# Patient Record
Sex: FEMALE | Race: WHITE | Employment: OTHER | URBAN - METROPOLITAN AREA
[De-identification: names, ages, dates, MRNs, and addresses within clinical notes are randomized per-mention and may not be internally consistent; named-entity substitution may affect disease eponyms.]

---

## 2017-01-05 ENCOUNTER — TELEPHONE (OUTPATIENT)
Dept: PAIN MANAGEMENT | Age: 67
End: 2017-01-05

## 2017-01-05 NOTE — TELEPHONE ENCOUNTER
Fentanyl 50mcg was denied by FEP - pt uses with 12 mcg to = 62mcg- this is available in a single dose patch, therefore, FEP will not cover 2 different strength patches.

## 2017-01-06 NOTE — TELEPHONE ENCOUNTER
Will need script for fentanyl 62 mcg for pt due to insurance. She has both fentanyl 50 and 12 on currently to be changed 1/8/17. Daughter will bring scripts to exchange. She was able to fill 12 mcg but 50 did not go through. They were unaware of situation when filling.

## 2017-01-16 ENCOUNTER — OFFICE VISIT (OUTPATIENT)
Dept: CARDIOLOGY CLINIC | Age: 67
End: 2017-01-16

## 2017-01-16 VITALS
DIASTOLIC BLOOD PRESSURE: 64 MMHG | SYSTOLIC BLOOD PRESSURE: 137 MMHG | WEIGHT: 128 LBS | HEART RATE: 75 BPM | HEIGHT: 64 IN | BODY MASS INDEX: 21.85 KG/M2

## 2017-01-16 DIAGNOSIS — Z95.0 PACEMAKER: ICD-10-CM

## 2017-01-16 DIAGNOSIS — F17.200 TOBACCO USE DISORDER: ICD-10-CM

## 2017-01-16 DIAGNOSIS — E78.00 PURE HYPERCHOLESTEROLEMIA: ICD-10-CM

## 2017-01-16 DIAGNOSIS — Z79.899 ENCOUNTER FOR LONG-TERM (CURRENT) USE OF HIGH-RISK MEDICATION: ICD-10-CM

## 2017-01-16 DIAGNOSIS — I48.19 PERSISTENT ATRIAL FIBRILLATION (HCC): Primary | ICD-10-CM

## 2017-01-16 DIAGNOSIS — I10 ESSENTIAL HYPERTENSION, BENIGN: ICD-10-CM

## 2017-01-16 DIAGNOSIS — J44.9 CHRONIC OBSTRUCTIVE PULMONARY DISEASE, UNSPECIFIED COPD TYPE (HCC): ICD-10-CM

## 2017-01-16 RX ORDER — ATORVASTATIN CALCIUM 20 MG/1
TABLET, FILM COATED ORAL DAILY
COMMUNITY
End: 2021-06-14 | Stop reason: SDUPTHER

## 2017-01-16 NOTE — PROGRESS NOTES
1. Have you been to the ER, urgent care clinic since your last visit? Hospitalized since your last visit? No    2. Have you seen or consulted any other health care providers outside of the [facility] since your last visit? Include any pap smears or colon screening. No     3. Since your last visit, have you had any of the following symptoms? SOB/Swelling in legs/ heart palpitations/ dizzines     4. Have you had any blood work, X-rays or cardiac testing? Per daughter confirmed patient had blood work completed at PCP     5. Where do you normally have your labs drawn? [facility]      6. Do you need any refills today?   None     Medications confirmed by patient and patient's daughter

## 2017-01-16 NOTE — PROGRESS NOTES
Last Office Visit date : 7/6/16  Next Office visit date : 1/16/17  Last remote check date : 10/28/16  scanned in our chart : yes

## 2017-01-16 NOTE — PATIENT INSTRUCTIONS
please get remote checks for pacer or ICD every 3 months and Office check in 1 yr  Please call our office after every transmission to confirm success of transmission  Medications Discontinued During This Encounter   Medication Reason    fentaNYL (DURAGESIC) 12 mcg/hr patch Duplicate Order    fentaNYL (DURAGESIC) 50 mcg/hr PATCH Duplicate Order    fentaNYL (DURAGESIC) 50 mcg/hr PATCH Duplicate Order    OTHER Duplicate Order    oxyCODONE IR (OXY-IR) 30 mg immediate release tablet Duplicate Order       Orders Placed This Encounter    CBC W/O DIFF     Standing Status:   Future     Standing Expiration Date:   4/18/2017    LIPID PANEL     Standing Status:   Future     Standing Expiration Date:   4/18/2017    HEPATIC FUNCTION PANEL     Standing Status:   Future     Standing Expiration Date:   4/18/2017    PROGRAM EVAL (IN PERSON) IMPLANT DEVICE,PACEMAKER,MULT LEAD     Order Specific Question:   Reason for Exam:     Answer:   f/u pacer          Stopping Smoking: Care Instructions  Your Care Instructions  Cigarette smokers crave the nicotine in cigarettes. Giving it up is much harder than simply changing a habit. Your body has to stop craving the nicotine. It is hard to quit, but you can do it. There are many tools that people use to quit smoking. You may find that combining tools works best for you. There are several steps to quitting. First you get ready to quit. Then you get support to help you. After that, you learn new skills and behaviors to become a nonsmoker. For many people, a necessary step is getting and using medicine. Your doctor will help you set up the plan that best meets your needs. You may want to attend a smoking cessation program to help you quit smoking. When you choose a program, look for one that has proven success. Ask your doctor for ideas.  You will greatly increase your chances of success if you take medicine as well as get counseling or join a cessation program.  Some of the changes you feel when you first quit tobacco are uncomfortable. Your body will miss the nicotine at first, and you may feel short-tempered and grumpy. You may have trouble sleeping or concentrating. Medicine can help you deal with these symptoms. You may struggle with changing your smoking habits and rituals. The last step is the tricky one: Be prepared for the smoking urge to continue for a time. This is a lot to deal with, but keep at it. You will feel better. Follow-up care is a key part of your treatment and safety. Be sure to make and go to all appointments, and call your doctor if you are having problems. Its also a good idea to know your test results and keep a list of the medicines you take. How can you care for yourself at home? · Ask your family, friends, and coworkers for support. You have a better chance of quitting if you have help and support. · Join a support group, such as Nicotine Anonymous, for people who are trying to quit smoking. · Consider signing up for a smoking cessation program, such as the American Lung Association's Freedom from Smoking program.  · Set a quit date. Pick your date carefully so that it is not right in the middle of a big deadline or stressful time. Once you quit, do not even take a puff. Get rid of all ashtrays and lighters after your last cigarette. Clean your house and your clothes so that they do not smell of smoke. · Learn how to be a nonsmoker. Think about ways you can avoid those things that make you reach for a cigarette. ¨ Avoid situations that put you at greatest risk for smoking. For some people, it is hard to have a drink with friends without smoking. For others, they might skip a coffee break with coworkers who smoke. ¨ Change your daily routine. Take a different route to work or eat a meal in a different place. · Cut down on stress. Calm yourself or release tension by doing an activity you enjoy, such as reading a book, taking a hot bath, or gardening.   · Talk to your doctor or pharmacist about nicotine replacement therapy, which replaces the nicotine in your body. You still get nicotine but you do not use tobacco. Nicotine replacement products help you slowly reduce the amount of nicotine you need. These products come in several forms, many of them available over-the-counter:  ¨ Nicotine patches  ¨ Nicotine gum and lozenges  ¨ Nicotine inhaler  · Ask your doctor about bupropion (Wellbutrin) or varenicline (Chantix), which are prescription medicines. They do not contain nicotine. They help you by reducing withdrawal symptoms, such as stress and anxiety. · Some people find hypnosis, acupuncture, and massage helpful for ending the smoking habit. · Eat a healthy diet and get regular exercise. Having healthy habits will help your body move past its craving for nicotine. · Be prepared to keep trying. Most people are not successful the first few times they try to quit. Do not get mad at yourself if you smoke again. Make a list of things you learned and think about when you want to try again, such as next week, next month, or next year. Where can you learn more? Go to http://jose alejandro-lizzy.info/. Enter H788 in the search box to learn more about \"Stopping Smoking: Care Instructions. \"  Current as of: May 26, 2016  Content Version: 11.1  © 6147-8188 [employer]. Care instructions adapted under license by [employer] (which disclaims liability or warranty for this information). If you have questions about a medical condition or this instruction, always ask your healthcare professional. [place] [address] any warranty or liability for your use of this information.

## 2017-01-16 NOTE — PROGRESS NOTES
HISTORY OF PRESENT ILLNESS  Juancarlos Ware is a 78 y.o. female. HPI Comments: F/u AFib, HTN, CHF, MR, HLD, s/p DDD  1/17 has a cold for 6 wks    Palpitations    The history is provided by the medical records. This is a chronic problem. The current episode started more than 1 week ago. The problem has not changed since onset. The problem occurs daily (off and on). Associated symptoms include irregular heartbeat, dizziness and shortness of breath. Pertinent negatives include no diaphoresis, no fever, no chest pain, no claudication, no orthopnea, no PND, no syncope, no abdominal pain, no nausea, no vomiting, no headaches, no weakness, no cough, no hemoptysis and no sputum production. Risk factors include diabetes mellitus, hypertension, smoking/tobacco exposure and dyslipidemia. Her past medical history is significant for DM, hypertension and atrial fibrillation. Hypertension   The history is provided by the medical records. This is a chronic problem. Associated symptoms include shortness of breath. Pertinent negatives include no chest pain, no abdominal pain and no headaches. Cholesterol Problem   The history is provided by the medical records. This is a chronic problem. Associated symptoms include shortness of breath. Pertinent negatives include no chest pain, no abdominal pain and no headaches. Pacemaker Check   The history is provided by the patient and medical records. Associated symptoms include shortness of breath. Pertinent negatives include no chest pain, no abdominal pain and no headaches. Dizziness   The history is provided by the patient. This is a new problem. The current episode started more than 1 week ago. The problem occurs every several days (3/wk; 5-10 mts). The problem has not changed since onset. Associated symptoms include shortness of breath. Pertinent negatives include no chest pain, no abdominal pain and no headaches. The symptoms are aggravated by twisting (looking/reaching up).  The symptoms are relieved by rest and laying down. Shortness of Breath   The history is provided by the patient. This is a chronic problem. The problem occurs intermittently. The current episode started more than 1 week ago. The problem has not changed since onset. Pertinent negatives include no fever, no headaches, no ear pain, no neck pain, no cough, no sputum production, no hemoptysis, no wheezing, no PND, no orthopnea, no chest pain, no syncope, no vomiting, no abdominal pain, no rash, no leg swelling and no claudication. The problem's precipitants include exercise (2 blocks). Review of Systems   Constitutional: Negative for chills, diaphoresis, fever and weight loss. HENT: Negative for ear pain and hearing loss. Eyes: Negative for blurred vision. Respiratory: Positive for shortness of breath. Negative for cough, hemoptysis, sputum production, wheezing and stridor. Cardiovascular: Positive for palpitations. Negative for chest pain, orthopnea, claudication, leg swelling, syncope and PND. Gastrointestinal: Negative for abdominal pain, heartburn, nausea and vomiting. Musculoskeletal: Negative for myalgias and neck pain. Skin: Negative for rash. Neurological: Positive for dizziness. Negative for tingling, tremors, focal weakness, loss of consciousness, weakness and headaches. Psychiatric/Behavioral: Negative for depression and suicidal ideas. No Known Allergies    Past Medical History   Diagnosis Date    Abnormal myocardial perfusion study 05/07/2010     Anterior ischemia c/w at least 1-vessel CAD. No WMA. EF 51%. Positive EKG at 78% pred max HR. Ex time 7 min 30 sec.  Allergic rhinitis 1/30/2010    Atrial fibrillation ([facility] [address]     Broken ankle 08/22/2013     left    Carotid stenosis 1/30/2010    COPD     Essential hypertension     Heart failure ([facility] [address]     History of amiodarone therapy 5/11/2015    History of cardiac cath 05/25/2010     Patent coronary arteries. LVEDP 13 mmHg. EF 65%.  History of cervical cancer 11/05    History of echocardiogram 08/21/2013     EF 50-55%. No RWMA. Gr 2 DDfx. Mild LAE. Mild MR.      Hypertension     Migraines 1/30/2010    Mitral regurgitation     Orthostatic dizziness 3/30/2016     ? autonomic dysfunction Vs amio side effect     Pacemaker 11/25/13     DDD    Pure hypercholesterolemia 1/30/2010    Rosacea 1/30/2010    Type II or unspecified type diabetes mellitus without mention of complication, not stated as uncontrolled 1/30/2010    Unspecified hypothyroidism 1/30/2010       Family History   Problem Relation Age of Onset    Hypertension Mother     Cancer Maternal Aunt      breast CA 68yo    Breast Cancer Maternal Aunt 79    Diabetes Maternal Grandmother     Cancer Maternal Aunt      uterine CA    Diabetes Daughter     Other Daughter      lupus (SLE) and coagulopathy    Breast Cancer Paternal Aunt 61    Heart Attack Sister     Heart Disease Neg Hx        Social History   Substance Use Topics    Smoking status: Current Some Day Smoker     Packs/day: 0.50     Years: 42.00     Last attempt to quit: 5/7/2013    Smokeless tobacco: Never Used      Comment: per patient some days none and some days a few     Alcohol use No        Current Outpatient Prescriptions   Medication Sig    atorvastatin (LIPITOR) 20 mg tablet Take  by mouth daily.  XARELTO 20 mg tab tablet TAKE ONE TABLET BY MOUTH ONCE DAILY WITH DINNER    [START ON 1/31/2017] fentaNYL (DURAGESIC) 12 mcg/hr patch 1 Patch by TransDERmal route every seventy-two (72) hours for 30 days. For chronic pain  Use with 50 ug patch = 62 ug total dose  Indications: CHRONIC PAIN WITH OPIOID TOLERANCE, SEVERE PAIN WITH OPIOID TOLERANCE    [START ON 1/31/2017] oxyCODONE IR (OXY-IR) 30 mg immediate release tablet Take 1 Tab by mouth four (4) times daily as needed for Pain for up to 30 days. Max Daily Amount: 120 mg.  Indications: NEUROPATHIC PAIN, PAIN    diltiazem XR (DILACOR XR) 180 mg XR capsule TAKE ONE CAPSULE BY MOUTH ONCE DAILY    digoxin (LANOXIN) 0.25 mg tablet Take 1 Tab by mouth daily. (Patient taking differently: Take 0.125 mg by mouth daily.)    naloxegol (MOVANTIK) 25 mg tab tablet Take 25 mg by mouth daily.  insulin lispro (HUMALOG) 100 unit/mL injection by SubCUTAneous route.  insulin glargine (LANTUS) 100 unit/mL injection by SubCUTAneous route nightly.  ipratropium-albuterol (COMBIVENT RESPIMAT)  mcg/actuation inhaler Take 1 Puff by inhalation every six (6) hours as needed for Wheezing or Shortness of Breath.  pyridoxine (VITAMIN B-6) 100 mg tablet Take 100 mg by mouth daily.  fish oil-dha-epa 1,200-144-216 mg cap Take 1 Cap by mouth daily.  levothyroxine (SYNTHROID) 125 mcg tablet Take 125 mcg by mouth Daily (before breakfast).  tiotropium (SPIRIVA WITH HANDIHALER) 18 mcg inhalation capsule Take 1 Cap by inhalation daily.  cholecalciferol, vitamin d3, (VITAMIN D) 1,000 unit tablet Take 5,000 Units by mouth daily. No current facility-administered medications for this visit. Past Surgical History   Procedure Laterality Date    Hx carotid endarterectomy  11/06     right Dr. Glenny Bradley Hx hysterectomy  5/06    Hx afib ablation  2/8/13     Dr Katherine Ramirez Hx svt ablation  2/18/2013    Hx pacemaker  11/25/13     DDD    Hx orthopaedic Right 07/11/2016     knee replacement       Diagnostic Studies: Old records reviewed and show as follows  EKG tracings reviewed by me today.     CARDIOLOGY STUDIES 8/2/2013 1/31/2013   EKG Result - 11/12 Atrial Flutter, rapid heart rate, normal axis, diffuse nonspecific ST-T changes   Myocardial Perfusion Scan Result - 12/05 nl scan, EF 51%; 4/08 nl scan, EF 66%; 5/10 abn scan, partially reversible anterior ischemia, EF 51%;   Echocardiogram - Complete Result 55%EF, mild MR, trivial PE 4/07 EF 60-65% mild DD, mild MR; 4/08 EF 60%,mild DD,trace/mild MR; 2/10 EF 45-50%, PAP 32; 5/10; 3/11 EF 55%, mild MR; 11/12 55%EF, mild LVH,mod MR,   Coronary Angiogram Result - CATH 6/06; 5/10 nl ca;   PFT's with DLCO Result - 9/07; 2/10; 7/10;   10/15 NUC STRESS  CONCLUSION  1. No evidence of ischemia based on this study. 2. Normal left ventricular size and function. Ejection fraction of 49%. 3. Low risk scan. Visit Vitals    /64    Pulse 75    Ht 5' 4\" (1.626 m)    Wt 58.1 kg (128 lb)    BMI 21.97 kg/m2       Ms. Yoo has a reminder for a \"due or due soon\" health maintenance. I have asked that she contact her primary care provider for follow-up on this health maintenance. Physical Exam   Constitutional: She is oriented to person, place, and time. She appears well-developed and well-nourished. No distress. HENT:   Head: Atraumatic. Mouth/Throat: No oropharyngeal exudate. Eyes: Conjunctivae are normal. No scleral icterus. Neck: Neck supple. No JVD present. Cardiovascular: An irregularly irregular rhythm present. Tachycardia present. Exam reveals no gallop. No murmur heard. Pulmonary/Chest: Effort normal and breath sounds normal. No stridor. She has no wheezes. She has no rales. Abdominal: Soft. There is no tenderness. Musculoskeletal: Normal range of motion. She exhibits no edema. Neurological: She is alert and oriented to person, place, and time. She exhibits normal muscle tone. Skin: Skin is warm. She is not diaphoretic. Psychiatric: She has a normal mood and affect. Her behavior is normal.     ekg atrial fibrillation with ventricular paced  ASSESSMENT and  Mace was seen today for irregular heart beat, hypertension, cholesterol problem and pacemaker check. Diagnoses and all orders for this visit:    Persistent atrial fibrillation [facility])    Essential hypertension, benign  Comments:  1/17 controlled    Pure hypercholesterolemia  Comments:  9/15 LDL46; lipitor incr recently  recheck in 1 months  Orders:  -     LIPID PANEL;  Future  -     HEPATIC FUNCTION PANEL; Future    Chronic obstructive pulmonary disease, unspecified COPD type ([facility] [address]    Tobacco use disorder  Comments:  1/17 Patient advised to stop smoking to reduce future cardiovascular events. Encounter for long-term (current) use of high-risk medication  Comments:  anticoags    Orders:  -     CBC W/O DIFF; Future    Pacemaker  Comments:  1/17 nl function, perm AFib  Orders:  -     PROGRAM EVAL (IN PERSON) IMPLANT DEVICE,PACEMAKER,MULT LEAD        Pertinent laboratory and test data reviewed and discussed with patient.   See patient instructions also for other medical advice given    Medications Discontinued During This Encounter   Medication Reason    fentaNYL (DURAGESIC) 12 mcg/hr patch Duplicate Order    fentaNYL (DURAGESIC) 50 mcg/hr PATCH Duplicate Order    fentaNYL (DURAGESIC) 50 mcg/hr PATCH Duplicate Order    OTHER Duplicate Order    oxyCODONE IR (OXY-IR) 30 mg immediate release tablet Duplicate Order       Follow-up Disposition:  Return in about 6 months (around 7/16/2017), or if symptoms worsen or fail to improve, for with pacer/ICD check, post test.

## 2017-01-16 NOTE — MR AVS SNAPSHOT
Visit Information Date & Time Provider Department Dept. Phone Encounter #  
 1/16/2017 10:15 AM Marleni Fernandez MD [facility] [id number] Follow-up Instructions Return in about 6 months (around 7/16/2017), or if symptoms worsen or fail to improve, for with pacer/ICD check, post test.  
  
Your Appointments 1/18/2017  9:00 AM  
PROCEDURE with [facility] IMAGING 2  
[facility] ([facility] SCHEDULING) Appt Note: asim iliack puga [address] [address]  
[phone] [address] [address]  
  
    
 1/18/2017 10:00 AM  
PROCEDURE with [facility] NONIMAGING  
[facility] ([facility] SCHEDULING) Appt Note: agnes puga [address] [address]  
[phone] [address] [address]  
  
    
 1/25/2017  9:20 AM  
Follow Up with Chandan Mejía MD  
[facility] [address] [facility]) Appt Note: return in 2 months 30 [facility] [address]  
[phone] [address] [facility]  
  
    
 2/1/2017 11:45 AM  
Follow Up with Justo Springer MD  
[facility] ([facility] SCHEDULING) [address] [facility] [address] [place] [address]  
[phone]  
  
   
 [address] [address] 7/17/2017 10:00 AM  
ESTABLISHED PATIENT with Marleni Fernandez MD  
[facility]) Appt Note: 6 months with medtr[employer] [address] [facility], [address] [facility] [address]  
[address], [address] [facility] 4300 [facility] Upcoming Health Maintenance Date Due Hepatitis C Screening 1950 FOOT EXAM Q1 5/1/1959 EYE EXAM RETINAL OR DILATED Q1 5/1/1959 FOBT Q 1 YEAR AGE 50-75 5/1/1999 DTaP/Tdap/Td series (1 - Tdap) 10/2/2005 ZOSTER VACCINE AGE 60> 5/1/2009 GLAUCOMA SCREENING Q2Y 5/1/2014 OSTEOPOROSIS SCREENING (DEXA) 5/1/2014 Pneumococcal 65+ Low/Medium Risk (2 of 2 - PPSV23) 5/1/2014 MEDICARE YEARLY EXAM 5/1/2014 BREAST CANCER SCRN MAMMOGRAM 7/25/2015 MICROALBUMIN Q1 1/8/2016 HEMOGLOBIN A1C Q6M 7/10/2016 INFLUENZA AGE 9 TO ADULT 8/1/2016 LIPID PANEL Q1 9/29/2016 Allergies as of 1/16/2017  Review Complete On: 1/16/2017 By: Ta Alberto MD  
 No Known Allergies Current Immunizations  Reviewed on 2/9/2011 Name Date Pneumococcal Vaccine (Unspecified Type) 12/14/2005 TD Vaccine 10/1/2005 Not reviewed this visit You Were Diagnosed With   
  
 Codes Comments Persistent atrial fibrillation (HCC)    -  Primary ICD-10-CM: I48.1 ICD-9-CM: 427.31 Essential hypertension, benign     ICD-10-CM: I10 
ICD-9-CM: 401.1 1/17 controlled Pure hypercholesterolemia     ICD-10-CM: E78.00 ICD-9-CM: 272.0 9/15 LDL46; lipitor incr recently 
recheck in 1 months Chronic obstructive pulmonary disease, unspecified [facility] ([facility]     ICD-10-CM: J44.9 ICD-9-CM: 070 Tobacco use disorder     ICD-10-CM: F17.200 ICD-9-CM: 305.1 1/17 Patient advised to stop smoking to reduce future cardiovascular events. Encounter for long-term (current) use of high-risk medication     ICD-10-CM: Z79.899 ICD-9-CM: V58.69 anticoags Pacemaker     ICD-10-CM: Z95.0 ICD-9-CM: V45.01 1/17 nl function, perm AFib Vitals BP Pulse Height(growth percentile) Weight(growth percentile) BMI OB Status 137/64 75 5' 4\" (1.626 m) 128 lb (58.1 kg) 21.97 kg/m2 Hysterectomy Smoking Status Current Some Day Smoker Vitals History BMI and BSA Data Body Mass Index Body Surface Area  
 21.97 kg/m 2 1.62 m 2 Preferred Pharmacy Pharmacy Name Phone [phone] [address] [phone] Your Updated Medication List  
  
 This list is accurate as of: 1/16/17 11:01 AM.  Always use your most recent med list.  
  
  
  
  
 digoxin 0.25 mg tablet Commonly known as:  LANOXIN Take 1 Tab by mouth daily. dilTIAZem  mg XR capsule Commonly known as:  DILACOR XR  
TAKE ONE CAPSULE BY MOUTH ONCE DAILY  
  
 fentaNYL 12 mcg/hr patch Commonly known as:  DURAGESIC  
1 Patch by TransDERmal route every seventy-two (72) hours for 30 days. For chronic pain Use with 50 ug patch = 62 ug total dose  Indications: CHRONIC PAIN WITH OPIOID TOLERANCE, SEVERE PAIN WITH OPIOID TOLERANCE Start taking on:  1/31/2017  
  
 fish oil-dha-epa 1,200-144-216 mg Cap Take 1 Cap by mouth daily. HumaLOG 100 unit/mL injection Generic drug:  insulin lispro  
by SubCUTAneous route. ipratropium-albuterol  mcg/actuation inhaler Commonly known as:  Jerral Ruck Take 1 Puff by inhalation every six (6) hours as needed for Wheezing or Shortness of Breath. LANTUS 100 unit/mL injection Generic drug:  insulin glargine  
by SubCUTAneous route nightly. LIPITOR 20 mg tablet Generic drug:  atorvastatin Take  by mouth daily. naloxegol 25 mg Tab tablet Commonly known as:  Kelsie Linear Take 25 mg by mouth daily. oxyCODONE IR 30 mg immediate release tablet Commonly known as:  OXY-IR Take 1 Tab by mouth four (4) times daily as needed for Pain for up to 30 days. Max Daily Amount: 120 mg. Indications: NEUROPATHIC PAIN, PAIN Start taking on:  1/31/2017 SYNTHROID 125 mcg tablet Generic drug:  levothyroxine Take 125 mcg by mouth Daily (before breakfast). tiotropium 18 mcg inhalation capsule Commonly known as:  [address] Take 1 Cap by inhalation daily. VITAMIN B-6 100 mg tablet Generic drug:  pyridoxine (vitamin B6) Take 100 mg by mouth daily. VITAMIN D3 1,000 unit tablet Generic drug:  cholecalciferol Take 5,000 Units by mouth daily. XARELTO 20 mg Tab tablet Generic drug:  rivaroxaban TAKE ONE TABLET BY MOUTH ONCE DAILY WITH DINNER We Performed the Following PROGRAM EVAL (IN PERSON) IMPLANT DEVICE,PACEMAKER,MULT LEAD [id number] CPT(R)] Follow-up Instructions Return in about 6 months (around 7/16/2017), or if symptoms worsen or fail to improve, for with pacer/ICD check, post test.  
  
To-Do List   
 Around 02/10/2017 Lab:  CBC W/O DIFF Around 02/10/2017 Lab:  HEPATIC FUNCTION PANEL Around 02/10/2017 Lab:  LIPID PANEL Patient Instructions   
please get remote checks for pacer or ICD every 3 months and Office check in 1 yr Please call our office after every transmission to confirm success of transmission Medications Discontinued During This Encounter Medication Reason  fentaNYL (DURAGESIC) 12 mcg/hr patch Duplicate Order  fentaNYL (DURAGESIC) 50 mcg/hr PATCH Duplicate Order  fentaNYL (DURAGESIC) 50 mcg/hr PATCH Duplicate Order  OTHER Duplicate Order  oxyCODONE IR (OXY-IR) 30 mg immediate release tablet Duplicate Order Orders Placed This Encounter  CBC W/O DIFF Standing Status:   Future Standing Expiration Date:   4/18/2017  LIPID PANEL Standing Status:   Future Standing Expiration Date:   4/18/2017  
 HEPATIC FUNCTION PANEL Standing Status:   Future Standing Expiration Date:   4/18/2017  
 PROGRAM EVAL (IN PERSON) IMPLANT DEVICE,PACEMAKER,MULT LEAD Order Specific Question:   Reason for Exam: Answer:   f/u pacer Stopping Smoking: Care Instructions Your Care Instructions Cigarette smokers crave the nicotine in cigarettes. Giving it up is much harder than simply changing a habit. Your body has to stop craving the nicotine. It is hard to quit, but you can do it. There are many tools that people use to quit smoking. You may find that combining tools works best for you. There are several steps to quitting. First you get ready to quit. Then you get support to help you. After that, you learn new skills and behaviors to become a nonsmoker. For many people, a necessary step is getting and using medicine. Your doctor will help you set up the plan that best meets your needs. You may want to attend a smoking cessation program to help you quit smoking. When you choose a program, look for one that has proven success. Ask your doctor for ideas. You will greatly increase your chances of success if you take medicine as well as get counseling or join a cessation program. 
Some of the changes you feel when you first quit tobacco are uncomfortable. Your body will miss the nicotine at first, and you may feel short-tempered and grumpy. You may have trouble sleeping or concentrating. Medicine can help you deal with these symptoms. You may struggle with changing your smoking habits and rituals. The last step is the tricky one: Be prepared for the smoking urge to continue for a time. This is a lot to deal with, but keep at it. You will feel better. Follow-up care is a key part of your treatment and safety. Be sure to make and go to all appointments, and call your doctor if you are having problems. Its also a good idea to know your test results and keep a list of the medicines you take. How can you care for yourself at home? · Ask your family, friends, and coworkers for support. You have a better chance of quitting if you have help and support. · Join a support group, such as Nicotine Anonymous, for people who are trying to quit smoking. · Consider signing up for a smoking cessation program, such as the American Lung Association's Freedom from Smoking program. 
· Set a quit date. Pick your date carefully so that it is not right in the middle of a big deadline or stressful time. Once you quit, do not even take a puff.  Get rid of all ashtrays and lighters after your last cigarette. Clean your house and your clothes so that they do not smell of smoke. · Learn how to be a nonsmoker. Think about ways you can avoid those things that make you reach for a cigarette. ¨ Avoid situations that put you at greatest risk for smoking. For some people, it is hard to have a drink with friends without smoking. For others, they might skip a coffee break with coworkers who smoke. ¨ Change your daily routine. Take a different route to work or eat a meal in a different place. · Cut down on stress. Calm yourself or release tension by doing an activity you enjoy, such as reading a book, taking a hot bath, or gardening. · Talk to your doctor or pharmacist about nicotine replacement therapy, which replaces the nicotine in your body. You still get nicotine but you do not use tobacco. Nicotine replacement products help you slowly reduce the amount of nicotine you need. These products come in several forms, many of them available over-the-counter: ¨ Nicotine patches ¨ Nicotine gum and lozenges ¨ Nicotine inhaler · Ask your doctor about bupropion (Wellbutrin) or varenicline (Chantix), which are prescription medicines. They do not contain nicotine. They help you by reducing withdrawal symptoms, such as stress and anxiety. · Some people find hypnosis, acupuncture, and massage helpful for ending the smoking habit. · Eat a healthy diet and get regular exercise. Having healthy habits will help your body move past its craving for nicotine. · Be prepared to keep trying. Most people are not successful the first few times they try to quit. Do not get mad at yourself if you smoke again. Make a list of things you learned and think about when you want to try again, such as next week, next month, or next year. Where can you learn more? Go to http://jose alejandro-lizzy.info/. Enter P221 in the search box to learn more about \"Stopping Smoking: Care Instructions. \" Current as of: May 26, 2016 Content Version: 11.1 © 7647-3435 [employer]. Care instructions adapted under license by [employer] (which disclaims liability or warranty for this information). If you have questions about a medical condition or this instruction, always ask your healthcare professional. [address] any warranty or liability for your use of this information. Introducing [address]! Tyler Farrell introduces Helveta patient portal. Now you can access parts of your medical record, email your doctor's office, and request medication refills online. 1. In your internet browser, go to [email or web address] 2. Click on the First Time User? Click Here link in the Sign In box. You will see the New Member Sign Up page. 3. Enter your Helveta Access Code exactly as it appears below. You will not need to use this code after youve completed the sign-up process. If you do not sign up before the expiration date, you must request a new code. · Helveta Access Code: [email or web address] Expires: 4/16/2017 10:14 AM 
 
4. Enter the last four digits of your Social Security Number (xxxx) and Date of Birth (mm/dd/yyyy) as indicated and click Submit. You will be taken to the next sign-up page. 5. Create a Helveta ID. This will be your Helveta login ID and cannot be changed, so think of one that is secure and easy to remember. 6. Create a Helveta password. You can change your password at any time. 7. Enter your Password Reset Question and Answer. This can be used at a later time if you forget your password. 8. Enter your e-mail address. You will receive e-mail notification when new information is available in [address] 9. Click Sign Up. You can now view and download portions of your medical record. 10. Click the Download Summary menu link to download a portable copy of your medical information. If you have questions, please visit the Frequently Asked Questions section of the [employer] website. Remember, [employer] is NOT to be used for urgent needs. For medical emergencies, dial 911. Now available from your iPhone and Android! Please provide this summary of care documentation to your next provider. Your primary care clinician is listed as Winsome Scherer. If you have any questions after today's visit, please call [phone].

## 2017-01-16 NOTE — LETTER
[address] 1950 1/16/2017 Dear Ewa Washington MD 
 
I had the pleasure of evaluating  Ms. Yoo in office today. Below are the relevant portions of my assessment and plan of care. ICD-10-CM ICD-9-CM 1. Persistent atrial fibrillation (HCC) I48.1 427.31   
2. Essential hypertension, benign I10 401.1 1/17 controlled 3. Pure hypercholesterolemia E78.00 272.0 LIPID PANEL  
   HEPATIC FUNCTION PANEL  
 9/15 LDL46; lipitor incr recently 
recheck in 1 months 4. Chronic obstructive pulmonary disease, unspecified COPD type ([facility] J44.9 496   
5. Tobacco use disorder F17.200 305.1 1/17 Patient advised to stop smoking to reduce future cardiovascular events. 6. Encounter for long-term (current) use of high-risk medication Z79.899 V58.69 CBC W/O DIFF  
 anticoags 7. Pacemaker Z95.0 V45.01 PROGRAM EVAL (IN PERSON) IMPLANT DEVICE,PACEMAKER,MULT LEAD  
 1/17 nl function, perm AFib Current Outpatient Prescriptions Medication Sig Dispense Refill  atorvastatin (LIPITOR) 20 mg tablet Take  by mouth daily.  XARELTO 20 mg tab tablet TAKE ONE TABLET BY MOUTH ONCE DAILY WITH DINNER 30 Tab 6  [START ON 1/31/2017] fentaNYL (DURAGESIC) 12 mcg/hr patch 1 Patch by TransDERmal route every seventy-two (72) hours for 30 days. For chronic pain Use with 50 ug patch = 62 ug total dose  Indications: CHRONIC PAIN WITH OPIOID TOLERANCE, SEVERE PAIN WITH OPIOID TOLERANCE 10 Patch 0  
 [START ON 1/31/2017] oxyCODONE IR (OXY-IR) 30 mg immediate release tablet Take 1 Tab by mouth four (4) times daily as needed for Pain for up to 30 days. Max Daily Amount: 120 mg. Indications: NEUROPATHIC PAIN, PAIN 120 Tab 0  
 diltiazem XR (DILACOR XR) 180 mg XR capsule TAKE ONE CAPSULE BY MOUTH ONCE DAILY 90 Cap 1  digoxin (LANOXIN) 0.25 mg tablet Take 1 Tab by mouth daily. (Patient taking differently: Take 0.125 mg by mouth daily.) 30 Tab 6  naloxegol (MOVANTIK) 25 mg tab tablet Take 25 mg by mouth daily.  insulin lispro (HUMALOG) 100 unit/mL injection by SubCUTAneous route.  insulin glargine (LANTUS) 100 unit/mL injection by SubCUTAneous route nightly.  ipratropium-albuterol (COMBIVENT RESPIMAT)  mcg/actuation inhaler Take 1 Puff by inhalation every six (6) hours as needed for Wheezing or Shortness of Breath. 1 Inhaler 1  pyridoxine (VITAMIN B-6) 100 mg tablet Take 100 mg by mouth daily.  fish oil-dha-epa 1,200-144-216 mg cap Take 1 Cap by mouth daily.  levothyroxine (SYNTHROID) 125 mcg tablet Take 125 mcg by mouth Daily (before breakfast).  tiotropium (SPIRIVA WITH HANDIHALER) 18 mcg inhalation capsule Take 1 Cap by inhalation daily. 30 Cap 11  
 cholecalciferol, vitamin d3, (VITAMIN D) 1,000 unit tablet Take 5,000 Units by mouth daily. Orders Placed This Encounter  CBC W/O DIFF Standing Status:   Future Standing Expiration Date:   4/18/2017  LIPID PANEL Standing Status:   Future Standing Expiration Date:   4/18/2017  
 HEPATIC FUNCTION PANEL Standing Status:   Future Standing Expiration Date:   4/18/2017  
 PROGRAM EVAL (IN PERSON) IMPLANT DEVICE,PACEMAKER,MULT LEAD Order Specific Question:   Reason for Exam: Answer:   f/u pacer  atorvastatin (LIPITOR) 20 mg tablet Sig: Take  by mouth daily. If you have questions, please do not hesitate to call me. I look forward to following Ms. oYo along with you. Sincerely, Elisabeth Parker MD

## 2017-01-18 ENCOUNTER — OFFICE VISIT (OUTPATIENT)
Dept: VASCULAR SURGERY | Age: 67
End: 2017-01-18

## 2017-01-18 DIAGNOSIS — I10 ESSENTIAL HYPERTENSION, BENIGN: ICD-10-CM

## 2017-01-18 DIAGNOSIS — I74.09 AORTOILIAC OCCLUSIVE DISEASE (HCC): ICD-10-CM

## 2017-01-18 DIAGNOSIS — I65.23 CAROTID STENOSIS, BILATERAL: ICD-10-CM

## 2017-01-18 NOTE — PROCEDURES
Kelsie Tomlinson   *** FINAL REPORT ***    Name: Jessi Yepez  MRN: [id number]       Outpatient  : 01 May 1950  HIS Order #: [id number]  [address] [facility] Visit #: [id number]  Date: 2017    TYPE OF TEST: Aorto-Iliac Duplex    REASON FOR TEST  Iliac stent    B-Mode:-                 (cm)   1     2     3  Aortic diameter:         AP:                 1.3                           TV:                 1.6  Common iliac diameter:   Right:                           Left:    Duplex:-                           PSV  Stenosis                           ----- --------------------  Aorta: (1)                     Normal         (2)         (3)               115.0    Right common iliac:      122.0 Normal  Right external iliac:    142.0 Normal    Left common iliac:       158.0 Normal  Left external iliac:     142.0 Normal    INTERPRETATION/FINDINGS  Duplex images were obtained using 2-D gray scale, color flow and  spectral doppler. 1. Patent  common iliac artery stents bilaterally  with no evidence of   significant stenosis. 2. Patent external iliac arteries bilaterally with no evidence of  significant stenosis. 3. Patent internal iliac arteries bilaterally. 4. Multiphasic flow noted throughout the iliac vessels. 5. Compared to the previous study on 16 there is no significant  change. ADDITIONAL COMMENTS    I have personally reviewed the data relevant to the interpretation of  this  study. TECHNOLOGIST: Jazmin Jones RVT, WENCESLAO  Signed: 2017 09:52 AM    PHYSICIAN: Mine Guevara.  Calin Paniagua MD  Signed: 2017 04:41 PM

## 2017-01-18 NOTE — PROCEDURES
[facility]   *** FINAL REPORT ***    Name: Quinton Leos  MRN: [id number]       Outpatient  : 01 May 1950  HIS Order #: [id number]  [address] [facility] Visit #: [id number]  Date: 2017    TYPE OF TEST: Cerebrovascular Duplex    REASON FOR TEST  Asymptomatic-post op follow up, Carotid stenosis    Right Carotid:-             Proximal               Mid                 Distal  cm/s  Systolic  Diastolic  Systolic  Diastolic  Systolic  Diastolic  CCA:     69.1      12.0       52.0      14.0       52.0      17.0  Bulb:    42.0      14.0  ECA:     48.0       5.0  ICA:     53.0      13.0       76.0      24.0       85.0      18.0  ICA/CCA:  1.4       1.5    ICA Stenosis: <50%    Right Vertebral:-  Finding: Antegrade  Sys:       52.0  Prema:       11.0    Right Subclavian:    Left Carotid:-            Proximal                Mid                 Distal  cm/s  Systolic  Diastolic  Systolic  Diastolic  Systolic  Diastolic  CCA:     44.3      18.0       60.0      15.0       43.0      11.0  Bulb:    58.0      18.0  ECA:     52.0       5.0  ICA:    155.0      38.0      119.0      29.0      115.0      38.0  ICA/CCA:  1.8       2.1    ICA Stenosis: 50-69%    Left Vertebral:-  Finding: Antegrade  Sys:     4813.0  Prema:    Left Subclavian:    INTERPRETATION/FINDINGS  Duplex images were obtained using 2-D gray scale, color flow and  spectral doppler analysis. 1. Patent right carotid endarterectomy with mild plaquing in the less  than 50% range. 2. Moderate  50-69% stenosis in the left internal carotid artery. 3. Heterogeneous plaque in the left common carotid artery without  significant stenosis. 4. No significant stenosis in the external carotid arteries  bilaterally. 5. Antegrade flow in both vertebral arteries. Plaque Morphology:  1. Heterogeneous plaque in the bulb and right ICA. 2. Irregular varying density plaque in the bulb and left ICA. No prior study in this lab for comparison.     ADDITIONAL COMMENTS    I have personally reviewed the data relevant to the interpretation of  this  study. TECHNOLOGIST: Varinder Swanson RVT, WENCESLAO  Signed: 01/18/2017 09:46 AM    PHYSICIAN: Omaira Cartwright.  Randi Rowland MD  Signed: 01/18/2017 04:41 PM

## 2017-01-25 ENCOUNTER — OFFICE VISIT (OUTPATIENT)
Dept: PAIN MANAGEMENT | Age: 67
End: 2017-01-25

## 2017-01-25 VITALS
BODY MASS INDEX: 21.97 KG/M2 | WEIGHT: 128 LBS | SYSTOLIC BLOOD PRESSURE: 122 MMHG | DIASTOLIC BLOOD PRESSURE: 54 MMHG | HEART RATE: 95 BPM

## 2017-01-25 DIAGNOSIS — Z86.59 HISTORY OF DEPRESSION: ICD-10-CM

## 2017-01-25 DIAGNOSIS — M79.2 NEURALGIA AND NEURITIS: ICD-10-CM

## 2017-01-25 DIAGNOSIS — G89.4 CHRONIC PAIN SYNDROME: ICD-10-CM

## 2017-01-25 DIAGNOSIS — Z79.899 ENCOUNTER FOR LONG-TERM (CURRENT) USE OF HIGH-RISK MEDICATION: ICD-10-CM

## 2017-01-25 DIAGNOSIS — M15.9 GENERALIZED OA: ICD-10-CM

## 2017-01-25 DIAGNOSIS — E11.40 DIABETIC NEUROPATHY, PAINFUL (HCC): Primary | ICD-10-CM

## 2017-01-25 RX ORDER — FENTANYL 50 UG/1
1 PATCH TRANSDERMAL
Qty: 10 PATCH | Refills: 0 | Status: SHIPPED | OUTPATIENT
Start: 2017-04-03 | End: 2017-01-25 | Stop reason: RX

## 2017-01-25 RX ORDER — FENTANYL 12.5 UG/1
1 PATCH TRANSDERMAL
Qty: 10 PATCH | Refills: 0 | Status: SHIPPED | OUTPATIENT
Start: 2017-03-05 | End: 2017-01-25 | Stop reason: RX

## 2017-01-25 RX ORDER — FENTANYL 50 UG/1
1 PATCH TRANSDERMAL
Qty: 10 PATCH | Refills: 0 | Status: SHIPPED | OUTPATIENT
Start: 2017-03-05 | End: 2017-01-25 | Stop reason: RX

## 2017-01-25 RX ORDER — OXYCODONE HYDROCHLORIDE 30 MG/1
30 TABLET ORAL
Qty: 120 TAB | Refills: 0 | Status: SHIPPED | OUTPATIENT
Start: 2017-03-30 | End: 2017-04-21 | Stop reason: SDUPTHER

## 2017-01-25 RX ORDER — FENTANYL 50 UG/1
1 PATCH TRANSDERMAL
Qty: 10 PATCH | Refills: 0 | Status: SHIPPED | OUTPATIENT
Start: 2017-02-04 | End: 2017-01-25 | Stop reason: RX

## 2017-01-25 RX ORDER — FENTANYL 12.5 UG/1
1 PATCH TRANSDERMAL
Qty: 10 PATCH | Refills: 0 | Status: SHIPPED | OUTPATIENT
Start: 2017-03-03 | End: 2017-01-25 | Stop reason: RX

## 2017-01-25 RX ORDER — OXYCODONE HYDROCHLORIDE 30 MG/1
30 TABLET ORAL
Qty: 120 TAB | Refills: 0 | Status: SHIPPED | OUTPATIENT
Start: 2017-03-01 | End: 2017-04-21 | Stop reason: SDUPTHER

## 2017-01-25 RX ORDER — FENTANYL 12.5 UG/1
1 PATCH TRANSDERMAL
Qty: 10 PATCH | Refills: 0 | Status: SHIPPED | OUTPATIENT
Start: 2017-02-04 | End: 2017-01-25 | Stop reason: RX

## 2017-01-25 RX ORDER — OXYCODONE HYDROCHLORIDE 30 MG/1
30 TABLET ORAL
Qty: 120 TAB | Refills: 0 | Status: SHIPPED | OUTPATIENT
Start: 2017-01-31 | End: 2017-04-21 | Stop reason: SDUPTHER

## 2017-01-25 NOTE — PROGRESS NOTES
Nursing Notes    Patient presents to the office today in follow-up. Patient rates her pain at 7/10 on the numerical pain scale. Reviewed medications with counts as follows:    Rx Date filled Qty Dispensed Pill Count Last Dose Short   Fentanyl 62.5 1/6/17 10 5+1 on Placed 1/24/17 no   torres 30 1/2/17 120 26 1/25/17 no         Comments:     POC UDS was not performed in office today    Any new labs or imaging since last appointment? YES, blood work for c/u    Have you been to an emergency room (ER) or urgent care clinic since your last visit? NO            Have you been hospitalized since your last visit? NO     If yes, where, when, and reason for visit? Have you seen or consulted any other health care providers outside of the [address] [place] [address]  since your last visit? NO     If yes, where, when, and reason for visit? Ms. Yoo has a reminder for a \"due or due soon\" health maintenance. I have asked that she contact her primary care provider for follow-up on this health maintenance.

## 2017-01-25 NOTE — MR AVS SNAPSHOT
Visit Information Date & Time Provider Department Dept. Phone Encounter #  
 1/25/2017  9:20 AM Cb Teresa MD [address] [place] [address] for Pain Management [phone] Follow-up Instructions Return in about 3 months (around 4/25/2017). Your Appointments 2/1/2017 11:45 AM  
Follow Up with Cristiano Morton MD  
[facility] ([facility] SCHEDULING) [address] [facility] [address] [address]  
[phone]  
  
   
 [address] [facility] [address] [facility] [address] 7/17/2017 10:00 AM  
ESTABLISHED PATIENT with Daisy Rocha MD  
[facility] Appt Note: 6 months with [employer] [address] [place] [address], [facility] [address] [address]  
[address] [facility] Robert, [address] [place] [address] Upcoming Health Maintenance Date Due Hepatitis C Screening 1950 FOOT EXAM Q1 5/1/1959 EYE EXAM RETINAL OR DILATED Q1 5/1/1959 FOBT Q 1 YEAR AGE 50-75 5/1/1999 DTaP/Tdap/Td series (1 - Tdap) 10/2/2005 ZOSTER VACCINE AGE 60> 5/1/2009 GLAUCOMA SCREENING Q2Y 5/1/2014 OSTEOPOROSIS SCREENING (DEXA) 5/1/2014 Pneumococcal 65+ Low/Medium Risk (2 of 2 - PPSV23) 5/1/2014 MEDICARE YEARLY EXAM 5/1/2014 BREAST CANCER SCRN MAMMOGRAM 7/25/2015 MICROALBUMIN Q1 1/8/2016 HEMOGLOBIN A1C Q6M 7/10/2016 INFLUENZA AGE 9 TO ADULT 8/1/2016 LIPID PANEL Q1 9/29/2016 Allergies as of 1/25/2017  Review Complete On: 1/25/2017 By: Pedro Haddad RN No Known Allergies Current Immunizations  Reviewed on 2/9/2011 Name Date Pneumococcal Vaccine (Unspecified Type) 12/14/2005 TD Vaccine 10/1/2005 Not reviewed this visit Vitals BP Pulse Weight(growth percentile) BMI OB Status Smoking Status 122/54 95 128 lb (58.1 kg) 21.97 kg/m2 Hysterectomy Current Some Day Smoker BMI and BSA Data Body Mass Index Body Surface Area 21.97 kg/m 2 1.62 m 2 Preferred Pharmacy Pharmacy Name Phone [facility] [address] [phone] Your Updated Medication List  
  
   
This list is accurate as of: 1/25/17 10:30 AM.  Always use your most recent med list.  
  
  
  
  
 digoxin 0.25 mg tablet Commonly known as:  LANOXIN Take 1 Tab by mouth daily. dilTIAZem  mg XR capsule Commonly known as:  DILACOR XR  
TAKE ONE CAPSULE BY MOUTH ONCE DAILY * fentaNYL 12 mcg/hr patch Commonly known as:  DURAGESIC  
1 Patch by TransDERmal route every seventy-two (72) hours for 30 days. For chronic pain Use with 50 ug patch = 62 ug total dose  Indications: CHRONIC PAIN WITH OPIOID TOLERANCE, SEVERE PAIN WITH OPIOID TOLERANCE Start taking on:  1/31/2017 * fentaNYL 50 mcg/hr PATCH Commonly known as:  DURAGESIC  
1 Patch by TransDERmal route every seventy-two (72) hours for 30 days. Indications: CHRONIC PAIN WITH OPIOID TOLERANCE, SEVERE PAIN WITH OPIOID TOLERANCE Start taking on:  2/4/2017 * fentaNYL 12 mcg/hr patch Commonly known as:  DURAGESIC  
1 Patch by TransDERmal route every seventy-two (72) hours for 30 days. For chronic pain Use with 50 ug patch = 62 ug total dose  Indications: CHRONIC PAIN WITH OPIOID TOLERANCE, SEVERE PAIN WITH OPIOID TOLERANCE Start taking on:  2/4/2017 * fentaNYL 12 mcg/hr patch Commonly known as:  DURAGESIC  
1 Patch by TransDERmal route every seventy-two (72) hours for 30 days. For chronic pain Use with 50 ug patch = 62 ug total dose  Indications: CHRONIC PAIN WITH OPIOID TOLERANCE, SEVERE PAIN WITH OPIOID TOLERANCE Start taking on:  3/3/2017 * fentaNYL 12 mcg/hr patch Commonly known as:  DURAGESIC  
1 Patch by TransDERmal route every seventy-two (72) hours for 30 days.  For chronic pain Use with 50 ug patch = 62 ug total dose  Indications: CHRONIC PAIN WITH OPIOID TOLERANCE, SEVERE PAIN WITH OPIOID TOLERANCE  
 Start taking on:  3/5/2017 * fentaNYL 50 mcg/hr PATCH Commonly known as:  DURAGESIC  
1 Patch by TransDERmal route every seventy-two (72) hours for 30 days. Indications: CHRONIC PAIN WITH OPIOID TOLERANCE, SEVERE PAIN WITH OPIOID TOLERANCE Start taking on:  3/5/2017 * fentaNYL 50 mcg/hr PATCH Commonly known as:  DURAGESIC  
1 Patch by TransDERmal route every seventy-two (72) hours for 30 days. Indications: CHRONIC PAIN WITH OPIOID TOLERANCE, SEVERE PAIN WITH OPIOID TOLERANCE Start taking on:  4/3/2017  
  
 fish oil-dha-epa 1,200-144-216 mg Cap Take 1 Cap by mouth daily. HumaLOG 100 unit/mL injection Generic drug:  insulin lispro  
by SubCUTAneous route. ipratropium-albuterol  mcg/actuation inhaler Commonly known as:  Devonda Pillow Take 1 Puff by inhalation every six (6) hours as needed for Wheezing or Shortness of Breath. LANTUS 100 unit/mL injection Generic drug:  insulin glargine  
by SubCUTAneous route nightly. LIPITOR 20 mg tablet Generic drug:  atorvastatin Take  by mouth daily. * naloxegol 25 mg Tab tablet Commonly known as:  Randol Boast Take 25 mg by mouth daily. * naloxegol 25 mg Tab tablet Commonly known as:  Randol Boast Take 1 Tab by mouth daily for 30 days. Indications: OPIOID-INDUCED CONSTIPATION  
  
 * oxyCODONE IR 30 mg immediate release tablet Commonly known as:  OXY-IR Take 1 Tab by mouth four (4) times daily as needed for Pain for up to 30 days. Max Daily Amount: 120 mg. Indications: NEUROPATHIC PAIN, PAIN Start taking on:  1/31/2017 * oxyCODONE IR 30 mg immediate release tablet Commonly known as:  OXY-IR Take 1 Tab by mouth four (4) times daily as needed for Pain for up to 30 days. Max Daily Amount: 120 mg. Indications: NEUROPATHIC PAIN, PAIN Start taking on:  1/31/2017 * oxyCODONE IR 30 mg immediate release tablet Commonly known as:  OXY-IR  
 Take 1 Tab by mouth four (4) times daily as needed for Pain for up to 30 days. Max Daily Amount: 120 mg. Indications: NEUROPATHIC PAIN, PAIN Start taking on:  3/1/2017 * oxyCODONE IR 30 mg immediate release tablet Commonly known as:  OXY-IR Take 1 Tab by mouth four (4) times daily as needed for Pain for up to 30 days. Max Daily Amount: 120 mg. Indications: NEUROPATHIC PAIN, PAIN Start taking on:  3/30/2017 SYNTHROID 125 mcg tablet Generic drug:  levothyroxine Take 125 mcg by mouth Daily (before breakfast). tiotropium 18 mcg inhalation capsule Commonly known as:  [address] Take 1 Cap by inhalation daily. VITAMIN B-6 100 mg tablet Generic drug:  pyridoxine (vitamin B6) Take 100 mg by mouth daily. VITAMIN D3 1,000 unit tablet Generic drug:  cholecalciferol Take 5,000 Units by mouth daily. XARELTO 20 mg Tab tablet Generic drug:  rivaroxaban TAKE ONE TABLET BY MOUTH ONCE DAILY WITH DINNER  
  
 * Notice: This list has 13 medication(s) that are the same as other medications prescribed for you. Read the directions carefully, and ask your doctor or other care provider to review them with you. Prescriptions Printed Refills  
 oxyCODONE IR (OXY-IR) 30 mg immediate release tablet 0 Starting on: 3/30/2017 Sig: Take 1 Tab by mouth four (4) times daily as needed for Pain for up to 30 days. Max Daily Amount: 120 mg. Indications: NEUROPATHIC PAIN, PAIN Class: Print Route: Oral  
 oxyCODONE IR (OXY-IR) 30 mg immediate release tablet 0 Starting on: 3/1/2017 Sig: Take 1 Tab by mouth four (4) times daily as needed for Pain for up to 30 days. Max Daily Amount: 120 mg. Indications: NEUROPATHIC PAIN, PAIN Class: Print Route: Oral  
 oxyCODONE IR (OXY-IR) 30 mg immediate release tablet 0 Starting on: 1/31/2017  Sig: Take 1 Tab by mouth four (4) times daily as needed for Pain for up to 30 days. Max Daily Amount: 120 mg. Indications: NEUROPATHIC PAIN, PAIN Class: Print Route: Oral  
 fentaNYL (DURAGESIC) 12 mcg/hr patch 0 Starting on: 3/3/2017 Si Patch by TransDERmal route every seventy-two (72) hours for 30 days. For chronic pain Use with 50 ug patch = 62 ug total dose  Indications: CHRONIC PAIN WITH OPIOID TOLERANCE, SEVERE PAIN WITH OPIOID TOLERANCE Class: Print Route: TransDERmal  
 fentaNYL (DURAGESIC) 50 mcg/hr PATCH 0 Starting on: 4/3/2017 Si Patch by TransDERmal route every seventy-two (72) hours for 30 days. Indications: CHRONIC PAIN WITH OPIOID TOLERANCE, SEVERE PAIN WITH OPIOID TOLERANCE Class: Print Route: TransDERmal  
 fentaNYL (DURAGESIC) 12 mcg/hr patch 0 Starting on: 3/5/2017 Si Patch by TransDERmal route every seventy-two (72) hours for 30 days. For chronic pain Use with 50 ug patch = 62 ug total dose  Indications: CHRONIC PAIN WITH OPIOID TOLERANCE, SEVERE PAIN WITH OPIOID TOLERANCE Class: Print Route: TransDERmal  
 fentaNYL (DURAGESIC) 50 mcg/hr PATCH 0 Starting on: 3/5/2017 Si Patch by TransDERmal route every seventy-two (72) hours for 30 days. Indications: CHRONIC PAIN WITH OPIOID TOLERANCE, SEVERE PAIN WITH OPIOID TOLERANCE Class: Print Route: TransDERmal  
 fentaNYL (DURAGESIC) 50 mcg/hr PATCH 0 Starting on: 2017 Si Patch by TransDERmal route every seventy-two (72) hours for 30 days. Indications: CHRONIC PAIN WITH OPIOID TOLERANCE, SEVERE PAIN WITH OPIOID TOLERANCE Class: Print Route: TransDERmal  
 fentaNYL (DURAGESIC) 12 mcg/hr patch 0 Starting on: 2017 Si Patch by TransDERmal route every seventy-two (72) hours for 30 days. For chronic pain Use with 50 ug patch = 62 ug total dose  Indications: CHRONIC PAIN WITH OPIOID TOLERANCE, SEVERE PAIN WITH OPIOID TOLERANCE Class: Print Route: TransDERmal  
  
Prescriptions Sent to Pharmacy Refills naloxegol (MOVANTIK) 25 mg tab tablet 5 Sig: Take 1 Tab by mouth daily for 30 days. Indications: OPIOID-INDUCED CONSTIPATION Class: Normal  
 Pharmacy: [address]  #: [phone] Route: Oral  
  
Follow-up Instructions Return in about 3 months (around 4/25/2017). Patient Instructions Current health maintenance issues were reviewed and the patient was advised to followup with his/her PCP for completion of these items. Introducing [facility]! Josie Sarmiento introduces [employer] patient portal. Now you can access parts of your medical record, email your doctor's office, and request medication refills online. 1. In your internet browser, go to [email or web address] 2. Click on the First Time User? Click Here link in the Sign In box. You will see the New Member Sign Up page. 3. Enter your [employer] Access Code exactly as it appears below. You will not need to use this code after youve completed the sign-up process. If you do not sign up before the expiration date, you must request a new code. · [employer] Access Code: [email or web address] Expires: 4/16/2017 10:14 AM 
 
4. Enter the last four digits of your Social Security Number (xxxx) and Date of Birth (mm/dd/yyyy) as indicated and click Submit. You will be taken to the next sign-up page. 5. Create a [employer] ID. This will be your [employer] login ID and cannot be changed, so think of one that is secure and easy to remember. 6. Create a [employer] password. You can change your password at any time. 7. Enter your Password Reset Question and Answer. This can be used at a later time if you forget your password. 8. Enter your e-mail address. You will receive e-mail notification when new information is available in [address] 9. Click Sign Up. You can now view and download portions of your medical record. 10. Click the Download Summary menu link to download a portable copy of your medical information. If you have questions, please visit the Frequently Asked Questions section of the [employer] website. Remember, [employer] is NOT to be used for urgent needs. For medical emergencies, dial 911. Now available from your iPhone and Android! Please provide this summary of care documentation to your next provider. Your primary care clinician is listed as Rip Webb. If you have any questions after today's visit, please call [phone].

## 2017-01-30 NOTE — PROGRESS NOTES
HISTORY OF PRESENT ILLNESS  Irineo Chahal is a 78 y.o. female. HPI she returns for follow-up of chronic, severe pain which is widespread and polyarticular and related to generalized osteoarthritis. He also suffers from painful diabetic polyneuropathy. Pain continues under satisfactory control, averaging 7 out of 10. Pain level today 7 out of 10, outcome 12/28,(The lower the upper number, the better the outcome)  Physical activity and mobility as well as sleep are fair, mood is good. No reported side effects. Review of Systems   Constitutional: Positive for chills. HENT: Positive for hearing loss and sore throat. Eyes: Negative for blurred vision and double vision. Respiratory: Positive for cough and shortness of breath. Cardiovascular: Positive for chest pain and leg swelling. Gastrointestinal: Positive for nausea. Negative for heartburn. Genitourinary: Negative for dysuria and urgency. Musculoskeletal: Positive for falls and joint pain. Skin: Negative for itching and rash. Neurological: Positive for loss of consciousness and weakness. Negative for dizziness. Endo/Heme/Allergies: Positive for environmental allergies. Does not bruise/bleed easily. Psychiatric/Behavioral: Positive for depression. Negative for suicidal ideas. The patient is nervous/anxious and has insomnia. All other systems reviewed and are negative. Physical Exam   Constitutional: She is oriented to person, place, and time. She appears well-developed and well-nourished. No distress. Daughter accompanies patient to appointment   HENT:   Head: Normocephalic. Eyes: Conjunctivae and EOM are normal. Pupils are equal, round, and reactive to light. Neck: No thyromegaly present. Pulmonary/Chest: Effort normal.   Musculoskeletal: She exhibits no edema or tenderness (throughout  legs to light palpation). Right knee: She exhibits decreased range of motion.         Left knee: She exhibits decreased range of motion. Neurological: She is alert and oriented to person, place, and time. No cranial nerve deficit (grossly). Gait (antalgic) abnormal.   CN 2-12 grossly intact   Psychiatric: She has a normal mood and affect. Her behavior is normal. Judgment and thought content normal.   Nursing note and vitals reviewed. ASSESSMENT and PLAN  Encounter Diagnoses   Name Primary?  Diabetic neuropathy, painful ([facility] [address] Yes    Neuralgia and neuritis     Encounter for long-term (current) use of high-risk medication     Chronic pain syndrome     Generalized OA     History of depression      She will continue on her current analgesic regimen as this is providing excellent pain control with improve functionality and minimal side effects. 3 month reassess her    No concerns are raised for misuse, abuse, or diversion. 1. Pain medications are prescribed with the objective of pain relief and improved physical and psychosocial function in this patient. 2. Counseled patient on proper use of prescribed medications and reviewed opioid contract. 3. Counseled patient about chronic medical conditions and their relationship to anxiety and depression and recommended mental health support as needed. 4. Reviewed with patient self-help tools, home exercise, and lifestyle changes to assist the patient in self-management of symptoms. 5. Advised patient to have a primary care provider to continue care for health maintenance and general medical conditions and support for referral to specialty care as needed. 6. Reviewed with patient the treatment plan, goals of treatment plan, and limitations of treatment plan, to include the potential for side effects from medications and procedures. If side effects occur, it is the responsibility of the patient to inform the clinic so that a change in the treatment plan can be made in a safe manner.  The patient is advised that stopping prescribed medication may cause an increase in symptoms and possible medication withdrawal symptoms. The patient is informed an emergency room evaluation may be necessary if this occurs. DISPOSITION: The patients condition and plan were discussed at length and all questions were answered. The patient agrees with the plan.     Counseling occupied > 50% of visit:  Total time: 40 minutes

## 2017-02-06 ENCOUNTER — TELEPHONE (OUTPATIENT)
Dept: PAIN MANAGEMENT | Age: 67
End: 2017-02-06

## 2017-02-06 NOTE — TELEPHONE ENCOUNTER
Received call from pharmacy regarding pt's fentanyl scripts. Dosage is 62.5 mcg and not 62. They will change verbally.

## 2017-03-15 ENCOUNTER — OFFICE VISIT (OUTPATIENT)
Dept: VASCULAR SURGERY | Age: 67
End: 2017-03-15

## 2017-03-15 VITALS
RESPIRATION RATE: 13 BRPM | HEART RATE: 78 BPM | DIASTOLIC BLOOD PRESSURE: 70 MMHG | WEIGHT: 128 LBS | HEIGHT: 64 IN | BODY MASS INDEX: 21.85 KG/M2 | SYSTOLIC BLOOD PRESSURE: 118 MMHG

## 2017-03-15 DIAGNOSIS — I73.9 PAD (PERIPHERAL ARTERY DISEASE) (HCC): ICD-10-CM

## 2017-03-15 DIAGNOSIS — I70.213 ATHEROSCLEROSIS OF NATIVE ARTERY OF BOTH LOWER EXTREMITIES WITH INTERMITTENT CLAUDICATION (HCC): ICD-10-CM

## 2017-03-15 DIAGNOSIS — I74.09 AORTOILIAC OCCLUSIVE DISEASE (HCC): ICD-10-CM

## 2017-03-15 DIAGNOSIS — I65.23 BILATERAL CAROTID ARTERY STENOSIS: Primary | ICD-10-CM

## 2017-03-15 NOTE — PROGRESS NOTES
[facility] [address]    Chief Complaint   Patient presents with    Carotid Artery Stenosis       History and Physical    [address] [place] [address] Drive is a 78 y.o. female with bilateral common iliac artery kissing stents. She also has a previous right carotid endarterectomy and known moderate disease within her left internal carotid artery. She remains asymptomatic without any TIA or strokes. She has no claudication. She did recently have a total knee replacement of the right knee. No rest pain no fevers or chills. Past Medical History:   Diagnosis Date    Abnormal myocardial perfusion study 05/07/2010    Anterior ischemia c/w at least 1-vessel CAD. No WMA. EF 51%. Positive EKG at 78% pred max HR. Ex time 7 min 30 sec.  Allergic rhinitis 1/30/2010    Atrial fibrillation ([facility]     Broken ankle 08/22/2013    left    Carotid stenosis 1/30/2010    COPD     Essential hypertension     Heart failure ([facility]     History of amiodarone therapy 5/11/2015    History of cardiac cath 05/25/2010    Patent coronary arteries. LVEDP 13 mmHg. EF 65%.  History of cervical cancer 11/05    History of echocardiogram 08/21/2013    EF 50-55%. No RWMA. Gr 2 DDfx. Mild LAE. Mild MR.      Hypertension     Migraines 1/30/2010    Mitral regurgitation     Orthostatic dizziness 3/30/2016    ? autonomic dysfunction Vs amio side effect     Pacemaker 11/25/13    DDD    Pure hypercholesterolemia 1/30/2010    Rosacea 1/30/2010    Type II or unspecified type diabetes mellitus without mention of complication, not stated as uncontrolled 1/30/2010    Unspecified hypothyroidism 1/30/2010     Past Surgical History:   Procedure Laterality Date    HX AFIB ABLATION  2/8/13    Dr Sage Collier HX CAROTID ENDARTERECTOMY  11/06    right Dr. Alexi Eaton HX HYSTERECTOMY  5/06    HX ORTHOPAEDIC Right 07/11/2016    knee replacement    HX PACEMAKER  11/25/13    DDD    HX SVT ABLATION  2/18/2013     Patient Active Problem List Diagnosis Code    Type II or unspecified type diabetes mellitus without mention of complication, not stated as uncontrolled E11.9    Essential hypertension, benign I10    Pure hypercholesterolemia E78.00    Unspecified hypothyroidism E03.9    Allergic rhinitis J30.9    Carotid stenosis I65.29    Rosacea L71.9    Migraines G43.909    COPD (chronic obstructive pulmonary disease) ([facility]) J44.9    Edema R60.9    Leg pain M79.606    Encounter for long-term (current) use of high-risk medication Z79.899    DM (diabetes mellitus) ([facility]) E11.9    Chronic pain syndrome G89.4    Peripheral neuropathy ([facility]) G62.9    Neuralgia and neuritis M79.2    History of depression Z86.59    Generalized OA M15.9    S/P ablation operation for arrhythmia Z98.890, Z86.79    Tobacco use disorder F17.200    Lesion of ulnar nerve G56.20    Diabetic neuropathy, painful ([facility]) E11.40    Pre-operative cardiovascular examination Z01.810    Syncope and collapse R55    Fracture of fibula with tibia, left, closed S82.202A, S82.402A    Palpitations R00.2    Irregular heart beat I49.9    Elevated INR R79.1    Pacemaker Z95.0    H/O amiodarone therapy Z92.29    Enthesopathy of hip region M76.899    Aortoiliac occlusive disease ([facility]) I74.09    PAD (peripheral artery disease) ([facility]) I73.9    Atherosclerosis of native artery of both lower extremities with intermittent claudication ([facility]) I70.213    History of amiodarone therapy Z92.29    Pneumonia J18.9    Atrial fibrillation with RVR ([facility]) I48.91    Atrial fibrillation ([facility]) I48.91    Orthostatic dizziness R42     Current Outpatient Prescriptions   Medication Sig Dispense Refill    dilTIAZem XR (DILACOR XR) 180 mg XR capsule TAKE ONE CAPSULE BY MOUTH ONCE DAILY 90 Cap 3    [START ON 3/30/2017] oxyCODONE IR (OXY-IR) 30 mg immediate release tablet Take 1 Tab by mouth four (4) times daily as needed for Pain for up to 30 days. Max Daily Amount: 120 mg.  Indications: NEUROPATHIC PAIN, PAIN 120 Tab 0    oxyCODONE IR (OXY-IR) 30 mg immediate release tablet Take 1 Tab by mouth four (4) times daily as needed for Pain for up to 30 days. Max Daily Amount: 120 mg. Indications: NEUROPATHIC PAIN, PAIN 120 Tab 0    OTHER Fentanyl 62 mcg patches, apply one patch every 72 hours. 10 Patch 0    OTHER Fentanyl 62 mcg patch. Apply one patch every 72 hours 10 Patch 0    [START ON 4/1/2017] OTHER Fentanyl 62 mcg patch. Apply one patch every 72 hours. 10 Patch 0    atorvastatin (LIPITOR) 20 mg tablet Take  by mouth daily.  XARELTO 20 mg tab tablet TAKE ONE TABLET BY MOUTH ONCE DAILY WITH DINNER 30 Tab 6    digoxin (LANOXIN) 0.25 mg tablet Take 1 Tab by mouth daily. (Patient taking differently: Take 0.125 mg by mouth daily.) 30 Tab 6    naloxegol (MOVANTIK) 25 mg tab tablet Take 25 mg by mouth daily.  insulin lispro (HUMALOG) 100 unit/mL injection by SubCUTAneous route.  insulin glargine (LANTUS) 100 unit/mL injection by SubCUTAneous route nightly.  ipratropium-albuterol (COMBIVENT RESPIMAT)  mcg/actuation inhaler Take 1 Puff by inhalation every six (6) hours as needed for Wheezing or Shortness of Breath. 1 Inhaler 1    pyridoxine (VITAMIN B-6) 100 mg tablet Take 100 mg by mouth daily.  fish oil-dha-epa 1,200-144-216 mg cap Take 1 Cap by mouth daily.  levothyroxine (SYNTHROID) 125 mcg tablet Take 125 mcg by mouth Daily (before breakfast).  tiotropium (SPIRIVA WITH HANDIHALER) 18 mcg inhalation capsule Take 1 Cap by inhalation daily. 30 Cap 11    cholecalciferol, vitamin d3, (VITAMIN D) 1,000 unit tablet Take 5,000 Units by mouth daily. No Known Allergies  Social History     Social History    Marital status:      Spouse name: N/A    Number of children: N/A    Years of education: N/A     Occupational History    Not on file.      Social History Main Topics    Smoking status: Current Some Day Smoker     Packs/day: 0.50     Years: 42.00 Last attempt to quit: 5/7/2013    Smokeless tobacco: Never Used      Comment: per patient some days none and some days a few     Alcohol use No    Drug use: No    Sexual activity: Not on file     Other Topics Concern    Not on file     Social History Narrative      Family History   Problem Relation Age of Onset    Hypertension Mother     Cancer Maternal Aunt      breast CA 66yo    Breast Cancer Maternal Aunt 79    Diabetes Maternal Grandmother     Cancer Maternal Aunt      uterine CA    Diabetes Daughter     Other Daughter      lupus (SLE) and coagulopathy    Breast Cancer Paternal Aunt 61    Heart Attack Sister     Heart Disease Neg Hx        Physical Exam:    Visit Vitals    /70 (BP 1 Location: Right arm, BP Patient Position: Sitting)    Pulse 78    Resp 13    Ht 5' 4\" (1.626 m)    Wt 128 lb (58.1 kg)    BMI 21.97 kg/m2      General: Well-appearing female no acute distress  HEENT: EOMI no scleral icterus is noted  Pulmonary: No increased work of breathing is noted  Extremities: Warm and well-perfused bilaterally  Neuro: Cranial nerves II through XII grossly intact strength is 5 out of 5 in upper and lower extremities bilaterally    Impression and Plan:  Lizbeth Magana is a 78 y.o. female with known asymptomatic carotid artery stenosis of the left internal carotid artery with the right one previously repaired she also has known peripheral arterial disease with aortoiliac occlusive disease repaired with bilateral common iliac artery stenting. She is currently asymptomatic from her normal arterial disease without any claudication. We will continue to watch all of her arterial disease on a yearly basis with ultrasounds. We reviewed the plan with the patient and the patient understands. We also gave the patient appropriate instructions on their disease process and when to call back. Follow-up Disposition:  Return in about 1 year (around 3/15/2018).     Zuhair Hewitt MD    PLEASE NOTE:  This document has been produced using voice recognition software. Unrecognized errors in transcription may be present.

## 2017-03-15 NOTE — MR AVS SNAPSHOT
Visit Information Date & Time Provider Department Dept. Phone Encounter #  
 3/15/2017  1:30 PM Casimiro García, [address] [facility] [phone] [id number] Follow-up Instructions Return in about 1 year (around 3/15/2018). Follow-up and Disposition History Your Appointments 4/21/2017  9:20 AM  
Follow Up with Gena Nowak MD  
[facility] Appt Note: return in 3 months [address] [facility] [address]  
[phone] Beverly Barron [address] [address] 7/17/2017 10:00 AM  
ESTABLISHED PATIENT with Caryl Johnson MD  
[facility] Replaced by [facility]) Appt Note: 6 months with [employer] [address] [facility], [address] [facility] [address] [address], [address] [place] [address] Upcoming Health Maintenance Date Due Hepatitis C Screening 1950 FOOT EXAM Q1 5/1/1959 EYE EXAM RETINAL OR DILATED Q1 5/1/1959 FOBT Q 1 YEAR AGE 50-75 5/1/1999 DTaP/Tdap/Td series (1 - Tdap) 10/2/2005 ZOSTER VACCINE AGE 60> 5/1/2009 GLAUCOMA SCREENING Q2Y 5/1/2014 OSTEOPOROSIS SCREENING (DEXA) 5/1/2014 Pneumococcal 65+ Low/Medium Risk (2 of 2 - PPSV23) 5/1/2014 MEDICARE YEARLY EXAM 5/1/2014 BREAST CANCER SCRN MAMMOGRAM 7/25/2015 MICROALBUMIN Q1 1/8/2016 HEMOGLOBIN A1C Q6M 7/10/2016 INFLUENZA AGE 9 TO ADULT 8/1/2016 LIPID PANEL Q1 9/29/2016 Allergies as of 3/15/2017  Review Complete On: 3/15/2017 By: Casimiro García MD  
 No Known Allergies Current Immunizations  Reviewed on 2/9/2011 Name Date Pneumococcal Vaccine (Unspecified Type) 12/14/2005 TD Vaccine 10/1/2005 Not reviewed this visit You Were Diagnosed With   
  
 Codes Comments Bilateral carotid artery stenosis    -  Primary ICD-10-CM: X59.23 ICD-9-CM: 433.10, 433.30 Atherosclerosis of native artery of both lower extremities with intermittent claudication ([facility]     ICD-10-CM: P95.125 ICD-9-CM: 440.21 Aortoiliac occlusive disease (HCC)     ICD-10-CM: I74.09 
ICD-9-CM: 444.09   
 PAD (peripheral artery disease) ([facility]     ICD-10-CM: I73.9 ICD-9-CM: 443. 9 Vitals BP Pulse Resp Height(growth percentile) Weight(growth percentile) BMI  
 118/70 (BP 1 Location: Right arm, BP Patient Position: Sitting) 78 13 5' 4\" (1.626 m) 128 lb (58.1 kg) 21.97 kg/m2 OB Status Smoking Status Hysterectomy Current Some Day Smoker Vitals History BMI and BSA Data Body Mass Index Body Surface Area  
 21.97 kg/m 2 1.62 m 2 Preferred Pharmacy Pharmacy Name Phone [facility] [address] [phone] Your Updated Medication List  
  
   
This list is accurate as of: 3/15/17  2:17 PM.  Always use your most recent med list.  
  
  
  
  
 digoxin 0.25 mg tablet Commonly known as:  LANOXIN Take 1 Tab by mouth daily. dilTIAZem  mg XR capsule Commonly known as:  DILACOR XR  
TAKE ONE CAPSULE BY MOUTH ONCE DAILY  
  
 fish oil-dha-epa 1,200-144-216 mg Cap Take 1 Cap by mouth daily. HumaLOG 100 unit/mL injection Generic drug:  insulin lispro  
by SubCUTAneous route. ipratropium-albuterol  mcg/actuation inhaler Commonly known as:  Jackqulyn Cristian Take 1 Puff by inhalation every six (6) hours as needed for Wheezing or Shortness of Breath. LANTUS 100 unit/mL injection Generic drug:  insulin glargine  
by SubCUTAneous route nightly. LIPITOR 20 mg tablet Generic drug:  atorvastatin Take  by mouth daily. naloxegol 25 mg Tab tablet Commonly known as:  Montez Son Take 25 mg by mouth daily. * OTHER Fentanyl 62 mcg patches, apply one patch every 72 hours. * OTHER Fentanyl 62 mcg patch. Apply one patch every 72 hours * OTHER Fentanyl 62 mcg patch. Apply one patch every 72 hours. Start taking on:  4/1/2017 * oxyCODONE IR 30 mg immediate release tablet Commonly known as:  OXY-IR Take 1 Tab by mouth four (4) times daily as needed for Pain for up to 30 days. Max Daily Amount: 120 mg. Indications: NEUROPATHIC PAIN, PAIN  
  
 * oxyCODONE IR 30 mg immediate release tablet Commonly known as:  OXY-IR Take 1 Tab by mouth four (4) times daily as needed for Pain for up to 30 days. Max Daily Amount: 120 mg. Indications: NEUROPATHIC PAIN, PAIN Start taking on:  3/30/2017 SYNTHROID 125 mcg tablet Generic drug:  levothyroxine Take 125 mcg by mouth Daily (before breakfast). tiotropium 18 mcg inhalation capsule Commonly known as:  [address] Take 1 Cap by inhalation daily. VITAMIN B-6 100 mg tablet Generic drug:  pyridoxine (vitamin B6) Take 100 mg by mouth daily. VITAMIN D3 1,000 unit tablet Generic drug:  cholecalciferol Take 5,000 Units by mouth daily. XARELTO 20 mg Tab tablet Generic drug:  rivaroxaban TAKE ONE TABLET BY MOUTH ONCE DAILY WITH DINNER  
  
 * Notice: This list has 5 medication(s) that are the same as other medications prescribed for you. Read the directions carefully, and ask your doctor or other care provider to review them with you. Follow-up Instructions Return in about 1 year (around 3/15/2018). To-Do List   
 03/15/2018 Imaging:  DUPLEX AORTA ILIAC GRAFT COMPLETE AMB   
  
 03/15/2018 Imaging:  DUPLEX CAROTID BILATERAL AMB   
  
 03/15/2018 Imaging:  LOWER EXT ART PVR MULT LEVEL SEG PRESSURES [facility]! Tyler Farrell introduces [employer] patient portal. Now you can access parts of your medical record, email your doctor's office, and request medication refills online. 1. In your internet browser, go to [email or web address] 2. Click on the First Time User? Click Here link in the Sign In box. You will see the New Member Sign Up page. 3. Enter your [employer] Access Code exactly as it appears below. You will not need to use this code after youve completed the sign-up process. If you do not sign up before the expiration date, you must request a new code. · [employer] Access Code: [email or web address] Expires: 4/16/2017 11:14 AM 
 
4. Enter the last four digits of your Social Security Number (xxxx) and Date of Birth (mm/dd/yyyy) as indicated and click Submit. You will be taken to the next sign-up page. 5. Create a [employer] ID. This will be your [employer] login ID and cannot be changed, so think of one that is secure and easy to remember. 6. Create a [employer] password. You can change your password at any time. 7. Enter your Password Reset Question and Answer. This can be used at a later time if you forget your password. 8. Enter your e-mail address. You will receive e-mail notification when new information is available in [address] 9. Click Sign Up. You can now view and download portions of your medical record. 10. Click the Download Summary menu link to download a portable copy of your medical information. If you have questions, please visit the Frequently Asked Questions section of the [employer] website. Remember, [employer] is NOT to be used for urgent needs. For medical emergencies, dial 911. Now available from your iPhone and Android! Please provide this summary of care documentation to your next provider. Your primary care clinician is listed as Atiya Gtz. If you have any questions after today's visit, please call [phone].

## 2017-04-21 ENCOUNTER — OFFICE VISIT (OUTPATIENT)
Dept: PAIN MANAGEMENT | Age: 67
End: 2017-04-21

## 2017-04-21 VITALS
BODY MASS INDEX: 21.97 KG/M2 | WEIGHT: 128 LBS | HEART RATE: 92 BPM | SYSTOLIC BLOOD PRESSURE: 131 MMHG | DIASTOLIC BLOOD PRESSURE: 56 MMHG

## 2017-04-21 DIAGNOSIS — I70.213 ATHEROSCLEROSIS OF NATIVE ARTERY OF BOTH LOWER EXTREMITIES WITH INTERMITTENT CLAUDICATION (HCC): ICD-10-CM

## 2017-04-21 DIAGNOSIS — G56.23 LESIONS OF BOTH ULNAR NERVES: ICD-10-CM

## 2017-04-21 DIAGNOSIS — G43.709 CHRONIC MIGRAINE WITHOUT AURA WITHOUT STATUS MIGRAINOSUS, NOT INTRACTABLE: ICD-10-CM

## 2017-04-21 DIAGNOSIS — Z86.59 HISTORY OF DEPRESSION: ICD-10-CM

## 2017-04-21 DIAGNOSIS — G89.4 CHRONIC PAIN SYNDROME: ICD-10-CM

## 2017-04-21 DIAGNOSIS — M15.9 GENERALIZED OA: Primary | ICD-10-CM

## 2017-04-21 DIAGNOSIS — E11.40 DIABETIC NEUROPATHY, PAINFUL (HCC): ICD-10-CM

## 2017-04-21 DIAGNOSIS — Z79.899 ENCOUNTER FOR LONG-TERM (CURRENT) USE OF HIGH-RISK MEDICATION: ICD-10-CM

## 2017-04-21 LAB
ALCOHOL UR POC: NORMAL
AMPHETAMINES UR POC: NEGATIVE
BARBITURATES UR POC: NEGATIVE
BENZODIAZEPINES UR POC: NEGATIVE
BUPRENORPHINE UR POC: NORMAL
CANNABINOIDS UR POC: NEGATIVE
CARISOPRODOL UR POC: NORMAL
COCAINE UR POC: NEGATIVE
FENTANYL UR POC: NORMAL
MDMA/ECSTASY UR POC: NEGATIVE
METHADONE UR POC: NEGATIVE
METHAMPHETAMINE UR POC: NEGATIVE
METHYLPHENIDATE UR POC: NEGATIVE
OPIATES UR POC: NORMAL
OXYCODONE UR POC: NORMAL
PHENCYCLIDINE UR POC: NEGATIVE
PROPOXYPHENE UR POC: NORMAL
TRAMADOL UR POC: NORMAL
TRICYCLICS UR POC: NEGATIVE

## 2017-04-21 RX ORDER — FENTANYL 62.5 UG/H
62.5 PATCH, EXTENDED RELEASE TRANSDERMAL
Qty: 10 PATCH | Refills: 0 | Status: SHIPPED | OUTPATIENT
Start: 2017-04-30 | End: 2017-07-21 | Stop reason: SDUPTHER

## 2017-04-21 RX ORDER — OXYCODONE HYDROCHLORIDE 30 MG/1
30 TABLET ORAL
Qty: 120 TAB | Refills: 0 | Status: SHIPPED | OUTPATIENT
Start: 2017-05-26 | End: 2017-07-21 | Stop reason: SDUPTHER

## 2017-04-21 RX ORDER — FENTANYL 62.5 UG/H
62.5 PATCH, EXTENDED RELEASE TRANSDERMAL
Qty: 10 PATCH | Refills: 0 | Status: SHIPPED | OUTPATIENT
Start: 2017-06-26 | End: 2017-07-21 | Stop reason: SDUPTHER

## 2017-04-21 RX ORDER — FENTANYL 62.5 UG/H
62.5 PATCH, EXTENDED RELEASE TRANSDERMAL
Qty: 10 PATCH | Refills: 0 | Status: SHIPPED | OUTPATIENT
Start: 2017-05-28 | End: 2017-07-21 | Stop reason: SDUPTHER

## 2017-04-21 RX ORDER — OXYCODONE HYDROCHLORIDE 30 MG/1
30 TABLET ORAL
Qty: 120 TAB | Refills: 0 | Status: SHIPPED | OUTPATIENT
Start: 2017-04-28 | End: 2017-07-21 | Stop reason: SDUPTHER

## 2017-04-21 RX ORDER — OXYCODONE HYDROCHLORIDE 30 MG/1
30 TABLET ORAL
Qty: 120 TAB | Refills: 0 | Status: SHIPPED | OUTPATIENT
Start: 2017-06-24 | End: 2017-07-21 | Stop reason: SDUPTHER

## 2017-04-21 NOTE — PROGRESS NOTES
Nursing Notes    Patient presents to the office today in follow-up. Patient rates her pain at 8/10 on the numerical pain scale. Reviewed medications with counts as follows:    Rx Date filled Qty Dispensed Pill Count Last Dose Short   Fentanyl 62.5 4/1/18 10 4+1 on Placed 5/20/17 no   torres 30 3/30/17 120 36 4/21/18 no       Comments: pt has been having trouble keeping new patches on, advised by pharmacist that other pt's were having the same issues with the new patch    POC UDS was performed in office today    Any new labs or imaging since last appointment? NO    Have you been to an emergency room (ER) or urgent care clinic since your last visit? NO            Have you been hospitalized since your last visit? NO     If yes, where, when, and reason for visit? Have you seen or consulted any other health care providers outside of the [address] [place] [address]  since your last visit? NO     If yes, where, when, and reason for visit? Ms. Yoo has a reminder for a \"due or due soon\" health maintenance. I have asked that she contact her primary care provider for follow-up on this health maintenance.

## 2017-05-09 NOTE — PROGRESS NOTES
HISTORY OF PRESENT ILLNESS  Mai Henderson is a 75 y.o. female. HPI she returns for follow-up of chronic, severe pain which is widespread and polyarticular and related to generalized osteoarthritis. She also suffers from painful diabetic polyneuropathy. Pain continues under good control with 50-60% overall relief. Pain continues to be adversely affected by the changing weather patterns. Pain level today 8 out of 10, outcome 15/28, (The lower the upper number, the better the outcome)  Physical activity and mobility as well as sleep are fair, mood is poor. No reported side effects    A current review of the  does not identify any inconsistency. UDS obtained and reviewed; formal confirmation from laboratory is pending. Review of Systems   Constitutional: Positive for chills. HENT: Positive for hearing loss and sore throat. Eyes: Negative for blurred vision and double vision. Respiratory: Positive for cough and shortness of breath. Cardiovascular: Positive for chest pain and leg swelling. Gastrointestinal: Positive for nausea. Negative for heartburn. Genitourinary: Negative for dysuria and urgency. Musculoskeletal: Positive for falls and joint pain. Skin: Negative for itching and rash. Neurological: Positive for loss of consciousness and weakness. Negative for dizziness. Endo/Heme/Allergies: Positive for environmental allergies. Does not bruise/bleed easily. Psychiatric/Behavioral: Positive for depression. Negative for suicidal ideas. The patient is nervous/anxious and has insomnia. All other systems reviewed and are negative. Physical Exam   Constitutional: She is oriented to person, place, and time. She appears well-developed and well-nourished. No distress. Daughter accompanies patient to appointment   HENT:   Head: Normocephalic. Eyes: Conjunctivae and EOM are normal. Pupils are equal, round, and reactive to light. Neck: No thyromegaly present. Pulmonary/Chest: Effort normal.   Musculoskeletal: She exhibits no edema or tenderness (throughout  legs to light palpation). Right knee: She exhibits decreased range of motion. Left knee: She exhibits decreased range of motion. Neurological: She is alert and oriented to person, place, and time. No cranial nerve deficit (grossly). Gait (antalgic) abnormal.   CN 2-12 grossly intact   Psychiatric: She has a normal mood and affect. Her behavior is normal. Judgment and thought content normal.   Nursing note and vitals reviewed. ASSESSMENT and PLAN  Encounter Diagnoses   Name Primary?  Generalized OA Yes    Encounter for long-term (current) use of high-risk medication     Atherosclerosis of native artery of both lower extremities with intermittent claudication (HCC)     Chronic pain syndrome     History of depression     Diabetic neuropathy, painful (HCC)     Lesions of both ulnar nerves     Chronic migraine without aura without status migrainosus, not intractable      She will continue on her current analgesic regimen as this is providing good pain control with improve functionality and minimal side effects. 3 month reassess her    No concerns are raised for misuse, abuse, or diversion. 1. Pain medications are prescribed with the objective of pain relief and improved physical and psychosocial function in this patient. 2. Counseled patient on proper use of prescribed medications and reviewed opioid contract. 3. Counseled patient about chronic medical conditions and their relationship to anxiety and depression and recommended mental health support as needed. 4. Reviewed with patient self-help tools, home exercise, and lifestyle changes to assist the patient in self-management of symptoms. 5. Advised patient to have a primary care provider to continue care for health maintenance and general medical conditions and support for referral to specialty care as needed.   6. Reviewed with patient the treatment plan, goals of treatment plan, and limitations of treatment plan, to include the potential for side effects from medications and procedures. If side effects occur, it is the responsibility of the patient to inform the clinic so that a change in the treatment plan can be made in a safe manner. The patient is advised that stopping prescribed medication may cause an increase in symptoms and possible medication withdrawal symptoms. The patient is informed an emergency room evaluation may be necessary if this occurs. DISPOSITION: The patients condition and plan were discussed at length and all questions were answered. The patient agrees with the plan.     Counseling occupied > 50% of visit:  Total time: 40 minutes

## 2017-06-07 RX ORDER — DIGOXIN 250 MCG
0.12 TABLET ORAL DAILY
Qty: 30 TAB | Refills: 6 | Status: SHIPPED | OUTPATIENT
Start: 2017-06-07 | End: 2018-02-16 | Stop reason: ALTCHOICE

## 2017-06-26 ENCOUNTER — TELEPHONE (OUTPATIENT)
Dept: CARDIOLOGY CLINIC | Age: 67
End: 2017-06-26

## 2017-06-26 ENCOUNTER — OFFICE VISIT (OUTPATIENT)
Dept: ORTHOPEDIC SURGERY | Age: 67
End: 2017-06-26

## 2017-06-26 VITALS
SYSTOLIC BLOOD PRESSURE: 113 MMHG | DIASTOLIC BLOOD PRESSURE: 63 MMHG | WEIGHT: 125 LBS | TEMPERATURE: 97.8 F | HEART RATE: 92 BPM | HEIGHT: 64 IN | BODY MASS INDEX: 21.34 KG/M2 | RESPIRATION RATE: 16 BRPM

## 2017-06-26 DIAGNOSIS — M46.1 SACROILIITIS (HCC): Primary | ICD-10-CM

## 2017-06-26 DIAGNOSIS — M79.18 MYOFASCIAL PAIN: ICD-10-CM

## 2017-06-26 DIAGNOSIS — M70.61 TROCHANTERIC BURSITIS OF BOTH HIPS: ICD-10-CM

## 2017-06-26 DIAGNOSIS — M70.62 TROCHANTERIC BURSITIS OF BOTH HIPS: ICD-10-CM

## 2017-06-26 RX ORDER — LIDOCAINE HYDROCHLORIDE 20 MG/ML
4 INJECTION, SOLUTION EPIDURAL; INFILTRATION; INTRACAUDAL; PERINEURAL ONCE
Qty: 4 ML | Refills: 0
Start: 2017-06-26 | End: 2017-06-26

## 2017-06-26 RX ORDER — BETAMETHASONE SODIUM PHOSPHATE AND BETAMETHASONE ACETATE 3; 3 MG/ML; MG/ML
12 INJECTION, SUSPENSION INTRA-ARTICULAR; INTRALESIONAL; INTRAMUSCULAR; SOFT TISSUE ONCE
Qty: 2 ML | Refills: 0
Start: 2017-06-26 | End: 2017-06-26

## 2017-06-26 RX ORDER — BUPIVACAINE HYDROCHLORIDE 2.5 MG/ML
4 INJECTION, SOLUTION INFILTRATION; PERINEURAL ONCE
Qty: 4 ML | Refills: 0
Start: 2017-06-26 | End: 2017-06-26

## 2017-06-26 NOTE — PATIENT INSTRUCTIONS
[facility] Activation    Thank you for requesting access to [employer]. Please follow the instructions below to securely access and download your online medical record. [facility] allows you to send messages to your doctor, view your test results, renew your prescriptions, schedule appointments, and more. How Do I Sign Up? 1. In your internet browser, go to [email or web address]  2. Click on the First Time User? Click Here link in the Sign In box. You will be redirect to the New Member Sign Up page. 3. Enter your [facility] Access Code exactly as it appears below. You will not need to use this code after youve completed the sign-up process. If you do not sign up before the expiration date, you must request a new code. [facility] Access Code: [email or web address]  Expires: 2017 10:01 AM (This is the date your [employer] access code will )    4. Enter the last four digits of your Social Security Number (xxxx) and Date of Birth (mm/dd/yyyy) as indicated and click Submit. You will be taken to the next sign-up page. 5. Create a [facility] ID. This will be your [facility] login ID and cannot be changed, so think of one that is secure and easy to remember. 6. Create a [facility] password. You can change your password at any time. 7. Enter your Password Reset Question and Answer. This can be used at a later time if you forget your password. 8. Enter your e-mail address. You will receive e-mail notification when new information is available in [address]. 9. Click Sign Up. You can now view and download portions of your medical record. 10. Click the Download Summary menu link to download a portable copy of your medical information. Additional Information    If you have questions, please visit the Frequently Asked Questions section of the [employer] website at [email or web address] Remember, [facility] is NOT to be used for urgent needs. For medical emergencies, dial 911.          Low Back Pain: Exercises  Your Care Instructions  Here are some examples of typical rehabilitation exercises for your condition. Start each exercise slowly. Ease off the exercise if you start to have pain. Your doctor or physical therapist will tell you when you can start these exercises and which ones will work best for you. How to do the exercises  Press-up    1. Lie on your stomach, supporting your body with your forearms. 2. Press your elbows down into the floor to raise your upper back. As you do this, relax your stomach muscles and allow your back to arch without using your back muscles. As your press up, do not let your hips or pelvis come off the floor. 3. Hold for 15 to 30 seconds, then relax. 4. Repeat 2 to 4 times. Alternate arm and leg (bird dog) exercise    Note: Do this exercise slowly. Try to keep your body straight at all times, and do not let one hip drop lower than the other. 1. Start on the floor, on your hands and knees. 2. Tighten your belly muscles. 3. Raise one leg off the floor, and hold it straight out behind you. Be careful not to let your hip drop down, because that will twist your trunk. 4. Hold for about 6 seconds, then lower your leg and switch to the other leg. 5. Repeat 8 to 12 times on each leg. 6. Over time, work up to holding for 10 to 30 seconds each time. 7. If you feel stable and secure with your leg raised, try raising the opposite arm straight out in front of you at the same time. Knee-to-chest exercise    1. Lie on your back with your knees bent and your feet flat on the floor. 2. Bring one knee to your chest, keeping the other foot flat on the floor (or keeping the other leg straight, whichever feels better on your lower back). 3. Keep your lower back pressed to the floor. Hold for at least 15 to 30 seconds. 4. Relax, and lower the knee to the starting position. 5. Repeat with the other leg. Repeat 2 to 4 times with each leg.   6. To get more stretch, put your other leg flat on the floor while pulling your knee to your chest.  Curl-ups    1. Lie on the floor on your back with your knees bent at a 90-degree angle. Your feet should be flat on the floor, about 12 inches from your buttocks. 2. Cross your arms over your chest. If this bothers your neck, try putting your hands behind your neck (not your head), with your elbows spread apart. 3. Slowly tighten your belly muscles and raise your shoulder blades off the floor. 4. Keep your head in line with your body, and do not press your chin to your chest.  5. Hold this position for 1 or 2 seconds, then slowly lower yourself back down to the floor. 6. Repeat 8 to 12 times. Pelvic tilt exercise    1. Lie on your back with your knees bent. 2. \"Brace\" your stomach. This means to tighten your muscles by pulling in and imagining your belly button moving toward your spine. You should feel like your back is pressing to the floor and your hips and pelvis are rocking back. 3. Hold for about 6 seconds while you breathe smoothly. 4. Repeat 8 to 12 times. Heel dig bridging    1. Lie on your back with both knees bent and your ankles bent so that only your heels are digging into the floor. Your knees should be bent about 90 degrees. 2. Then push your heels into the floor, squeeze your buttocks, and lift your hips off the floor until your shoulders, hips, and knees are all in a straight line. 3. Hold for about 6 seconds as you continue to breathe normally, and then slowly lower your hips back down to the floor and rest for up to 10 seconds. 4. Do 8 to 12 repetitions. Hamstring stretch in doorway    1. Lie on your back in a doorway, with one leg through the open door. 2. Slide your leg up the wall to straighten your knee. You should feel a gentle stretch down the back of your leg. 3. Hold the stretch for at least 15 to 30 seconds. Do not arch your back, point your toes, or bend either knee.  Keep one heel touching the floor and the other heel touching the wall.  4. Repeat with your other leg. 5. Do 2 to 4 times for each leg. Hip flexor stretch    1. Kneel on the floor with one knee bent and one leg behind you. Place your forward knee over your foot. Keep your other knee touching the floor. 2. Slowly push your hips forward until you feel a stretch in the upper thigh of your rear leg. 3. Hold the stretch for at least 15 to 30 seconds. Repeat with your other leg. 4. Do 2 to 4 times on each side. Wall sit    1. Stand with your back 10 to 12 inches away from a wall. 2. Lean into the wall until your back is flat against it. 3. Slowly slide down until your knees are slightly bent, pressing your lower back into the wall. 4. Hold for about 6 seconds, then slide back up the wall. 5. Repeat 8 to 12 times. Follow-up care is a key part of your treatment and safety. Be sure to make and go to all appointments, and call your doctor if you are having problems. It's also a good idea to know your test results and keep a list of the medicines you take. Where can you learn more? Go to http://jose alejandro-lizzy.info/. Enter I583 in the search box to learn more about \"Low Back Pain: Exercises. \"  Current as of: March 21, 2017  Content Version: 11.3  © 5177-9656 [employer]. Care instructions adapted under license by [employer] (which disclaims liability or warranty for this information). If you have questions about a medical condition or this instruction, always ask your healthcare professional. [place] [address] any warranty or liability for your use of this information.

## 2017-06-26 NOTE — TELEPHONE ENCOUNTER
Need to know if patient can stop the Xarelto to have a block done and how many days prior can she stop ?

## 2017-06-26 NOTE — MR AVS SNAPSHOT
Visit Information Date & Time Provider Department Dept. Phone Encounter #  
 6/26/2017 10:00 AM Anu Martinez MD [facility] [phone] [id number] Follow-up Instructions Return in about 3 weeks (around 7/17/2017), or if symptoms worsen or fail to improve. Your Appointments 7/17/2017 10:00 AM  
ESTABLISHED PATIENT with Rubia Wooten MD  
[facility]) Appt Note: 6 months with [employer] [address], [address] [facility] [address]  
[phone]  
  
   
 [address], [address]  
  
    
 7/21/2017  9:20 AM  
Follow Up with Raymundo Whyte MD  
[facility]) Appt Note: 2 MONTH F/U  
 [address] [facility] [address]  
[phone] [address] [id number]  
  
    
 7/31/2017  9:30 AM  
Follow Up with Anu Martinez MD  
[facility] ONE [facility]) Appt Note: 3 WK BLOCK FU; [employer] 7/12/17  
 [address] [facility] [address]  
[phone]  
  
   
 [address] [facility] [address] 3/16/2018  8:00 AM  
PROCEDURE with [facility] IMAGING 1 [facility] ([facility]) Appt Note: LISA ILIAC STENTS 1YR FAGAN; MAILED [address]; .  
 [address] [facility] [address]  
[phone] [address] [place], [place] [id number]  
  
    
 3/16/2018 10:00 AM  
PROCEDURE with [facility] NONIMAGING [facility] ([facility]) Appt Note: LEG ART 1YR FAGAN; MAILED CARDS AND PREP INFORMATION; .  
 [phone] [facility] [address]  
[phone]  [address] [place] [address]  
  
    
 3/28/2018  1:45 PM  
Follow Up with Mary Cano MD  
 [facility] App[facility] Note: 1 year fu after studies at our lab on 3/16/2018 with prep; MAILED [address]; CALLED PATIENT TO MOVE APPT TIME DUE TO DR Navy Feliz Hinton PT IS AWARE; 1 year fu after studies at our lab on 3/16/2018 with prep MAILED CARDS AND PREP INFORMATION CALLED PATIENT TO MOVE APPT TIME DUE TO DR De Anda [address]  
 [place] [address], [place] [address] [address] [facility] [address]  
[phone] [facility] 200 [facility] Upcoming Health Maintenance Date Due Hepatitis C Screening 1950 FOOT EXAM Q1 5/1/1959 EYE EXAM RETINAL OR DILATED Q1 5/1/1959 FOBT Q 1 YEAR AGE 50-75 5/1/1999 DTaP/Tdap/Td series (1 - Tdap) 10/2/2005 ZOSTER VACCINE AGE 60> 5/1/2009 GLAUCOMA SCREENING Q2Y 5/1/2014 OSTEOPOROSIS SCREENING (DEXA) 5/1/2014 Pneumococcal 65+ Low/Medium Risk (2 of 2 - PPSV23) 5/1/2014 MEDICARE YEARLY EXAM 5/1/2014 BREAST CANCER SCRN MAMMOGRAM 7/25/2015 MICROALBUMIN Q1 1/8/2016 HEMOGLOBIN A1C Q6M 7/10/2016 LIPID PANEL Q1 9/29/2016 INFLUENZA AGE 9 TO ADULT 8/1/2017 Allergies as of 6/26/2017  Review Complete On: 6/26/2017 By: Tyron Barrett LPN No Known Allergies Current Immunizations  Reviewed on 2/9/2011 Name Date Pneumococcal Vaccine (Unspecified Type) 12/14/2005 TD Vaccine 10/1/2005 Not reviewed this visit You Were Diagnosed With   
  
 Codes Comments Sacroiliitis ([facility] [address]    -  Primary ICD-10-CM: M46.1 ICD-9-CM: 720.2 Myofascial pain     ICD-10-CM: M79.1 ICD-9-CM: 729.1 Trochanteric bursitis of both hips     ICD-10-CM: M70.61, M70.62 ICD-9-CM: 726.5 Vitals BP Pulse Temp Resp Height(growth percentile) Weight(growth percentile) 113/63 92 97.8 °F (36.6 °C) (Oral) 16 5' 4\" (1.626 m) 125 lb (56.7 kg) BMI OB Status Smoking Status 21.46 kg/m2 Hysterectomy Current Some Day Smoker BMI and BSA Data Body Mass Index Body Surface Area  
 21.46 kg/m 2 1.6 m 2 Preferred Pharmacy Pharmacy Name Phone [facility] [address] [phone] Your Updated Medication List  
  
   
This list is accurate as of: 6/26/17 11:36 AM.  Always use your most recent med list.  
  
  
  
  
 betamethasone 6 mg/mL injection Commonly known as:  CELESTONE SOLUSPAN  
2 mL by IntraMUSCular route once for 1 dose. bupivacaine 0.25 % (2.5 mg/mL) Soln injection Commonly known as:  MARCAINE  
4 mL by IntraMUSCular route once for 1 dose. digoxin 0.25 mg tablet Commonly known as:  LANOXIN Take 0.5 Tabs by mouth daily. dilTIAZem  mg XR capsule Commonly known as:  DILACOR XR  
TAKE ONE CAPSULE BY MOUTH ONCE DAILY * fentaNYL 62.5 mcg/hour Pt72  
62.5 mcg by TransDERmal route every seventy-two (72) hours for 30 days. Max Daily Amount: 62.5 mcg. Indications: Chronic Pain with Opioid Tolerance, SEVERE PAIN WITH OPIOID TOLERANCE  
  
 * fentaNYL 62.5 mcg/hour Pt72  
62.5 mcg by TransDERmal route every seventy-two (72) hours for 30 days. Max Daily Amount: 62.5 mcg. Indications: Chronic Pain with Opioid Tolerance, SEVERE PAIN WITH OPIOID TOLERANCE  
  
 fish oil-dha-epa 1,200-144-216 mg Cap Take 1 Cap by mouth daily. HumaLOG 100 unit/mL injection Generic drug:  insulin lispro  
by SubCUTAneous route. ipratropium-albuterol  mcg/actuation inhaler Commonly known as:  Peggye Fury Take 1 Puff by inhalation every six (6) hours as needed for Wheezing or Shortness of Breath. LANTUS 100 unit/mL injection Generic drug:  insulin glargine  
by SubCUTAneous route nightly. lidocaine (PF) 20 mg/mL (2 %) injection Commonly known as:  XYLOCAINE  
4 mL by Other route once for 1 dose. LIPITOR 20 mg tablet Generic drug:  atorvastatin Take  by mouth daily. naloxegol 25 mg Tab tablet Commonly known as:  Marina Richter Take 25 mg by mouth daily. * OTHER Fentanyl 62 mcg patches, apply one patch every 72 hours. * OTHER Fentanyl 62 mcg patch. Apply one patch every 72 hours * OTHER Fentanyl 62 mcg patch. Apply one patch every 72 hours. oxyCODONE IR 30 mg immediate release tablet Commonly known as:  OXY-IR Take 1 Tab by mouth four (4) times daily as needed for Pain for up to 30 days. Max Daily Amount: 120 mg. Indications: NEUROPATHIC PAIN, Pain SYNTHROID 125 mcg tablet Generic drug:  levothyroxine Take 125 mcg by mouth Daily (before breakfast). tiotropium 18 mcg inhalation capsule Commonly known as:  [address] Take 1 Cap by inhalation daily. VITAMIN B-6 100 mg tablet Generic drug:  pyridoxine (vitamin B6) Take 100 mg by mouth daily. VITAMIN D3 1,000 unit tablet Generic drug:  cholecalciferol Take 5,000 Units by mouth daily. XARELTO 20 mg Tab tablet Generic drug:  rivaroxaban TAKE ONE TABLET BY MOUTH ONCE DAILY WITH DINNER  
  
 * Notice: This list has 5 medication(s) that are the same as other medications prescribed for you. Read the directions carefully, and ask your doctor or other care provider to review them with you. We Performed the Following BETAMETHASONE ACETATE & SODIUM PHOSPHATE INJECTION 3 MG EA. [ [facility]S] INJECTION, BUPIVICAINE HYDRO [ [facility]S] LIDOCAINE INJECTION [ [facility]] TN DRAIN/INJECT LARGE JOINT/BURSA [id number] CPT(R)] REFERRAL TO PHYSICAL THERAPY [UTX43 Custom] Comments:  
 eval and treat, dry needling if indicated, Pilates equipment based exercises; Eric therapy. Patient has lumbar myofascial pain. Follow-up Instructions Return in about 3 weeks (around 7/17/2017), or if symptoms worsen or fail to improve. Referral Information Referral ID Referred By Referred To  
  
 [id number] UYEN Mccoy Not Available Visits Status Start Date End Date 1 New Request 17 If your referral has a status of pending review or denied, additional information will be sent to support the outcome of this decision. Patient Instructions [employer] Activation Thank you for requesting access to [employer]. Please follow the instructions below to securely access and download your online medical record. [employer] allows you to send messages to your doctor, view your test results, renew your prescriptions, schedule appointments, and more. How Do I Sign Up? 1. In your internet browser, go to [email or web address] 
2. Click on the First Time User? Click Here link in the Sign In box. You will be redirect to the New Member Sign Up page. 3. Enter your [employer] Access Code exactly as it appears below. You will not need to use this code after youve completed the sign-up process. If you do not sign up before the expiration date, you must request a new code. [employer] Access Code: [email or web address] Expires: 2017 10:01 AM (This is the date your [employer] access code will ) 4. Enter the last four digits of your Social Security Number (xxxx) and Date of Birth (mm/dd/yyyy) as indicated and click Submit. You will be taken to the next sign-up page. 5. Create a [employer] ID. This will be your [employer] login ID and cannot be changed, so think of one that is secure and easy to remember. 6. Create a [employer] password. You can change your password at any time. 7. Enter your Password Reset Question and Answer. This can be used at a later time if you forget your password. 8. Enter your e-mail address. You will receive e-mail notification when new information is available in [address] 9. Click Sign Up. You can now view and download portions of your medical record.  
10. Click the Download Summary menu link to download a portable copy of your medical information. Additional Information If you have questions, please visit the Frequently Asked Questions section of the [employer] website at [email or web address] Remember, [employer] is NOT to be used for urgent needs. For medical emergencies, dial 911. Low Back Pain: Exercises Your Care Instructions Here are some examples of typical rehabilitation exercises for your condition. Start each exercise slowly. Ease off the exercise if you start to have pain. Your doctor or physical therapist will tell you when you can start these exercises and which ones will work best for you. How to do the exercises Press-up 1. Lie on your stomach, supporting your body with your forearms. 2. Press your elbows down into the floor to raise your upper back. As you do this, relax your stomach muscles and allow your back to arch without using your back muscles. As your press up, do not let your hips or pelvis come off the floor. 3. Hold for 15 to 30 seconds, then relax. 4. Repeat 2 to 4 times. Alternate arm and leg (bird dog) exercise Note: Do this exercise slowly. Try to keep your body straight at all times, and do not let one hip drop lower than the other. 1. Start on the floor, on your hands and knees. 2. Tighten your belly muscles. 3. Raise one leg off the floor, and hold it straight out behind you. Be careful not to let your hip drop down, because that will twist your trunk. 4. Hold for about 6 seconds, then lower your leg and switch to the other leg. 5. Repeat 8 to 12 times on each leg. 6. Over time, work up to holding for 10 to 30 seconds each time. 7. If you feel stable and secure with your leg raised, try raising the opposite arm straight out in front of you at the same time. Knee-to-chest exercise 1. Lie on your back with your knees bent and your feet flat on the floor.  
2. Bring one knee to your chest, keeping the other foot flat on the floor (or keeping the other leg straight, whichever feels better on your lower back). 3. Keep your lower back pressed to the floor. Hold for at least 15 to 30 seconds. 4. Relax, and lower the knee to the starting position. 5. Repeat with the other leg. Repeat 2 to 4 times with each leg. 6. To get more stretch, put your other leg flat on the floor while pulling your knee to your chest. 
Curl-ups 1. Lie on the floor on your back with your knees bent at a 90-degree angle. Your feet should be flat on the floor, about 12 inches from your buttocks. 2. Cross your arms over your chest. If this bothers your neck, try putting your hands behind your neck (not your head), with your elbows spread apart. 3. Slowly tighten your belly muscles and raise your shoulder blades off the floor. 4. Keep your head in line with your body, and do not press your chin to your chest. 
5. Hold this position for 1 or 2 seconds, then slowly lower yourself back down to the floor. 6. Repeat 8 to 12 times. Pelvic tilt exercise 1. Lie on your back with your knees bent. 2. \"Brace\" your stomach. This means to tighten your muscles by pulling in and imagining your belly button moving toward your spine. You should feel like your back is pressing to the floor and your hips and pelvis are rocking back. 3. Hold for about 6 seconds while you breathe smoothly. 4. Repeat 8 to 12 times. Heel dig bridging 1. Lie on your back with both knees bent and your ankles bent so that only your heels are digging into the floor. Your knees should be bent about 90 degrees. 2. Then push your heels into the floor, squeeze your buttocks, and lift your hips off the floor until your shoulders, hips, and knees are all in a straight line. 3. Hold for about 6 seconds as you continue to breathe normally, and then slowly lower your hips back down to the floor and rest for up to 10 seconds. 4. Do 8 to 12 repetitions. Hamstring stretch in doorway 1. Lie on your back in a doorway, with one leg through the open door. 2. Slide your leg up the wall to straighten your knee. You should feel a gentle stretch down the back of your leg. 3. Hold the stretch for at least 15 to 30 seconds. Do not arch your back, point your toes, or bend either knee. Keep one heel touching the floor and the other heel touching the wall. 4. Repeat with your other leg. 5. Do 2 to 4 times for each leg. Hip flexor stretch 1. Kneel on the floor with one knee bent and one leg behind you. Place your forward knee over your foot. Keep your other knee touching the floor. 2. Slowly push your hips forward until you feel a stretch in the upper thigh of your rear leg. 3. Hold the stretch for at least 15 to 30 seconds. Repeat with your other leg. 4. Do 2 to 4 times on each side. Wall sit 1. Stand with your back 10 to 12 inches away from a wall. 2. Lean into the wall until your back is flat against it. 3. Slowly slide down until your knees are slightly bent, pressing your lower back into the wall. 4. Hold for about 6 seconds, then slide back up the wall. 5. Repeat 8 to 12 times. Follow-up care is a key part of your treatment and safety. Be sure to make and go to all appointments, and call your doctor if you are having problems. It's also a good idea to know your test results and keep a list of the medicines you take. Where can you learn more? Go to http://jose alejandro-lizzy.info/. Enter W390 in the search box to learn more about \"Low Back Pain: Exercises. \" Current as of: March 21, 2017 Content Version: 11.3 © 8202-1779 Healthwise, Incorporated. Care instructions adapted under license by [employer] (which disclaims liability or warranty for this information).  If you have questions about a medical condition or this instruction, always ask your healthcare professional. [place] [address] any warranty or liability for your use of this information. Introducing [facility]! [facility] introduces [employer] patient portal. Now you can access parts of your medical record, email your doctor's office, and request medication refills online. 1. In your internet browser, go to [email or web address] 2. Click on the First Time User? Click Here link in the Sign In box. You will see the New Member Sign Up page. 3. Enter your [employer] Access Code exactly as it appears below. You will not need to use this code after youve completed the sign-up process. If you do not sign up before the expiration date, you must request a new code. · [employer] Access Code: [email or web address] Expires: 7/20/2017 10:01 AM 
 
4. Enter the last four digits of your Social Security Number (xxxx) and Date of Birth (mm/dd/yyyy) as indicated and click Submit. You will be taken to the next sign-up page. 5. Create a [employer] ID. This will be your [employer] login ID and cannot be changed, so think of one that is secure and easy to remember. 6. Create a [employer] password. You can change your password at any time. 7. Enter your Password Reset Question and Answer. This can be used at a later time if you forget your password. 8. Enter your e-mail address. You will receive e-mail notification when new information is available in [address] 9. Click Sign Up. You can now view and download portions of your medical record. 10. Click the Download Summary menu link to download a portable copy of your medical information. If you have questions, please visit the Frequently Asked Questions section of the [employer] website. Remember, [employer] is NOT to be used for urgent needs. For medical emergencies, dial 911. Now available from your iPhone and Android! Please provide this summary of care documentation to your next provider. Your primary care clinician is listed as [facility].  If you have any questions after today's visit, please call [phone].

## 2017-06-26 NOTE — PROGRESS NOTES
Nae [address]man [facility].  [address], [address]  [address]  Phone: [phone]  Fax: [phone]        Mikayla Tidwell  : 1950  PCP: Onesimo Mccormack MD  2017    NEW PATIENT      ASSESSMENT AND PLAN     Cory Patino comes in to the office today c/o pain in the lumbar region that is described at pins and needles for the last 2 months. The pain radiates down the posterior aspect of her bilateral lower extremities. Based upon the physical examination findings of a bilateral positive DEON, right P4, distraction and Gaenslen's test I am lead to believe her pain today is caused by Sacroiliitis. She is also tender in the trochanter bursa region. I did a bilateral injection which provided great relief. She was referred to physical therapy for sacroiliitis and myofascial pain. She was also referred to get bilateral SI injection for the sacroiliitis. Pt will f/u in 3 weeks or sooner if needed. Maria Del Rosario Krause was seen today for back pain. Diagnoses and all orders for this visit:    Sacroiliitis ([address]  -     SCHEDULE SURGERY    Myofascial pain  -     REFERRAL TO PHYSICAL THERAPY    Trochanteric bursitis of both hips  -     bupivacaine (MARCAINE) 0.25 % (2.5 mg/mL) soln injection; 4 mL by IntraMUSCular route once for 1 dose. -     INJECTION, BUPIVICAINE HYDRO  -     LIDOCAINE INJECTION  -     lidocaine, PF, (XYLOCAINE) 20 mg/mL (2 %) injection; 4 mL by Other route once for 1 dose. -     betamethasone (CELESTONE SOLUSPAN) 6 mg/mL injection; 2 mL by IntraMUSCular route once for 1 dose.  -     BETAMETHASONE ACETATE & SODIUM PHOSPHATE INJECTION 6 MG   - DRAIN/INJECT LARGE JOINT/BURSA       Follow-up Disposition:  Return in about 6 weeks (around 2017), or if symptoms worsen or fail to improve.     CHIEF COMPLAINT  Cory Patino is seen today in consultation at the request of Onesimo Mccormack MD for complaints of pain in the lumbar region. HISTORY OF PRESENT ILLNESS  Diana Jaramillo is a 75 y.o. female c/o pain in the lumbar region that is described at pins and needles for the last 2 months. The pain radiates down the posterior aspect of her bilateral lower extremities and goes into the anterior aspect of the feet. Her pain is currently radiated at a 8/10. She does have issues with her balance while walking and pain with bending forward to tie her shoes. Sitting, walking, lifting and bending forward increases her pain. Standing, lying, and changing positions help relieve her pain. Pt denies any fevers, chills, nausea, vomiting. Pt denies any chest pain and shortness of breath. Pt denies any ear, nose, and throat problems. Pt denies any fecal or urinary incontinence. She is currently not working  She is a tobacco smoker. PAST MEDICAL HISTORY   Past Medical History:   Diagnosis Date    Abnormal myocardial perfusion study 05/07/2010    Anterior ischemia c/w at least 1-vessel CAD. No WMA. EF 51%. Positive EKG at 78% pred max HR. Ex time 7 min 30 sec.  Allergic rhinitis 1/30/2010    Atrial fibrillation ([address]     Broken ankle 08/22/2013    left    Carotid stenosis 1/30/2010    COPD     Essential hypertension     Heart failure ([address]     History of amiodarone therapy 5/11/2015    History of cardiac cath 05/25/2010    Patent coronary arteries. LVEDP 13 mmHg. EF 65%.  History of cervical cancer 11/05    History of echocardiogram 08/21/2013    EF 50-55%. No RWMA. Gr 2 DDfx. Mild LAE. Mild MR.      Hypertension     Migraines 1/30/2010    Mitral regurgitation     Orthostatic dizziness 3/30/2016    ? autonomic dysfunction Vs amio side effect     Pacemaker 11/25/13    DDD    Pure hypercholesterolemia 1/30/2010    Rosacea 1/30/2010    Type II or unspecified type diabetes mellitus without mention of complication, not stated as uncontrolled 1/30/2010    Unspecified hypothyroidism 1/30/2010 Past Surgical History:   Procedure Laterality Date    HX AFIB ABLATION  2/8/13    Dr Jovana Gary HX CAROTID ENDARTERECTOMY  11/06    right Dr. Niraj Moyer HX HYSTERECTOMY  5/06    HX ORTHOPAEDIC Right 07/11/2016    knee replacement    HX PACEMAKER  11/25/13    DDD    HX SVT ABLATION  2/18/2013       MEDICATIONS      Current Outpatient Prescriptions   Medication Sig Dispense Refill    digoxin (LANOXIN) 0.25 mg tablet Take 0.5 Tabs by mouth daily. 30 Tab 6    oxyCODONE IR (OXY-IR) 30 mg immediate release tablet Take 1 Tab by mouth four (4) times daily as needed for Pain for up to 30 days. Max Daily Amount: 120 mg. Indications: NEUROPATHIC PAIN, Pain 120 Tab 0    fentaNYL 62.5 mcg/hour pt72 62.5 mcg by TransDERmal route every seventy-two (72) hours for 30 days. Max Daily Amount: 62.5 mcg. Indications: Chronic Pain with Opioid Tolerance, SEVERE PAIN WITH OPIOID TOLERANCE 10 Patch 0    fentaNYL 62.5 mcg/hour pt72 62.5 mcg by TransDERmal route every seventy-two (72) hours for 30 days. Max Daily Amount: 62.5 mcg. Indications: Chronic Pain with Opioid Tolerance, SEVERE PAIN WITH OPIOID TOLERANCE 10 Patch 0    dilTIAZem XR (DILACOR XR) 180 mg XR capsule TAKE ONE CAPSULE BY MOUTH ONCE DAILY 90 Cap 3    OTHER Fentanyl 62 mcg patches, apply one patch every 72 hours. 10 Patch 0    OTHER Fentanyl 62 mcg patch. Apply one patch every 72 hours 10 Patch 0    OTHER Fentanyl 62 mcg patch. Apply one patch every 72 hours. 10 Patch 0    atorvastatin (LIPITOR) 20 mg tablet Take  by mouth daily.  XARELTO 20 mg tab tablet TAKE ONE TABLET BY MOUTH ONCE DAILY WITH DINNER 30 Tab 6    naloxegol (MOVANTIK) 25 mg tab tablet Take 25 mg by mouth daily.  insulin lispro (HUMALOG) 100 unit/mL injection by SubCUTAneous route.  insulin glargine (LANTUS) 100 unit/mL injection by SubCUTAneous route nightly.       ipratropium-albuterol (COMBIVENT RESPIMAT)  mcg/actuation inhaler Take 1 Puff by inhalation every six (6) hours as needed for Wheezing or Shortness of Breath. 1 Inhaler 1    pyridoxine (VITAMIN B-6) 100 mg tablet Take 100 mg by mouth daily.  fish oil-dha-epa 1,200-144-216 mg cap Take 1 Cap by mouth daily.  levothyroxine (SYNTHROID) 125 mcg tablet Take 125 mcg by mouth Daily (before breakfast).  tiotropium (SPIRIVA WITH HANDIHALER) 18 mcg inhalation capsule Take 1 Cap by inhalation daily. 30 Cap 11    cholecalciferol, vitamin d3, (VITAMIN D) 1,000 unit tablet Take 5,000 Units by mouth daily. ALLERGIES  No Known Allergies       SOCIAL HISTORY    Social History     Social History    Marital status:      Spouse name: N/A    Number of children: N/A    Years of education: N/A     Social History Main Topics    Smoking status: Current Some Day Smoker     Packs/day: 0.50     Years: 42.00     Last attempt to quit: 5/7/2013    Smokeless tobacco: Never Used      Comment: per patient some days none and some days a few     Alcohol use No    Drug use: No    Sexual activity: Not Asked     Other Topics Concern    None     Social History Narrative       FAMILY HISTORY    Family History   Problem Relation Age of Onset    Hypertension Mother     Cancer Maternal Aunt      breast CA 66yo    Breast Cancer Maternal Aunt 79    Diabetes Maternal Grandmother     Cancer Maternal Aunt      uterine CA    Diabetes Daughter     Other Daughter      lupus (SLE) and coagulopathy    Breast Cancer Paternal Aunt 61    Heart Attack Sister     Heart Disease Neg Hx          REVIEW OF SYSTEMS  Review of Systems   Constitutional: Negative for chills, diaphoresis, fever, malaise/fatigue and weight loss. Respiratory: Negative for shortness of breath. Cardiovascular: Negative for chest pain and leg swelling. Gastrointestinal: Negative for constipation, nausea and vomiting. Neurological: Negative for dizziness, tingling, seizures, loss of consciousness, weakness and headaches.    Psychiatric/Behavioral: The patient does not have insomnia. PHYSICAL EXAMINATION  Visit Vitals    /63    Pulse 92    Temp 97.8 °F (36.6 °C) (Oral)    Resp 16    Ht 5' 4\" (1.626 m)    Wt 125 lb (56.7 kg)    BMI 21.46 kg/m2         Pain Assessment  6/26/2017   Location of Pain Back   Severity of Pain 7   Quality of Pain Aching   Quality of Pain Comment tingling   Frequency of Pain Constant   Result of Injury No         Constitutional:  Well developed, well nourished, in no acute distress. Psychiatric: Affect and mood are appropriate. HEENT: Normocephalic, atraumatic. Extraocular movements intact. Integumentary: No rashes or abrasions noted on exposed areas. Cardiovascular: Regular rate and rhythm. Pulmonary: Clear to auscultation bilaterally. SPINE/MUSCULOSKELETAL EXAM    Cervical spine:  Neck is midline. Normal muscle tone. No focal atrophy is noted. ROM pain free. Shoulder ROM intact. No tenderness to palpation. Negative Spurling's sign. Negative Tinel's sign. Negative Lala's sign. Sensation in the bilateral arms grossly intact to light touch. Lumbar spine:  No rash, ecchymosis, or gross obliquity. No fasciculations. No focal atrophy is noted. No pain with hip ROM. Full range of motion. Tenderness to palpation. No tenderness to palpation at the sciatic notch. SI joints tender. Trochanters tender. Positive bilateral DEON test   Positive right  P4 test   Negatvie Compression test  Positive Distraction test   Positive right Gaenslen's test       Right L5 and S1 decreased sensation      MOTOR:      Biceps  Triceps Deltoids Wrist Ext Wrist Flex Hand Intrin   Right 5/5 5/5 5/5 5/5 5/5 5/5   Left 5/5 5/5 5/5 5/5 5/5 5/5             Hip Flex  Quads Hamstrings Ankle DF EHL Ankle PF   Right 5/5 5/5 5/5 5/5 5/5 5/5   Left 5/5 5/5 5/5 5/5 5/5 5/5     DTRs are 2+ biceps, triceps, brachioradialis, patella, and Achilles. Negative Straight Leg raise.    Squat not tested. No difficulty with tandem gait. Ambulation without assistive device. FWB. RADIOGRAPHS  Lumbar XR images taken on 05/22/2017 personally reviewed with patient:  Findings: 3 views of the lumbar spine are provided.  There are 5 non-rib bearing lumbar vertebral elements.  There is no evidence of subluxation.  Mild chronic wedge deformities of L2 and L3 again noted.  Significant loss of disc height is noted at L2/3 and L5/S1, with moderate loss of disc height at L3/4, and mild loss of disc height at L1/2 and L4/5 consistent with multilevel degenerative disc disease.  Bilateral degenerative facet joint change is noted at all lumbar levels but is most severe from L3-S1.  Degenerative changes noted involving the sacroiliac joints bilaterally. Vascular calcification is noted.  Stents are present at the aortic bifurcation.  reviewed    Ms. Yoo has a reminder for a \"due or due soon\" health maintenance. I have asked that she contact her primary care provider for follow-up on this health maintenance. This plan was reviewed with the patient and patient agrees. All questions were answered. More than half of this visit today was spent on counseling. Written by Bruce Méndez, as dictated by Dr. Florencio Fernandez. I, Dr. Florencio Fernandez, confirm that all documentation is accurate.

## 2017-06-28 NOTE — TELEPHONE ENCOUNTER
Hold xarelto x 2 days  Start asa 81 mg/day if ok with surgeon- may need to hold this also for spinal. Check with surgeon. Please inform the patient of slightly increased risk of stroke during this time(<1%). Restart xarelto post procedure as soon as possible and when ok with surgeon.

## 2017-06-29 ENCOUNTER — HOSPITAL ENCOUNTER (OUTPATIENT)
Dept: PHYSICAL THERAPY | Age: 67
Discharge: HOME | End: 2017-06-29
Payer: MEDICARE

## 2017-06-29 PROCEDURE — G8978 MOBILITY CURRENT STATUS: HCPCS

## 2017-06-29 PROCEDURE — 97162 PT EVAL MOD COMPLEX 30 MIN: CPT

## 2017-06-29 PROCEDURE — 97110 THERAPEUTIC EXERCISES: CPT

## 2017-06-29 PROCEDURE — G8979 MOBILITY GOAL STATUS: HCPCS

## 2017-06-29 NOTE — PROGRESS NOTES
[facility]  [address]  [place], [address]  [phone] fax    Plan of Care/ Statement of Necessity for Physical Therapy Services    Patient name: Sharmin Grimaldo Start of Care: 2017   Referral source: Adryan Friday, MD : 1950    Medical Diagnosis: Sacroiliitis, not elsewhere classified [M46.1]  Trochanteric bursitis, right hip [M70.61]  Trochanteric bursitis, left hip [M70.62]  Myalgia [M79.1]   Onset Date:2 months ago    Treatment Diagnosis: B SI and hip pain   Prior Hospitalization: see medical history Provider#: [id number]   Medications: Verified on Patient summary List    Comorbidities: heart disease; pacemaker; R TKR; OA; thyroid; D<; HTN; hearing impaired; L ankle B malleolar fx   Prior Level of Function: Limited mobility due to co-morbidities       The Plan of Care and following information is based on the information from the initial evaluation. Assessment/ key information: 75y.o. year old female presents with CC of chronic B SI pain and B radicular pain L > R. Reports 30 year history of pain with recent exacerbation of pain 2 months ago. Deficits include: poor B glute recruitment L > R; poor B hip strength; poor TA recruitment and strength; poor core stability. Patient will benefit from physical therapy to address deficits, and ultimately to return patient to prior level of function. Evaluation Complexity History HIGH Complexity :3+ comorbidities / personal factors will impact the outcome/ POC ; Examination MEDIUM Complexity : 3 Standardized tests and measures addressing body structure, function, activity limitation and / or participation in recreation  ;Presentation MEDIUM Complexity : Evolving with changing characteristics  ; Clinical Decision Making MEDIUM Complexity : FOTO score of 26-74  Overall Complexity Rating: MEDIUM  Problem List: pain affecting function, decrease strength, decrease ADL/ functional abilitiies, decrease activity tolerance and decrease flexibility/ joint mobility   Treatment Plan may include any combination of the following: Therapeutic exercise, Neuromuscular re-education, Physical agent/modality, Manual therapy and Patient education  Patient / Family readiness to learn indicated by: asking questions, trying to perform skills and interest  Persons(s) to be included in education: patient (P)  Barriers to Learning/Limitations: None  Patient Goal (s): to decrease pain  Patient Self Reported Health Status: fair  Rehabilitation Potential: good    Short Term Goals: To be accomplished in 1-2 weeks:   1. I and compliant with HEP for self management of symptoms. Long Term Goals: To be accomplished in 4 weeks:   1. Improve FOTO to 48 to indicate improved function with daily activities. 2. Increase B hip strength to grossly 4/5 to improve tolerate for prolonged standing for ADLs. 3. Perform core exercises without rectus bulge to indicate improved stability for daily activities. Frequency / Duration: Patient to be seen 2 times per week for 4 weeks. Patient/ Caregiver education and instruction: Diagnosis, prognosis, exercises   [x]  Plan of care has been reviewed with PTA    G-Codes (GP)  Mobility   Current  CL= 60-79%   Goal  CK= 40-59%    The severity rating is based on clinical judgment and the FOTO score. Certification Period: 6/29/17-8/29/17  Bill Welsh, PT 6/29/2017 2:45 PM    ________________________________________________________________________    I certify that the above Therapy Services are being furnished while the patient is under my care. I agree with the treatment plan and certify that this therapy is necessary.     [de-identified] Signature:____________________  Date:____________Time: _________    Please sign and return to [facility] [address] [place], [place] [address], [facility] [address]  [phone] fax

## 2017-06-29 NOTE — PROGRESS NOTES
PT DAILY TREATMENT NOTE - [facility]     Patient Name: Helena Yoo  Date:2017  : 1950  [x]  Patient  Verified  [place]: [place] / Plan: [place] MED[place]ARE PART A & B / Product Type: Medicare /    In time:202  Out time:246  Total Treatment Time (min): 44  Total Timed Codes (min): 24  1:1 Treatment Time ( only): 44   Visit #: 1 of 8    Treatment Area: Sacroiliitis, not elsewhere classified [M46.1]  Trochanteric bursitis, right hip [M70.61]  Trochanteric bursitis, left hip [M70.62]  Myalgia [M79.1]    SUBJECTIVE  Pain Level (0-10 scale): 8  Any medication changes, allergies to medications, adverse drug reactions, diagnosis change, or new procedure performed?: [x] No    [] Yes (see summary sheet for update)  Subjective functional status/changes:   [] No changes reported  See eval    OBJECTIVE      20 min [x]Eval                  []Re-Eval       24 min Therapeutic Exercise:  [] See flow sheet :   Rationale: increase ROM and increase strength to improve the patients ability to perform daily activities       With   [] TE   [] TA   [] neuro   [] other: Patient Education: [x] Review HEP    [] Progressed/Changed HEP based on:   [] positioning   [] body mechanics   [] transfers   [] heat/ice application    [] other:      Other Objective/Functional Measures:      Pain Level (0-10 scale) post treatment: 8    ASSESSMENT/Changes in Function: see POC    Patient will continue to benefit from skilled PT services to modify and progress therapeutic interventions, address functional mobility deficits, address ROM deficits, address strength deficits, analyze and address soft tissue restrictions, analyze and cue movement patterns and assess and modify postural abnormalities to attain remaining goals. [x]  See Plan of Care  []  See progress note/recertification  []  See Discharge Summary         Progress towards goals / Updated goals:  Short Term Goals:  To be accomplished in 1-2 weeks:                        1. I and compliant with HEP for self management of symptoms. Long Term Goals: To be accomplished in 4 weeks:                        1. Improve FOTO to 48 to indicate improved function with daily activities. 2. Increase B hip strength to grossly 4/5 to improve tolerate for prolonged standing for ADLs. 3. Perform core exercises without rectus bulge to indicate improved stability for daily activities.      PLAN  []  Upgrade activities as tolerated     [x]  Continue plan of care  []  Update interventions per flow sheet       []  Discharge due to:_  []  Other:_      Whit Hendrix, PT 6/29/2017  3:52 PM    Future Appointments  Date Time Provider Lucille Singleton   7/6/2017 2:30 PM Whit Hendrix, PT [facility]   7/7/2017 10:00 AM Ton Everett, PT [facility]   7/11/2017 10:00 AM Zenon Lazcano, PTA [facility]   7/13/2017 12:00 PM Zenon Lazcano, PTA [facility]   7/17/2017 10:00 AM Stanislav Pelaez MD [facility] SCHED   7/18/2017 11:00 AM Gabbie Santana, PTA [facility]   7/20/2017 11:00 AM Zenon Lazcano, PTA [facility]   7/21/2017 9:20 AM Katelyn Hauser MD [facility] SCHED   7/24/2017 11:00 AM Zenon Lazcano, PTA [facility]   7/31/2017 9:30 AM Tressa Sanchez  [address]    3/16/2018 8:00 AM [facility]    3/16/2018 10:00 AM [facility]AGING [facility] SCHED   3/28/2018 1:45 PM MD [address]

## 2017-07-06 ENCOUNTER — APPOINTMENT (OUTPATIENT)
Dept: PHYSICAL THERAPY | Age: 67
End: 2017-07-06

## 2017-07-07 ENCOUNTER — APPOINTMENT (OUTPATIENT)
Dept: PHYSICAL THERAPY | Age: 67
End: 2017-07-07

## 2017-07-12 ENCOUNTER — HOSPITAL ENCOUNTER (OUTPATIENT)
Age: 67
Setting detail: OUTPATIENT SURGERY
Discharge: HOME | End: 2017-07-12
Attending: PHYSICAL MEDICINE & REHABILITATION | Admitting: PHYSICAL MEDICINE & REHABILITATION
Payer: MEDICARE

## 2017-07-12 ENCOUNTER — APPOINTMENT (OUTPATIENT)
Dept: GENERAL RADIOLOGY | Age: 67
End: 2017-07-12
Attending: PHYSICAL MEDICINE & REHABILITATION
Payer: MEDICARE

## 2017-07-12 VITALS
OXYGEN SATURATION: 99 % | BODY MASS INDEX: 21.46 KG/M2 | RESPIRATION RATE: 18 BRPM | WEIGHT: 125 LBS | DIASTOLIC BLOOD PRESSURE: 72 MMHG | TEMPERATURE: 98 F | HEART RATE: 71 BPM | SYSTOLIC BLOOD PRESSURE: 129 MMHG

## 2017-07-12 LAB — GLUCOSE BLD STRIP.AUTO-MCNC: 93 MG/DL (ref 70–110)

## 2017-07-12 PROCEDURE — 76010000009 HC PAIN MGT 0 TO 30 MIN PROC: Performed by: PHYSICAL MEDICINE & REHABILITATION

## 2017-07-12 PROCEDURE — 74011250637 HC RX REV CODE- 250/637: Performed by: PHYSICAL MEDICINE & REHABILITATION

## 2017-07-12 PROCEDURE — 74011250636 HC RX REV CODE- 250/636

## 2017-07-12 PROCEDURE — 74011636320 HC RX REV CODE- 636/320

## 2017-07-12 PROCEDURE — 82962 GLUCOSE BLOOD TEST: CPT

## 2017-07-12 PROCEDURE — 74011000250 HC RX REV CODE- 250

## 2017-07-12 RX ORDER — LIDOCAINE HYDROCHLORIDE 10 MG/ML
INJECTION, SOLUTION EPIDURAL; INFILTRATION; INTRACAUDAL; PERINEURAL AS NEEDED
Status: DISCONTINUED | OUTPATIENT
Start: 2017-07-12 | End: 2017-07-12 | Stop reason: HOSPADM

## 2017-07-12 RX ORDER — DEXAMETHASONE SODIUM PHOSPHATE 100 MG/10ML
INJECTION INTRAMUSCULAR; INTRAVENOUS AS NEEDED
Status: DISCONTINUED | OUTPATIENT
Start: 2017-07-12 | End: 2017-07-12 | Stop reason: HOSPADM

## 2017-07-12 RX ORDER — DIAZEPAM 5 MG/1
5-20 TABLET ORAL ONCE
Status: COMPLETED | OUTPATIENT
Start: 2017-07-12 | End: 2017-07-12

## 2017-07-12 RX ADMIN — DIAZEPAM 10 MG: 5 TABLET ORAL at 13:49

## 2017-07-12 NOTE — H&P
Date of Surgery Update:  Ngoc Parnell was seen and examined. History and physical has been reviewed. The patient has been examined. There have been no significant clinical changes since the completion of the last office visit.       Signed By: Leopoldo Bolt, MD     July 12, 2017 1:37 PM

## 2017-07-12 NOTE — DISCHARGE INSTRUCTIONS
[facility]   (MALATHI)  Dr. Gill Bustamante, Dr. Dayne Jones Dr. Sunday Glancanaly not drive a car, operate heavy machinery or dangerous equipment for 24 hours. * Activity as tolerated; rest for the remainder of the day. * Resume pre-block medications including those for your family doctor. * Do not drink alcoholic beverages for 24 hours. Alcohol and the medications you have received may interact and cause an adverse reaction. * You may feel better this evening and worse tomorrow, as the numbing medications wears off and the steroid has yet to begin to work. After 48 hrs the steroid should begin to release bringing you relief. * You may shower this evening and remove any bandages. * Avoid hot tubs and heating pads for 24 hours. You may use cold packs on the procedure site as tolerated for the first 24 hours. * If a headache develops, drink plenty of fluids and rest.  Take over the counter medications for headache if needed. If the headache continues longer than 24 hours, call MD at the [address] [place] [address]. [phone]    * Continue taking pain medications as needed. * You may resume your regular diet if tolerated. Otherwise, start with sips of water and advance slowly. * If Diabetic: check your blood sugar three times a day for the next 3 days. If your sugar is greater than 300 call your family doctor. If your sugar is greater than 400, have someone transport you to the nearest Emergency Room. * If you experience any of the following problems, Please Call the [address] [place] [address] at [phone].         * Shortness of Breath    * Fever of 101 or higher    * Nausea / Vomiting    * Severe Headache    * Weakness or numbness in arms or legs that is not      resolving    * Prolonged increase in pain greater than 4 days      DISCHARGE SUMMARY from Nurse      PATIENT INSTRUCTIONS:    After oral sedation, for 24 hours or while taking prescription Narcotics:  · Limit your activities  · Do not drive and operate hazardous machinery  · Do not make important personal or business decisions  · Do  not drink alcoholic beverages  · If you have not urinated within 8 hours after discharge, please contact your surgeon on call. Report the following to your surgeon:  · Excessive pain, swelling, redness or odor of or around the surgical area  · Temperature over 101  · Nausea and vomiting lasting longer than 4 hours or if unable to take medications  · Any signs of decreased circulation or nerve impairment to extremity: change in color, persistent  numbness, tingling, coldness or increase pain  · Any questions            What to do at Home:  Recommended activity: Activity as tolerated, NO DRIVING FOR 12 Hours post injection          *  Please give a list of your current medications to your Primary Care Provider. *  Please update this list whenever your medications are discontinued, doses are      changed, or new medications (including over-the-counter products) are added. *  Please carry medication information at all times in case of emergency situations. These are general instructions for a healthy lifestyle:    No smoking/ No tobacco products/ Avoid exposure to second hand smoke    Surgeon General's Warning:  Quitting smoking now greatly reduces serious risk to your health. Obesity, smoking, and sedentary lifestyle greatly increases your risk for illness    A healthy diet, regular physical exercise & weight monitoring are important for maintaining a healthy lifestyle    You may be retaining fluid if you have a history of heart failure or if you experience any of the following symptoms:  Weight gain of 3 pounds or more overnight or 5 pounds in a week, increased swelling in our hands or feet or shortness of breath while lying flat in bed.   Please call your doctor as soon as you notice any of these symptoms; do not wait until your next office visit. Recognize signs and symptoms of STROKE:    F-face looks uneven    A-arms unable to move or move unevenly    S-speech slurred or non-existent    T-time-call 911 as soon as signs and symptoms begin-DO NOT go       Back to bed or wait to see if you get better-TIME IS BRAIN.

## 2017-07-12 NOTE — PROCEDURES
Bi Procedure Note    Patient Name: Jada Szymanski    Date of Procedure: July 12, 2017    Preoperative Diagnosis: Bilateral Sacroiliac Joint Pain and Dysfunction    Post Operative Diagnosis: same    Procedure: SI Joint Injection bilateral      Consent: Informed consent was obtained prior to the procedure. The patient was given the opportunity to ask questions regarding the procedure and its associated risks. In addition to the potential risks associated with the procedure itself, the patient was informed both verbally and in writing of potential side effects of the use of glucocorticoids. The patient appeared to comprehend the informed consent and desired to have the procedure performed. Procedure: The patient was placed in the prone position on the flouroscopy table and the back was prepped and draped in the usual sterile manner. A #22 gauge spinal needle was then advanced to lie within the SI joint after local Lidocaine 1% injection. The procedure was repeated for the contralateral SI joint. A total of 30 mg of preservative free dexamethasone  and 5 ml of Lidocaine was introduced into SI joints. The injection area was cleaned and bandaids applied. No excessive bleeding was noted. Patient dressed and was discharged to home with instructions. Discussion:  (x) The patient tolerated the procedure well.     ( )       Eleanor Davis MD  July 12, 2017

## 2017-07-13 ENCOUNTER — APPOINTMENT (OUTPATIENT)
Dept: PHYSICAL THERAPY | Age: 67
End: 2017-07-13

## 2017-07-17 ENCOUNTER — OFFICE VISIT (OUTPATIENT)
Dept: CARDIOLOGY CLINIC | Age: 67
End: 2017-07-17

## 2017-07-17 VITALS
HEART RATE: 74 BPM | SYSTOLIC BLOOD PRESSURE: 91 MMHG | HEIGHT: 64 IN | DIASTOLIC BLOOD PRESSURE: 49 MMHG | BODY MASS INDEX: 22.02 KG/M2 | WEIGHT: 129 LBS

## 2017-07-17 DIAGNOSIS — F17.200 TOBACCO USE DISORDER: ICD-10-CM

## 2017-07-17 DIAGNOSIS — E78.00 PURE HYPERCHOLESTEROLEMIA: ICD-10-CM

## 2017-07-17 DIAGNOSIS — I10 ESSENTIAL HYPERTENSION, BENIGN: ICD-10-CM

## 2017-07-17 DIAGNOSIS — J44.9 CHRONIC OBSTRUCTIVE PULMONARY DISEASE, UNSPECIFIED COPD TYPE (HCC): ICD-10-CM

## 2017-07-17 DIAGNOSIS — R55 SYNCOPE, UNSPECIFIED SYNCOPE TYPE: Primary | ICD-10-CM

## 2017-07-17 DIAGNOSIS — Z95.0 PACEMAKER: ICD-10-CM

## 2017-07-17 DIAGNOSIS — I48.21 PERMANENT ATRIAL FIBRILLATION (HCC): ICD-10-CM

## 2017-07-17 NOTE — PATIENT INSTRUCTIONS
Medications Discontinued During This Encounter   Medication Reason    OTHER Duplicate Order    OTHER Duplicate Order    OTHER Duplicate Order       Orders Placed This Encounter    DIGOXIN     Standing Status:   Future     Standing Expiration Date:   0/12/5659    METABOLIC PANEL, BASIC     Standing Status:   Future     Standing Expiration Date:   7/18/2018    CBC W/O DIFF     Standing Status:   Future     Standing Expiration Date:   7/18/2018    MAGNESIUM     Standing Status:   Future     Standing Expiration Date:   7/18/2018    REFERRAL TO CARDIAC ELECTROPHYSIOLOGY     Standing Status:   Future     Standing Expiration Date:   10/15/2017     Referral Priority:   Routine     Referral Type:   Consultation     Referral Reason:   Specialty Services Required     Referred to Provider:   Edel Martinez MD     Requested Specialty:   Cardiology     Number of Visits Requested:   1    PROGRAM EVAL (IN PERSON) IMPLANT DEVICE,PACEMAKER,MULT LEAD     Order Specific Question:   Reason for Exam:     Answer:   f/u pacer    AMB POC EKG ROUTINE W/ 12 LEADS, INTER & REP     Order Specific Question:   Reason for Exam:     Answer:   palpitations     Please drink plenty of fluids and eat plenty of salt         Learning About Atrial Fibrillation  What is atrial fibrillation? Atrial fibrillation (say \"AY-tree-yennifer jfk-tdyx-TZM-shun\") is the most common type of irregular heartbeat (arrhythmia). Normally, the heart beats in a strong, steady rhythm. In atrial fibrillation, a problem with the heart's electrical system causes the two upper parts of the heart (the atria) to quiver, or fibrillate. Your heart rate also may be faster than normal.  Atrial fibrillation can be dangerous because if the heartbeat isn't strong and steady, blood can collect, or pool, in the atria. And pooled blood is more likely to form clots. Clots can travel to the brain, block blood flow, and cause a stroke.  Atrial fibrillation can also lead to heart failure. Treatment for atrial fibrillation helps prevent stroke and heart failure. It also helps relieve symptoms. Atrial fibrillation is often caused by another heart problem. It may happen after heart surgery. It may also be caused by other problems, such as an overactive thyroid gland or lung disease. Many people with atrial fibrillation are able to live full and active lives. What are the symptoms? Some people feel symptoms when they have episodes of atrial fibrillation. But other people don't notice any symptoms. If you have symptoms, you may feel:  · A fluttering, racing, or pounding feeling in your chest called palpitations. · Weak or tired. · Dizzy or lightheaded. · Short of breath. · Chest pain. · Confused. You may notice signs of atrial fibrillation when you check your pulse. Your pulse may seem uneven or fast.  What can you expect when you have atrial fibrillation? At first, spells of atrial fibrillation may come on suddenly and last a short time. It may go away on its own or it goes away after treatment. This is called paroxysmal atrial fibrillation. Over time, the spells may last longer and occur more often. They often don't go away on their own. How is it treated? Treatments can help you feel better and prevent future problems, especially stroke and heart failure. The main types of treatment slow the heart rate, control the heart rhythm, and help prevent stroke. Your treatment will depend on the cause of your atrial fibrillation, your symptoms, and your risk for stroke. · Heart rate treatment. Medicine may be used to slow your heart rate. Your heartbeat may still be irregular. But these medicines keep your heart from beating too fast. They may also help relieve your symptoms. · Heart rhythm treatment. Different treatments may be used to try to stop atrial fibrillation and keep it from returning. They can also relieve symptoms.  These treatments include medicine, electrical cardioversion to shock the heart back to a normal rhythm, a procedure called catheter ablation, and heart surgery. · Stroke prevention. You and your doctor can decide how to lower your risk. You may decide to take a blood-thinning medicine, such as aspirin or an anticoagulant. How can you live well with it? You can live well and help manage atrial fibrillation by having a heart-healthy lifestyle. To have a heart-healthy lifestyle:  · Don't smoke. · Eat heart-healthy foods. · Be active. Talk to your doctor about what type and level of exercise is safe for you. · Stay at a healthy weight. Lose weight if you need to. · Manage stress. · Avoid alcohol if it triggers symptoms. · Manage other health problems such as high blood pressure, high cholesterol, and diabetes. · Avoid getting sick from the flu. Get a flu shot every year. When should you call for help? Call 911 anytime you think you may need emergency care. For example, call if:  · You have symptoms of a stroke. These may include:  ¨ Sudden numbness, tingling, weakness, or loss of movement in your face, arm, or leg, especially on only one side of your body. ¨ Sudden vision changes. ¨ Sudden trouble speaking. ¨ Sudden confusion or trouble understanding simple statements. ¨ Sudden problems with walking or balance. ¨ A sudden, severe headache that is different from past headaches. Call your doctor now or seek immediate medical care if:  · You have new or increased shortness of breath. · You feel dizzy or lightheaded, or you feel like you may faint. Watch closely for changes in your health, and be sure to contact your doctor if you have any problems. Follow-up care is a key part of your treatment and safety. Be sure to make and go to all appointments, and call your doctor if you are having problems. It's also a good idea to know your test results and keep a list of the medicines you take. Where can you learn more?   Go to http://jose alejandro-lizzy.info/. Enter [phone] in the search box to learn more about \"Learning About Atrial Fibrillation. \"  Current as of: March 28, 2016  Content Version: 11.3  © 6008-5012 [employer] Care instructions adapted under license by [employer] (which disclaims liability or warranty for this information). If you have questions about a medical condition or this instruction, always ask your healthcare professional. [place] [address] any warranty or liability for your use of this information.

## 2017-07-17 NOTE — PROGRESS NOTES
Last Office Visit date : 1/16/2017  Next Office visit date :  7/17/2017  Last remote check date : 4/13/2017  scanned in our chart : yes  1. Have you been to the ER, urgent care clinic since your last visit? Hospitalized since your last visit?     no    2. Have you seen or consulted any other health care providers outside of the [address] [place] [address] since your last visit? Include any pap smears or colon screening. Yes,pcp    3. Since your last visit, have you had any of the following symptoms? Sob, dizziness, chest pains, palpitations    4. Have you had any blood work, X-rays or cardiac testing? Yes, pcp            5.  Where do you normally have your labs drawn?   pcp    6. Do you need any refills today?    no

## 2017-07-17 NOTE — LETTER
[address] 1950 7/17/2017 Dear Mariah Cochran MD 
 
I had the pleasure of evaluating  Ms. Yoo in office today. Below are the relevant portions of my assessment and plan of care. ICD-10-CM ICD-9-CM 1. Syncope, unspecified syncope type R55 780.2 AMB POC EKG ROUTINE W/ 12 LEADS, INTER & REP  
   REFERRAL TO CARDIAC ELECTROPHYSIOLOGY 7/17 continues with dizziness, rare LOC/fall- last in 6/17 
likely due to RVR intermittently with perm afib; will refer for AFib ablation 2. Essential hypertension, benign I10 401.1 7/17 low normal- adv to incr salt/fluids; 3. Pure hypercholesterolemia E78.00 272.0   
 7/17 on lipitor; 9/15 LDL46 
get labs from PCP 4. Chronic obstructive pulmonary disease, unspecified COPD [facility] ([facility] J44.9 496   
5. Tobacco use disorder F17.200 305.1 7/17; 1/17 Patient advised to stop smoking to reduce future cardiovascular events. Restarted 6/16 6. Permanent atrial fibrillation (HCC) I48.2 427.31 REFERRAL TO CARDIAC ELECTROPHYSIOLOGY  
   DIGOXIN  
   METABOLIC PANEL, BASIC  
   CBC W/O DIFF MAGNESIUM Current Outpatient Prescriptions Medication Sig Dispense Refill  digoxin (LANOXIN) 0.25 mg tablet Take 0.5 Tabs by mouth daily. 30 Tab 6  
 oxyCODONE IR (OXY-IR) 30 mg immediate release tablet Take 1 Tab by mouth four (4) times daily as needed for Pain for up to 30 days. Max Daily Amount: 120 mg. Indications: NEUROPATHIC PAIN, Pain 120 Tab 0  
 fentaNYL 62.5 mcg/hour pt72 62.5 mcg by TransDERmal route every seventy-two (72) hours for 30 days. Max Daily Amount: 62.5 mcg. Indications: Chronic Pain with Opioid Tolerance, SEVERE PAIN WITH OPIOID TOLERANCE 10 Patch 0  
 dilTIAZem XR (DILACOR XR) 180 mg XR capsule TAKE ONE CAPSULE BY MOUTH ONCE DAILY 90 Cap 3  
 atorvastatin (LIPITOR) 20 mg tablet Take  by mouth daily.  XARELTO 20 mg tab tablet TAKE ONE TABLET BY MOUTH ONCE DAILY WITH DINNER 30 Tab 6  naloxegol (MOVANTIK) 25 mg tab tablet Take 25 mg by mouth daily.  insulin lispro (HUMALOG) 100 unit/mL injection by SubCUTAneous route.  insulin glargine (LANTUS) 100 unit/mL injection by SubCUTAneous route nightly.  ipratropium-albuterol (COMBIVENT RESPIMAT)  mcg/actuation inhaler Take 1 Puff by inhalation every six (6) hours as needed for Wheezing or Shortness of Breath. 1 Inhaler 1  pyridoxine (VITAMIN B-6) 100 mg tablet Take 100 mg by mouth daily.  fish oil-dha-epa 1,200-144-216 mg cap Take 1 Cap by mouth daily.  levothyroxine (SYNTHROID) 125 mcg tablet Take 125 mcg by mouth Daily (before breakfast).  tiotropium (SPIRIVA WITH HANDIHALER) 18 mcg inhalation capsule Take 1 Cap by inhalation daily. 30 Cap 11  
 cholecalciferol, vitamin d3, (VITAMIN D) 1,000 unit tablet Take 5,000 Units by mouth daily. Orders Placed This Encounter  DIGOXIN Standing Status:   Future Standing Expiration Date:   1/17/2018  METABOLIC PANEL, BASIC Standing Status:   Future Standing Expiration Date:   7/18/2018  CBC W/O DIFF Standing Status:   Future Standing Expiration Date:   7/18/2018  MAGNESIUM Standing Status:   Future Standing Expiration Date:   7/18/2018  REFERRAL TO CARDIAC ELECTROPHYSIOLOGY Standing Status:   Future Standing Expiration Date:   10/15/2017 Referral Priority:   Routine Referral Type:   Consultation Referral Reason:   Specialty Services Required Referred to Provider:   Tom Jesus MD  
  Requested Specialty:   Cardiology Number of Visits Requested:   1  AMB POC EKG ROUTINE W/ 12 LEADS, INTER & REP Order Specific Question:   Reason for Exam: Answer:   palpitations If you have questions, please do not hesitate to call me. I look forward to following Ms. Yoo along with you. Sincerely, Charles Huynh MD

## 2017-07-17 NOTE — PROGRESS NOTES
HISTORY OF PRESENT ILLNESS  Gwen Chang is a 75 y.o. female. HPI Comments: F/u AFib, HTN, CHF, MR, HLD, s/p DDD    7/17 continues with dizziness, rare LOC/fall- last in 6/17 1/17 has a cold for 6 wks    Palpitations    The history is provided by the medical records. This is a chronic problem. The current episode started more than 1 week ago. The problem has not changed since onset. The problem occurs daily (off and on). On average, each episode lasts 30 minutes (permanent AF on pacer check). Associated symptoms include irregular heartbeat, dizziness and shortness of breath. Pertinent negatives include no diaphoresis, no fever, no chest pain, no claudication, no orthopnea, no PND, no syncope, no abdominal pain, no nausea, no vomiting, no headaches, no weakness, no cough, no hemoptysis and no sputum production. Risk factors include diabetes mellitus, hypertension, smoking/tobacco exposure and dyslipidemia. Her past medical history is significant for DM, hypertension and atrial fibrillation. Hypertension   The history is provided by the medical records. This is a chronic problem. Associated symptoms include shortness of breath. Pertinent negatives include no chest pain, no abdominal pain and no headaches. Pacemaker Check   The history is provided by the patient and medical records. Associated symptoms include shortness of breath. Pertinent negatives include no chest pain, no abdominal pain and no headaches. Cholesterol Problem   The history is provided by the medical records. This is a chronic problem. Associated symptoms include shortness of breath. Pertinent negatives include no chest pain, no abdominal pain and no headaches. Dizziness   The history is provided by the patient. This is a new problem. The current episode started more than 1 week ago. The problem occurs every several days (3/wk; 5-10 mts; 6/17 LOC for few seconds). The problem has not changed since onset. Associated symptoms include shortness of breath. Pertinent negatives include no chest pain, no abdominal pain and no headaches. The symptoms are aggravated by twisting (looking/reaching up). The symptoms are relieved by rest and laying down. Shortness of Breath   The history is provided by the patient. This is a chronic problem. The problem occurs intermittently. The current episode started more than 1 week ago. The problem has not changed since onset. Pertinent negatives include no fever, no headaches, no ear pain, no neck pain, no cough, no sputum production, no hemoptysis, no wheezing, no PND, no orthopnea, no chest pain, no syncope, no vomiting, no abdominal pain, no rash, no leg swelling and no claudication. The problem's precipitants include exercise (2 blocks). Review of Systems   Constitutional: Negative for chills, diaphoresis, fever and weight loss. HENT: Negative for ear pain and hearing loss. Eyes: Negative for blurred vision. Respiratory: Positive for shortness of breath. Negative for cough, hemoptysis, sputum production, wheezing and stridor. Cardiovascular: Positive for palpitations. Negative for chest pain, orthopnea, claudication, leg swelling, syncope and PND. Gastrointestinal: Negative for abdominal pain, heartburn, nausea and vomiting. Musculoskeletal: Negative for myalgias and neck pain. Skin: Negative for rash. Neurological: Positive for dizziness. Negative for tingling, tremors, focal weakness, loss of consciousness, weakness and headaches. Psychiatric/Behavioral: Negative for depression and suicidal ideas. No Known Allergies    Past Medical History:   Diagnosis Date    Abnormal myocardial perfusion study 05/07/2010    Anterior ischemia c/w at least 1-vessel CAD. No WMA. EF 51%. Positive EKG at 78% pred max HR. Ex time 7 min 30 sec.     Allergic rhinitis 1/30/2010    Atrial fibrillation ([address]     Broken ankle 08/22/2013    left    Carotid stenosis 1/30/2010    COPD     Essential hypertension     Heart failure ([facility]     History of amiodarone therapy 5/11/2015    History of cardiac cath 05/25/2010    Patent coronary arteries. LVEDP 13 mmHg. EF 65%.  History of cervical cancer 11/05    History of echocardiogram 08/21/2013    EF 50-55%. No RWMA. Gr 2 DDfx. Mild LAE. Mild MR.      Hypertension     Migraines 1/30/2010    Mitral regurgitation     Orthostatic dizziness 3/30/2016    ? autonomic dysfunction Vs amio side effect     Pacemaker 11/25/13    DDD    Pure hypercholesterolemia 1/30/2010    Rosacea 1/30/2010    Type II or unspecified type diabetes mellitus without mention of complication, not stated as uncontrolled 1/30/2010    Unspecified hypothyroidism 1/30/2010       Family History   Problem Relation Age of Onset    Hypertension Mother     Cancer Maternal Aunt      breast CA 66yo    Breast Cancer Maternal Aunt 79    Diabetes Maternal Grandmother     Cancer Maternal Aunt      uterine CA    Diabetes Daughter     Other Daughter      lupus (SLE) and coagulopathy    Breast Cancer Paternal Aunt 61    Heart Attack Sister     Heart Disease Neg Hx        Social History   Substance Use Topics    Smoking status: Current Some Day Smoker     Packs/day: 0.50     Years: 42.00     Last attempt to quit: 5/7/2013    Smokeless tobacco: Never Used      Comment: per patient some days none and some days a few     Alcohol use No        Current Outpatient Prescriptions   Medication Sig    digoxin (LANOXIN) 0.25 mg tablet Take 0.5 Tabs by mouth daily.  oxyCODONE IR (OXY-IR) 30 mg immediate release tablet Take 1 Tab by mouth four (4) times daily as needed for Pain for up to 30 days. Max Daily Amount: 120 mg. Indications: NEUROPATHIC PAIN, Pain    fentaNYL 62.5 mcg/hour pt72 62.5 mcg by TransDERmal route every seventy-two (72) hours for 30 days. Max Daily Amount: 62.5 mcg.  Indications: Chronic Pain with Opioid Tolerance, SEVERE PAIN WITH OPIOID TOLERANCE    dilTIAZem XR (DILACOR XR) 180 mg XR capsule TAKE ONE CAPSULE BY MOUTH ONCE DAILY    atorvastatin (LIPITOR) 20 mg tablet Take  by mouth daily.  XARELTO 20 mg tab tablet TAKE ONE TABLET BY MOUTH ONCE DAILY WITH DINNER    naloxegol (MOVANTIK) 25 mg tab tablet Take 25 mg by mouth daily.  insulin lispro (HUMALOG) 100 unit/mL injection by SubCUTAneous route.  insulin glargine (LANTUS) 100 unit/mL injection by SubCUTAneous route nightly.  ipratropium-albuterol (COMBIVENT RESPIMAT)  mcg/actuation inhaler Take 1 Puff by inhalation every six (6) hours as needed for Wheezing or Shortness of Breath.  pyridoxine (VITAMIN B-6) 100 mg tablet Take 100 mg by mouth daily.  fish oil-dha-epa 1,200-144-216 mg cap Take 1 Cap by mouth daily.  levothyroxine (SYNTHROID) 125 mcg tablet Take 125 mcg by mouth Daily (before breakfast).  tiotropium (SPIRIVA WITH HANDIHALER) 18 mcg inhalation capsule Take 1 Cap by inhalation daily.  cholecalciferol, vitamin d3, (VITAMIN D) 1,000 unit tablet Take 5,000 Units by mouth daily. No current facility-administered medications for this visit. Past Surgical History:   Procedure Laterality Date    HX AFIB ABLATION  2/8/13    Dr Anthony Milligan HX CAROTID ENDARTERECTOMY  11/06    right Dr. Andrew Gracia HX HYSTERECTOMY  5/06    HX ORTHOPAEDIC Right 07/11/2016    knee replacement    HX PACEMAKER  11/25/13    DDD    HX SVT ABLATION  2/18/2013       Diagnostic Studies: Old records reviewed and show as follows  EKG tracings reviewed by me today.     CARDIOLOGY STUDIES 8/2/2013 1/31/2013   EKG Result - 11/12 Atrial Flutter, rapid heart rate, normal axis, diffuse nonspecific ST-T changes   Myocardial Perfusion Scan Result - 12/05 nl scan, EF 51%; 4/08 nl scan, EF 66%; 5/10 abn scan, partially reversible anterior ischemia, EF 51%;   Echocardiogram - Complete Result 55%EF, mild MR, trivial PE 4/07 EF 60-65% mild DD, mild MR; 4/08 EF 60%,mild DD,trace/mild MR; 2/10 EF 45-50%, PAP 32; 5/10; 3/11 EF 55%, mild MR; 11/12 55%EF, mild LVH,mod MR,   Coronary Angiogram Result - CATH 6/06; 5/10 nl ca;   PFT's with DLCO Result - 9/07; 2/10; 7/10; Some recent data might be hidden   10/15 NUC STRESS  CONCLUSION  1. No evidence of ischemia based on this study. 2. Normal left ventricular size and function. Ejection fraction of 49%. 3. Low risk scan. Visit Vitals    BP 91/49    Pulse 74    Ht 5' 4\" (1.626 m)    Wt 58.5 kg (129 lb)    BMI 22.14 kg/m2       Ms. Yoo has a reminder for a \"due or due soon\" health maintenance. I have asked that she contact her primary care provider for follow-up on this health maintenance. Physical Exam   Constitutional: She is oriented to person, place, and time. She appears well-developed and well-nourished. No distress. HENT:   Head: Atraumatic. Mouth/Throat: No oropharyngeal exudate. Eyes: Conjunctivae are normal. No scleral icterus. Neck: Neck supple. No JVD present. Cardiovascular: An irregularly irregular rhythm present. Tachycardia present. Exam reveals no gallop. No murmur heard. Pulmonary/Chest: Effort normal and breath sounds normal. No stridor. She has no wheezes. She has no rales. Abdominal: Soft. There is no tenderness. Musculoskeletal: Normal range of motion. She exhibits no edema. Neurological: She is alert and oriented to person, place, and time. She exhibits normal muscle tone. Skin: Skin is warm. She is not diaphoretic. Psychiatric: She has a normal mood and affect. Her behavior is normal.     ekg atrial fibrillation with ventricular paced  ASSESSMENT and Olvierio Sandhu was seen today for irregular heart beat, hypertension and pacemaker check.     Diagnoses and all orders for this visit:    Syncope, unspecified syncope type  Comments:  7/17 continues with dizziness, rare LOC/fall- last in 6/17  likely due to RVR intermittently with perm afib; will refer for AFib ablation  Orders:  -     AMB POC EKG ROUTINE W/ 12 LEADS, INTER & REP  -     REFERRAL TO CARDIAC ELECTROPHYSIOLOGY; Future    Essential hypertension, benign  Comments:  7/17 low normal- adv to incr salt/fluids;     Pure hypercholesterolemia  Comments:  7/17 on lipitor; 9/15 LDL46  get labs from PCP    Chronic obstructive pulmonary disease, unspecified COPD type ([facility]    Tobacco use disorder  Comments:  7/17; 1/17 Patient advised to stop smoking to reduce future cardiovascular events. Restarted 6/16    Permanent atrial fibrillation (HCC)  -     REFERRAL TO CARDIAC ELECTROPHYSIOLOGY; Future  -     DIGOXIN; Future  -     METABOLIC PANEL, BASIC; Future  -     CBC W/O DIFF; Future  -     MAGNESIUM; Future    Pacemaker  Comments:   7/17 nl function, perm AFib  Orders:  -     PROGRAM EVAL (IN PERSON) IMPLANT DEVICE,PACEMAKER,MULT LEAD        Pertinent laboratory and test data reviewed and discussed with patient. See patient instructions also for other medical advice given    Medications Discontinued During This Encounter   Medication Reason    OTHER Duplicate Order    OTHER Duplicate Order    OTHER Duplicate Order       Follow-up Disposition:  Return in about 3 months (around 10/17/2017), or if symptoms worsen or fail to improve, for with pacer/ICD check.

## 2017-07-17 NOTE — MR AVS SNAPSHOT
Visit Information Date & Time Provider Department Dept. Phone Encounter #  
 7/17/2017 10:00 AM Adam Jalloh MD [facility] [id number] Follow-up Instructions Return in about 3 months (around 10/17/2017), or if symptoms worsen or fail to improve, for with pacer/ICD check. Your Appointments 7/21/2017  9:20 AM  
Follow Up with Marcelina Brooks MD  
[facility]) Appt Note: 2 MONTH F/U  
 [address] [place] [address]  
[phone] [id number]  
  
    
 7/27/2017  9:40 AM  
Follow Up with Judith Valenzuela MD  
[facility] ([facility]) Appt Note: ref by Dr. Sunshine Potter ofc   dx AV node ablation    notes in cc per [place] [phone]  
[phone] [address] [facility] [address] [address]  
  
    
 7/31/2017  9:30 AM  
Follow Up with Laurie Murray MD  
[address] [facility], [address] and [facility]) Appt Note: 3 WK BLOCK FU; [employer] 7/12/17  
 [address] [facility] [address]  
[phone]  
  
   
 [address] [facility] [address]  
  
    
 10/23/2017 10:45 AM  
Office Visit with Adam Jalloh MD  
[facility]) Appt Note: 3 m w/ medtronic ck [address] [facility], [address] [facility] [address]  
[address], [address] [place] [id number] [facility] 3/16/2018  8:00 AM  
PROCEDURE with [facility] IMAGING 1 [facility] ([facility]) Appt Note: LISA ILIAC STENTS 1YR FAGAN; MAILED [address]; .  
 27 [place], [place] [address] [address] [facility]  
[phone]  [address] [place] [address],Suite [facility]  
  
    
 3/16/2018 10:00 AM  
 PROCEDURE with [facility] NONIMAGING [facility] ([address] [facility]) Appt Note: LEG ART 1YR FAGAN; MAILED CARDS AND PREP INFORMATION; .  
 [phone] [facility] [address]  
[phone] [address] [facility],[address]  
  
    
 3/28/2018  1:45 PM  
Follow Up with Farzaneh Magallanes MD  
600 [facility] [address] [place] [address]) Appt Note: 1 year fu after studies at our lab on 3/16/2018 with prep; MAILED [address]; CALLED PATIENT TO MOVE APPT TIME DUE TO DR Navy Feliz Hinton PT IS AWARE; 1 year fu after studies at our lab on 3/16/2018 with prep MAILED CARDS AND PREP INFORMATION CALLED PATIENT TO MOVE APPT TIME DUE TO DR De Anda 207  
 [address] [address] [address] [facility] [address]  
[phone] [address] [facility] [address] [facility] [address] Upcoming Health Maintenance Date Due Hepatitis C Screening 1950 FOOT EXAM Q1 5/1/1959 EYE EXAM RETINAL OR DILATED Q1 5/1/1959 FOBT Q 1 YEAR AGE 50-75 5/1/1999 DTaP/Tdap/Td series (1 - Tdap) 10/2/2005 ZOSTER VACCINE AGE 60> 5/1/2009 GLAUCOMA SCREENING Q2Y 5/1/2014 OSTEOPOROSIS SCREENING (DEXA) 5/1/2014 Pneumococcal 65+ Low/Medium Risk (2 of 2 - PPSV23) 5/1/2014 MEDICARE YEARLY EXAM 5/1/2014 BREAST CANCER SCRN MAMMOGRAM 7/25/2015 MICROALBUMIN Q1 1/8/2016 HEMOGLOBIN A1C Q6M 7/10/2016 LIPID PANEL Q1 9/29/2016 INFLUENZA AGE 9 TO ADULT 8/1/2017 Allergies as of 7/17/2017  Review Complete On: 7/17/2017 By: Andrew Szymanski MD  
 No Known Allergies Current Immunizations  Reviewed on 2/9/2011 Name Date Pneumococcal Vaccine (Unspecified Type) 12/14/2005 TD Vaccine 10/1/2005 Not reviewed this visit You Were Diagnosed With   
  
 Codes Comments  Syncope, unspecified syncope type    -  Primary ICD-10-CM: R55 
 ICD-9-CM: 780.2 7/17 continues with dizziness, rare LOC/fall- last in 6/17 
likely due to RVR intermittently with perm afib; will refer for AFib ablation Essential hypertension, benign     ICD-10-CM: I10 
ICD-9-CM: 401.1 7/17 low normal- adv to incr salt/fluids;   
 Pure hypercholesterolemia     ICD-10-CM: E78.00 ICD-9-CM: 272.0 7/17 on lipitor; 9/15 LDL46 
get labs from PCP Chronic obstructive pulmonary disease, unspecified COPD type ([facility]     ICD-10-CM: J44.9 ICD-9-CM: 726 Tobacco use disorder     ICD-10-CM: F17.200 ICD-9-CM: 305.1 7/17; 1/17 Patient advised to stop smoking to reduce future cardiovascular events. Restarted 6/16 Permanent atrial fibrillation (HCC)     ICD-10-CM: R62.5 ICD-9-CM: 427.31 Pacemaker     ICD-10-CM: Z95.0 ICD-9-CM: V45.01  7/17 nl function, perm AFib Vitals BP Pulse Height(growth percentile) Weight(growth percentile) BMI OB Status 91/49 74 5' 4\" (1.626 m) 129 lb (58.5 kg) 22.14 kg/m2 Hysterectomy Smoking Status Current Some Day Smoker Vitals History BMI and BSA Data Body Mass Index Body Surface Area  
 22.14 kg/m 2 1.63 m 2 Preferred Pharmacy Pharmacy Name Phone [facility] [address] [phone] Your Updated Medication List  
  
   
This list is accurate as of: 7/17/17 11:08 AM.  Always use your most recent med list.  
  
  
  
  
 digoxin 0.25 mg tablet Commonly known as:  LANOXIN Take 0.5 Tabs by mouth daily. dilTIAZem  mg XR capsule Commonly known as:  DILACOR XR  
TAKE ONE CAPSULE BY MOUTH ONCE DAILY  
  
 fentaNYL 62.5 mcg/hour Pt72  
62.5 mcg by TransDERmal route every seventy-two (72) hours for 30 days. Max Daily Amount: 62.5 mcg. Indications: Chronic Pain with Opioid Tolerance, SEVERE PAIN WITH OPIOID TOLERANCE  
  
 fish oil-dha-epa 1,200-144-216 mg Cap Take 1 Cap by mouth daily. HumaLOG 100 unit/mL injection Generic drug:  insulin lispro  
by SubCUTAneous route. ipratropium-albuterol  mcg/actuation inhaler Commonly known as:  Klaudia Bal Take 1 Puff by inhalation every six (6) hours as needed for Wheezing or Shortness of Breath. LANTUS 100 unit/mL injection Generic drug:  insulin glargine  
by SubCUTAneous route nightly. LIPITOR 20 mg tablet Generic drug:  atorvastatin Take  by mouth daily. naloxegol 25 mg Tab tablet Commonly known as:  Jesus Ziegler Take 25 mg by mouth daily. oxyCODONE IR 30 mg immediate release tablet Commonly known as:  OXY-IR Take 1 Tab by mouth four (4) times daily as needed for Pain for up to 30 days. Max Daily Amount: 120 mg. Indications: NEUROPATHIC PAIN, Pain SYNTHROID 125 mcg tablet Generic drug:  levothyroxine Take 125 mcg by mouth Daily (before breakfast). tiotropium 18 mcg inhalation capsule Commonly known as:  [address] Take 1 Cap by inhalation daily. VITAMIN B-6 100 mg tablet Generic drug:  pyridoxine (vitamin B6) Take 100 mg by mouth daily. VITAMIN D3 1,000 unit tablet Generic drug:  cholecalciferol Take 5,000 Units by mouth daily. XARELTO 20 mg Tab tablet Generic drug:  rivaroxaban TAKE ONE TABLET BY MOUTH ONCE DAILY WITH DINNER We Performed the Following AMB POC EKG ROUTINE W/ 12 LEADS, INTER & REP [69187 CPT(R)] PROGRAM EVAL (IN PERSON) IMPLANT DEVICE,PACEMAKER,MULT LEAD [id number] CPT(R)] Follow-up Instructions Return in about 3 months (around 10/17/2017), or if symptoms worsen or fail to improve, for with pacer/ICD check. To-Do List   
 Around 07/17/2017 Lab:  CBC W/O DIFF   
  
 07/17/2017 Lab:  MAGNESIUM   
  
 07/17/2017 Lab:  METABOLIC PANEL, BASIC Around 07/18/2017 Lab:  DIGOXIN   
  
 07/18/2017 11:00 AM  
  Appointment with Ramana Trinidad PTA at [facility]  [facility] ([phone]) 07/20/2017 11:00 AM  
  Appointment with Zaid Ramirez PTA at [facility]  [address] ([phone]) Around 07/24/2017 Outpatient Referral:  REFERRAL TO CARDIAC ELECTROPHYSIOLOGY   
  
 07/24/2017 11:00 AM  
  Appointment with Zaid Ramirez PTA at [facility]  [address] ([phone]) Referral Information Referral ID Referred By Referred To  
  
 [id number] Wei HUMPHREY MD   
   [address] [facility] [address] [facility] [place], [address] Phone: [phone] Fax: [phone] Visits Status Start Date End Date 1 New Request 7/17/17 7/17/18 If your referral has a status of pending review or denied, additional information will be sent to support the outcome of this decision. Patient Instructions Medications Discontinued During This Encounter Medication Reason  OTHER Duplicate Order  OTHER Duplicate Order  OTHER Duplicate Order Orders Placed This Encounter  DIGOXIN Standing Status:   Future Standing Expiration Date:   1/17/2018  METABOLIC PANEL, BASIC Standing Status:   Future Standing Expiration Date:   7/18/2018  CBC W/O DIFF Standing Status:   Future Standing Expiration Date:   7/18/2018  MAGNESIUM Standing Status:   Future Standing Expiration Date:   7/18/2018  REFERRAL TO CARDIAC ELECTROPHYSIOLOGY Standing Status:   Future Standing Expiration Date:   10/15/2017 Referral Priority:   Routine Referral Type:   Consultation Referral Reason:   Specialty Services Required Referred to Provider:   Arvind Newell MD  
  Requested Specialty:   Cardiology Number of Visits Requested:   1  AMB POC EKG ROUTINE W/ 12 LEADS, INTER & REP Order Specific Question:   Reason for Exam: Answer:   palpitations Please drink plenty of fluids and eat plenty of salt Learning About Atrial Fibrillation What is atrial fibrillation? Atrial fibrillation (say \"AY-tree-yennifer mkz-puid-EMQ-shun\") is the most common type of irregular heartbeat (arrhythmia). Normally, the heart beats in a strong, steady rhythm. In atrial fibrillation, a problem with the heart's electrical system causes the two upper parts of the heart (the atria) to quiver, or fibrillate. Your heart rate also may be faster than normal. 
Atrial fibrillation can be dangerous because if the heartbeat isn't strong and steady, blood can collect, or pool, in the atria. And pooled blood is more likely to form clots. Clots can travel to the brain, block blood flow, and cause a stroke. Atrial fibrillation can also lead to heart failure. Treatment for atrial fibrillation helps prevent stroke and heart failure. It also helps relieve symptoms. Atrial fibrillation is often caused by another heart problem. It may happen after heart surgery. It may also be caused by other problems, such as an overactive thyroid gland or lung disease. Many people with atrial fibrillation are able to live full and active lives. What are the symptoms? Some people feel symptoms when they have episodes of atrial fibrillation. But other people don't notice any symptoms. If you have symptoms, you may feel: · A fluttering, racing, or pounding feeling in your chest called palpitations. · Weak or tired. · Dizzy or lightheaded. · Short of breath. · Chest pain. · Confused. You may notice signs of atrial fibrillation when you check your pulse. Your pulse may seem uneven or fast. 
What can you expect when you have atrial fibrillation? At first, spells of atrial fibrillation may come on suddenly and last a short time. It may go away on its own or it goes away after treatment. This is called paroxysmal atrial fibrillation. Over time, the spells may last longer and occur more often. They often don't go away on their own. How is it treated? Treatments can help you feel better and prevent future problems, especially stroke and heart failure. The main types of treatment slow the heart rate, control the heart rhythm, and help prevent stroke. Your treatment will depend on the cause of your atrial fibrillation, your symptoms, and your risk for stroke. · Heart rate treatment. Medicine may be used to slow your heart rate. Your heartbeat may still be irregular. But these medicines keep your heart from beating too fast. They may also help relieve your symptoms. · Heart rhythm treatment. Different treatments may be used to try to stop atrial fibrillation and keep it from returning. They can also relieve symptoms. These treatments include medicine, electrical cardioversion to shock the heart back to a normal rhythm, a procedure called catheter ablation, and heart surgery. · Stroke prevention. You and your doctor can decide how to lower your risk. You may decide to take a blood-thinning medicine, such as aspirin or an anticoagulant. How can you live well with it? You can live well and help manage atrial fibrillation by having a heart-healthy lifestyle. To have a heart-healthy lifestyle: · Don't smoke. · Eat heart-healthy foods. · Be active. Talk to your doctor about what type and level of exercise is safe for you. · Stay at a healthy weight. Lose weight if you need to. · Manage stress. · Avoid alcohol if it triggers symptoms. · Manage other health problems such as high blood pressure, high cholesterol, and diabetes. · Avoid getting sick from the flu. Get a flu shot every year. When should you call for help? Call 911 anytime you think you may need emergency care. For example, call if: 
· You have symptoms of a stroke. These may include: 
¨ Sudden numbness, tingling, weakness, or loss of movement in your face, arm, or leg, especially on only one side of your body. ¨ Sudden vision changes. ¨ Sudden trouble speaking. ¨ Sudden confusion or trouble understanding simple statements. ¨ Sudden problems with walking or balance. ¨ A sudden, severe headache that is different from past headaches. Call your doctor now or seek immediate medical care if: 
· You have new or increased shortness of breath. · You feel dizzy or lightheaded, or you feel like you may faint. Watch closely for changes in your health, and be sure to contact your doctor if you have any problems. Follow-up care is a key part of your treatment and safety. Be sure to make and go to all appointments, and call your doctor if you are having problems. It's also a good idea to know your test results and keep a list of the medicines you take. Where can you learn more? Go to http://jose alejandro-lizzy.info/. Enter [phone] in the search box to learn more about \"Learning About Atrial Fibrillation. \" Current as of: March 28, 2016 Content Version: 11.3 © 1673-5736 [employer]. Care instructions adapted under license by [employer] (which disclaims liability or warranty for this information). If you have questions about a medical condition or this instruction, always ask your healthcare professional. [place] [address] any warranty or liability for your use of this information. Introducing [facility] & HEALTH SERVICES! [employer] introduces [employer] patient portal. Now you can access parts of your medical record, email your doctor's office, and request medication refills online. 1. In your internet browser, go to [email or web address] 2. Click on the First Time User? Click Here link in the Sign In box. You will see the New Member Sign Up page. 3. Enter your C[employer]ium Access Code exactly as it appears below. You will not need to use this code after youve completed the sign-up process. If you do not sign up before the expiration date, you must request a new code.  
 
· [employer] Access Code: [email or web address] 
 Expires: 7/20/2017 10:01 AM 
 
4. Enter the last four digits of your Social Security Number (xxxx) and Date of Birth (mm/dd/yyyy) as indicated and click Submit. You will be taken to the next sign-up page. 5. Create a Sgrouples ID. This will be your Sgr[employer]ples login ID and cannot be changed, so think of one that is secure and easy to remember. 6. Create a S[employer]ples password. You can change your password at any time. 7. Enter your Password Reset Question and Answer. This can be used at a later time if you forget your password. 8. Enter your e-mail address. You will receive e-mail notification when new information is available in [address] 9. Click Sign Up. You can now view and download portions of your medical record. 10. Click the Download Summary menu link to download a portable copy of your medical information. If you have questions, please visit the Frequently Asked Questions section of the S[employer] website. Remember, Sgrouples is NOT to be used for urgent needs. For medical emergencies, dial 911. Now available from your iPhone and Android! Please provide this summary of care documentation to your next provider. Your primary care clinician is listed as Layla Navarro. If you have any questions after today's visit, please call [phone].

## 2017-07-21 ENCOUNTER — OFFICE VISIT (OUTPATIENT)
Dept: PAIN MANAGEMENT | Age: 67
End: 2017-07-21

## 2017-07-21 VITALS
HEART RATE: 92 BPM | TEMPERATURE: 96.4 F | WEIGHT: 131 LBS | SYSTOLIC BLOOD PRESSURE: 111 MMHG | BODY MASS INDEX: 22.49 KG/M2 | RESPIRATION RATE: 16 BRPM | DIASTOLIC BLOOD PRESSURE: 55 MMHG

## 2017-07-21 DIAGNOSIS — G89.4 CHRONIC PAIN SYNDROME: ICD-10-CM

## 2017-07-21 DIAGNOSIS — Z79.899 ENCOUNTER FOR LONG-TERM (CURRENT) USE OF HIGH-RISK MEDICATION: ICD-10-CM

## 2017-07-21 DIAGNOSIS — E11.40 DIABETIC NEUROPATHY, PAINFUL (HCC): Primary | ICD-10-CM

## 2017-07-21 DIAGNOSIS — I70.213 ATHEROSCLEROSIS OF NATIVE ARTERY OF BOTH LOWER EXTREMITIES WITH INTERMITTENT CLAUDICATION (HCC): ICD-10-CM

## 2017-07-21 DIAGNOSIS — M15.9 GENERALIZED OA: ICD-10-CM

## 2017-07-21 RX ORDER — OXYCODONE HYDROCHLORIDE 30 MG/1
30 TABLET ORAL
Qty: 120 TAB | Refills: 0 | Status: SHIPPED | OUTPATIENT
Start: 2017-08-20 | End: 2017-09-19

## 2017-07-21 RX ORDER — FENTANYL 62.5 UG/H
62.5 PATCH, EXTENDED RELEASE TRANSDERMAL
Qty: 10 PATCH | Refills: 0 | Status: SHIPPED | OUTPATIENT
Start: 2017-09-20 | End: 2017-10-10 | Stop reason: SDUPTHER

## 2017-07-21 RX ORDER — FENTANYL 62.5 UG/H
62.5 PATCH, EXTENDED RELEASE TRANSDERMAL
Qty: 10 PATCH | Refills: 0 | Status: SHIPPED | OUTPATIENT
Start: 2017-08-22 | End: 2017-08-21

## 2017-07-21 RX ORDER — FENTANYL 62.5 UG/H
62.5 PATCH, EXTENDED RELEASE TRANSDERMAL
Qty: 10 PATCH | Refills: 0 | Status: SHIPPED | OUTPATIENT
Start: 2017-07-24 | End: 2017-11-16 | Stop reason: SDUPTHER

## 2017-07-21 RX ORDER — OXYCODONE HYDROCHLORIDE 30 MG/1
30 TABLET ORAL
Qty: 120 TAB | Refills: 0 | Status: SHIPPED | OUTPATIENT
Start: 2017-09-18 | End: 2017-10-10 | Stop reason: SDUPTHER

## 2017-07-21 RX ORDER — OXYCODONE HYDROCHLORIDE 30 MG/1
30 TABLET ORAL
Qty: 120 TAB | Refills: 0 | Status: SHIPPED | OUTPATIENT
Start: 2017-07-22 | End: 2017-08-21

## 2017-07-21 RX ORDER — NALOXONE HYDROCHLORIDE 4 MG/.1ML
4 SPRAY NASAL AS NEEDED
Qty: 1 BOX | Refills: 1 | Status: SHIPPED | OUTPATIENT
Start: 2017-07-21

## 2017-07-21 NOTE — PROGRESS NOTES
Nursing Notes    Patient presents to the office today in follow-up. Patient rates her pain at 7/10 on the numerical pain scale. Reviewed medications with counts as follows:    Rx Date filled Qty Dispensed Pill Count Last Dose Short   Fentanyl 62.5 6/26/17 10 2+1 on Placed 7/21/17 no   torres 30 6/24/17 120 11 7/21/17 no         Comments:     POC UDS was not performed in office today    Any new labs or imaging since last appointment? NO    Have you been to an emergency room (ER) or urgent care clinic since your last visit? NO            Have you been hospitalized since your last visit? NO     If yes, where, when, and reason for visit? Have you seen or consulted any other health care providers outside of the [address] [place] [address]  since your last visit? NO     If yes, where, when, and reason for visit? Ms. Yoo has a reminder for a \"due or due soon\" health maintenance. I have asked that she contact her primary care provider for follow-up on this health maintenance.

## 2017-07-21 NOTE — MR AVS SNAPSHOT
Visit Information Date & Time Provider Department Dept. Phone Encounter #  
 7/21/2017  9:20 AM Xin Ferrer MD [address] [place] [address] for Pain Management  Follow-up Instructions Return in about 3 months (around 10/21/2017). Your Appointments 7/27/2017  9:40 AM  
Follow Up with Daniel Dunbar MD  
[facility] ([facility]) Appt Note: ref by Dr. Beck Weber ofc   dx AV node ablation    notes in cc per Bill Martínez  [id number]  
[phone] [address] [address]  
  
    
 7/31/2017  9:30 AM  
Follow Up with Griselda Odell MD  
[address] [facility], [address] and [facility]) Appt Note: 3 WK BLOCK FU; [employer] 7/12/17  
 [address] Merg[facility] with [facility] [address]  
[phone]  
  
   
 [address] [facility] [address]  
  
    
 10/23/2017 10:45 AM  
Office Visit with Miranda Coyle MD  
[facility]) Appt Note: 3 m w/ medtronic ck 178 [facility], [address] M[facility]ed with [facility] [address]  
[address], [address] 3/16/2018  8:00 AM  
PROCEDURE with BSV[facility] IMAGING 1 [facility] ([facility]) Appt Note: LISA ILIAC STENTS 1YR FAGAN; MAILED [address] [facility] [address]; .  
 Lucita [facility], [facility] 863 [address] [facility] [address]  
[phone] [address] [place], [place] [address]  
  
    
 3/16/2018 10:00 AM  
PROCEDURE with BSVVS NONIMAGING [facility] ([facility]) Appt Note: LEG ART 1YR FAGAN; MAILED CARDS AND PREP INFORMATION; .  
 [address], [facility] [address] 200 [facility] [address]  
[phone]  Charisse Juan [address] [place] [address]  
  
    
 3/28/2018  1:45 PM  
 Follow Up with Timo Martinez MD  
600 [facility] [address] [facility]) Appt Note: 1 year fu after studies at our lab on 3/16/2018 with prep; MAILED [address]; CALLED PATIENT TO MOVE APPT TIME DUE TO DR Navy Feliz Hinton PT IS AWARE; 1 year fu after studies at our lab on 3/16/2018 with prep MAILED CARDS AND PREP INFORMATION CALLED PATIENT TO MOVE APPT TIME DUE TO DR De Anda 207  
 [address] 99 706 [facility]  
[phone] 2307 [facility] 706 [facility] Upcoming Health Maintenance Date Due Hepatitis C Screening 1950 FOOT EXAM Q1 5/1/1959 EYE EXAM RETINAL OR DILATED Q1 5/1/1959 FOBT Q 1 YEAR AGE 50-75 5/1/1999 DTaP/Tdap/Td series (1 - Tdap) 10/2/2005 ZOSTER VACCINE AGE 60> 3/1/2009 GLAUCOMA SCREENING Q2Y 5/1/2014 OSTEOPOROSIS SCREENING (DEXA) 5/1/2014 Pneumococcal 65+ Low/Medium Risk (2 of 2 - PPSV23) 5/1/2014 MEDICARE YEARLY EXAM 5/1/2014 BREAST CANCER SCRN MAMMOGRAM 7/25/2015 MICROALBUMIN Q1 1/8/2016 HEMOGLOBIN A1C Q6M 7/10/2016 LIPID PANEL Q1 9/29/2016 INFLUENZA AGE 9 TO ADULT 8/1/2017 Allergies as of 7/21/2017  Review Complete On: 7/21/2017 By: Carlyn Watson MD  
 No Known Allergies Current Immunizations  Reviewed on 2/9/2011 Name Date Pneumococcal Vaccine (Unspecified Type) 12/14/2005 TD Vaccine 10/1/2005 Not reviewed this visit Vitals BP Pulse Temp Resp Weight(growth percentile) BMI  
 111/55 92 96.4 °F (35.8 °C) 16 131 lb (59.4 kg) 22.49 kg/m2 OB Status Smoking Status Hysterectomy Current Some Day Smoker BMI and BSA Data Body Mass Index Body Surface Area  
 22.49 kg/m 2 1.64 m 2 Preferred Pharmacy Pharmacy Name Phone [facility] [address] [phone] Your Updated Medication List  
  
   
 This list is accurate as of: 7/21/17 10:17 AM.  Always use your most recent med list.  
  
  
  
  
 digoxin 0.25 mg tablet Commonly known as:  LANOXIN Take 0.5 Tabs by mouth daily. dilTIAZem  mg XR capsule Commonly known as:  DILACOR XR  
TAKE ONE CAPSULE BY MOUTH ONCE DAILY * fentaNYL 62.5 mcg/hour Pt72  
62.5 mcg by TransDERmal route every seventy-two (72) hours for 30 days. Max Daily Amount: 62.5 mcg. Indications: Chronic Pain with Opioid Tolerance, SEVERE PAIN WITH OPIOID TOLERANCE Start taking on:  7/24/2017 * fentaNYL 62.5 mcg/hour Pt72  
62.5 mcg by TransDERmal route every seventy-two (72) hours for 30 days. Max Daily Amount: 62.5 mcg. Indications: Chronic Pain with Opioid Tolerance, SEVERE PAIN WITH OPIOID TOLERANCE Start taking on:  8/22/2017 * fentaNYL 62.5 mcg/hour Pt72  
62.5 mcg by TransDERmal route every seventy-two (72) hours for 30 days. Max Daily Amount: 62.5 mcg. Indications: Chronic Pain with Opioid Tolerance, SEVERE PAIN WITH OPIOID TOLERANCE Start taking on:  9/20/2017  
  
 fish oil-dha-epa 1,200-144-216 mg Cap Take 1 Cap by mouth daily. HumaLOG 100 unit/mL injection Generic drug:  insulin lispro  
by SubCUTAneous route. ipratropium-albuterol  mcg/actuation inhaler Commonly known as:  Cori Charlevoix Take 1 Puff by inhalation every six (6) hours as needed for Wheezing or Shortness of Breath. LANTUS 100 unit/mL injection Generic drug:  insulin glargine  
by SubCUTAneous route nightly. LIPITOR 20 mg tablet Generic drug:  atorvastatin Take  by mouth daily. naloxegol 25 mg Tab tablet Commonly known as:  Sigmund Meo Take 25 mg by mouth daily. naloxone 4 mg/actuation Spry 4 mg by Nasal route as needed for up to 2 doses. Indications: OPIOID TOXICITY  
  
 * oxyCODONE IR 30 mg immediate release tablet Commonly known as:  OXY-IR  
 Take 1 Tab by mouth four (4) times daily as needed for Pain for up to 30 days. Max Daily Amount: 120 mg. Indications: NEUROPATHIC PAIN, Pain Start taking on:  2017 * oxyCODONE IR 30 mg immediate release tablet Commonly known as:  OXY-IR Take 1 Tab by mouth four (4) times daily as needed for Pain for up to 30 days. Max Daily Amount: 120 mg. Indications: NEUROPATHIC PAIN, Pain Start taking on:  2017 * oxyCODONE IR 30 mg immediate release tablet Commonly known as:  OXY-IR Take 1 Tab by mouth four (4) times daily as needed for Pain for up to 30 days. Max Daily Amount: 120 mg. Indications: NEUROPATHIC PAIN, Pain Start taking on:  2017 SYNTHROID 125 mcg tablet Generic drug:  levothyroxine Take 125 mcg by mouth Daily (before breakfast). tiotropium 18 mcg inhalation capsule Commonly known as:  [address] Take 1 Cap by inhalation daily. VITAMIN B-6 100 mg tablet Generic drug:  pyridoxine (vitamin B6) Take 100 mg by mouth daily. VITAMIN D3 1,000 unit tablet Generic drug:  cholecalciferol Take 5,000 Units by mouth daily. XARELTO 20 mg Tab tablet Generic drug:  rivaroxaban TAKE ONE TABLET BY MOUTH ONCE DAILY WITH DINNER  
  
 * Notice: This list has 6 medication(s) that are the same as other medications prescribed for you. Read the directions carefully, and ask your doctor or other care provider to review them with you. Prescriptions Printed Refills  
 oxyCODONE IR (OXY-IR) 30 mg immediate release tablet 0 Starting on: 2017 Sig: Take 1 Tab by mouth four (4) times daily as needed for Pain for up to 30 days. Max Daily Amount: 120 mg. Indications: NEUROPATHIC PAIN, Pain Class: Print Route: Oral  
 fentaNYL 62.5 mcg/hour pt72 0 Starting on: 2017  Si.5 mcg by TransDERmal route every seventy-two (72) hours for 30 days. Max Daily Amount: 62.5 mcg. Indications: Chronic Pain with Opioid Tolerance, SEVERE PAIN WITH OPIOID TOLERANCE Class: Print Route: TransDERmal  
 oxyCODONE IR (OXY-IR) 30 mg immediate release tablet 0 Starting on: 2017 Sig: Take 1 Tab by mouth four (4) times daily as needed for Pain for up to 30 days. Max Daily Amount: 120 mg. Indications: NEUROPATHIC PAIN, Pain Class: Print Route: Oral  
 oxyCODONE IR (OXY-IR) 30 mg immediate release tablet 0 Starting on: 2017 Sig: Take 1 Tab by mouth four (4) times daily as needed for Pain for up to 30 days. Max Daily Amount: 120 mg. Indications: NEUROPATHIC PAIN, Pain Class: Print Route: Oral  
 fentaNYL 62.5 mcg/hour pt72 0 Starting on: 2017 Si.5 mcg by TransDERmal route every seventy-two (72) hours for 30 days. Max Daily Amount: 62.5 mcg. Indications: Chronic Pain with Opioid Tolerance, SEVERE PAIN WITH OPIOID TOLERANCE Class: Print Route: TransDERmal  
 fentaNYL 62.5 mcg/hour pt72 0 Starting on: 2017 Si.5 mcg by TransDERmal route every seventy-two (72) hours for 30 days. Max Daily Amount: 62.5 mcg. Indications: Chronic Pain with Opioid Tolerance, SEVERE PAIN WITH OPIOID TOLERANCE Class: Print Route: TransDERmal  
  
Prescriptions Sent to Pharmacy Refills  
 naloxone 4 mg/actuation spry 1 Si mg by Nasal route as needed for up to 2 doses. Indications: OPIOID TOXICITY Class: Normal  
 Pharmacy: [address] #: [phone] Route: Nasal  
  
Follow-up Instructions Return in about 3 months (around 10/21/2017). To-Do List   
 2017 11:00 AM  
  Appointment with Zaid Ramirez PTA at [facility]  [facility] ([phone]) Patient Instructions Current health maintenance issues were reviewed and the patient was advised to followup with his/her PCP for completion of these items. Introducing [facility] & HEALTH SERVICES! [employer] introduces 115 network disks patient portal. Now you can access parts of your medical record, email your doctor's office, and request medication refills online. 1. In your internet browser, go to [email or web address] 2. Click on the First Time User? Click Here link in the Sign In box. You will see the New Member Sign Up page. 3. Enter your 115 network disks Access Code exactly as it appears below. You will not need to use this code after youve completed the sign-up process. If you do not sign up before the expiration date, you must request a new code. · 115 network disks Access Code: [email or web address] Expires: 10/18/2017 10:17 AM 
 
4. Enter the last four digits of your Social Security Number (xxxx) and Date of Birth (mm/dd/yyyy) as indicated and click Submit. You will be taken to the next sign-up page. 5. Create a 115 network disks ID. This will be your 115 network disks login ID and cannot be changed, so think of one that is secure and easy to remember. 6. Create a 115 network disks password. You can change your password at any time. 7. Enter your Password Reset Question and Answer. This can be used at a later time if you forget your password. 8. Enter your e-mail address. You will receive e-mail notification when new information is available in [address] 9. Click Sign Up. You can now view and download portions of your medical record. 10. Click the Download Summary menu link to download a portable copy of your medical information. If you have questions, please visit the Frequently Asked Questions section of the 115 network disks website. Remember, 115 network disks is NOT to be used for urgent needs. For medical emergencies, dial 911. Now available from your iPhone and Android! Please provide this summary of care documentation to your next provider. Your primary care clinician is listed as Lesly Wakefield. If you have any questions after today's visit, please call [phone].

## 2017-07-26 NOTE — PROGRESS NOTES
[facility]  [address]  [place], [address]  [phone] fax    Physical Therapy Discharge Summary    Patient name: Dmitri Birmingham Start of Care: 17   Referral source: Tarik Marshall MD : 1950   Medical/Treatment Diagnosis: Trochanteric bursitis, right hip [M70.61]  Trochanteric bursitis, left hip [M70.62]  Myalgia [M79.1]  Bilateral hip pain [M25.551, M25.552] Onset Date:> 2 months ago     Prior Hospitalization: see medical history Provider#: [id number]   Medications: Verified on Patient Summary List    Comorbidities: heart disease; pacemaker; R TKR; OA; thyroid; D<; HTN; hearing impaired; L ankle B malleolar fx   Prior Level of Function: Limited mobility due to co-morbidities   Visits from Start of Care: 1    Missed Visits: 2  Reporting Period : 17 to 17    Summary of Care:  Patient only attended eval; no-showed and cancelled the rest of her appointments; unplanned D/C.    ASSESSMENT/RECOMMENDATIONS:  [x]Discontinue therapy: []Patient has reached or is progressing toward set goals      [x]Patient is non-compliant or has abdicated      []Due to lack of appreciable progress towards set Ul. Alexis Mendez, PT 2017 3:59 PM

## 2017-07-27 ENCOUNTER — OFFICE VISIT (OUTPATIENT)
Dept: CARDIOLOGY CLINIC | Age: 67
End: 2017-07-27

## 2017-07-27 ENCOUNTER — CLINICAL SUPPORT (OUTPATIENT)
Dept: CARDIOLOGY CLINIC | Age: 67
End: 2017-07-27

## 2017-07-27 VITALS
HEART RATE: 99 BPM | WEIGHT: 129 LBS | DIASTOLIC BLOOD PRESSURE: 74 MMHG | BODY MASS INDEX: 22.02 KG/M2 | SYSTOLIC BLOOD PRESSURE: 112 MMHG | OXYGEN SATURATION: 97 % | HEIGHT: 64 IN

## 2017-07-27 DIAGNOSIS — I10 ESSENTIAL HYPERTENSION, BENIGN: ICD-10-CM

## 2017-07-27 DIAGNOSIS — R60.9 EDEMA, UNSPECIFIED TYPE: ICD-10-CM

## 2017-07-27 DIAGNOSIS — E78.00 PURE HYPERCHOLESTEROLEMIA: ICD-10-CM

## 2017-07-27 DIAGNOSIS — Z95.0 PACEMAKER: ICD-10-CM

## 2017-07-27 DIAGNOSIS — I48.91 ATRIAL FIBRILLATION WITH RVR (HCC): Primary | ICD-10-CM

## 2017-07-27 DIAGNOSIS — I65.23 BILATERAL CAROTID ARTERY STENOSIS: ICD-10-CM

## 2017-07-27 DIAGNOSIS — I48.19 PERSISTENT ATRIAL FIBRILLATION (HCC): Primary | ICD-10-CM

## 2017-07-27 DIAGNOSIS — I70.213 ATHEROSCLEROSIS OF NATIVE ARTERY OF BOTH LOWER EXTREMITIES WITH INTERMITTENT CLAUDICATION (HCC): ICD-10-CM

## 2017-07-27 NOTE — MR AVS SNAPSHOT
Visit Information Date & Time Provider Department Dept. Phone Encounter #  
 7/27/2017  9:40 AM Jose M Fry MD [facility] [place] [phone] Follow-up Instructions Follow-up and Disposition History Your Appointments 7/31/2017  9:30 AM  
Follow Up with Faiza Guerrero MD  
[facility]) Appt Note: 3 WK BLOCK FU; [employer] 7/12/17  
 [address] [place] [address]  
[phone]  
  
   
 [address]  
  
    
 8/2/2017  8:30 AM  
PROCEDURE with CA NAYELI [facility] ([facility]) Appt Note: les eng yadi prior to ablation [address] [place] [address]  
[address], [address] Daniela [place] 8/14/2017 10:00 AM  
PRE PROCEDURE with Bp Hv Csi [facility] [place] ([facility]) Appt Note: AV Node Ablation on 8/21 [facility] [address] [address] [address] [phone] [place] [address]  
  
    
 10/18/2017  9:20 AM  
Follow Up with Royce Nails MD  
[facility] for Pain Management [facility]) Appt Note: return in 3 months 30 [facility] [address] [phone] [facility] 6292 [address]  
  
    
 10/23/2017 10:45 AM  
Office Visit with Ramila Sanderson MD  
[facility]) Appt Note: 3 m w/ medtronic ck [address], [address] [place] [address]  
[address], [address] Daniela [place] 3/16/2018  8:00 AM  
PROCEDURE with BSVVS IMAGING 1 [facility] ([facility]) Appt Note: LISA ILIAC STENTS 1YR FAGAN; MAILED CARDS AND PREP INFORMATION; . 2300 [facility] Roddie Necessary 112 200 [facility] [address]  
[phone] 2630 [facility],[address]  
  
    
 3/16/2018 10:00 AM  
PROCEDURE with BSVVS ALEKSANDRA [facility] ([address] [place] [address]) Appt Note: LEG ART 1YR FAGAN; MAILED CARDS AND PREP INFORMATION; .  
 27 Char[address], [place] [address] [facility] [address]  
[phone] [facility],[address]  
  
    
 3/28/2018  1:45 PM  
Follow Up with Althea Tucker MD  
600 [facility] [address] [place] [address]) Appt Note: 1 year fu after studies at our lab on 3/16/2018 with prep; MAILED [address]; CALLED PATIENT TO MOVE APPT TIME DUE TO DR Navy Feliz Hinton PT IS AWARE; 1 year fu after studies at our lab on 3/16/2018 with prep MAILED CARDS AND PREP INFORMATION CALLED PATIENT TO MOVE APPT TIME DUE TO DR De Anda 207  
 27 Char[address], [place] [address] 200 [facility] [address]  
[phone] 2300 [facility] 200 [facility] [address] Upcoming Health Maintenance Date Due Hepatitis C Screening 1950 FOOT EXAM Q1 5/1/1959 EYE EXAM RETINAL OR DILATED Q1 5/1/1959 FOBT Q 1 YEAR AGE 50-75 5/1/1999 DTaP/Tdap/Td series (1 - Tdap) 10/2/2005 ZOSTER VACCINE AGE 60> 3/1/2009 GLAUCOMA SCREENING Q2Y 5/1/2014 OSTEOPOROSIS SCREENING (DEXA) 5/1/2014 Pneumococcal 65+ Low/Medium Risk (2 of 2 - PPSV23) 5/1/2014 MEDICARE YEARLY EXAM 5/1/2014 BREAST CANCER SCRN MAMMOGRAM 7/25/2015 MICROALBUMIN Q1 1/8/2016 HEMOGLOBIN A1C Q6M 7/10/2016 LIPID PANEL Q1 9/29/2016 INFLUENZA AGE 9 TO ADULT 8/1/2017 Allergies as of 7/27/2017  Review Complete On: 7/27/2017 By: Tom Jesus MD  
 No Known Allergies Current Immunizations  Reviewed on 2/9/2011 Name Date  
 TD Vaccine 10/1/2005 ZZZ-RETIRED (DO NOT USE) Pneumococcal Vaccine (Unspecified Type) 12/14/2005 Not reviewed this visit You Were Diagnosed With   
  
 Codes Comments Persistent atrial fibrillation (HCC)    -  Primary ICD-10-CM: I48.1 ICD-9-CM: 427.31 Edema, unspecified type     ICD-10-CM: R60.9 ICD-9-CM: 782.3 Essential hypertension, benign     ICD-10-CM: I10 
ICD-9-CM: 401.1 Pure hypercholesterolemia     ICD-10-CM: E78.00 ICD-9-CM: 272.0 Bilateral carotid artery stenosis     ICD-10-CM: I65.23 ICD-9-CM: 433.10, 433.30 Atherosclerosis of native artery of both lower extremities with intermittent claudication ([facility] [address] [address]     ICD-10-CM: W48.433 ICD-9-CM: 440.21 Vitals BP Pulse Height(growth percentile) Weight(growth percentile) SpO2 BMI  
 112/74 99 5' 4\" (1.626 m) 129 lb (58.5 kg) 97% 22.14 kg/m2 OB Status Smoking Status Hysterectomy Current Some Day Smoker Vitals History BMI and BSA Data Body Mass Index Body Surface Area  
 22.14 kg/m 2 1.63 m 2 Preferred Pharmacy Pharmacy Name Phone [facility] [address] [phone] Your Updated Medication List  
  
   
This list is accurate as of: 7/27/17 11:08 AM.  Always use your most recent med list.  
  
  
  
  
 digoxin 0.25 mg tablet Commonly known as:  LANOXIN Take 0.5 Tabs by mouth daily. dilTIAZem  mg XR capsule Commonly known as:  DILACOR XR  
TAKE ONE CAPSULE BY MOUTH ONCE DAILY * fentaNYL 62.5 mcg/hour Pt72  
62.5 mcg by TransDERmal route every seventy-two (72) hours for 30 days. Max Daily Amount: 62.5 mcg. Indications: Chronic Pain with Opioid Tolerance, SEVERE PAIN WITH OPIOID TOLERANCE  
  
 * fentaNYL 62.5 mcg/hour Pt72  
62.5 mcg by TransDERmal route every seventy-two (72) hours for 30 days. Max Daily Amount: 62.5 mcg. Indications: Chronic Pain with Opioid Tolerance, SEVERE PAIN WITH OPIOID TOLERANCE Start taking on:  8/22/2017 * fentaNYL 62.5 mcg/hour Pt72 62.5 mcg by TransDERmal route every seventy-two (72) hours for 30 days. Max Daily Amount: 62.5 mcg. Indications: Chronic Pain with Opioid Tolerance, SEVERE PAIN WITH OPIOID TOLERANCE Start taking on:  9/20/2017  
  
 fish oil-dha-epa 1,200-144-216 mg Cap Take 1 Cap by mouth daily. HumaLOG 100 unit/mL injection Generic drug:  insulin lispro  
by SubCUTAneous route. ipratropium-albuterol  mcg/actuation inhaler Commonly known as:  Rosi Slim Take 1 Puff by inhalation every six (6) hours as needed for Wheezing or Shortness of Breath. LANTUS 100 unit/mL injection Generic drug:  insulin glargine  
by SubCUTAneous route nightly. LIPITOR 20 mg tablet Generic drug:  atorvastatin Take  by mouth daily. naloxegol 25 mg Tab tablet Commonly known as:  Shelby Mayfield Take 25 mg by mouth daily. naloxone 4 mg/actuation Spry 4 mg by Nasal route as needed for up to 2 doses. Indications: OPIOID TOXICITY  
  
 * oxyCODONE IR 30 mg immediate release tablet Commonly known as:  OXY-IR Take 1 Tab by mouth four (4) times daily as needed for Pain for up to 30 days. Max Daily Amount: 120 mg. Indications: NEUROPATHIC PAIN, Pain * oxyCODONE IR 30 mg immediate release tablet Commonly known as:  OXY-IR Take 1 Tab by mouth four (4) times daily as needed for Pain for up to 30 days. Max Daily Amount: 120 mg. Indications: NEUROPATHIC PAIN, Pain Start taking on:  8/20/2017 * oxyCODONE IR 30 mg immediate release tablet Commonly known as:  OXY-IR Take 1 Tab by mouth four (4) times daily as needed for Pain for up to 30 days. Max Daily Amount: 120 mg. Indications: NEUROPATHIC PAIN, Pain Start taking on:  9/18/2017 SYNTHROID 125 mcg tablet Generic drug:  levothyroxine Take 125 mcg by mouth Daily (before breakfast). tiotropium 18 mcg inhalation capsule Commonly known as:  [address] [facility] [address] Take 1 Cap by inhalation daily. VITAMIN B-6 100 mg tablet Generic drug:  pyridoxine (vitamin B6) Take 100 mg by mouth daily. VITAMIN D3 1,000 unit tablet Generic drug:  cholecalciferol Take 5,000 Units by mouth daily. XARELTO 20 mg Tab tablet Generic drug:  rivaroxaban TAKE ONE TABLET BY MOUTH ONCE DAILY WITH DINNER  
  
 * Notice: This list has 6 medication(s) that are the same as other medications prescribed for you. Read the directions carefully, and ask your doctor or other care provider to review them with you. We Performed the Following AMB POC EKG ROUTINE W/ 12 LEADS, INTER & REP [94458 CPT(R)] Introducing [facility] & HEALTH SERVICES! Guillermina Saravia introduces [employer] patient portal. Now you can access parts of your medical record, email your doctor's office, and request medication refills online. 1. In your internet browser, go to [email or web address] 2. Click on the First Time User? Click Here link in the Sign In box. You will see the New Member Sign Up page. 3. Enter your [employer] Link Access Code exactly as it appears below. You will not need to use this code after youve completed the sign-up process. If you do not sign up before the expiration date, you must request a new code. · Ui Link Access Code: [email or web address] Expires: 10/18/2017 10:17 AM 
 
4. Enter the last four digits of your Social Security Number (xxxx) and Date of Birth (mm/dd/yyyy) as indicated and click Submit. You will be taken to the next sign-up page. 5. Create a [employer])t ID. This will be your [employer]i Link login ID and cannot be changed, so think of one that is secure and easy to remember. 6. Create a Ui Link password. You can change your password at any time. 7. Enter your Password Reset Question and Answer. This can be used at a later time if you forget your password. 8. Enter your e-mail address. You will receive e-mail notification when new information is available in [address] 9. Click Sign Up. You can now view and download portions of your medical record. 10. Click the Download Summary menu link to download a portable copy of your medical information. If you have questions, please visit the Frequently Asked Questions section of the [employer] website. Remember, [employer] is NOT to be used for urgent needs. For medical emergencies, dial 911. Now available from your iPhone and Android! Please provide this summary of care documentation to your next provider. Your primary care clinician is listed as Meadville Backers. If you have any questions after today's visit, please call [phone].

## 2017-07-27 NOTE — PROGRESS NOTES
History of Present Illness:  A 75 y.o. woman referred by Dr. Valentino Kitchen for evaluation of atrial fibrillation. Christiano Lopez has had persistent and essentially permanent atrial fibrillation for a number of years.  In 2013, I performed an atrial flutter ablation and placed a pacemaker for pauses of up to 8-9 seconds.  She lost about 50-60 pounds in the past 6 months.  She has had 2 episodes of syncope, one while in the kitchen and the other while getting up from the bed and going to the bathroom.  She had significant palpitations just prior. Her device shows she does have atrial fibrillation rates that do increase up to about 200 beats per minute for short episodes, potentially exacerbating her condition.  She does have some occasional chest pain when her heart races, no PND, orthopnea, edema.  She has been intermittently smoking and now smoking about a pack that lasts for about a week and a half. The chest pain that she does have is sharp. Impression:   Essentially chronic atrial fibrillation with occasional episodes of increased ventricular rates. History of recurrent syncope associated with palpitations. Atypical chest pains when her heart races. History of atrial flutter ablation 2013. Dual chamber Medtronic pacemaker 2013 for pauses of up to 8-9 seconds. Chronic Digoxin and Diltiazem therapy limited due to hypotension. Chronic COPD on inhalers, currently stable. Echocardiogram 2015 with normal function. Chronic diastolic heart failure, currently compensated. Recent 50-60 pound weight loss in the past 6 months. History of hypotension. Chronic pain on narcotics. Diabetes on insulin. Thyroid disorder. Chronic Xarelto use. Dyslipidemia.      Given her co-morbidities and essentially permanent atrial fibrillation, the chance of atrial fibrillation ablation success is quite low and I discussed this with the patient. Em Marvin her episodes however and increased ventricular rates, I discussed AV node ablation.  I discussed this would be completely dependent upon the device.  I stated that she could develop a reduced ejection fraction as time goes on. Israel Rappa the goal for symptom management, however, she agreed to the AV node ablation.  I will go ahead and make arrangements. She does have some chest pains and given her risk factors, I am going to have her get a nuclear stress test at Dr. Beck Weber office. [facility] with AV node ablation we will be able to stop some of her Diltiazem and her blood pressure will improve though there will need to be continued evaluation for her weight loss if it continues. I am going to check a chest x-ray in preparation for the procedure as well. Of note she has failed amiodarone therapy as well. Past Medical History:   Diagnosis Date    Abnormal myocardial perfusion study 05/07/2010    Anterior ischemia c/w at least 1-vessel CAD. No WMA. EF 51%. Positive EKG at 78% pred max HR. Ex time 7 min 30 sec.  Allergic rhinitis 1/30/2010    Atrial fibrillation ([address]     Broken ankle 08/22/2013    left    Carotid stenosis 1/30/2010    COPD     Essential hypertension     Heart failure ([address]     History of amiodarone therapy 5/11/2015    History of cardiac cath 05/25/2010    Patent coronary arteries. LVEDP 13 mmHg. EF 65%.  History of cervical cancer 11/05    History of echocardiogram 08/21/2013    EF 50-55%. No RWMA. Gr 2 DDfx. Mild LAE. Mild MR.      Hypertension     Migraines 1/30/2010    Mitral regurgitation     Orthostatic dizziness 3/30/2016    ? autonomic dysfunction Vs amio side effect     Pacemaker 11/25/13    DDD    Pure hypercholesterolemia 1/30/2010    Rosacea 1/30/2010    Type II or unspecified type diabetes mellitus without mention of complication, not stated as uncontrolled 1/30/2010    Unspecified hypothyroidism 1/30/2010       Current Outpatient Prescriptions   Medication Sig Dispense Refill    [START ON 9/18/2017] oxyCODONE IR (OXY-IR) 30 mg immediate release tablet Take 1 Tab by mouth four (4) times daily as needed for Pain for up to 30 days. Max Daily Amount: 120 mg. Indications: NEUROPATHIC PAIN, Pain 120 Tab 0    [START ON 9/20/2017] fentaNYL 62.5 mcg/hour pt72 62.5 mcg by TransDERmal route every seventy-two (72) hours for 30 days. Max Daily Amount: 62.5 mcg. Indications: Chronic Pain with Opioid Tolerance, SEVERE PAIN WITH OPIOID TOLERANCE 10 Patch 0    [START ON 8/20/2017] oxyCODONE IR (OXY-IR) 30 mg immediate release tablet Take 1 Tab by mouth four (4) times daily as needed for Pain for up to 30 days. Max Daily Amount: 120 mg. Indications: NEUROPATHIC PAIN, Pain 120 Tab 0    oxyCODONE IR (OXY-IR) 30 mg immediate release tablet Take 1 Tab by mouth four (4) times daily as needed for Pain for up to 30 days. Max Daily Amount: 120 mg. Indications: NEUROPATHIC PAIN, Pain 120 Tab 0    [START ON 8/22/2017] fentaNYL 62.5 mcg/hour pt72 62.5 mcg by TransDERmal route every seventy-two (72) hours for 30 days. Max Daily Amount: 62.5 mcg. Indications: Chronic Pain with Opioid Tolerance, SEVERE PAIN WITH OPIOID TOLERANCE 10 Patch 0    fentaNYL 62.5 mcg/hour pt72 62.5 mcg by TransDERmal route every seventy-two (72) hours for 30 days. Max Daily Amount: 62.5 mcg. Indications: Chronic Pain with Opioid Tolerance, SEVERE PAIN WITH OPIOID TOLERANCE 10 Patch 0    naloxone 4 mg/actuation spry 4 mg by Nasal route as needed for up to 2 doses. Indications: OPIOID TOXICITY 1 Box 1    digoxin (LANOXIN) 0.25 mg tablet Take 0.5 Tabs by mouth daily. 30 Tab 6    dilTIAZem XR (DILACOR XR) 180 mg XR capsule TAKE ONE CAPSULE BY MOUTH ONCE DAILY 90 Cap 3    atorvastatin (LIPITOR) 20 mg tablet Take  by mouth daily.  XARELTO 20 mg tab tablet TAKE ONE TABLET BY MOUTH ONCE DAILY WITH DINNER 30 Tab 6    naloxegol (MOVANTIK) 25 mg tab tablet Take 25 mg by mouth daily.  insulin lispro (HUMALOG) 100 unit/mL injection by SubCUTAneous route.       insulin glargine (LANTUS) 100 unit/mL injection by SubCUTAneous route nightly.  ipratropium-albuterol (COMBIVENT RESPIMAT)  mcg/actuation inhaler Take 1 Puff by inhalation every six (6) hours as needed for Wheezing or Shortness of Breath. 1 Inhaler 1    pyridoxine (VITAMIN B-6) 100 mg tablet Take 100 mg by mouth daily.  fish oil-dha-epa 1,200-144-216 mg cap Take 1 Cap by mouth daily.  levothyroxine (SYNTHROID) 125 mcg tablet Take 125 mcg by mouth Daily (before breakfast).  tiotropium (SPIRIVA WITH HANDIHALER) 18 mcg inhalation capsule Take 1 Cap by inhalation daily. 30 Cap 11    cholecalciferol, vitamin d3, (VITAMIN D) 1,000 unit tablet Take 5,000 Units by mouth daily. Social History   reports that she has been smoking. She has a 21.00 pack-year smoking history. She has never used smokeless tobacco.   reports that she does not drink alcohol. Family History  family history includes Breast Cancer (age of onset: 61) in her paternal aunt; Breast Cancer (age of onset: 79) in her maternal aunt; Cancer in her maternal aunt and maternal aunt; Diabetes in her daughter and maternal grandmother; Heart Attack in her sister; Hypertension in her mother; Other in her daughter. There is no history of Heart Disease. Review of Systems  Except as stated above include:  Constitutional: Negative for fever, chills and malaise/fatigue. HEENT: No congestion or recent URI. Gastrointestinal: No nausea, vomiting, abdominal pain, bloody stools. Pulmonary:  Negative except as stated above. Cardiac:  Negative except as stated above. Musculoskeletal: Negative except as stated above. Neurological:  No localized symptoms. Skin:  Negative except as stated above. Psych:  No mood swings. Endocrine:  No heat/cold intolerance.     PHYSICAL EXAM  BP Readings from Last 3 Encounters:   07/27/17 112/74   07/21/17 111/55   07/17/17 91/49     Pulse Readings from Last 3 Encounters:   07/27/17 99   07/21/17 92 07/17/17 74     Wt Readings from Last 3 Encounters:   07/27/17 58.5 kg (129 lb)   07/21/17 59.4 kg (131 lb)   07/17/17 58.5 kg (129 lb)     General:   Well developed, well groomed. Head/Neck:   No jugular venous distention     No carotid bruits. No evidence of xanthelasma. Lungs:   No respiratory distress. Clear bilaterally. Heart:    Irreg irreg. Normal S1/S2. Palpation of heart with normal point of maximum impulse. No significant murmurs, rubs or gallops. Pacer pocket ok. Abdomen:   Soft and nontender. No palpable abdominal mass or bruits. Extremities:   Intact peripheral pulses. No significant edema. Neurological:   Alert and oriented to person, place, time. No focal neurological deficit visually.

## 2017-07-27 NOTE — PROGRESS NOTES
1. Have you been to the ER, urgent care clinic since your last visit? Hospitalized since your last visit? No     2. Have you seen or consulted any other health care providers outside of the [facility] since your last visit? Include any pap smears or colon screening.  No

## 2017-07-28 NOTE — PROGRESS NOTES
I have personally seen and evaluated the device findings. Interrogation reviewed and I agree with assessment.     Nati Rivera

## 2017-07-31 ENCOUNTER — OFFICE VISIT (OUTPATIENT)
Dept: ORTHOPEDIC SURGERY | Age: 67
End: 2017-07-31

## 2017-07-31 VITALS
HEIGHT: 64 IN | OXYGEN SATURATION: 97 % | TEMPERATURE: 98 F | BODY MASS INDEX: 22.2 KG/M2 | SYSTOLIC BLOOD PRESSURE: 97 MMHG | RESPIRATION RATE: 18 BRPM | WEIGHT: 130 LBS | DIASTOLIC BLOOD PRESSURE: 69 MMHG | HEART RATE: 80 BPM

## 2017-07-31 DIAGNOSIS — M79.18 MYOFASCIAL PAIN: Primary | ICD-10-CM

## 2017-07-31 RX ORDER — PREDNISONE 10 MG/1
10 TABLET ORAL SEE ADMIN INSTRUCTIONS
Qty: 21 TAB | Refills: 0 | Status: ON HOLD | OUTPATIENT
Start: 2017-07-31 | End: 2017-08-21 | Stop reason: CLARIF

## 2017-07-31 NOTE — PROGRESS NOTES
HISTORY OF PRESENT ILLNESS  Adrianna Coffey is a 75 y.o. female. HPI  she returns for follow-up of chronic, severe pain which is widespread and polyarticular and related to generalized osteoarthritis. She also suffers from painful diabetic polyneuropathy. Pain continues under good control, 70% overall relief being reported. Pain level today 7 out of 10, outcome 12/28,(The lower the upper number, the better the outcome)  Physical activity and mobility as well as sleep are fair, mood is good. No reported side effects. A current review of the  does not identify any inconsistency. Review of Systems   Constitutional: Positive for chills. HENT: Positive for hearing loss and sore throat. Eyes: Negative for blurred vision and double vision. Respiratory: Positive for cough and shortness of breath. Cardiovascular: Positive for chest pain and leg swelling. Gastrointestinal: Positive for nausea. Negative for heartburn. Genitourinary: Negative for dysuria and urgency. Musculoskeletal: Positive for falls and joint pain. Skin: Negative for itching and rash. Neurological: Positive for loss of consciousness and weakness. Negative for dizziness. Endo/Heme/Allergies: Positive for environmental allergies. Does not bruise/bleed easily. Psychiatric/Behavioral: Positive for depression. Negative for suicidal ideas. The patient is nervous/anxious and has insomnia. All other systems reviewed and are negative. Physical Exam   Constitutional: She is oriented to person, place, and time. She appears well-developed and well-nourished. No distress. Daughter accompanies patient to appointment   HENT:   Head: Normocephalic. Eyes: Conjunctivae and EOM are normal. Pupils are equal, round, and reactive to light. Neck: No thyromegaly present. Pulmonary/Chest: Effort normal.   Musculoskeletal: She exhibits no edema or tenderness (throughout  legs to light palpation).         Right knee: She exhibits decreased range of motion. Left knee: She exhibits decreased range of motion. Neurological: She is alert and oriented to person, place, and time. No cranial nerve deficit (grossly). Gait (antalgic) abnormal.   CN 2-12 grossly intact   Psychiatric: She has a normal mood and affect. Her behavior is normal. Judgment and thought content normal.   Nursing note and vitals reviewed. ASSESSMENT and PLAN  Encounter Diagnoses   Name Primary?  Diabetic neuropathy, painful ([address] Yes    Atherosclerosis of native artery of both lower extremities with intermittent claudication (HCC)     Generalized OA     Chronic pain syndrome     Encounter for long-term (current) use of high-risk medication      She will continue on her current analgesic regimen as this is providing excellent pain control with improved functionality and minimal side effects. 3 month reassess her    No concerns are raised for misuse, abuse, or diversion. 1. Pain medications are prescribed with the objective of pain relief and improved physical and psychosocial function in this patient. 2. Counseled patient on proper use of prescribed medications and reviewed opioid contract. 3. Counseled patient about chronic medical conditions and their relationship to anxiety and depression and recommended mental health support as needed. 4. Reviewed with patient self-help tools, home exercise, and lifestyle changes to assist the patient in self-management of symptoms. 5. Advised patient to have a primary care provider to continue care for health maintenance and general medical conditions and support for referral to specialty care as needed. 6. Reviewed with patient the treatment plan, goals of treatment plan, and limitations of treatment plan, to include the potential for side effects from medications and procedures.  If side effects occur, it is the responsibility of the patient to inform the clinic so that a change in the treatment plan can be made in a safe manner. The patient is advised that stopping prescribed medication may cause an increase in symptoms and possible medication withdrawal symptoms. The patient is informed an emergency room evaluation may be necessary if this occurs. DISPOSITION: The patients condition and plan were discussed at length and all questions were answered. The patient agrees with the plan.     Counseling occupied > 50% of visit:  Total time: 40 minutes

## 2017-07-31 NOTE — PATIENT INSTRUCTIONS
[employer] Activation    Thank you for requesting access to [employer]. Please follow the instructions below to securely access and download your online medical record. [employer] allows you to send messages to your doctor, view your test results, renew your prescriptions, schedule appointments, and more. How Do I Sign Up? 1. In your internet browser, go to [email or web address]  2. Click on the First Time User? Click Here link in the Sign In box. You will be redirect to the New Member Sign Up page. 3. Enter your [employer] Access Code exactly as it appears below. You will not need to use this code after youve completed the sign-up process. If you do not sign up before the expiration date, you must request a new code. [employer] Access Code: [email or web address]  Expires: 10/18/2017 10:17 AM (This is the date your [employer] access code will )    4. Enter the last four digits of your Social Security Number (xxxx) and Date of Birth (mm/dd/yyyy) as indicated and click Submit. You will be taken to the next sign-up page. 5. Create a [employer] ID. This will be your [employer] login ID and cannot be changed, so think of one that is secure and easy to remember. 6. Create a [employer] password. You can change your password at any time. 7. Enter your Password Reset Question and Answer. This can be used at a later time if you forget your password. 8. Enter your e-mail address. You will receive e-mail notification when new information is available in [id number]. 9. Click Sign Up. You can now view and download portions of your medical record. 10. Click the Download Summary menu link to download a portable copy of your medical information. Additional Information    If you have questions, please visit the Frequently Asked Questions section of the [employer] website at [email or web address] Remember, [employer] is NOT to be used for urgent needs. For medical emergencies, dial 911.

## 2017-07-31 NOTE — PROGRESS NOTES
[address]  [address], [address]  [place]  Phone: [phone]  Fax: [phone]        Leanna Wagoner  : 1950  PCP: Elie Mahmood MD  2017    PROGRESS NOTE      ASSESSMENT AND PLAN    Eylse Yoo comes in to the office today for a new onset of pain in the cervical region with limited range of motion. Upon the physical examination she had tenderness in the cervical region with radicular symptoms into the left upper extremity. I performed dry needling to her trapezius, cervical paraspinals, supraspinatus and infraspinatus. She had good relief with this treatment. She had significant twitch responses in the supraspinatus. At this time, I referred her to physical therapy and prescribed her a Prednisone 10 mg dose pack. Based on her progression, I may consider advanced imaging at her next visit. Pt will f/u in 6 weeks or sooner as needed. Diagnoses and all orders for this visit:    1. Myofascial pain  -     REFERRAL TO PHYSICAL THERAPY  -     predniSONE (STERAPRED DS) 10 mg dose pack; Take 1 Tab by mouth See Admin Instructions. See administration instruction per 10mg dose pack       Follow-up Disposition:  Return in about 6 weeks (around 2017), or if symptoms worsen or fail to improve. HISTORY OF PRESENT ILLNESS  Dmitri Birmingham is a 75 y.o. female. A&P / HPI from 2017:  Dmitri Birmingham comes in to the office today c/o pain in the lumbar region that is described at pins and needles for the last 2 months. The pain radiates down the posterior aspect of her bilateral lower extremities.     Based upon the physical examination findings of a bilateral positive DEON, right P4, distraction and Gaenslen's test I am lead to believe her pain today is caused by Sacroiliitis. She is also tender in the trochanter bursa region. I did a bilateral injection which provided great relief.  She was referred to physical therapy for sacroiliitis and myofascial pain. She was also referred to get bilateral SI injection for the sacroiliitis. Updates from 07/31/17:  Pt presents for improved pain in the bilateral SI joints. At the last visit she was referred to get bilateral SI joint injections and PT. This course of treatment has completely decreased her symptoms. She has not attended PT due to family illness. She presents today with a new onset of pain in the cervical region with limited ROM to the left. She has radicular symptoms into the left upper extremity. She notes a tingling sensation that radiates into the last 2 digits. She reports that getting out of her bed will exacerbate her pain. PAST MEDICAL HISTORY   Past Medical History:   Diagnosis Date    Abnormal myocardial perfusion study 05/07/2010    Anterior ischemia c/w at least 1-vessel CAD. No WMA. EF 51%. Positive EKG at 78% pred max HR. Ex time 7 min 30 sec.  Allergic rhinitis 1/30/2010    Atrial fibrillation ([address]     Broken ankle 08/22/2013    left    Carotid stenosis 1/30/2010    COPD     Essential hypertension     Heart failure ([address]     History of amiodarone therapy 5/11/2015    History of cardiac cath 05/25/2010    Patent coronary arteries. LVEDP 13 mmHg. EF 65%.  History of cervical cancer 11/05    History of echocardiogram 08/21/2013    EF 50-55%. No RWMA. Gr 2 DDfx. Mild LAE. Mild MR.      Hypertension     Migraines 1/30/2010    Mitral regurgitation     Orthostatic dizziness 3/30/2016    ? autonomic dysfunction Vs amio side effect     Pacemaker 11/25/13    DDD    Pure hypercholesterolemia 1/30/2010    Rosacea 1/30/2010    Type II or unspecified type diabetes mellitus without mention of complication, not stated as uncontrolled 1/30/2010    Unspecified hypothyroidism 1/30/2010       Past Surgical History:   Procedure Laterality Date    HX AFIB ABLATION  2/8/13    Dr Cheryl Wen ENDARTERECTOMY  11/06    right Dr. Yfn Yip  5/06    HX ORTHOPAEDIC Right 07/11/2016    knee replacement    HX PACEMAKER  11/25/13    DDD    HX SVT ABLATION  2/18/2013   . MEDICATIONS      Current Outpatient Prescriptions   Medication Sig Dispense Refill    [START ON 9/18/2017] oxyCODONE IR (OXY-IR) 30 mg immediate release tablet Take 1 Tab by mouth four (4) times daily as needed for Pain for up to 30 days. Max Daily Amount: 120 mg. Indications: NEUROPATHIC PAIN, Pain 120 Tab 0    [START ON 9/20/2017] fentaNYL 62.5 mcg/hour pt72 62.5 mcg by TransDERmal route every seventy-two (72) hours for 30 days. Max Daily Amount: 62.5 mcg. Indications: Chronic Pain with Opioid Tolerance, SEVERE PAIN WITH OPIOID TOLERANCE 10 Patch 0    [START ON 8/20/2017] oxyCODONE IR (OXY-IR) 30 mg immediate release tablet Take 1 Tab by mouth four (4) times daily as needed for Pain for up to 30 days. Max Daily Amount: 120 mg. Indications: NEUROPATHIC PAIN, Pain 120 Tab 0    oxyCODONE IR (OXY-IR) 30 mg immediate release tablet Take 1 Tab by mouth four (4) times daily as needed for Pain for up to 30 days. Max Daily Amount: 120 mg. Indications: NEUROPATHIC PAIN, Pain 120 Tab 0    [START ON 8/22/2017] fentaNYL 62.5 mcg/hour pt72 62.5 mcg by TransDERmal route every seventy-two (72) hours for 30 days. Max Daily Amount: 62.5 mcg. Indications: Chronic Pain with Opioid Tolerance, SEVERE PAIN WITH OPIOID TOLERANCE 10 Patch 0    fentaNYL 62.5 mcg/hour pt72 62.5 mcg by TransDERmal route every seventy-two (72) hours for 30 days. Max Daily Amount: 62.5 mcg. Indications: Chronic Pain with Opioid Tolerance, SEVERE PAIN WITH OPIOID TOLERANCE 10 Patch 0    naloxone 4 mg/actuation spry 4 mg by Nasal route as needed for up to 2 doses. Indications: OPIOID TOXICITY 1 Box 1    digoxin (LANOXIN) 0.25 mg tablet Take 0.5 Tabs by mouth daily.  30 Tab 6    dilTIAZem XR (DILACOR XR) 180 mg XR capsule TAKE ONE CAPSULE BY MOUTH ONCE DAILY 90 Cap 3    atorvastatin (LIPITOR) 20 mg tablet Take  by mouth daily.  XARELTO 20 mg tab tablet TAKE ONE TABLET BY MOUTH ONCE DAILY WITH DINNER 30 Tab 6    naloxegol (MOVANTIK) 25 mg tab tablet Take 25 mg by mouth daily.  insulin lispro (HUMALOG) 100 unit/mL injection by SubCUTAneous route.  insulin glargine (LANTUS) 100 unit/mL injection by SubCUTAneous route nightly.  ipratropium-albuterol (COMBIVENT RESPIMAT)  mcg/actuation inhaler Take 1 Puff by inhalation every six (6) hours as needed for Wheezing or Shortness of Breath. 1 Inhaler 1    pyridoxine (VITAMIN B-6) 100 mg tablet Take 100 mg by mouth daily.  fish oil-dha-epa 1,200-144-216 mg cap Take 1 Cap by mouth daily.  levothyroxine (SYNTHROID) 125 mcg tablet Take 125 mcg by mouth Daily (before breakfast).  tiotropium (SPIRIVA WITH HANDIHALER) 18 mcg inhalation capsule Take 1 Cap by inhalation daily. 30 Cap 11    cholecalciferol, vitamin d3, (VITAMIN D) 1,000 unit tablet Take 5,000 Units by mouth daily.           ALLERGIES  No Known Allergies       SOCIAL HISTORY    Social History     Social History    Marital status:      Spouse name: N/A    Number of children: N/A    Years of education: N/A     Social History Main Topics    Smoking status: Current Some Day Smoker     Packs/day: 0.50     Years: 42.00     Last attempt to quit: 5/7/2013    Smokeless tobacco: Never Used      Comment: per patient some days none and some days a few     Alcohol use No    Drug use: No    Sexual activity: Not Asked     Other Topics Concern    None     Social History Narrative       FAMILY HISTORY    Family History   Problem Relation Age of Onset    Hypertension Mother     Cancer Maternal Aunt      breast CA 66yo    Breast Cancer Maternal Aunt 79    Diabetes Maternal Grandmother     Cancer Maternal Aunt      uterine CA    Diabetes Daughter     Other Daughter      lupus (SLE) and coagulopathy    Breast Cancer Paternal Aunt 61    Heart Attack Sister     Heart Disease Neg Hx          REVIEW OF SYSTEMS  Review of Systems   Constitutional: Negative for chills, diaphoresis, fever, malaise/fatigue and weight loss. Respiratory: Negative for shortness of breath. Cardiovascular: Negative for chest pain and leg swelling. Gastrointestinal: Negative for constipation, nausea and vomiting. Neurological: Negative for dizziness, tingling, seizures, loss of consciousness, weakness and headaches. Psychiatric/Behavioral: The patient does not have insomnia. PHYSICAL EXAMINATION  Visit Vitals    BP 97/69    Pulse 80    Temp 98 °F (36.7 °C)    Resp 18    Ht 5' 4\" (1.626 m)    Wt 130 lb (59 kg)    SpO2 97%    BMI 22.31 kg/m2       Pain Assessment  7/31/2017   Location of Pain Back   Severity of Pain 0   Quality of Pain -   Quality of Pain Comment -   Frequency of Pain -   Result of Injury -           Constitutional:  Well developed, well nourished, in no acute distress. Psychiatric: Affect and mood are appropriate. Integumentary: No rashes or abrasions noted on exposed areas. SPINE/MUSCULOSKELETAL EXAM    Cervical spine:  Neck is midline. Normal muscle tone. No focal atrophy is noted. ROM pain free. Shoulder ROM intact. No tenderness to palpation. Negative Spurling's sign. Negative Tinel's sign. Negative Lala's sign.       Sensation in the bilateral arms grossly intact to light touch.      Lumbar spine:  No rash, ecchymosis, or gross obliquity. No fasciculations. No focal atrophy is noted. No pain with hip ROM. Full range of motion. Tenderness to palpation. No tenderness to palpation at the sciatic notch. SI joints tender. Trochanters tender.   Positive bilateral DEON test   Positive right  P4 test   Negatvie Compression test  Positive Distraction test   Positive right Gaenslen's test         Right L5 and S1 decreased sensation    Updates from 07/31/17:  Decreased ROM in the cervical region to the left     MOTOR:      Biceps  Triceps Deltoids Wrist Ext Wrist Flex Hand Intrin   Right 5/5 5/5 5/5 5/5 5/5 5/5   Left 5/5 5/5 5/5 5/5 5/5 5/5             Hip Flex  Quads Hamstrings Ankle DF EHL Ankle PF   Right 5/5 5/5 5/5 5/5 5/5 5/5   Left 5/5 5/5 5/5 5/5 5/5 5/5     DTRs are 2+ biceps, triceps, brachioradialis, patella, and Achilles.     Negative Straight Leg raise. Squat not tested. No difficulty with tandem gait.      Ambulation without assistive device. FWB.       RADIOGRAPHS  Lumbar XR images taken on 05/22/2017 personally reviewed with patient:  Findings: 3 views of the lumbar spine are provided.  There are 5 non-rib bearing lumbar vertebral elements.  There is no evidence of subluxation.  Mild chronic wedge deformities of L2 and L3 again noted.  Significant loss of disc height is noted at L2/3 and L5/S1, with moderate loss of disc height at L3/4, and mild loss of disc height at L1/2 and L4/5 consistent with multilevel degenerative disc disease.  Bilateral degenerative facet joint change is noted at all lumbar levels but is most severe from L3-S1.  Degenerative changes noted involving the sacroiliac joints bilaterally. Vascular calcification is noted.  Stents are present at the aortic bifurcation.      reviewed     Ms. Yoo has a reminder for a \"due or due soon\" health maintenance. I have asked that she contact her primary care provider for follow-up on this health maintenance.      This plan was reviewed with the patient and patient agrees. All questions were answered. More than half of this visit today was spent on counseling.     Written by Celena Shah, as dictated by Dr. Andrew Cristina, Dr. Viviana Valdes, confirm that all documentation is accurate.

## 2017-07-31 NOTE — MR AVS SNAPSHOT
Visit Information Date & Time Provider Department Dept. Phone Encounter #  
 7/31/2017  9:30 AM Eleazar Andersen MD [facility] [phone] [id number] Follow-up Instructions Return in about 6 weeks (around 9/11/2017), or if symptoms worsen or fail to improve. Your Appointments 8/2/2017  8:30 AM  
PROCEDURE with SANNA TYLER [facility] ([facility]) Appt Note: les eng yadi prior to ablation [address] [facility], [address] [facility] [address]  
[address], 9352 [facility] 4300 [facility] 8/14/2017 10:00 AM  
PRE PROCEDURE with [facility] ([address] [place] [address]) Appt Note: AV Node Ablation on 8/21 [address] [address] [phone] [place] [id number]  
  
    
 10/18/2017  9:20 AM  
Follow Up with Prerna Mccoy MD  
[facility] [address] [place] [address]) Appt Note: return in 3 months 30 [facility] [address]  
[phone] [facility] 1348 [address]  
  
    
 10/23/2017 10:45 AM  
Office Visit with Leslie Gregory MD  
[facility]) Appt Note: 3 m w/ medtronic ck [address] [facility], [address] [place] [address]  
[address], 9352 [facility] [address] 3/16/2018  8:00 AM  
PROCEDURE with [facility] IMAGING 1 [facility] ([address] [place], [place] [address]er [facility]) Appt Note: LISA ILIAC STENTS 1YR FAGAN; MAILED [address]; .  
 27 [place], [place] [address] [facility]  
[phone] [address] [place] [address]  
  
    
 3/16/2018 10:00 AM  
PROCEDURE with [facility] NONIMAGING [facility] ([address] [place] [address] Road) Appt Note: LEG ART 1YR FAGAN; MAILED CARDS AND PREP INFORMATION; .  
 27 [address]i [address]el 960 200 [facility] [address]  
[phone] 2630 [facility]  
  
    
 3/28/2018  1:45 PM  
Follow Up with Binu Reynolds MD  
600 [facility]) Appt Note: 1 year fu after studies at our lab on 3/16/2018 with prep; MAILED [address] [facility] [address]; CALLED PATIENT TO MOVE APPT TIME DUE TO DR Navy Feliz Hinton PT IS AWARE; 1 year fu after studies at our lab on 3/16/2018 with prep MAILED CARDS AND PREP INFORMATION CALLED PATIENT TO MOVE APPT TIME DUE TO DR De Anda 207  
 [address] [place] [address] 200 [facility] [address]  
[phone] 2300 [facility] 200 [facility] [address] Upcoming Health Maintenance Date Due Hepatitis C Screening 1950 FOOT EXAM Q1 5/1/1959 EYE EXAM RETINAL OR DILATED Q1 5/1/1959 FOBT Q 1 YEAR AGE 50-75 5/1/1999 DTaP/Tdap/Td series (1 - Tdap) 10/2/2005 ZOSTER VACCINE AGE 60> 3/1/2009 GLAUCOMA SCREENING Q2Y 5/1/2014 OSTEOPOROSIS SCREENING (DEXA) 5/1/2014 Pneumococcal 65+ Low/Medium Risk (2 of 2 - PPSV23) 5/1/2014 MEDICARE YEARLY EXAM 5/1/2014 BREAST CANCER SCRN MAMMOGRAM 7/25/2015 MICROALBUMIN Q1 1/8/2016 HEMOGLOBIN A1C Q6M 7/10/2016 LIPID PANEL Q1 9/29/2016 INFLUENZA AGE 9 TO ADULT 8/1/2017 Allergies as of 7/31/2017  Review Complete On: 7/31/2017 By: Rodney Briones LPN No Known Allergies Current Immunizations  Reviewed on 2/9/2011 Name Date  
 TD Vaccine 10/1/2005 ZZZ-RETIRED (DO NOT USE) Pneumococcal Vaccine (Unspecified Type) 12/14/2005 Not reviewed this visit You Were Diagnosed With   
  
 Codes Comments Myofascial pain    -  Primary ICD-10-CM: M79.1 ICD-9-CM: 729.1 Vitals BP Pulse Temp Resp Height(growth percentile) Weight(growth percentile) 97/69 80 98 °F (36.7 °C) 18 5' 4\" (1.626 m) 130 lb (59 kg) SpO2 BMI OB Status Smoking Status 97% 22.31 kg/m2 Hysterectomy Current Some Day Smoker Vitals History BMI and BSA Data Body Mass Index Body Surface Area  
 22.31 kg/m 2 1.63 m 2 Preferred Pharmacy Pharm[facility] [address]. [phone] Your Updated Medication List  
  
   
This list is accurate as of: 7/31/17 10:35 AM.  Always use your most recent med list.  
  
  
  
  
 digoxin 0.25 mg tablet Commonly known as:  LANOXIN Take 0.5 Tabs by mouth daily. dilTIAZem  mg XR capsule Commonly known as:  DILACOR XR  
TAKE ONE CAPSULE BY MOUTH ONCE DAILY * fentaNYL 62.5 mcg/hour Pt72  
62.5 mcg by TransDERmal route every seventy-two (72) hours for 30 days. Max Daily Amount: 62.5 mcg. Indications: Chronic Pain with Opioid Tolerance, SEVERE PAIN WITH OPIOID TOLERANCE  
  
 * fentaNYL 62.5 mcg/hour Pt72  
62.5 mcg by TransDERmal route every seventy-two (72) hours for 30 days. Max Daily Amount: 62.5 mcg. Indications: Chronic Pain with Opioid Tolerance, SEVERE PAIN WITH OPIOID TOLERANCE Start taking on:  8/22/2017 * fentaNYL 62.5 mcg/hour Pt72  
62.5 mcg by TransDERmal route every seventy-two (72) hours for 30 days. Max Daily Amount: 62.5 mcg. Indications: Chronic Pain with Opioid Tolerance, SEVERE PAIN WITH OPIOID TOLERANCE Start taking on:  9/20/2017  
  
 fish oil-dha-epa 1,200-144-216 mg Cap Take 1 Cap by mouth daily. HumaLOG 100 unit/mL injection Generic drug:  insulin lispro  
by SubCUTAneous route. ipratropium-albuterol  mcg/actuation inhaler Commonly known as:  Ebony Pesa Take 1 Puff by inhalation every six (6) hours as needed for Wheezing or Shortness of Breath. LANTUS 100 unit/mL injection Generic drug:  insulin glargine  
by SubCUTAneous route nightly. LIPITOR 20 mg tablet Generic drug:  atorvastatin Take  by mouth daily. naloxegol 25 mg Tab tablet Commonly known as:  Tamsen Randy Take 25 mg by mouth daily. naloxone 4 mg/actuation Spry 4 mg by Nasal route as needed for up to 2 doses. Indications: OPIOID TOXICITY  
  
 * oxyCODONE IR 30 mg immediate release tablet Commonly known as:  OXY-IR Take 1 Tab by mouth four (4) times daily as needed for Pain for up to 30 days. Max Daily Amount: 120 mg. Indications: NEUROPATHIC PAIN, Pain * oxyCODONE IR 30 mg immediate release tablet Commonly known as:  OXY-IR Take 1 Tab by mouth four (4) times daily as needed for Pain for up to 30 days. Max Daily Amount: 120 mg. Indications: NEUROPATHIC PAIN, Pain Start taking on:  8/20/2017 * oxyCODONE IR 30 mg immediate release tablet Commonly known as:  OXY-IR Take 1 Tab by mouth four (4) times daily as needed for Pain for up to 30 days. Max Daily Amount: 120 mg. Indications: NEUROPATHIC PAIN, Pain Start taking on:  9/18/2017  
  
 predniSONE 10 mg dose pack Commonly known as:  STERAPRED DS Take 1 Tab by mouth See Admin Instructions. See administration instruction per 10mg dose pack SYNTHROID 125 mcg tablet Generic drug:  levothyroxine Take 125 mcg by mouth Daily (before breakfast). tiotropium 18 mcg inhalation capsule Commonly known as:  [address] [facility] [address] Take 1 Cap by inhalation daily. VITAMIN B-6 100 mg tablet Generic drug:  pyridoxine (vitamin B6) Take 100 mg by mouth daily. VITAMIN D3 1,000 unit tablet Generic drug:  cholecalciferol Take 5,000 Units by mouth daily. XARELTO 20 mg Tab tablet Generic drug:  rivaroxaban TAKE ONE TABLET BY MOUTH ONCE DAILY WITH DINNER  
  
 * Notice: This list has 6 medication(s) that are the same as other medications prescribed for you. Read the directions carefully, and ask your doctor or other care provider to review them with you. Prescriptions Sent to Pharmacy Refills  
 predniSONE (STERAPRED DS) 10 mg dose pack 0 Sig: Take 1 Tab by mouth See Admin Instructions. See administration instruction per 10mg dose pack Class: Normal  
 Pharmacy: [facility] [address]  #: [phone] Route: Oral  
  
We Performed the Following REFERRAL TO PHYSICAL THERAPY [QXY94 Custom] Comments:  
 eval and treat; dry needling Pt has myofascial pain in the cervical region with possible cervical radiculopathy. Follow-up Instructions Return in about 6 weeks (around 9/11/2017), or if symptoms worsen or fail to improve. To-Do List   
 08/21/2017 9:15 AM  
  Appointment with [facility] ANESTHESIA 2; [facility] EP LAB; [facility] CATH HOLDING at [address] [place] [address] ([phone]) Referral Information Referral ID Referred By Referred To  
  
 [id number] [facility] Not Available Visits Status Start Date End Date 1 New Request 7/31/17 7/31/18 If your referral has a status of pending review or denied, additional information will be sent to support the outcome of this decision. Patient Instructions The Luxe Nomad Activation Thank you for requesting access to [employer] Luxe Nomad. Please follow the instructions below to securely access and download your online medical record. The Luxe Nomad allows you to send messages to your doctor, view your test results, renew your prescriptions, schedule appointments, and more. How Do I Sign Up? 1. In your internet browser, go to [email or web address]."[email or web address] 
2. Click on the First Time User? Click Here link in the Sign In box. You will be redirect to the New Member Sign Up page. 3. Enter your The Luxe Nomad Access Code exactly as it appears below. You will not need to use this code after youve completed the sign-up process. If you do not sign up before the expiration date, you must request a new code.  
 
The Luxe Nomad Access Code: [email or web address] 
 Expires: 10/18/2017 10:17 AM (This is the date your BovControl access code will ) 4. Enter the last four digits of your Social Security Number (xxxx) and Date of Birth (mm/dd/yyyy) as indicated and click Submit. You will be taken to the next sign-up page. 5. Create a Embarket ID. This will be your BovControl login ID and cannot be changed, so think of one that is secure and easy to remember. 6. Create a BovControl password. You can change your password at any time. 7. Enter your Password Reset Question and Answer. This can be used at a later time if you forget your password. 8. Enter your e-mail address. You will receive e-mail notification when new information is available in [address] 9. Click Sign Up. You can now view and download portions of your medical record. 10. Click the Download Summary menu link to download a portable copy of your medical information. Additional Information If you have questions, please visit the Frequently Asked Questions section of the BovControl website at [email or web address] Remember, BovControl is NOT to be used for urgent needs. For medical emergencies, dial 911. Introducing [facility]! Magui Arevalo introduces BovControl patient portal. Now you can access parts of your medical record, email your doctor's office, and request medication refills online. 1. In your internet browser, go to [email or web address] 2. Click on the First Time User? Click Here link in the Sign In box. You will see the New Member Sign Up page. 3. Enter your BovControl Access Code exactly as it appears below. You will not need to use this code after youve completed the sign-up process. If you do not sign up before the expiration date, you must request a new code. · BovControl Access Code: [email or web address] Expires: 10/18/2017 10:17 AM 
 
4.  Enter the last four digits of your Social Security Number (xxxx) and Date of Birth (mm/dd/yyyy) as indicated and click Submit. You will be taken to the next sign-up page. 5. Create a LemonQuest ID. This will be your LemonQuest login ID and cannot be changed, so think of one that is secure and easy to remember. 6. Create a LemonQuest password. You can change your password at any time. 7. Enter your Password Reset Question and Answer. This can be used at a later time if you forget your password. 8. Enter your e-mail address. You will receive e-mail notification when new information is available in [address] 9. Click Sign Up. You can now view and download portions of your medical record. 10. Click the Download Summary menu link to download a portable copy of your medical information. If you have questions, please visit the Frequently Asked Questions section of the LemonQuest website. Remember, LemonQuest is NOT to be used for urgent needs. For medical emergencies, dial 911. Now available from your iPhone and Android! Please provide this summary of care documentation to your next provider. Your primary care clinician is listed as Dimitris Hinds. If you have any questions after today's visit, please call [phone].

## 2017-08-02 ENCOUNTER — CLINICAL SUPPORT (OUTPATIENT)
Dept: CARDIOLOGY CLINIC | Age: 67
End: 2017-08-02

## 2017-08-02 DIAGNOSIS — I10 ESSENTIAL HYPERTENSION, BENIGN: ICD-10-CM

## 2017-08-02 DIAGNOSIS — I48.19 PERSISTENT ATRIAL FIBRILLATION (HCC): Primary | ICD-10-CM

## 2017-08-02 NOTE — PROGRESS NOTES
[facility]  [address], [facility], [address] [place] [address], [place], [address] [place] [address]  [phone] [facility]  [phone] [place]       Name: Edward Castillo         MRN#: [id number]        YOB: 1950   Gender: female Ht:5'4\" Wt:130 lbs       . Date of Rest/Stress Images: 8/2/2017   Referring Physician: Celena Shook MD  Ordering Physician: Dr. Tristin Esquivel  Technologist: Leesa Eaton. ANU Hudson, C.N.M.T  Diagnosis:No diagnosis found. Rest/Stress Myoview SPECT Myocardial Perfusion Imaging with  Lexiscan Stress and gated SPECT Imaging      PROCEDURE:      Myocardial perfusion imaging was performed at rest approximately 30 mins following the intravenous injection,(Right hand ) of 12.1 mCi of Tc99m Myoview for evaluation of myocardial function and perfusion at rest.    Baseline Data:  Baseline EKG reveals atrial fibrillation with ST-T changes involving the inferolateral leads. Baseline heart rate was 87 beats per minute. Baseline blood pressure was 140/82 mmHg. Procedure: Following this, the patient was administered 0.4 mg of IV Lexiscan over a 30 second period. Heart rate increased to a maximum of 92 beats per minute. Maximum blood pressure was 140/82 mmHg. The patient did not report chest pain or trouble breathing. No ventricular arrhythmias were noted. The patient did not report chest pain or trouble breathing. The patient had chronic ST-T changes with no worsening. The test was stopped due to completion of protocol. Diagnosis:  1. Nondiagnostic EKG portion due to resting abnormal ECG. 2. Nuclear imaging report to follow. Thank you for this referral.           Pharmacological:  Patient was injected with . 4 mg/mL with Lexiscan intravenously over a period 10 to 20 sec. After pharmacologic stress, the patient was injected intravenously with 37.7 mCi of Tc99m Myoview.  Gating post stress tomographic imaging performed approximately 45 minutes post tracer injection. The data was reconstructed in the short, horizontal long and vertical long axis views and tomographic slices were generated. NUCLEAR IMAGING:    Findings:   1. Post-stress imaging in short axis, horizontal and vertical long axis views reveals normal isotope uptake in all areas. 2. Resting images also show normal isotope uptake in all areas. 3. Gated images show normal left ventricular size, wall motion and systolic function. Ejection fraction is 69%. Diagnosis:   1. Normal scan. 2. No evidence of significant fixed or reversible defect suggesting ischemia or myocardial infarction noted from this nuclear study. 3. Low risk scan.     Thank you for the referral.    E-signed and Interpreting Physician:    Pippa Murphy MD, [facility]     Date of interpretation: 8/2/2017  Date of final report: 8/2/2017

## 2017-08-07 ENCOUNTER — APPOINTMENT (OUTPATIENT)
Dept: PHYSICAL THERAPY | Age: 67
End: 2017-08-07
Payer: MEDICARE

## 2017-08-10 ENCOUNTER — HOSPITAL ENCOUNTER (OUTPATIENT)
Dept: PHYSICAL THERAPY | Age: 67
Discharge: HOME | End: 2017-08-10
Payer: MEDICARE

## 2017-08-10 PROCEDURE — 97110 THERAPEUTIC EXERCISES: CPT

## 2017-08-10 PROCEDURE — G8985 CARRY GOAL STATUS: HCPCS

## 2017-08-10 PROCEDURE — 97162 PT EVAL MOD COMPLEX 30 MIN: CPT

## 2017-08-10 PROCEDURE — G8984 CARRY CURRENT STATUS: HCPCS

## 2017-08-10 NOTE — PROGRESS NOTES
[facility]  [address]  [place], [address]  [phone] fax    Plan of Care/ Statement of Necessity for Physical Therapy Services    Patient name: Edward Castillo Start of Care: 8/10/2017   Referral source: Marvin Wilson MD : 1950    Medical Diagnosis: Neck pain [M54.2]  Myalgia [M79.1]   Onset Date:2 weeks ago    Treatment Diagnosis: C/S pain, L shoulder pain   Prior Hospitalization: see medical history Provider#: [id number]   Medications: Verified on Patient summary List    Comorbidities: chronic A-fib, pacemaker, R TKR, B ankle fx, diabetes, OA, thyroid disease, HTN, hearing impaired   Prior Level of Function: functionally I with all activities      The Plan of Care and following information is based on the information from the initial evaluation. Assessment/ key information: 76 y/o female presents with 2 week h/o L sided neck and shoulder pain. She reports no mechanism of injury. Since onset she reports improvement in symptoms. Pt demonstrates limited c/s AROM in all planes of motion with c/o pain. AROM and PROM of the L shoulder also limited in all planes of motion. Pt was difficult to assess for capsular tightness due to being limited by pain and resisting PT with PROM. + trigger point and myofascial restrictions noted in the L UT. Pt reports she will be having surgery the week after next and will not be able to attend PT. Pt will benefit from PT for next week to address the aforementioned impairments. Evaluation Complexity History HIGH Complexity :3+ comorbidities / personal factors will impact the outcome/ POC ; Examination MEDIUM Complexity : 3 Standardized tests and measures addressing body structure, function, activity limitation and / or participation in recreation  ;Presentation MEDIUM Complexity : Evolving with changing characteristics  ; Clinical Decision Making MEDIUM Complexity : FOTO score of 26-74  Overall Complexity Rating: MEDIUM  Problem List: pain affecting function, decrease ROM, decrease strength, decrease ADL/ functional abilitiies, decrease activity tolerance and decrease flexibility/ joint mobility   Treatment Plan may include any combination of the following: Therapeutic exercise, Therapeutic activities, Neuromuscular re-education, Physical agent/modality, Manual therapy and Patient education  Patient / Family readiness to learn indicated by: asking questions and trying to perform skills  Persons(s) to be included in education: patient (P)  Barriers to Learning/Limitations: None  Patient Goal (s): To help learn some things to help the pain  Patient Self Reported Health Status: fair  Rehabilitation Potential: fair    Short Term Goals: To be accomplished in 1-2 weeks:   1. Pt will be I and compliant with HEP   2. Pt will improve FOTO score to 52 for improved ability for daily tasks      Frequency / Duration: Patient to be seen 1-2 times per week for 2 weeks. Patient/ Caregiver education and instruction: Diagnosis, prognosis, self care, activity modification and exercises   [x]  Plan of care has been reviewed with PTA    G-Codes (GP)  Carry   Current  CK= 40-59%    Goal  CK= 40-59%      The severity rating is based on clinical judgment and the FOTO score. Certification Period: 8-10-17 to 09-8-17  Ciro Chavarria, PT 8/10/2017 9:51 AM    ________________________________________________________________________    I certify that the above Therapy Services are being furnished while the patient is under my care. I agree with the treatment plan and certify that this therapy is necessary.     [de-identified] Signature:____________________  Date:____________Time: _________    Please sign and return to [facility] [address] [place] [address] [place] [address], [address]  [phone] fax

## 2017-08-10 NOTE — PROGRESS NOTES
PT DAILY TREATMENT NOTE - [facility]    Patient Name: Naman Laird  Date:8/10/2017  : 1950  [x]  Patient  Verified  [place]or[place] MED[place]ARE / Plan: [place] MEDICARE PART A & B / Product Type: Medicare /    In time:10:00  Out time:10:40  Total Treatment Time (min): 40  Total Timed Codes (min): 10  1:1 Treatment Time ( [address] only): 40   Visit #: 1 of 3    Treatment Area: Neck pain [M54.2]  Myalgia [M79.1]    SUBJECTIVE  Pain Level (0-10 scale): 6  Any medication changes, allergies to medications, adverse drug reactions, diagnosis change, or new procedure performed?: [x] No    [] Yes (see summary sheet for update)  Subjective functional status/changes:   [] No changes reported       OBJECTIVE    Modality rationale:    Min Type Additional Details    [] Estim:  []Unatt       []IFC  []Premod                        []Other:  []w/ice   []w/heat  Position:  Location:    [] Estim: []Att    []TENS instruct  []NMES                    []Other:  []w/US   []w/ice   []w/heat  Position:  Location:    []  Traction: [] Cervical       []Lumbar                       [] Prone          []Supine                       []Intermittent   []Continuous Lbs:  [] before manual  [] after manual    []  Ultrasound: []Continuous   [] Pulsed                           []1MHz   []3MHz Location:  W/cm2:    []  Iontophoresis with dexamethasone         Location: [] Take home patch   [] In clinic    []  Ice     []  heat  []  Ice massage  []  Laser   []  Anodyne Position:  Location:    []  Laser with stim  []  Other: Position:  Location:    []  Vasopneumatic Device Pressure:       [] lo [] med [] hi   Temperature: [] lo [] med [] hi   [] Skin assessment post-treatment:  []intact []redness- no adverse reaction    []redness  adverse reaction:     30 min [x]Eval                  []Re-Eval       10 min Therapeutic Exercise:  [] See flow sheet : HEP   Rationale: increase ROM and increase strength to improve the patients ability to perform ADL              With [] TE   [] TA   [] neuro   [] other: Patient Education: [x] Review HEP    [] Progressed/Changed HEP based on:   [] positioning   [] body mechanics   [] transfers   [] heat/ice application    [] other:      Other Objective/Functional Measures:       Pain Level (0-10 scale) post treatment: 6    ASSESSMENT/Changes in Function:      Patient will continue to benefit from skilled PT services to modify and progress therapeutic interventions, address functional mobility deficits, address ROM deficits, address strength deficits, analyze and address soft tissue restrictions and analyze and cue movement patterns to attain remaining goals.      [x]  See Plan of Care  []  See progress note/recertification  []  See Discharge Summary         Progress towards goals / Updated goals:  Per POC    PLAN  []  Upgrade activities as tolerated     [x]  Continue plan of care  []  Update interventions per flow sheet       []  Discharge due to:_  []  Other:_      Ladonna Ivory, PT 8/10/2017  9:49 AM

## 2017-08-14 ENCOUNTER — HOSPITAL ENCOUNTER (OUTPATIENT)
Dept: GENERAL RADIOLOGY | Age: 67
Discharge: HOME | End: 2017-08-14
Payer: MEDICARE

## 2017-08-14 ENCOUNTER — HOSPITAL ENCOUNTER (OUTPATIENT)
Dept: LAB | Age: 67
Discharge: HOME | End: 2017-08-14
Payer: MEDICARE

## 2017-08-14 ENCOUNTER — OFFICE VISIT (OUTPATIENT)
Dept: CARDIOLOGY CLINIC | Age: 67
End: 2017-08-14

## 2017-08-14 DIAGNOSIS — I48.19 PERSISTENT ATRIAL FIBRILLATION (HCC): ICD-10-CM

## 2017-08-14 DIAGNOSIS — Z01.812 PRE-PROCEDURE LAB EXAM: Primary | ICD-10-CM

## 2017-08-14 DIAGNOSIS — Z01.812 PRE-PROCEDURE LAB EXAM: ICD-10-CM

## 2017-08-14 LAB
ALBUMIN SERPL BCP-MCNC: 3.1 G/DL (ref 3.4–5)
ALBUMIN/GLOB SERPL: 1 {RATIO} (ref 0.8–1.7)
ALP SERPL-CCNC: 108 U/L (ref 45–117)
ALT SERPL-CCNC: 33 U/L (ref 13–56)
ANION GAP BLD CALC-SCNC: 4 MMOL/L (ref 3–18)
AST SERPL W P-5'-P-CCNC: 19 U/L (ref 15–37)
BASOPHILS # BLD AUTO: 0 K/UL (ref 0–0.06)
BASOPHILS # BLD: 0 % (ref 0–2)
BILIRUB SERPL-MCNC: 0.3 MG/DL (ref 0.2–1)
BUN SERPL-MCNC: 10 MG/DL (ref 7–18)
BUN/CREAT SERPL: 10 (ref 12–20)
CALCIUM SERPL-MCNC: 7.9 MG/DL (ref 8.5–10.1)
CHLORIDE SERPL-SCNC: 98 MMOL/L (ref 100–108)
CO2 SERPL-SCNC: 33 MMOL/L (ref 21–32)
CREAT SERPL-MCNC: 0.99 MG/DL (ref 0.6–1.3)
DIFFERENTIAL METHOD BLD: NORMAL
EOSINOPHIL # BLD: 0.1 K/UL (ref 0–0.4)
EOSINOPHIL NFR BLD: 1 % (ref 0–5)
ERYTHROCYTE [DISTWIDTH] IN BLOOD BY AUTOMATED COUNT: 13.2 % (ref 11.6–14.5)
GLOBULIN SER CALC-MCNC: 3.1 G/DL (ref 2–4)
GLUCOSE SERPL-MCNC: 149 MG/DL (ref 74–99)
HCT VFR BLD AUTO: 42.4 % (ref 35–45)
HGB BLD-MCNC: 14.7 G/DL (ref 12–16)
INR PPP: 0.9 (ref 0.8–1.2)
LYMPHOCYTES # BLD AUTO: 27 % (ref 21–52)
LYMPHOCYTES # BLD: 2 K/UL (ref 0.9–3.6)
MCH RBC QN AUTO: 31.5 PG (ref 24–34)
MCHC RBC AUTO-ENTMCNC: 34.7 G/DL (ref 31–37)
MCV RBC AUTO: 90.8 FL (ref 74–97)
MONOCYTES # BLD: 0.7 K/UL (ref 0.05–1.2)
MONOCYTES NFR BLD AUTO: 10 % (ref 3–10)
NEUTS SEG # BLD: 4.4 K/UL (ref 1.8–8)
NEUTS SEG NFR BLD AUTO: 62 % (ref 40–73)
PLATELET # BLD AUTO: 162 K/UL (ref 135–420)
PMV BLD AUTO: 9.6 FL (ref 9.2–11.8)
POTASSIUM SERPL-SCNC: 3.6 MMOL/L (ref 3.5–5.5)
PROT SERPL-MCNC: 6.2 G/DL (ref 6.4–8.2)
PROTHROMBIN TIME: 11.7 SEC (ref 11.5–15.2)
RBC # BLD AUTO: 4.67 M/UL (ref 4.2–5.3)
SODIUM SERPL-SCNC: 135 MMOL/L (ref 136–145)
WBC # BLD AUTO: 7.2 K/UL (ref 4.6–13.2)

## 2017-08-14 PROCEDURE — 85025 COMPLETE CBC W/AUTO DIFF WBC: CPT | Performed by: INTERNAL MEDICINE

## 2017-08-14 PROCEDURE — 85610 PROTHROMBIN TIME: CPT | Performed by: INTERNAL MEDICINE

## 2017-08-14 PROCEDURE — 36415 COLL VENOUS BLD VENIPUNCTURE: CPT | Performed by: INTERNAL MEDICINE

## 2017-08-14 PROCEDURE — 71020 XR CHEST PA LAT: CPT

## 2017-08-14 PROCEDURE — 80053 COMPREHEN METABOLIC PANEL: CPT | Performed by: INTERNAL MEDICINE

## 2017-08-14 NOTE — MR AVS SNAPSHOT
Visit Information Date & Time Provider Department Dept. Phone Encounter #  
 8/14/2017 10:00 AM [facility] [id number] Your Appointments 9/11/2017  9:45 AM  
Follow Up with Kee Stokes MD  
[address] [facility], [address] and [facility] ONE [facility]) Appt Note: 6 wk f/u  
 [address] [facility] [address]  
[phone]  
  
   
 [address] [facility],[address] [facility]  
  
    
 10/18/2017  9:20 AM  
Follow Up with Belen Neal MD  
[facility]) Appt Note: return in 3 months 30 [facility] [address]  
[phone]  [phone]  
  
    
 10/23/2017 10:45 AM  
Office Visit with Janneth Rothman MD  
[facility]) Appt Note: 3 m w/ medtronic ck [address] [facility], [address] [facility] [address]  
[address], 9352 [facility] [address] [place] [address] 3/16/2018  8:00 AM  
PROCEDURE with [facility] 1 [facility] ([facility]) Appt Note: LISA ILIAC STENTS 1YR FAGAN; MAILED [address] [facility] [address]; .  
 [address] [facility] [address]  
[phone] [address] [place] [address]  
  
    
 3/16/2018 10:00 AM  
PROCEDURE with [facility] NONIMAGING [facility] ([facility]) Appt Note: LEG ART 1YR FAGAN; MAILED CARDS AND PREP INFORMATION; .  
 [facility] [place] [facility] [address] [facility] [address]  
[phone] [address] [place] [address]  
  
    
 3/28/2018  1:45 PM  
Follow Up with Ling Burkett MD  
[address] [place] [address] and [facility]) Appt Note: 1 year fu after studies at our lab on 3/16/2018 with prep; MAILED CARDS AND PREP INFORMATION; CALLED PATIENT TO MOVE APPT TIME DUE TO  [facility] TIMES CHANGE PT IS AWARE; 1 year fu after studies at our lab on 3/16/2018 with prep MAILED CARDS AND PREP INFORMATION CALLED PATIENT TO MOVE APPT TIME DUE TO [address] PT IS AWARE  
 27 [address]a Patel [address] 200 [facility] [address]  
[phone] 12[address] [facility] [place] [address] [facility] [address] Upcoming Health Maintenance Date Due Hepatitis C Screening 1950 FOOT EXAM Q1 5/1/1959 EYE EXAM RETINAL OR DILATED Q1 5/1/1959 FOBT Q 1 YEAR AGE 50-75 5/1/1999 DTaP/Tdap/Td series (1 - Tdap) 10/2/2005 ZOSTER VACCINE AGE 60> 3/1/2009 GLAUCOMA SCREENING Q2Y 5/1/2014 OSTEOPOROSIS SCREENING (DEXA) 5/1/2014 Pneumococcal 65+ Low/Medium Risk (2 of 2 - PPSV23) 5/1/2014 MEDICARE YEARLY EXAM 5/1/2014 BREAST CANCER SCRN MAMMOGRAM 7/25/2015 MICROALBUMIN Q1 1/8/2016 HEMOGLOBIN A1C Q6M 7/10/2016 LIPID PANEL Q1 9/29/2016 INFLUENZA AGE 9 TO ADULT 8/1/2017 Allergies as of 8/14/2017  Review Complete On: 8/10/2017 By: Jacquelyn Geiger PT No Known Allergies Current Immunizations  Reviewed on 2/9/2011 Name Date  
 TD Vaccine 10/1/2005 ZZZ-RETIRED (DO NOT USE) Pneumococcal Vaccine (Unspecified Type) 12/14/2005 Not reviewed this visit You Were Diagnosed With   
  
 Codes Comments Pre-procedure lab exam    -  Primary ICD-10-CM: R17.999 ICD-9-CM: V72.63 Persistent atrial fibrillation (HCC)     ICD-10-CM: I48.1 ICD-9-CM: 427.31 Vitals OB Status Smoking Status Hysterectomy Current Some Day Smoker Preferred Pharmacy Pharmacy Name Phone [facility] [address]. [phone] Your Updated Medication List  
  
   
This list is accurate as of: 8/14/17 10:23 AM.  Always use your most recent med list.  
  
  
  
  
 digoxin 0.25 mg tablet Commonly known as:  LANOXIN Take 0.5 Tabs by mouth daily. dilTIAZem  mg XR capsule Commonly known as:  DILACOR XR  
TAKE ONE CAPSULE BY MOUTH ONCE DAILY * fentaNYL 62.5 mcg/hour Pt72  
62.5 mcg by TransDERmal route every seventy-two (72) hours for 30 days. Max Daily Amount: 62.5 mcg. Indications: Chronic Pain with Opioid Tolerance, SEVERE PAIN WITH OPIOID TOLERANCE  
  
 * fentaNYL 62.5 mcg/hour Pt72  
62.5 mcg by TransDERmal route every seventy-two (72) hours for 30 days. Max Daily Amount: 62.5 mcg. Indications: Chronic Pain with Opioid Tolerance, SEVERE PAIN WITH OPIOID TOLERANCE Start taking on:  8/22/2017 * fentaNYL 62.5 mcg/hour Pt72  
62.5 mcg by TransDERmal route every seventy-two (72) hours for 30 days. Max Daily Amount: 62.5 mcg. Indications: Chronic Pain with Opioid Tolerance, SEVERE PAIN WITH OPIOID TOLERANCE Start taking on:  9/20/2017  
  
 fish oil-dha-epa 1,200-144-216 mg Cap Take 1 Cap by mouth daily. HumaLOG 100 unit/mL injection Generic drug:  insulin lispro  
by SubCUTAneous route. ipratropium-albuterol  mcg/actuation inhaler Commonly known as:  Lit Common Take 1 Puff by inhalation every six (6) hours as needed for Wheezing or Shortness of Breath. LANTUS 100 unit/mL injection Generic drug:  insulin glargine  
by SubCUTAneous route nightly. LIPITOR 20 mg tablet Generic drug:  atorvastatin Take  by mouth daily. naloxegol 25 mg Tab tablet Commonly known as:  Jyoti Sierras Take 25 mg by mouth daily. naloxone 4 mg/actuation Spry 4 mg by Nasal route as needed for up to 2 doses. Indications: OPIOID TOXICITY  
  
 * oxyCODONE IR 30 mg immediate release tablet Commonly known as:  OXY-IR Take 1 Tab by mouth four (4) times daily as needed for Pain for up to 30 days. Max Daily Amount: 120 mg. Indications: NEUROPATHIC PAIN, Pain * oxyCODONE IR 30 mg immediate release tablet Commonly known as:  OXY-IR  
 Take 1 Tab by mouth four (4) times daily as needed for Pain for up to 30 days. Max Daily Amount: 120 mg. Indications: NEUROPATHIC PAIN, Pain Start taking on:  8/20/2017 * oxyCODONE IR 30 mg immediate release tablet Commonly known as:  OXY-IR Take 1 Tab by mouth four (4) times daily as needed for Pain for up to 30 days. Max Daily Amount: 120 mg. Indications: NEUROPATHIC PAIN, Pain Start taking on:  9/18/2017  
  
 predniSONE 10 mg dose pack Commonly known as:  STERAPRED DS Take 1 Tab by mouth See Admin Instructions. See administration instruction per 10mg dose pack SYNTHROID 125 mcg tablet Generic drug:  levothyroxine Take 125 mcg by mouth Daily (before breakfast). tiotropium 18 mcg inhalation capsule Commonly known as:  Radiation Watch [address] Take 1 Cap by inhalation daily. VITAMIN B-6 100 mg tablet Generic drug:  pyridoxine (vitamin B6) Take 100 mg by mouth daily. VITAMIN D3 1,000 unit tablet Generic drug:  cholecalciferol Take 5,000 Units by mouth daily. XARELTO 20 mg Tab tablet Generic drug:  rivaroxaban TAKE ONE TABLET BY MOUTH ONCE DAILY WITH DINNER  
  
 * Notice: This list has 6 medication(s) that are the same as other medications prescribed for you. Read the directions carefully, and ask your doctor or other care provider to review them with you. To-Do List   
 08/14/2017 Lab:  CBC WITH AUTOMATED DIFF   
  
 08/14/2017 Lab:  METABOLIC PANEL, COMPREHENSIVE   
  
 08/14/2017 Lab:  PROTHROMBIN TIME + INR   
  
 08/14/2017 Imaging:  XR CHEST PA LAT   
  
 08/15/2017 3:00 PM  
  Appointment with Sherice Ly PTA at [facility] PT [address] ([phone])  
  
 08/17/2017 3:30 PM  
  Appointment with Janneth Mackey PTA at [facility]  [address] ([phone])  
  
 08/21/2017 9:15 AM  
  Appointment with [facility] ANESTHESIA 2; [facility] EP LAB; [facility] CATH HOLDING at [address] [place] [address] ([phone]) Patient Instructions DR. BERGER [facility] Patient  EP Instructions 1. You are scheduled to have a AV Node Ablation on  Aug 21, 2017  , at 9:15 am.    Please check in at 8:15 am. 
 
2. Please go to DR. AB MATUTE and anton in the outpatient parking lot that is located around to the back of the hospital and enter through the [employer]. Once you enter through the [facility] check in with the  there. The  will either give you directions or assist you in getting to the cath holding area. 3.         You are not to eat or drink anything after midnight the morning of the  
            procedure. 4. Please continue to take your medications with a small sip of water on the morning of the procedure with the following exceptions:  [address] the morning of procedure . HOLD Xarelto 48hrs prior to procedure. 5. If you are diabetic, do not take your insulin/sugar pill the morning of the procedure. 6. We encourage families to wait in the waiting room on the first floor while the procedure is being done. The Doctor will come out and talk with you as soon as the procedure is over. 7. There is the possibility that you may spend the night in the hospital, depending on the results of the procedure. This will be determined after the procedure is done. 8.   If you or your family have any questions, please call our office Monday-Friday 9:00am  
      -4:30 pm , at [phone], and ask to speak to one of the nurses. Introducing [facility]! Lashon Kaye introduces [employer] patient portal. Now you can access parts of your medical record, email your doctor's office, and request medication refills online. 1. In your internet browser, go to [email or web address] 2. Click on the First Time User? Click Here link in the Sign In box. You will see the New Member Sign Up page. 3. Enter your hybris Access Code exactly as it appears below. You will not need to use this code after youve completed the sign-up process. If you do not sign up before the expiration date, you must request a new code. · hybris Access Code: [email or web address] Expires: 10/18/2017 10:17 AM 
 
4. Enter the last four digits of your Social Security Number (xxxx) and Date of Birth (mm/dd/yyyy) as indicated and click Submit. You will be taken to the next sign-up page. 5. Create a hybris ID. This will be your hybris login ID and cannot be changed, so think of one that is secure and easy to remember. 6. Create a hybris password. You can change your password at any time. 7. Enter your Password Reset Question and Answer. This can be used at a later time if you forget your password. 8. Enter your e-mail address. You will receive e-mail notification when new information is available in [address] 9. Click Sign Up. You can now view and download portions of your medical record. 10. Click the Download Summary menu link to download a portable copy of your medical information. If you have questions, please visit the Frequently Asked Questions section of the hybris website. Remember, hybris is NOT to be used for urgent needs. For medical emergencies, dial 911. Now available from your iPhone and Android! Please provide this summary of care documentation to your next provider. Your primary care clinician is listed as Ellie Linton. If you have any questions after today's visit, please call [phone].

## 2017-08-14 NOTE — PATIENT INSTRUCTIONS
DR. [facility]          Patient  EP Instructions                  1. You are scheduled to have a AV Node Ablation on  Aug 21, 2017  , at 9:15 am.    Please check in at 8:15 am.    2. Please go to DR. LNI'RICHMOND MATUTE and anton in the outpatient parking lot that is located around to the back of the hospital and enter through the [facility] building. Once you enter through the [facility] check in with the  there. The  will either give you directions or assist you in getting to the cath holding area. 3.  []       You are not to eat or drink anything after midnight the morning of the               procedure. 4. Please continue to take your medications with a small sip of water on the morning of the procedure with the following exceptions:  [address] the morning of procedure . HOLD Xarelto 48hrs prior to procedure. 5. If you are diabetic, do not take your insulin/sugar pill the morning of the procedure. 6. We encourage families to wait in the waiting room on the first floor while the procedure is being done. The Doctor will come out and talk with you as soon as the procedure is over. 7. There is the possibility that you may spend the night in the hospital, depending on the results of the procedure. This will be determined after the procedure is done. 8.   If you or your family have any questions, please call our office Monday-Friday 9:00am         -4:30 pm , at [phone], and ask to speak to one of the nurses.

## 2017-08-15 ENCOUNTER — HOSPITAL ENCOUNTER (OUTPATIENT)
Dept: PHYSICAL THERAPY | Age: 67
Discharge: HOME | End: 2017-08-15
Payer: MEDICARE

## 2017-08-15 PROCEDURE — 97110 THERAPEUTIC EXERCISES: CPT

## 2017-08-15 NOTE — PROGRESS NOTES
PT DAILY TREATMENT NOTE - [facility]     Patient Name: Temo Tomlinson  Date:8/15/2017  : 1950  [x]  Patient  Verified  Payor: Jah Poe / Plan: [place] MEDICARE PART A & B / Product Type: Medicare /    In time:3:00  Out time:3:24  Total Treatment Time (min): 24  Total Timed Codes (min): 24  1:1 Treatment Time ( only): 8   Visit #: 2 of 3    Treatment Area: Neck pain [M54.2]  Myalgia [M79.1]    SUBJECTIVE  Pain Level (0-10 scale): 5/10  Any medication changes, allergies to medications, adverse drug reactions, diagnosis change, or new procedure performed?: [x] No    [] Yes (see summary sheet for update)  Subjective functional status/changes:   [] No changes reported  \"Not having a good day because of another condition. \"    OBJECTIVE    24 min Therapeutic Exercise:  [x] See flow sheet :   Rationale: increase ROM, increase strength and increase proprioception to improve the patients ability to perform ADL's. With   [x] TE   [] TA   [] neuro   [] other: Patient Education: [x] Review HEP    [] Progressed/Changed HEP based on:   [] positioning   [] body mechanics   [] transfers   [] heat/ice application    [] other:      Other Objective/Functional Measures: Initiated exercises per flow sheet. After performing most exercises, pt declined continuation of treatment secondary to other medical conditions. Pt did not go into details. Pain Level (0-10 scale) post treatment: 7/10    ASSESSMENT/Changes in Function: First F/U visit. Patient will continue to benefit from skilled PT services to modify and progress therapeutic interventions, address functional mobility deficits, address ROM deficits, address strength deficits, analyze and cue movement patterns and analyze and modify body mechanics/ergonomics to attain remaining goals. [x]  See Plan of Care  []  See progress note/recertification  []  See Discharge Summary         Progress towards goals / Updated goals:  Short Term Goals:  To be accomplished in 1-2 weeks:                        1. Pt will be I and compliant with HEP - Pt reports compliance. 8/15/2017                        2. Pt will improve FOTO score to 52 for improved ability for daily tasks                                                Frequency / Duration: Patient to be seen 1-2 times per week for 2 weeks.     PLAN  []  Upgrade activities as tolerated     [x]  Continue plan of care  []  Update interventions per flow sheet       []  Discharge due to:_  []  Other:_      Sherice Ly PTA 8/15/2017  3:07 PM    Future Appointments  Date Time Provider Lucille Singleton   8/17/2017 3:30 PM Janneth Mackey PTA [facility]   8/21/2017 9:15 AM [facility] CATH HOLDING [facility]   9/11/2017 9:45 AM Judge Mindi  [address]   10/18/2017 9:20 AM Crow Katz MD [facility] ALEKSEY SCHED   10/23/2017 10:45 AM Albert Penn MD [facility] ALEKSEY SCHED   3/16/2018 8:00 AM [facility] IM[facility] 1 [facility] ALEKSE[facility] SCHED   3/16/2018 10:00 AM [facility] NONIMAGING [facility] ALEKSEY SCHED   3/28/2018 1:45 PM Te Linder MD [place] [id number]

## 2017-08-16 ENCOUNTER — TELEPHONE (OUTPATIENT)
Dept: CARDIOLOGY CLINIC | Age: 67
End: 2017-08-16

## 2017-08-16 NOTE — TELEPHONE ENCOUNTER
----- Message from Flori Coker MD sent at 8/15/2017  7:56 AM EDT -----  Yes, ok to hold xarelto 48hrs prior and she can continue to wear the patch. thx    ----- Message -----     From: See Florencio     Sent: 8/14/2017  11:05 AM       To: Flori Coker MD    You are doing an AV node ablation on this patient. I just want to make sure you want Xarelto held 48 hours prior. Also she wears a Fentanyl patch. Is she ok to wear this the morning of the procedure? Please advise. Thanks.

## 2017-08-19 NOTE — H&P
Seen & examined. See full H&P/notes for details. Plan AV node ablation. 100% dependence on pacemaker discussed and patient understands what would happen if device becomes infected including prolonged hospitalization. All risks, benefits, alternatives discussed. All questions answered. Risks included but are not limited to pain, infection, bleeding, stroke, perforation, valve damage, nerve damage, diaphragmatic paralysis, rupture of esophagus and possible fistula from heart to esophagus, deep venous thrombosis, emergent open heart surgery, and death both during procedure and post-operatively. History of Present Illness:  A 75 y.o. woman referred by Dr. Mimi Membreno for evaluation of atrial fibrillation. [place] has had persistent and essentially permanent atrial fibrillation for a number of years.  In 2013, I performed an atrial flutter ablation and placed a pacemaker for pauses of up to 8-9 seconds.  She lost about 50-60 pounds in the past 6 months.  She has had 2 episodes of syncope, one while in the kitchen and the other while getting up from the bed and going to the bathroom.  She had significant palpitations just prior. Her device shows she does have atrial fibrillation rates that do increase up to about 200 beats per minute for short episodes, potentially exacerbating her condition.  She does have some occasional chest pain when her heart races, no PND, orthopnea, edema.  She has been intermittently smoking and now smoking about a pack that lasts for about a week and a half. The chest pain that she does have is sharp.      Diagnosis:  Primary cardiologist Dr. Mimi Membreno   -Chronic atrial fibrillation with occasional episodes of increased ventricular rates.   -History of recurrent syncope associated with palpitations.   -Atypical chest pains when her heart races. Nuclear stress test August 2017 without ischemia. EF 69%.   -History of atrial flutter ablation 2013.   -Dual chamber Medtronic pacemaker 2013 for pauses of up to 8-9 seconds.   -Chronic Digoxin and Diltiazem therapy limited due to hypotension.   -Chronic COPD on inhalers, currently stable.   -Echocardiogram 2015 with normal function.   -Chronic diastolic heart failure, currently compensated. -Recent 50-60 pound weight loss in the past 6 months.   -History of hypotension.   -Chronic pain on narcotics. -Diabetes on insulin.   -Thyroid disorder.   -Chronic Xarelto use. -Dyslipidemia.      Given her co-morbidities and essentially permanent atrial fibrillation, the chance of atrial fibrillation ablation success is quite low and I discussed this with the patient. Cesar Fill her episodes however and increased ventricular rates, I discussed AV node ablation.  I discussed this would be completely dependent upon the device.  I stated that she could develop a reduced ejection fraction as time goes on.  Given the goal for symptom management, however, she agreed to the AV node ablation.  I will go ahead and make arrangements.      She does have some chest pains and given her risk factors, I am going to have her get a nuclear stress test at Dr. Cinthia Osborne office. [facility] with AV node ablation we will be able to stop some of her Diltiazem and her blood pressure will improve though there will need to be continued evaluation for her weight loss if it continues. I am going to check a chest x-ray in preparation for the procedure as well.       Of note she has failed amiodarone therapy as well.          Past Medical History:   Diagnosis Date    Abnormal myocardial perfusion study 05/07/2010     Anterior ischemia c/w at least 1-vessel CAD. No WMA. EF 51%. Positive EKG at 78% pred max HR. Ex time 7 min 30 sec.     Allergic rhinitis 1/30/2010    Atrial fibrillation (HCC)      Broken ankle 08/22/2013     left    Carotid stenosis 1/30/2010    COPD      Essential hypertension      Heart failure (HCC)      History of amiodarone therapy 5/11/2015    History of cardiac cath 05/25/2010     Patent coronary arteries. LVEDP 13 mmHg. EF 65%.  History of cervical cancer 11/05    History of echocardiogram 08/21/2013     EF 50-55%. No RWMA. Gr 2 DDfx. Mild LAE. Mild MR.      Hypertension      Migraines 1/30/2010    Mitral regurgitation      Orthostatic dizziness 3/30/2016     ?autonomic dysfunction Vs amio side effect     Pacemaker 11/25/13     DDD    Pure hypercholesterolemia 1/30/2010    Rosacea 1/30/2010    Type II or unspecified type diabetes mellitus without mention of complication, not stated as uncontrolled 1/30/2010    Unspecified hypothyroidism 1/30/2010                Current Outpatient Prescriptions   Medication Sig Dispense Refill    [START ON 9/18/2017] oxyCODONE IR (OXY-IR) 30 mg immediate release tablet Take 1 Tab by mouth four (4) times daily as needed for Pain for up to 30 days. Max Daily Amount: 120 mg. Indications: NEUROPATHIC PAIN, Pain 120 Tab 0    [START ON 9/20/2017] fentaNYL 62.5 mcg/hour pt72 62.5 mcg by TransDERmal route every seventy-two (72) hours for 30 days. Max Daily Amount: 62.5 mcg. Indications: Chronic Pain with Opioid Tolerance, SEVERE PAIN WITH OPIOID TOLERANCE 10 Patch 0    [START ON 8/20/2017] oxyCODONE IR (OXY-IR) 30 mg immediate release tablet Take 1 Tab by mouth four (4) times daily as needed for Pain for up to 30 days. Max Daily Amount: 120 mg. Indications: NEUROPATHIC PAIN, Pain 120 Tab 0    oxyCODONE IR (OXY-IR) 30 mg immediate release tablet Take 1 Tab by mouth four (4) times daily as needed for Pain for up to 30 days. Max Daily Amount: 120 mg. Indications: NEUROPATHIC PAIN, Pain 120 Tab 0    [START ON 8/22/2017] fentaNYL 62.5 mcg/hour pt72 62.5 mcg by TransDERmal route every seventy-two (72) hours for 30 days. Max Daily Amount: 62.5 mcg.  Indications: Chronic Pain with Opioid Tolerance, SEVERE PAIN WITH OPIOID TOLERANCE 10 Patch 0    fentaNYL 62.5 mcg/hour pt72 62.5 mcg by TransDERmal route every seventy-two (72) hours for 30 days. Max Daily Amount: 62.5 mcg. Indications: Chronic Pain with Opioid Tolerance, SEVERE PAIN WITH OPIOID TOLERANCE 10 Patch 0    naloxone 4 mg/actuation spry 4 mg by Nasal route as needed for up to 2 doses. Indications: OPIOID TOXICITY 1 Box 1    digoxin (LANOXIN) 0.25 mg tablet Take 0.5 Tabs by mouth daily. 30 Tab 6    dilTIAZem XR (DILACOR XR) 180 mg XR capsule TAKE ONE CAPSULE BY MOUTH ONCE DAILY 90 Cap 3    atorvastatin (LIPITOR) 20 mg tablet Take  by mouth daily.        XARELTO 20 mg tab tablet TAKE ONE TABLET BY MOUTH ONCE DAILY WITH DINNER 30 Tab 6    naloxegol (MOVANTIK) 25 mg tab tablet Take 25 mg by mouth daily.        insulin lispro (HUMALOG) 100 unit/mL injection by SubCUTAneous route.        insulin glargine (LANTUS) 100 unit/mL injection by SubCUTAneous route nightly.        ipratropium-albuterol (COMBIVENT RESPIMAT)  mcg/actuation inhaler Take 1 Puff by inhalation every six (6) hours as needed for Wheezing or Shortness of Breath. 1 Inhaler 1    pyridoxine (VITAMIN B-6) 100 mg tablet Take 100 mg by mouth daily.        fish oil-dha-epa 1,200-144-216 mg cap Take 1 Cap by mouth daily.        levothyroxine (SYNTHROID) 125 mcg tablet Take 125 mcg by mouth Daily (before breakfast).        tiotropium (SPIRIVA WITH HANDIHALER) 18 mcg inhalation capsule Take 1 Cap by inhalation daily. 30 Cap 11    cholecalciferol, vitamin d3, (VITAMIN D) 1,000 unit tablet Take 5,000 Units by mouth daily.             Social History   reports that she has been smoking. She has a 21.00 pack-year smoking history. She has never used smokeless tobacco.   reports that she does not drink alcohol.     Family History  family history includes Breast Cancer (age of onset: 61) in her paternal aunt; Breast Cancer (age of onset: 79) in her maternal aunt; Cancer in her maternal aunt and maternal aunt; Diabetes in her daughter and maternal grandmother; Heart Attack in her sister;  Hypertension in her mother; Other in her daughter. There is no history of Heart Disease.     Review of Systems  Except as stated above include:  Constitutional: Negative for fever, chills and malaise/fatigue. HEENT: No congestion or recent URI. Gastrointestinal: No nausea, vomiting, abdominal pain, bloody stools. Pulmonary:  Negative except as stated above. Cardiac:  Negative except as stated above. Musculoskeletal: Negative except as stated above. Neurological:  No localized symptoms. Skin:  Negative except as stated above. Psych:  No mood swings. Endocrine:  No heat/cold intolerance.     PHYSICAL EXAM      BP Readings from Last 3 Encounters:   07/27/17 112/74   07/21/17 111/55   07/17/17 91/49          Pulse Readings from Last 3 Encounters:   07/27/17 99   07/21/17 92   07/17/17 74          Wt Readings from Last 3 Encounters:   07/27/17 58.5 kg (129 lb)   07/21/17 59.4 kg (131 lb)   07/17/17 58.5 kg (129 lb)      General:                    Well developed, well groomed. Head/Neck:               No jugular venous distention                                               No carotid bruits. No evidence of xanthelasma. Lungs:                       No respiratory distress. Clear bilaterally. Heart:                                              Irreg irreg. Normal S1/S2. Palpation of heart with normal point of maximum impulse. No significant murmurs, rubs or gallops. Pacer pocket ok. Abdomen:                  Soft and nontender. No palpable abdominal mass or bruits. Extremities:               Intact peripheral pulses. No significant edema. Neurological:             Alert and oriented to person, place, time. No focal neurological deficit visually.            Electronically signed by Rico Leyden, MD at 07/27/17 [id number]

## 2017-08-21 ENCOUNTER — HOSPITAL ENCOUNTER (OUTPATIENT)
Dept: CARDIAC CATH/INVASIVE PROCEDURES | Age: 67
Discharge: HOME | End: 2017-08-21
Attending: INTERNAL MEDICINE | Admitting: INTERNAL MEDICINE
Payer: MEDICARE

## 2017-08-21 ENCOUNTER — ANESTHESIA EVENT (OUTPATIENT)
Dept: CARDIAC CATH/INVASIVE PROCEDURES | Age: 67
End: 2017-08-21
Payer: MEDICARE

## 2017-08-21 ENCOUNTER — ANESTHESIA (OUTPATIENT)
Dept: CARDIAC CATH/INVASIVE PROCEDURES | Age: 67
End: 2017-08-21
Payer: MEDICARE

## 2017-08-21 VITALS
SYSTOLIC BLOOD PRESSURE: 162 MMHG | BODY MASS INDEX: 21.34 KG/M2 | WEIGHT: 125 LBS | OXYGEN SATURATION: 96 % | HEIGHT: 64 IN | HEART RATE: 84 BPM | RESPIRATION RATE: 11 BRPM | TEMPERATURE: 96.4 F | DIASTOLIC BLOOD PRESSURE: 80 MMHG

## 2017-08-21 PROBLEM — Z98.890 S/P RF ABLATION OPERATION FOR ARRHYTHMIA: Status: ACTIVE | Noted: 2017-08-21

## 2017-08-21 PROBLEM — Z86.79 S/P RF ABLATION OPERATION FOR ARRHYTHMIA: Status: ACTIVE | Noted: 2017-08-21

## 2017-08-21 LAB — GLUCOSE BLD STRIP.AUTO-MCNC: 101 MG/DL (ref 70–110)

## 2017-08-21 PROCEDURE — 74011000250 HC RX REV CODE- 250: Performed by: INTERNAL MEDICINE

## 2017-08-21 PROCEDURE — 74011250636 HC RX REV CODE- 250/636: Performed by: INTERNAL MEDICINE

## 2017-08-21 PROCEDURE — 82962 GLUCOSE BLOOD TEST: CPT

## 2017-08-21 PROCEDURE — 76060000035 HC ANESTHESIA 2 TO 2.5 HR

## 2017-08-21 PROCEDURE — 93005 ELECTROCARDIOGRAM TRACING: CPT

## 2017-08-21 PROCEDURE — 74011250636 HC RX REV CODE- 250/636

## 2017-08-21 PROCEDURE — 77030018729 EP STUDY

## 2017-08-21 RX ORDER — LIDOCAINE HYDROCHLORIDE 10 MG/ML
1-30 INJECTION, SOLUTION EPIDURAL; INFILTRATION; INTRACAUDAL; PERINEURAL
Status: DISCONTINUED | OUTPATIENT
Start: 2017-08-21 | End: 2017-08-21 | Stop reason: HOSPADM

## 2017-08-21 RX ORDER — HEPARIN SODIUM 200 [USP'U]/100ML
500 INJECTION, SOLUTION INTRAVENOUS ONCE
Status: COMPLETED | OUTPATIENT
Start: 2017-08-21 | End: 2017-08-21

## 2017-08-21 RX ORDER — OXYCODONE AND ACETAMINOPHEN 5; 325 MG/1; MG/1
1 TABLET ORAL
Status: DISCONTINUED | OUTPATIENT
Start: 2017-08-21 | End: 2017-08-21 | Stop reason: HOSPADM

## 2017-08-21 RX ORDER — SODIUM CHLORIDE 9 MG/ML
INJECTION, SOLUTION INTRAVENOUS
Status: DISCONTINUED | OUTPATIENT
Start: 2017-08-21 | End: 2017-08-21 | Stop reason: HOSPADM

## 2017-08-21 RX ORDER — PROPOFOL 10 MG/ML
INJECTION, EMULSION INTRAVENOUS AS NEEDED
Status: DISCONTINUED | OUTPATIENT
Start: 2017-08-21 | End: 2017-08-21 | Stop reason: HOSPADM

## 2017-08-21 RX ORDER — MIDAZOLAM HYDROCHLORIDE 1 MG/ML
INJECTION, SOLUTION INTRAMUSCULAR; INTRAVENOUS AS NEEDED
Status: DISCONTINUED | OUTPATIENT
Start: 2017-08-21 | End: 2017-08-21 | Stop reason: HOSPADM

## 2017-08-21 RX ADMIN — HEPARIN SODIUM 1000 UNITS: 200 INJECTION, SOLUTION INTRAVENOUS at 11:54

## 2017-08-21 RX ADMIN — PROPOFOL 20 MG: 10 INJECTION, EMULSION INTRAVENOUS at 11:30

## 2017-08-21 RX ADMIN — HEPARIN SODIUM 1000 UNITS: 200 INJECTION, SOLUTION INTRAVENOUS at 10:33

## 2017-08-21 RX ADMIN — PROPOFOL 20 MG: 10 INJECTION, EMULSION INTRAVENOUS at 10:35

## 2017-08-21 RX ADMIN — LIDOCAINE HYDROCHLORIDE 20 ML: 10 INJECTION, SOLUTION EPIDURAL; INFILTRATION; INTRACAUDAL; PERINEURAL at 10:37

## 2017-08-21 RX ADMIN — SODIUM CHLORIDE: 9 INJECTION, SOLUTION INTRAVENOUS at 09:35

## 2017-08-21 RX ADMIN — MIDAZOLAM HYDROCHLORIDE 2 MG: 1 INJECTION, SOLUTION INTRAMUSCULAR; INTRAVENOUS at 10:10

## 2017-08-21 NOTE — IP AVS SNAPSHOT
[address] [place] [address] Patient: Anna Tucker MRN: [id number] ENW:[id number] Current Discharge Medication List  
  
CONTINUE these medications which have CHANGED Dose & Instructions Dispensing Information Comments Morning Noon Evening Bedtime * fentaNYL 62.5 mcg/hour Pt72 What changed:  Another medication with the same name was removed. Continue taking this medication, and follow the directions you see here. Your last dose was: Your next dose is:    
   
   
 Dose:  62.5 mcg 62.5 mcg by TransDERmal route every seventy-two (72) hours for 30 days. Max Daily Amount: 62.5 mcg. Indications: Chronic Pain with Opioid Tolerance, SEVERE PAIN WITH OPIOID TOLERANCE Quantity:  10 Patch Refills:  0  
     
   
   
   
  
 * fentaNYL 62.5 mcg/hour Pt72 Start taking on:  9/20/2017 What changed:  Another medication with the same name was removed. Continue taking this medication, and follow the directions you see here. Your last dose was: Your next dose is:    
   
   
 Dose:  62.5 mcg 62.5 mcg by TransDERmal route every seventy-two (72) hours for 30 days. Max Daily Amount: 62.5 mcg. Indications: Chronic Pain with Opioid Tolerance, SEVERE PAIN WITH OPIOID TOLERANCE Quantity:  10 Patch Refills:  0  
     
   
   
   
  
 * oxyCODONE IR 30 mg immediate release tablet Commonly known as:  OXY-IR What changed:  Another medication with the same name was removed. Continue taking this medication, and follow the directions you see here. Your last dose was: Your next dose is:    
   
   
 Dose:  30 mg Take 1 Tab by mouth four (4) times daily as needed for Pain for up to 30 days. Max Daily Amount: 120 mg. Indications: NEUROPATHIC PAIN, Pain Quantity:  120 Tab Refills:  0  
     
   
   
   
  
 * oxyCODONE IR 30 mg immediate release tablet Commonly known as:  OXY-IR  
 Start taking on:  9/18/2017 What changed:  Another medication with the same name was removed. Continue taking this medication, and follow the directions you see here. Your last dose was: Your next dose is:    
   
   
 Dose:  30 mg Take 1 Tab by mouth four (4) times daily as needed for Pain for up to 30 days. Max Daily Amount: 120 mg. Indications: NEUROPATHIC PAIN, Pain Quantity:  120 Tab Refills:  0  
     
   
   
   
  
 * Notice: This list has 4 medication(s) that are the same as other medications prescribed for you. Read the directions carefully, and ask your doctor or other care provider to review them with you. CONTINUE these medications which have NOT CHANGED Dose & Instructions Dispensing Information Comments Morning Noon Evening Bedtime  
 digoxin 0.25 mg tablet Commonly known as:  LANOXIN Your last dose was: Your next dose is:    
   
   
 Dose:  0.125 mg Take 0.5 Tabs by mouth daily. Quantity:  30 Tab Refills:  6  
     
   
   
   
  
 dilTIAZem  mg XR capsule Commonly known as:  DILACOR XR Your last dose was: Your next dose is: TAKE ONE CAPSULE BY MOUTH ONCE DAILY Quantity:  90 Cap Refills:  3  
     
   
   
   
  
 fish oil-dha-epa 1,200-144-216 mg Cap Your last dose was: Your next dose is:    
   
   
 Dose:  1 Cap Take 1 Cap by mouth daily. Refills:  0 HumaLOG 100 unit/mL injection Generic drug:  insulin lispro Your last dose was: Your next dose is:    
   
   
 by SubCUTAneous route. Refills:  0  
     
   
   
   
  
 ipratropium-albuterol  mcg/actuation inhaler Commonly known as:  Karen Chacon Your last dose was: Your next dose is:    
   
   
 Dose:  1 Puff Take 1 Puff by inhalation every six (6) hours as needed for Wheezing or Shortness of Breath. Quantity:  1 Inhaler Refills:  1 LANTUS 100 unit/mL injection Generic drug:  insulin glargine Your last dose was: Your next dose is:    
   
   
 by SubCUTAneous route nightly. Refills:  0 LIPITOR 20 mg tablet Generic drug:  atorvastatin Your last dose was: Your next dose is: Take  by mouth daily. Refills:  0  
     
   
   
   
  
 naloxegol 25 mg Tab tablet Commonly known as:  Daria Barbone Your last dose was: Your next dose is:    
   
   
 Dose:  25 mg Take 25 mg by mouth daily. Refills:  0  
     
   
   
   
  
 naloxone 4 mg/actuation Spry Your last dose was: Your next dose is:    
   
   
 Dose:  4 mg 4 mg by Nasal route as needed for up to 2 doses. Indications: OPIOID TOXICITY Quantity:  1 Box Refills:  1 SYNTHROID 125 mcg tablet Generic drug:  levothyroxine Your last dose was: Your next dose is:    
   
   
 Dose:  125 mcg Take 125 mcg by mouth Daily (before breakfast). Refills:  0  
     
   
   
   
  
 tiotropium 18 mcg inhalation capsule Commonly known as:  [address] Your last dose was: Your next dose is:    
   
   
 Dose:  1 Cap Take 1 Cap by inhalation daily. Quantity:  30 Cap Refills:  11 VITAMIN B-6 100 mg tablet Generic drug:  pyridoxine (vitamin B6) Your last dose was: Your next dose is:    
   
   
 Dose:  100 mg Take 100 mg by mouth daily. Refills:  0  
     
   
   
   
  
 VITAMIN D3 1,000 unit tablet Generic drug:  cholecalciferol Your last dose was: Your next dose is:    
   
   
 Dose:  5000 Units Take 5,000 Units by mouth daily. Refills:  0 XARELTO 20 mg Tab tablet Generic drug:  rivaroxaban Your last dose was: Your next dose is: TAKE ONE TABLET BY MOUTH ONCE DAILY WITH DINNER Quantity:  30 Tab Refills:  6

## 2017-08-21 NOTE — ROUTINE PROCESS
Pt alert and oriented x4; denies pain. Right groin site c/d/i, no oozing/hematoma formation. Tolerated PO, denies nausea. Voided x1 in bedpan. Verbalizes understanding of immobilizing right leg to prevent bleeding. 1530:Pt discharge instructions reviewed with patient and family, verbalize understanding. Right groin site c/d/i, PIV removed and dsd applied. Escorted out to daughters car, discharged home .

## 2017-08-21 NOTE — IP AVS SNAPSHOT
[address] Patient: Gwen Chang MRN: [id number] THP:7/6/3127 You are allergic to the following No active allergies Recent Documentation Height Weight BMI OB Status Smoking Status 1.626 m 56.7 kg 21.46 kg/m2 Hysterectomy Current Some Day Smoker Emergency Contacts Name Discharge Info Relation Home Work Mobile Elia Garza DISCHARGE CAREGIVER [3] Child [2] [phone] About your hospitalization You were admitted on:  August 21, 2017 You last received care in the:  [facility] You were discharged on:  August 21, 2017 Unit phone number:  [phone] Why you were hospitalized Your primary diagnosis was:  S/P Rf Ablation Operation For Arrhythmia Providers Seen During Your Hospitalizations Provider Role Specialty Primary office phone Mickey Jeffries MD Attending Provider Cardiology [phone] Your Primary Care Physician (PCP) Primary Care Physician Office Phone Office Fax Arianne Blackman [phone] ** None ** Follow-up Information Follow up With Details Comments Contact Info Merlyn Dave MD   [place] [address] [address] [facility] 
[phone] Albert Penn MD Schedule an appointment as soon as possible for a visit in 4 weeks  [address] [facility] [address] [facility] [address] [phone] Your Appointments Monday September 11, 2017  9:45 AM EDT Follow Up with Judge Mindi MD  
[address] [facility], [address] and [facility] [address] [facility]) [address] [facility] [address] [phone] Current Discharge Medication List  
  
CONTINUE these medications which have CHANGED Dose & Instructions Dispensing Information Comments Morning Noon Evening Bedtime * fentaNYL 62.5 mcg/hour Pt72 What changed:  Another medication with the same name was removed. Continue taking this medication, and follow the directions you see here. Your last dose was: Your next dose is:    
   
   
 Dose:  62.5 mcg 62.5 mcg by TransDERmal route every seventy-two (72) hours for 30 days. Max Daily Amount: 62.5 mcg. Indications: Chronic Pain with Opioid Tolerance, SEVERE PAIN WITH OPIOID TOLERANCE Quantity:  10 Patch Refills:  0  
     
   
   
   
  
 * fentaNYL 62.5 mcg/hour Pt72 Start taking on:  9/20/2017 What changed:  Another medication with the same name was removed. Continue taking this medication, and follow the directions you see here. Your last dose was: Your next dose is:    
   
   
 Dose:  62.5 mcg 62.5 mcg by TransDERmal route every seventy-two (72) hours for 30 days. Max Daily Amount: 62.5 mcg. Indications: Chronic Pain with Opioid Tolerance, SEVERE PAIN WITH OPIOID TOLERANCE Quantity:  10 Patch Refills:  0  
     
   
   
   
  
 * oxyCODONE IR 30 mg immediate release tablet Commonly known as:  OXY-IR What changed:  Another medication with the same name was removed. Continue taking this medication, and follow the directions you see here. Your last dose was: Your next dose is:    
   
   
 Dose:  30 mg Take 1 Tab by mouth four (4) times daily as needed for Pain for up to 30 days. Max Daily Amount: 120 mg. Indications: NEUROPATHIC PAIN, Pain Quantity:  120 Tab Refills:  0  
     
   
   
   
  
 * oxyCODONE IR 30 mg immediate release tablet Commonly known as:  OXY-IR Start taking on:  9/18/2017 What changed:  Another medication with the same name was removed. Continue taking this medication, and follow the directions you see here. Your last dose was: Your next dose is:    
   
   
 Dose:  30 mg Take 1 Tab by mouth four (4) times daily as needed for Pain for up to 30 days. Max Daily Amount: 120 mg. Indications: NEUROPATHIC PAIN, Pain Quantity:  120 Tab Refills:  0  
     
   
   
   
  
 * Notice: This list has 4 medication(s) that are the same as other medications prescribed for you. Read the directions carefully, and ask your doctor or other care provider to review them with you. CONTINUE these medications which have NOT CHANGED Dose & Instructions Dispensing Information Comments Morning Noon Evening Bedtime  
 digoxin 0.25 mg tablet Commonly known as:  LANOXIN Your last dose was: Your next dose is:    
   
   
 Dose:  0.125 mg Take 0.5 Tabs by mouth daily. Quantity:  30 Tab Refills:  6  
     
   
   
   
  
 dilTIAZem  mg XR capsule Commonly known as:  DILACOR XR Your last dose was: Your next dose is: TAKE ONE CAPSULE BY MOUTH ONCE DAILY Quantity:  90 Cap Refills:  3  
     
   
   
   
  
 fish oil-dha-epa 1,200-144-216 mg Cap Your last dose was: Your next dose is:    
   
   
 Dose:  1 Cap Take 1 Cap by mouth daily. Refills:  0 HumaLOG 100 unit/mL injection Generic drug:  insulin lispro Your last dose was: Your next dose is:    
   
   
 by SubCUTAneous route. Refills:  0  
     
   
   
   
  
 ipratropium-albuterol  mcg/actuation inhaler Commonly known as:  Gloria Spore Your last dose was: Your next dose is:    
   
   
 Dose:  1 Puff Take 1 Puff by inhalation every six (6) hours as needed for Wheezing or Shortness of Breath. Quantity:  1 Inhaler Refills:  1 LANTUS 100 unit/mL injection Generic drug:  insulin glargine Your last dose was: Your next dose is:    
   
   
 by SubCUTAneous route nightly. Refills:  0 LIPITOR 20 mg tablet Generic drug:  atorvastatin Your last dose was: Your next dose is: Take  by mouth daily. Refills:  0  
     
   
   
   
  
 naloxegol 25 mg Tab tablet Commonly known as:  Orisharee Rivera Your last dose was: Your next dose is:    
   
   
 Dose:  25 mg Take 25 mg by mouth daily. Refills:  0  
     
   
   
   
  
 naloxone 4 mg/actuation Spry Your last dose was: Your next dose is:    
   
   
 Dose:  4 mg 4 mg by Nasal route as needed for up to 2 doses. Indications: OPIOID TOXICITY Quantity:  1 Box Refills:  1 SYNTHROID 125 mcg tablet Generic drug:  levothyroxine Your last dose was: Your next dose is:    
   
   
 Dose:  125 mcg Take 125 mcg by mouth Daily (before breakfast). Refills:  0  
     
   
   
   
  
 tiotropium 18 mcg inhalation capsule Commonly known as:  [employer] [facility] [address] Your last dose was: Your next dose is:    
   
   
 Dose:  1 Cap Take 1 Cap by inhalation daily. Quantity:  30 Cap Refills:  11 VITAMIN B-6 100 mg tablet Generic drug:  pyridoxine (vitamin B6) Your last dose was: Your next dose is:    
   
   
 Dose:  100 mg Take 100 mg by mouth daily. Refills:  0  
     
   
   
   
  
 VITAMIN D3 1,000 unit tablet Generic drug:  cholecalciferol Your last dose was: Your next dose is:    
   
   
 Dose:  5000 Units Take 5,000 Units by mouth daily. Refills:  0 XARELTO 20 mg Tab tablet Generic drug:  rivaroxaban Your last dose was: Your next dose is: TAKE ONE TABLET BY MOUTH ONCE DAILY WITH DINNER Quantity:  30 Tab Refills:  6 Discharge Instructions Disposition: When discharged to home will need follow-up in the office with Dr. Serene Jones in 4 weeks. Please call my office at [phone] if any questions or concerns. Restrictions: 
No driving for at least 24 hours after surgery. No heavy lifting > 10 lbs or prolonged standing, walking, or running x 1 week. OK to shower today over incision and remove dressing. Monitor groin for any signs of bleeding and please contact office at [phone] if any issues. There may be some mild bruising which is not unusual. 
If any new symptoms develop, such as chest pain, dyspnea, dizziness, please contact office at [phone] immediately. ABLATION DISCHARGE INSTRUCTIONS It is normal to feel tired the first couple days. Take it easy and follow the physicians instructions. CHECK THE CATHETER INSERTION SITE DAILY: 
You may shower 24 hours after the procedure, remove the bandage during showering. Wash with soap and water and pat dry. Gentle cleaning of the site with soap and water is sufficient, cover with a dry clean dressing or bandage. Do not apply creams or powders to the area. Do not sit in a bathtub or pool of water for 7 days or until wound has completely healed. Temporary bruising and discomfort is normal and may last a few weeks. You may have a  formation of a small lump at the site which may last up to 6 weeks. CALL THE PHYSICIAN: If the site becomes red, swollen or feels warm to the touch If there is bleeding or drainage or if there is unusual pain at the groin or down the leg. If there is any bleeding, lie down, apply pressure or have someone apply pressure with a clean cloth until the bleeding stops. If the bleeding continues, call 911 to be transported to the hospital. 
DO  [facility] [address]. Activity: For the first 24-48 hours or as instructed by the physician: No lifting, pushing or pulling over 10 pounds and no straining the insertion site. Do not life grocery bags or the garbage can, do not run the vacuum  or  for 7 days.  
Start with short walks as in the hospital and gradually increase as tolerated each day. It is recommended to walk 30 minutes 5-7 days per week. Follow your physicians instructions on activity. Avoid walking outside in extremes of heat or cold. Walk inside when it is cold and windy or hot and humid. Things to keep in mind: 
No driving for at least 24 hours, or as designated by your physician. Limit the number of times you go up and down the stairs Take rests and pace yourself with activity. Be careful and do not strain with bowel movements. Medications: Take all medications as prescribed Call your physician if you have any questions Keep an updated list of your medications with you at all times and give a list to your physician and pharmacist 
 
Signs and Symptoms: 
Be cautious of symptoms of angina or recurrent symptoms such as chest discomfort, unusual shortness of breath or fatigue, palpitations. After Care: Follow up with your physician as instructed. Follow a heart healthy diet with proper portion control, daily stress management, daily exercise, blood pressure and cholesterol control , and smoking cessation. Discharge Orders None Introducing [facility]! [address] introduces [employer] patient portal. Now you can access parts of your medical record, email your doctor's office, and request medication refills online. 1. In your internet browser, go to [email or web address] 2. Click on the First Time User? Click Here link in the Sign In box. You will see the New Member Sign Up page. 3. Enter your [employer] Access Code exactly as it appears below. You will not need to use this code after youve completed the sign-up process. If you do not sign up before the expiration date, you must request a new code. · [employer] Access Code: [email or web address] Expires: 10/18/2017 10:17 AM 
 
4. Enter the last four digits of your Social Security Number (xxxx) and Date of Birth (mm/dd/yyyy) as indicated and click Submit.  You will be taken to the next sign-up page. 5. Create a [employer] ID. This will be your [employer] login ID and cannot be changed, so think of one that is secure and easy to remember. 6. Create a [employer] password. You can change your password at any time. 7. Enter your Password Reset Question and Answer. This can be used at a later time if you forget your password. 8. Enter your e-mail address. You will receive e-mail notification when new information is available in [address] 9. Click Sign Up. You can now view and download portions of your medical record. 10. Click the Download Summary menu link to download a portable copy of your medical information. If you have questions, please visit the Frequently Asked Questions section of the [employer] website. Remember, [employer] is NOT to be used for urgent needs. For medical emergencies, dial 911. Now available from your iPhone and Android! General Information Please provide this summary of care documentation to your next provider. Patient Signature:  ____________________________________________________________ Date:  ____________________________________________________________  
  
Tom Verma Provider Signature:  ____________________________________________________________ Date:  ____________________________________________________________

## 2017-08-21 NOTE — ROUTINE PROCESS
TRANSFER - OUT REPORT:    Verbal report given to LUL CORTEZ RN (name) on Pavan Yin  being transferred to [facility] ([facility]) for routine progression of care       Report consisted of patients Situation, Background, Assessment and   Recommendations(SBAR). Information from the following report(s) SBAR, Procedure Summary and MAR was reviewed with the receiving nurse. Lines:   Peripheral IV 08/21/17 Left Antecubital (Active)   Site Assessment Clean, dry, & intact 8/21/2017  8:50 AM   Phlebitis Assessment 0 8/21/2017  8:50 AM   Infiltration Assessment 0 8/21/2017  8:50 AM   Dressing Status Clean, dry, & intact 8/21/2017  8:50 AM   Dressing Type Transparent 8/21/2017  8:50 AM   Hub Color/Line Status Pink 8/21/2017  8:50 AM       Peripheral IV 08/21/17 Right Forearm (Active)   Site Assessment Clean, dry, & intact 8/21/2017  8:50 AM   Phlebitis Assessment 0 8/21/2017  8:50 AM   Infiltration Assessment 0 8/21/2017  8:50 AM   Dressing Status Clean, dry, & intact 8/21/2017  8:50 AM   Dressing Type Transparent 8/21/2017  8:50 AM   Hub Color/Line Status Pink 8/21/2017  8:50 AM        Opportunity for questions and clarification was provided.       Patient transported with:   [facility]

## 2017-08-21 NOTE — ROUTINE PROCESS
TRANSFER - IN REPORT:    Verbal report received from Ronald Moreno RN (name) on Kingston Garcia  being received from [facility] ([facility]) for routine progression of care      Report consisted of patients Situation, Background, Assessment and   Recommendations(SBAR). Information from the following report(s) SBAR, Procedure Summary and MAR was reviewed with the receiving nurse. Opportunity for questions and clarification was provided. Assessment completed upon patients arrival to unit and care assumed.

## 2017-08-21 NOTE — DISCHARGE INSTRUCTIONS
Disposition:  When discharged to home will need follow-up in the office with Dr. Hurley Cheadle in 4 weeks. Please call my office at [phone] if any questions or concerns. Restrictions:  No driving for at least 24 hours after surgery. No heavy lifting > 10 lbs or prolonged standing, walking, or running x 1 week. OK to shower today over incision and remove dressing. Monitor groin for any signs of bleeding and please contact office at [phone] if any issues. There may be some mild bruising which is not unusual.  If any new symptoms develop, such as chest pain, dyspnea, dizziness, please contact office at [phone] immediately. ABLATION DISCHARGE INSTRUCTIONS    It is normal to feel tired the first couple days. Take it easy and follow the physicians instructions. CHECK THE CATHETER INSERTION SITE DAILY:  You may shower 24 hours after the procedure, remove the bandage during showering. Wash with soap and water and pat dry. Gentle cleaning of the site with soap and water is sufficient, cover with a dry clean dressing or bandage. Do not apply creams or powders to the area. Do not sit in a bathtub or pool of water for 7 days or until wound has completely healed. Temporary bruising and discomfort is normal and may last a few weeks. You may have a  formation of a small lump at the site which may last up to 6 weeks. CALL THE PHYSICIAN:  If the site becomes red, swollen or feels warm to the touch  If there is bleeding or drainage or if there is unusual pain at the groin or down the leg. If there is any bleeding, lie down, apply pressure or have someone apply pressure with a clean cloth until the bleeding stops. If the bleeding continues, call 911 to be transported to the hospital.  DO NOT DRIVE YOURSELF, [facility] [address]97. Activity:      For the first 24-48 hours or as instructed by the physician:  No lifting, pushing or pulling over 10 pounds and no straining the insertion site. Do not life grocery bags or the garbage can, do not run the vacuum  or  for 7 days. Start with short walks as in the hospital and gradually increase as tolerated each day. It is recommended to walk 30 minutes 5-7 days per week. Follow your physicians instructions on activity. Avoid walking outside in extremes of heat or cold. Walk inside when it is cold and windy or hot and humid. Things to keep in mind:  No driving for at least 24 hours, or as designated by your physician. Limit the number of times you go up and down the stairs  Take rests and pace yourself with activity. Be careful and do not strain with bowel movements. Medications: Take all medications as prescribed  Call your physician if you have any questions  Keep an updated list of your medications with you at all times and give a list to your physician and pharmacist    Signs and Symptoms:  Be cautious of symptoms of angina or recurrent symptoms such as chest discomfort, unusual shortness of breath or fatigue, palpitations. After Care: Follow up with your physician as instructed. Follow a heart healthy diet with proper portion control, daily stress management, daily exercise, blood pressure and cholesterol control , and smoking cessation.

## 2017-08-21 NOTE — ANESTHESIA PREPROCEDURE EVALUATION
Anesthetic History               Review of Systems / Medical History  Patient summary reviewed, nursing notes reviewed and pertinent labs reviewed    Pulmonary    COPD: moderate      Smoker         Neuro/Psych   Within defined limits           Cardiovascular    Hypertension: well controlled        Dysrhythmias : atrial fibrillation      Exercise tolerance: >4 METS     GI/Hepatic/Renal  Within defined limits              Endo/Other    Diabetes: well controlled, type 2  Hypothyroidism: well controlled       Other Findings   Comments: Risk Factors for Postoperative nausea/vomiting:       History of postoperative nausea/vomiting? NO       Female? YES       Motion sickness? NO       Intended opioid administration for postoperative analgesia? NO      Smoking Abstinence  Current Smoker? YES  Elective Surgery? YES  Seen preoperatively by anesthesiologist or proxy prior to day of surgery? YES  Pt abstained from smoking 24 hours prior to anesthesia?  NO           Physical Exam    Airway  Mallampati: I  TM Distance: 4 - 6 cm  Neck ROM: normal range of motion   Mouth opening: Normal     Cardiovascular    Rhythm: irregular           Dental    Dentition: Full lower dentures and Full upper dentures     Pulmonary  Breath sounds clear to auscultation               Abdominal  GI exam deferred       Other Findings            Anesthetic Plan    ASA: 3  Anesthesia type: MAC            Anesthetic plan and risks discussed with: Patient and Son / Daughter

## 2017-08-21 NOTE — ANESTHESIA POSTPROCEDURE EVALUATION
Post-Anesthesia Evaluation and Assessment    Patient: Temo Tomlinson MRN: [id number]  SSN: [id number]    YOB: 1950  Age: 75 y.o. Sex: female       Cardiovascular Function/Vital Signs  Visit Vitals    /80    Pulse 81    Temp 35.8 °C (96.4 °F)    Resp 17    Ht 5' 4\" (1.626 m)    Wt 56.7 kg (125 lb)    SpO2 94%    BMI 21.46 kg/m2       Patient is status post MAC anesthesia for * No procedures listed *. Nausea/Vomiting: None    Postoperative hydration reviewed and adequate. Pain:      Managed    Neurological Status: At baseline    Mental Status and Level of Consciousness: Arousable    Pulmonary Status:   O2 Device: Room air (08/21/17 1230)   Adequate oxygenation and airway patent    Complications related to anesthesia: None    Post-anesthesia assessment completed.  No concerns    Signed By: Marianela Garcia MD     August 21, 2017

## 2017-08-21 NOTE — PROCEDURES
PROCEDURE   -Cardiac electrophysiology study. -AV node ablation using CARTO with 3-D map around the His (tricuspid annulus, right atrium and right ventricle)  -Reprogramming and interrogation of pacemaker. -MAC sedation with anesthesia assistance. Diagnosis:  Primary cardiologist Dr. Mary Ayala  -Chronic atrial fibrillation with occasional episodes of increased ventricular rates.   -History of recurrent syncope associated with palpitations.   -Atypical chest pains when her heart races. Nuclear stress test August 2017 without ischemia. EF 69%. -History of atrial flutter ablation 2013.   -Dual chamber Medtronic pacemaker 2013 for pauses of up to 8-9 seconds.   -Chronic Digoxin and Diltiazem therapy limited due to hypotension.   -Chronic COPD on inhalers, currently stable.   -Echocardiogram 2015 with normal function.   -Chronic diastolic heart failure, currently compensated. -Recent 50-60 pound weight loss in the past 6 months.   -History of hypotension.   -Chronic pain on narcotics. -Diabetes on insulin.   -Thyroid disorder.   -Chronic Xarelto use. -Dyslipidemia. PROCEDURE: After informed consent was obtained, the patient was brought to electrophysiology lab in a fasting, nonsedated state. The right groin was prepped and draped in the usual sterile fashion. Local lidocaine was infiltrated just below the right femoral vein. In the right femoral vein was inserted a 6 and 8-Brazilian sheath. The pacemaker was interrogated and programmed to VVI 40, normal function. The 8-Brazilian sheath was exchanged for a long Mobi sheath for stability. A D/F Biosense irrigated ablation catheter was placed in the right atrium near the His. The patient was in atrial fibrillation with variable AV conduction. Rates ranged from 60 to 120 bpm.  HV interval 50 ms, QRS duration 95 ms, QT interval 390 milliseconds     Radiofrequency ablation was performed along the His with underlying junctional escape rhythm.   A 15 minute timer was placed. A 3-D electroanatomical map was created around the His. Multiple ablation attempts were made along the His with resetting of a timer. Finally 15 minutes elapsed without any AV conduction. The patient remained in complete heart block. The pacemaker was rechecked and reprogrammed to VVIR  bpm.    The catheters and sheaths were removed. The patient awoke without complication and left the lab in stable condition. IMPRESSION:   -Successful AV node ablation, although limited due to tissue edema. PLAN:   -Restart anticoagulation this evening and discharge this afternoon.  -Followup Dr. Hurley Cheadle, primary cardiologist, in 4-6 weeks. -Difficult AV node ablation with multiple recurrences of conduction so increased chance of reconnection. 15 minutes elapsed after the last ablation without conduction. If recurs and having episodes, I would repeat. Thank you kindly for allowing me to participate in this patient's care. Please do not hesitate to contact me if any questions.

## 2017-08-21 NOTE — PROGRESS NOTES
Patient arrived to cath holding awake and alert, vital signs stable, right groin site, clean, dry, intact with no hematoma present. No C/O pain at this time, family at bedside, will continue to monitor.

## 2017-08-22 LAB
ATRIAL RATE: 81 BPM
CALCULATED P AXIS, ECG09: 98 DEGREES
CALCULATED R AXIS, ECG10: -87 DEGREES
CALCULATED T AXIS, ECG11: 91 DEGREES
DIAGNOSIS, 93000: NORMAL
P-R INTERVAL, ECG05: 240 MS
Q-T INTERVAL, ECG07: 428 MS
QRS DURATION, ECG06: 152 MS
QTC CALCULATION (BEZET), ECG08: 493 MS
VENTRICULAR RATE, ECG03: 80 BPM

## 2017-08-25 RX ORDER — RIVAROXABAN 20 MG/1
TABLET, FILM COATED ORAL
Qty: 30 TAB | Refills: 6 | Status: SHIPPED | OUTPATIENT
Start: 2017-08-25 | End: 2018-04-30 | Stop reason: SDUPTHER

## 2017-09-25 NOTE — PROGRESS NOTES
[facility]  [address]  [place], [address]  [phone] fax    Physical Therapy Discharge Summary  Patient name: Oc Swan Start of Care: 8/10/2017   Referral source: Rob Burkett MD : 1950                         Medical Diagnosis: Neck pain [M54.2]  Myalgia [M79.1] Onset Date:2 weeks ago                         Treatment Diagnosis: C/S pain, L shoulder pain   Prior Hospitalization: see medical history Provider#: [id number]   Medications: Verified on Patient summary List    Comorbidities: chronic A-fib, pacemaker, R TKR, B ankle fx, diabetes, OA, thyroid disease, HTN, hearing impaired   Prior Level of Function: functionally I with all activities        Visits from Start of Care: 2    Missed Visits: 1        Summary of Care:  Pt missed final appointment and was to have surgery. She was seen for PT eval and 1 treatment. Progress towards goals :  Short Term Goals: To be accomplished in 1-2 weeks:                        1. Pt will be I and compliant with HEP - Pt reports compliance.  8/15/2017                        2. Pt will improve FOTO score to 52 for improved ability for daily tasks     ASSESSMENT/RECOMMENDATIONS:  [x]Discontinue therapy: []Patient has reached or is progressing toward set goals      [x]Patient is non-compliant or has abdicated      []Due to lack of appreciable progress towards set Sacha 70, PT 2017 9:50 AM

## 2017-10-09 ENCOUNTER — TELEPHONE (OUTPATIENT)
Dept: PAIN MANAGEMENT | Age: 67
End: 2017-10-09

## 2017-10-09 NOTE — TELEPHONE ENCOUNTER
Called pt re 10/18 appt cancellation d/t Dr Mike Essex leaving the practice. Pt very upset - gave her admin director number and pt advocate number. Also, explained we do have a list of neurologist/pain mgmt providers we can give her or she can talk with her pcp/insurnace about a referral to a new pain mgmt clinic. Pt asked about scripts for pain meds - told her I can transfer her to the nurses' line and she can leave a msg letting them know she will need scripts for her meds. Pt not happy, but understood.

## 2017-10-10 RX ORDER — FENTANYL 62.5 UG/H
62.5 PATCH, EXTENDED RELEASE TRANSDERMAL
Qty: 10 PATCH | Refills: 0 | Status: SHIPPED | OUTPATIENT
Start: 2017-10-20 | End: 2017-11-19

## 2017-10-10 RX ORDER — OXYCODONE HYDROCHLORIDE 30 MG/1
30 TABLET ORAL
Qty: 120 TAB | Refills: 0 | Status: SHIPPED | OUTPATIENT
Start: 2017-10-17 | End: 2017-11-16 | Stop reason: SDUPTHER

## 2017-10-11 ENCOUNTER — OFFICE VISIT (OUTPATIENT)
Dept: PAIN MANAGEMENT | Age: 67
End: 2017-10-11

## 2017-10-11 DIAGNOSIS — Z79.899 ENCOUNTER FOR LONG-TERM (CURRENT) USE OF HIGH-RISK MEDICATION: Primary | ICD-10-CM

## 2017-11-08 ENCOUNTER — TELEPHONE (OUTPATIENT)
Dept: PAIN MANAGEMENT | Age: 67
End: 2017-11-08

## 2017-11-08 NOTE — TELEPHONE ENCOUNTER
Patient called and stated that she will be out of her medications next week I gave her snapshot to the practice manager and he states that he will review that case and let me know when I can make her an appt. I spoke the patient and informed that I will get back in touch with her about an appt.

## 2017-11-16 ENCOUNTER — OFFICE VISIT (OUTPATIENT)
Dept: PAIN MANAGEMENT | Age: 67
End: 2017-11-16

## 2017-11-16 VITALS
RESPIRATION RATE: 18 BRPM | DIASTOLIC BLOOD PRESSURE: 66 MMHG | HEART RATE: 86 BPM | HEIGHT: 64 IN | TEMPERATURE: 97.3 F | WEIGHT: 126 LBS | SYSTOLIC BLOOD PRESSURE: 126 MMHG | BODY MASS INDEX: 21.51 KG/M2

## 2017-11-16 DIAGNOSIS — M76.892 ENTHESOPATHY OF HIP REGION ON BOTH SIDES: ICD-10-CM

## 2017-11-16 DIAGNOSIS — M15.9 GENERALIZED OA: ICD-10-CM

## 2017-11-16 DIAGNOSIS — Z79.899 ENCOUNTER FOR LONG-TERM (CURRENT) USE OF HIGH-RISK MEDICATION: ICD-10-CM

## 2017-11-16 DIAGNOSIS — M79.2 NEURALGIA AND NEURITIS: ICD-10-CM

## 2017-11-16 DIAGNOSIS — M76.891 ENTHESOPATHY OF HIP REGION ON BOTH SIDES: ICD-10-CM

## 2017-11-16 DIAGNOSIS — G60.9 IDIOPATHIC PERIPHERAL NEUROPATHY: ICD-10-CM

## 2017-11-16 DIAGNOSIS — E11.40 DIABETIC NEUROPATHY, PAINFUL (HCC): Primary | ICD-10-CM

## 2017-11-16 DIAGNOSIS — G89.4 CHRONIC PAIN SYNDROME: ICD-10-CM

## 2017-11-16 RX ORDER — FENTANYL 62.5 UG/H
62.5 PATCH, EXTENDED RELEASE TRANSDERMAL
Qty: 10 PATCH | Refills: 0 | Status: SHIPPED | OUTPATIENT
Start: 2017-11-16 | End: 2017-12-16

## 2017-11-16 RX ORDER — OXYCODONE HYDROCHLORIDE 30 MG/1
30 TABLET ORAL
Qty: 120 TAB | Refills: 0 | Status: SHIPPED | OUTPATIENT
Start: 2017-11-16 | End: 2017-12-16

## 2017-11-16 NOTE — PROGRESS NOTES
HISTORY OF PRESENT ILLNESS  Avis Rausch is a 75 y.o. female    Ms. Betsy Morley returns for follow-up of chronic, severe pain which is widespread and polyarticular and related to generalized osteoarthritis. She also suffers from painful diabetic polyneuropathy. The patient denies any significant changes since last f/u. This patient has informed me that she will be moving her pain managed care to [facility] for pain management. We discussed her current condition and medications in detail today. She tolerates medications without side effects. Avis Rausch reports no change in sleep or constipation. Current medication management consists of:oxycodone IR, take 1 tablet 4 times daily as needed for pain, fentanyl 62.5 mcg/h 1 patch every 72 hours  Medications are helping with pain control and quality of life. Her pain is 6-7/10 with medication and 10/10 without. Pt describes pain as aching, stabbing, and burning. Aggravating factors include standing, sitting, and walking. Relieved with rest, medication, and avoiding painful activities. The patient reports 60-70% relief with current medications. Current treatment is helping to improve general activity, mood, walking, sleep, enjoyment of life    Measuring clinical outcomes of chronic pain patients: score 15/28; the lower the number the better the outcome. Pain Meds and Quality Of Life have been reviewed. Nonpharmacologic therapy and non-opioid pharmacologic therapy were considered. If opioid therapy is prescribed, this is only if the expected benefits are anticipated to outweigh risks. She  is otherwise doing well with no other complaints today. She denies any adverse events including nausea, vomiting, dizziness, increased constipation, hallucinations, or seizures. The patient reports functional improvement and QOL with pain medication.        Vitals:    11/16/17 0911   BP: 126/66   Pulse: 86   Resp: 18   Temp: 97.3 °F (36.3 °C)   TempSrc: Oral   Weight: 57.2 kg (126 lb)   Height: 5' 4\" (1.626 m)   PainSc:   7   PainLoc: Hip         No Known Allergies    Past Surgical History:   Procedure Laterality Date    HX AFIB ABLATION  2/8/13    Dr Irma Potter HX CAROTID ENDARTERECTOMY  11/06    right Dr. Gladys Roldan HX HYSTERECTOMY  5/06    HX ORTHOPAEDIC Right 07/11/2016    knee replacement    HX PACEMAKER  11/25/13    DDD    HX SVT ABLATION  2/18/2013       ROS   Constitutional: Positive for chills. HENT: Positive for hearing loss and sore throat. Eyes: Negative for blurred vision and double vision. Respiratory: Positive for cough and shortness of breath. Cardiovascular: Positive for chest pain and leg swelling. Gastrointestinal: Positive for nausea. Negative for heartburn. Genitourinary: Negative for dysuria and urgency. Musculoskeletal: Positive for falls and joint pain. Skin: Negative for itching and rash. Neurological: Positive for loss of consciousness and weakness. Negative for dizziness. Endo/Heme/Allergies: Positive for environmental allergies. Does not bruise/bleed easily. Psychiatric/Behavioral: Positive for depression. Negative for suicidal ideas. The patient is nervous/anxious and has insomnia. All other systems reviewed and are negative. Physical Exam   Constitutional: She is oriented to person, place, and time. She appears well-developed and well-nourished. No distress. Daughter accompanies patient to appointment   HENT:   Head: Normocephalic. Eyes: Conjunctivae and EOM are normal. Pupils are equal, round, and reactive to light. Neck: No thyromegaly present. Pulmonary/Chest: Effort normal.   Musculoskeletal: She exhibits no edema or tenderness (throughout  legs to light palpation). Right knee: She exhibits decreased range of motion. Left knee: She exhibits decreased range of motion. Neurological: She is alert and oriented to person, place, and time.  No cranial nerve deficit (grossly). Gait (antalgic) abnormal.   CN 2-12 grossly intact   Psychiatric: She has a normal mood and affect. Her behavior is normal. Judgment and thought content normal.   Nursing note and vitals reviewed. ASSESSMENT:    1. Diabetic neuropathy, painful ([address]    2. Neuralgia and neuritis    3. Idiopathic peripheral neuropathy    4. Chronic pain syndrome    5. Enthesopathy of hip region on both sides    6. Generalized OA    7. Encounter for long-term (current) use of high-risk medication        [place] Prescription Monitoring Program was reviewed which does not demonstrate aberrancies and/or inconsistencies with regard to the historical prescribing of controlled medications to this patient by other providers. Medications were brought to visit today. Pill count was appropriate. When possible, non-drug therapy for chronic pain should be used as a first-line treatment. Physical therapy exercise regimens, chiropractic manipulation, meditation relaxation techniques, cognitive behavior therapy, acupuncture, yoga, Tod Chi,  transcutaneous electrical nerve stimulation (TENS), and application of moist heat can help alleviate pain . Explained that realistic expectations and goals with chronic pain management are to maximize function and minimize pain with the understanding that limitations will exist both in the extent of relief that she may achieve, as well as thresholds of mg strengths that we will not exceed. Our role is to help the patient better cope with chronic pain utilizng a multimodal approach. The patients condition and plan were discussed. All questions were answered. The patient agrees with the plan. PLAN / Pt Instructions:  1. Continue current plan with no evidence of addiction or diversion. 2. Stable on current medication without adverse events. 3. Refill oxycodone IR, take 1 tablet 4 times daily as needed for pain  4.  Refill fentanyl 62.5 mcg/h 1 patch every 72 hours  5. Discussed risks of addiction, dependency, and opioid induced hyperalgesia. 6. Reviewed with patient benefits of home exercise, and lifestyle changes to assist in self-management of symptoms. 7. Patient has appointment with [facility] pain clinic 12/15/17. We will assist this patient with any medical records transfer as needed. Medications Ordered Today   Medications    oxyCODONE IR (ROXICODONE) 30 mg immediate release tablet     Sig: Take 1 Tab by mouth four (4) times daily as needed for Pain for up to 30 days. Max Daily Amount: 120 mg. Indications: NEUROPATHIC PAIN, Pain     Dispense:  120 Tab     Refill:  0    fentaNYL 62.5 mcg/hour pt72     Si.5 mcg by TransDERmal route every seventy-two (72) hours for 30 days. Max Daily Amount: 62.5 mcg. Indications: Chronic Pain with Opioid Tolerance, SEVERE PAIN WITH OPIOID TOLERANCE     Dispense:  10 Patch     Refill:  0         Prescription monitoring program reviewed. The patient  should keep track of total Tylenol intake and make sure liver function tests have been checked with primary care physician. Pain medications prescribed with the objective of pain relief and improved physical and psychosocial function in this patient. DISPOSITION  · Counseled patient on proper use of prescribed medications. · Counseled patient about chronic medical conditions and their relationship to anxiety and depression and recommended mental health support as needed. · Reviewed with patient self-help tools, home exercise, and lifestyle changes to assist the patient in self-management of symptoms. · Reviewed with patient the treatment plan, goals of treatment plan, and limitations of treatment plan, to include the potential for side effects from medications and procedures. If side effects occur, it is the responsibility of the patient to inform the clinic so that a change in the treatment plan can be made in a safe manner.  The patient is advised that stopping prescribed medication may cause an increase in symptoms and possible medication withdrawal symptoms. The patient is informed an emergency room evaluation may be necessary if this occurs. Spent 25 minutes with patient today reviewing the treatment plan, goals of treatment plan, and limitations of the treatment plan, to include the potential for side effects from medications and procedures. More than 50% of the visit time was spent counseling the patient. Lawyer Vincent PA-C 11/16/2017        Note: Please excuse any typographical errors. Voice recognition software was used for this note and may cause mistakes.

## 2017-11-16 NOTE — PROGRESS NOTES
Nursing Notes    Patient presents to the office today in follow-up. Patient rates her pain at 7/10 on the numerical pain scale. Reviewed medications with counts as follows:    Rx Date filled Qty Dispensed Pill Count Last Dose Short   Fentanyl 62.5 mcg 10/16/17 10 0 Today on stomach no   Oxycodone Hcl 30 mg 10/16/17 120 0 This a.m no                                POC UDS was not performed in office today    Any new labs or imaging since last appointment? NO    Have you been to an emergency room (ER) or urgent care clinic since your last visit? NO            Have you been hospitalized since your last visit? NO     If yes, where, when, and reason for visit? Have you seen or consulted any other health care providers outside of the [address] [place] [address]  since your last visit? YES, PCP     If yes, where, when, and reason for visit? HM deferred to pcp.

## 2018-02-16 ENCOUNTER — OFFICE VISIT (OUTPATIENT)
Dept: CARDIOLOGY CLINIC | Age: 68
End: 2018-02-16

## 2018-02-16 VITALS
BODY MASS INDEX: 22.88 KG/M2 | SYSTOLIC BLOOD PRESSURE: 111 MMHG | HEIGHT: 64 IN | WEIGHT: 134 LBS | HEART RATE: 83 BPM | DIASTOLIC BLOOD PRESSURE: 47 MMHG

## 2018-02-16 DIAGNOSIS — Z98.890 S/P ABLATION OPERATION FOR ARRHYTHMIA: ICD-10-CM

## 2018-02-16 DIAGNOSIS — I48.19 PERSISTENT ATRIAL FIBRILLATION (HCC): Primary | ICD-10-CM

## 2018-02-16 DIAGNOSIS — Z86.79 S/P ABLATION OPERATION FOR ARRHYTHMIA: ICD-10-CM

## 2018-02-16 DIAGNOSIS — I10 ESSENTIAL HYPERTENSION, BENIGN: ICD-10-CM

## 2018-02-16 DIAGNOSIS — Z95.0 PACEMAKER: ICD-10-CM

## 2018-02-16 DIAGNOSIS — I50.32 CHRONIC DIASTOLIC CONGESTIVE HEART FAILURE (HCC): ICD-10-CM

## 2018-02-16 DIAGNOSIS — E78.00 PURE HYPERCHOLESTEROLEMIA: ICD-10-CM

## 2018-02-16 NOTE — PATIENT INSTRUCTIONS
Medications Discontinued During This Encounter   Medication Reason    dilTIAZem XR (DILACOR XR) 180 mg XR capsule Therapy Completed    digoxin (LANOXIN) 0.25 mg tablet Therapy Completed       Orders Placed This Encounter    2D ECHO COMPLETE ADULT (TTE)     Standing Status:   Future     Standing Expiration Date:   8/15/2018     Order Specific Question:   Reason for Exam:     Answer:   chf          Atrial Fibrillation: Care Instructions  Your Care Instructions    Atrial fibrillation is an irregular and often fast heartbeat. Treating this condition is important for several reasons. It can cause blood clots, which can travel from your heart to your brain and cause a stroke. If you have a fast heartbeat, you may feel lightheaded, dizzy, and weak. An irregular heartbeat can also increase your risk for heart failure. Atrial fibrillation is often the result of another heart condition, such as high blood pressure or coronary artery disease. Making changes to improve your heart condition will help you stay healthy and active. Follow-up care is a key part of your treatment and safety. Be sure to make and go to all appointments, and call your doctor if you are having problems. It's also a good idea to know your test results and keep a list of the medicines you take. How can you care for yourself at home? Medicines  ? · Take your medicines exactly as prescribed. Call your doctor if you think you are having a problem with your medicine. You will get more details on the specific medicines your doctor prescribes. ? · If your doctor has given you a blood thinner to prevent a stroke, be sure you get instructions about how to take your medicine safely. Blood thinners can cause serious bleeding problems. ? · Do not take any vitamins, over-the-counter drugs, or herbal products without talking to your doctor first.   ? Lifestyle changes  ? · Do not smoke. Smoking can increase your chance of a stroke and heart attack.  If you need help quitting, talk to your doctor about stop-smoking programs and medicines. These can increase your chances of quitting for good. ? · Eat a heart-healthy diet. ? · Stay at a healthy weight. Lose weight if you need to.   ? · Limit alcohol to 2 drinks a day for men and 1 drink a day for women. Too much alcohol can cause health problems. ? · Avoid colds and flu. Get a pneumococcal vaccine shot. If you have had one before, ask your doctor whether you need another dose. Get a flu shot every year. If you must be around people with colds or flu, wash your hands often. Activity  ? · If your doctor recommends it, get more exercise. Walking is a good choice. Bit by bit, increase the amount you walk every day. Try for at least 30 minutes on most days of the week. You also may want to swim, bike, or do other activities. Your doctor may suggest that you join a cardiac rehabilitation program so that you can have help increasing your physical activity safely. ? · Start light exercise if your doctor says it is okay. Even a small amount will help you get stronger, have more energy, and manage stress. Walking is an easy way to get exercise. Start out by walking a little more than you did in the hospital. Gradually increase the amount you walk. ? · When you exercise, watch for signs that your heart is working too hard. You are pushing too hard if you cannot talk while you are exercising. If you become short of breath or dizzy or have chest pain, sit down and rest immediately. ? · Check your pulse regularly. Place two fingers on the artery at the palm side of your wrist, in line with your thumb. If your heartbeat seems uneven or fast, talk to your doctor. When should you call for help? Call 911 anytime you think you may need emergency care. For example, call if:  ? · You have symptoms of a heart attack. These may include:  ¨ Chest pain or pressure, or a strange feeling in the chest.  ¨ Sweating.   ¨ Shortness of breath. ¨ Nausea or vomiting. ¨ Pain, pressure, or a strange feeling in the back, neck, jaw, or upper belly or in one or both shoulders or arms. ¨ Lightheadedness or sudden weakness. ¨ A fast or irregular heartbeat. After you call 911, the  may tell you to chew 1 adult-strength or 2 to 4 low-dose aspirin. Wait for an ambulance. Do not try to drive yourself. ? · You have symptoms of a stroke. These may include:  ¨ Sudden numbness, tingling, weakness, or loss of movement in your face, arm, or leg, especially on only one side of your body. ¨ Sudden vision changes. ¨ Sudden trouble speaking. ¨ Sudden confusion or trouble understanding simple statements. ¨ Sudden problems with walking or balance. ¨ A sudden, severe headache that is different from past headaches. ? · You passed out (lost consciousness). ?Call your doctor now or seek immediate medical care if:  ? · You have new or increased shortness of breath. ? · You feel dizzy or lightheaded, or you feel like you may faint. ? · Your heart rate becomes irregular. ? · You can feel your heart flutter in your chest or skip heartbeats. Tell your doctor if these symptoms are new or worse. ? Watch closely for changes in your health, and be sure to contact your doctor if you have any problems. Where can you learn more? Go to http://jose alejandro-lizzy.info/. Enter U020 in the search box to learn more about \"Atrial Fibrillation: Care Instructions. \"  Current as of: September 21, 2016  Content Version: 11.4  © 1524-4771 [employer]. Care instructions adapted under license by [employer] (which disclaims liability or warranty for this information). If you have questions about a medical condition or this instruction, always ask your healthcare professional. [address] any warranty or liability for your use of this information.

## 2018-02-16 NOTE — MR AVS SNAPSHOT
[address] [facility] [address] 
 
 
 [address] [facility], [address] [facility] [address] 
[phone] Patient: Sharifa Kumar MRN: [id number] Visit Information Date & Time Provider Department Dept. Phone Encounter #  
 2/16/2018 12:15 PM Kasie Haider MD [facility] [address] [place] [address] [id number] Follow-up Instructions Return in about 4 months (around 6/16/2018), or if symptoms worsen or fail to improve, for with pacer/ICD check. Your Appointments 3/16/2018  8:00 AM  
PROCEDURE with [facility] IMAGING 1 [facility] ([address] [place] [address]) Appt Note: iliac stents MAC/CW  
 [address], [place] [address] [address] [facility]  
[phone] [address] [place] [address]  
  
    
 3/16/2018 10:00 AM  
PROCEDURE with [facility] NONIMAGING [facility] ([address] [place] [address]) Appt Note: LEG ART 1YR FAGAN; MAILED CARDS AND PREP INFORMATION; .  
 [place] [address], [place] [address] [facility]  
[phone] [address] [place] [address]  
  
    
 3/28/2018  1:45 PM  
Follow Up with Kayli Steel MD  
[address] [place] [address] [facility] [address] [place] [address]) Appt Note: 1 year fu after studies at our lab on 3/16/2018 with prep; MAILED 21 [facility]; CALLED PATIENT TO MOVE APPT TIME DUE TO DR Navy Feliz Hinton PT IS AWARE; 1 year fu after studies at our lab on 3/16/2018 with prep MAILED CARDS AND PREP INFORMATION CALLED PATIENT TO MOVE APPT TIME DUE TO DR De Anda 207  
 [place] [facility], [place] [address] [address] [facility]  
[phone] [address] 7/16/2018 10:45 AM  
PROCEDURE with Kasie Haider MD  
[facility]) Appt Note: medtronic ck [address] [facility], [address] [facility] [address]  
[phone] [address] [facility], [address] [facility] Upcoming Health Maintenance Date Due Hepatitis C Screening 1950 FOOT EXAM Q1 5/1/1959 EYE EXAM RETINAL OR DILATED Q1 5/1/1959 FOBT Q 1 YEAR AGE 50-75 5/1/1999 DTaP/Tdap/Td series (1 - Tdap) 10/2/2005 ZOSTER VACCINE AGE 60> 3/1/2009 GLAUCOMA SCREENING Q2Y 5/1/2014 OSTEOPOROSIS SCREENING (DEXA) 5/1/2014 Pneumococcal 65+ Low/Medium Risk (2 of 2 - PPSV23) 5/1/2014 MEDICARE YEARLY EXAM 5/1/2014 BREAST CANCER SCRN MAMMOGRAM 7/25/2015 MICROALBUMIN Q1 1/8/2016 HEMOGLOBIN A1C Q6M 7/10/2016 LIPID PANEL Q1 9/29/2016 Influenza Age 5 to Adult 8/1/2017 Allergies as of 2/16/2018  Review Complete On: 2/16/2018 By: Madalyn Downing MD  
 No Known Allergies Current Immunizations  Reviewed on 2/9/2011 Name Date  
 TD Vaccine 10/1/2005 ZZZ-RETIRED (DO NOT USE) Pneumococcal Vaccine (Unspecified Type) 12/14/2005 Not reviewed this visit You Were Diagnosed With   
  
 Codes Comments Persistent atrial fibrillation (HCC)    -  Primary ICD-10-CM: I48.1 ICD-9-CM: 427.31 2/18; 8/17 on xarelto;  
Persistent despite ablation S/P ablation operation for arrhythmia     ICD-10-CM: Z98.890, Z86.79 
ICD-9-CM: V45.89 8/17 AV node ablation; 9/15 AFib ablation Pacemaker     ICD-10-CM: Z95.0 ICD-9-CM: V45.01  12/17 nl function Essential hypertension, benign     ICD-10-CM: I10 
ICD-9-CM: 401.1 2/18 d/c cardizem, digoxin; 7/17 low normal- adv to incr salt/fluids; Chronic diastolic congestive heart failure ([facility] [address]     ICD-10-CM: I50.32 
ICD-9-CM: 428.32, 428.0 2/18 [facility] Pure hypercholesterolemia     ICD-10-CM: E78.00 ICD-9-CM: 272.0 7/17 on lipitor; 9/15 LDL46 
get recent labs from PCP Vitals BP Pulse Height(growth percentile) Weight(growth percentile) BMI OB Status 111/47 83 5' 4\" (1.626 m) 134 lb (60.8 kg) 23 kg/m2 Hysterectomy Smoking Status Current Some Day Smoker Vitals History BMI and BSA Data Body Mass Index Body Surface Area  
 23 kg/m 2 1.66 m 2 Preferred Pharmacy Pharmacy Name Phone [address] [place] [address]. [phone] Your Updated Medication List  
  
   
This list is accurate as of: 2/16/18 12:36 PM.  Always use your most recent med list.  
  
  
  
  
 fish oil-dha-epa 1,200-144-216 mg Cap Take 1 Cap by mouth daily. HumaLOG U-100 Insulin 100 unit/mL injection Generic drug:  insulin lispro  
by SubCUTAneous route. ipratropium-albuterol  mcg/actuation inhaler Commonly known as:  Bailey Dibbles Take 1 Puff by inhalation every six (6) hours as needed for Wheezing or Shortness of Breath. LANTUS U-100 INSULIN 100 unit/mL injection Generic drug:  insulin glargine  
by SubCUTAneous route nightly. LIPITOR 20 mg tablet Generic drug:  atorvastatin Take  by mouth daily. naloxegol 25 mg Tab tablet Commonly known as:  Yvonne Mainanaly Take 25 mg by mouth daily. naloxone 4 mg/actuation nasal spray Commonly known as:  NARCAN  
4 mg by Nasal route as needed for up to 2 doses. Indications: OPIOID TOXICITY  
  
 SYNTHROID 125 mcg tablet Generic drug:  levothyroxine Take 125 mcg by mouth Daily (before breakfast). tiotropium 18 mcg inhalation capsule Commonly known as:  [facility] [address] Take 1 Cap by inhalation daily. VITAMIN B-6 100 mg tablet Generic drug:  pyridoxine (vitamin B6) Take 100 mg by mouth daily. VITAMIN D3 1,000 unit tablet Generic drug:  cholecalciferol Take 5,000 Units by mouth daily. XARELTO 20 mg Tab tablet Generic drug:  rivaroxaban TAKE ONE TABLET BY MOUTH ONCE DAILY WITH DINNER Follow-up Instructions Return in about 4 months (around 6/16/2018), or if symptoms worsen or fail to improve, for with pacer/ICD check.   
  
To-Do List   
 Around 02/19/2018 Cardiac Services:  2D ECHO COMPLETE ADULT (TTE) Patient Instructions Medications Discontinued During This Encounter Medication Reason  dilTIAZem XR (DILACOR XR) 180 mg XR capsule Therapy Completed  digoxin (LANOXIN) 0.25 mg tablet Therapy Completed Orders Placed This Encounter  2D ECHO COMPLETE ADULT (TTE) Standing Status:   Future Standing Expiration Date:   8/15/2018 Order Specific Question:   Reason for Exam: Answer:   chf  
 
 
  
Atrial Fibrillation: Care Instructions Your Care Instructions Atrial fibrillation is an irregular and often fast heartbeat. Treating this condition is important for several reasons. It can cause blood clots, which can travel from your heart to your brain and cause a stroke. If you have a fast heartbeat, you may feel lightheaded, dizzy, and weak. An irregular heartbeat can also increase your risk for heart failure. Atrial fibrillation is often the result of another heart condition, such as high blood pressure or coronary artery disease. Making changes to improve your heart condition will help you stay healthy and active. Follow-up care is a key part of your treatment and safety. Be sure to make and go to all appointments, and call your doctor if you are having problems. It's also a good idea to know your test results and keep a list of the medicines you take. How can you care for yourself at home? Medicines ? · Take your medicines exactly as prescribed. Call your doctor if you think you are having a problem with your medicine. You will get more details on the specific medicines your doctor prescribes. ? · If your doctor has given you a blood thinner to prevent a stroke, be sure you get instructions about how to take your medicine safely. Blood thinners can cause serious bleeding problems.   
? · Do not take any vitamins, over-the-counter drugs, or herbal products without talking to your doctor first.  
 ?Lifestyle changes ? · Do not smoke. Smoking can increase your chance of a stroke and heart attack. If you need help quitting, talk to your doctor about stop-smoking programs and medicines. These can increase your chances of quitting for good. ? · Eat a heart-healthy diet. ? · Stay at a healthy weight. Lose weight if you need to.  
? · Limit alcohol to 2 drinks a day for men and 1 drink a day for women. Too much alcohol can cause health problems. ? · Avoid colds and flu. Get a pneumococcal vaccine shot. If you have had one before, ask your doctor whether you need another dose. Get a flu shot every year. If you must be around people with colds or flu, wash your hands often. Activity ? · If your doctor recommends it, get more exercise. Walking is a good choice. Bit by bit, increase the amount you walk every day. Try for at least 30 minutes on most days of the week. You also may want to swim, bike, or do other activities. Your doctor may suggest that you join a cardiac rehabilitation program so that you can have help increasing your physical activity safely. ? · Start light exercise if your doctor says it is okay. Even a small amount will help you get stronger, have more energy, and manage stress. Walking is an easy way to get exercise. Start out by walking a little more than you did in the hospital. Gradually increase the amount you walk. ? · When you exercise, watch for signs that your heart is working too hard. You are pushing too hard if you cannot talk while you are exercising. If you become short of breath or dizzy or have chest pain, sit down and rest immediately. ? · Check your pulse regularly. Place two fingers on the artery at the palm side of your wrist, in line with your thumb. If your heartbeat seems uneven or fast, talk to your doctor. When should you call for help? Call 911 anytime you think you may need emergency care. For example, call if: ? · You have symptoms of a heart attack. These may include: ¨ Chest pain or pressure, or a strange feeling in the chest. 
¨ Sweating. ¨ Shortness of breath. ¨ Nausea or vomiting. ¨ Pain, pressure, or a strange feeling in the back, neck, jaw, or upper belly or in one or both shoulders or arms. ¨ Lightheadedness or sudden weakness. ¨ A fast or irregular heartbeat. After you call 911, the  may tell you to chew 1 adult-strength or 2 to 4 low-dose aspirin. Wait for an ambulance. Do not try to drive yourself. ? · You have symptoms of a stroke. These may include: 
¨ Sudden numbness, tingling, weakness, or loss of movement in your face, arm, or leg, especially on only one side of your body. ¨ Sudden vision changes. ¨ Sudden trouble speaking. ¨ Sudden confusion or trouble understanding simple statements. ¨ Sudden problems with walking or balance. ¨ A sudden, severe headache that is different from past headaches. ? · You passed out (lost consciousness). ?Call your doctor now or seek immediate medical care if: 
? · You have new or increased shortness of breath. ? · You feel dizzy or lightheaded, or you feel like you may faint. ? · Your heart rate becomes irregular. ? · You can feel your heart flutter in your chest or skip heartbeats. Tell your doctor if these symptoms are new or worse. ? Watch closely for changes in your health, and be sure to contact your doctor if you have any problems. Where can you learn more? Go to http://jose alejandro-lizzy.info/. Enter U020 in the search box to learn more about \"Atrial Fibrillation: Care Instructions. \" Current as of: September 21, 2016 Content Version: 11.4 © 0669-4523 [employer]. Care instructions adapted under license by [employer] (which disclaims liability or warranty for this information).  If you have questions about a medical condition or this instruction, always ask your healthcare professional. [facility] any warranty or liability for your use of this information. Introducing [facility] SERVICES! Pam Jones introduces [employer] patient portal. Now you can access parts of your medical record, email your doctor's office, and request medication refills online. 1. In your internet browser, go to [email or web address] 2. Click on the First Time User? Click Here link in the Sign In box. You will see the New Member Sign Up page. 3. Enter your Win[employer] Access Code exactly as it appears below. You will not need to use this code after youve completed the sign-up process. If you do not sign up before the expiration date, you must request a new code. · [employer] Access Code: [email or web address] Expires: 5/17/2018 12:12 PM 
 
4. Enter the last four digits of your Social Security Number (xxxx) and Date of Birth (mm/dd/yyyy) as indicated and click Submit. You will be taken to the next sign-up page. 5. Create a Winmedical ID. This will be your Win[employer] login ID and cannot be changed, so think of one that is secure and easy to remember. 6. Create a [employer] password. You can change your password at any time. 7. Enter your Password Reset Question and Answer. This can be used at a later time if you forget your password. 8. Enter your e-mail address. You will receive e-mail notification when new information is available in [address] 9. Click Sign Up. You can now view and download portions of your medical record. 10. Click the Download Summary menu link to download a portable copy of your medical information. If you have questions, please visit the Frequently Asked Questions section of the [employer] website. Remember, [employer] is NOT to be used for urgent needs. For medical emergencies, dial 911. Now available from your iPhone and Android! Please provide this summary of care documentation to your next provider. Your primary care clinician is listed as [facility]. If you have any questions after today's visit, please call [phone].

## 2018-02-16 NOTE — LETTER
[address] 1950 2/16/2018 Dear MD Liliam Villa MD Benedict Fickle, MD Cornell Akin, [place] Vergia Caldwell, [place] Loretta Crown, MD Lendel Cuba, MD Rip Baltimore, MD 
 
I had the pleasure of evaluating  Ms. Yoo in office today. Below are the relevant portions of my assessment and plan of care. ICD-10-CM ICD-9-CM 1. Persistent atrial fibrillation (HCC) I48.1 427.31 2D ECHO COMPLETE ADULT (TTE)  
 2/18; 8/17 on xarelto;  
Persistent despite ablation 2. S/P ablation operation for arrhythmia Z98.890 V45.89   
 Z86.79    
 8/17 AV node ablation; 9/15 AFib ablation 3. Pacemaker- DDD to VVIR Z95.0 V45.01   
  12/17 nl function 4. Essential hypertension, benign I10 401.1 2/18 d/c cardizem, digoxin; 7/17 low normal- adv to incr salt/fluids;   
5. Chronic diastolic congestive heart failure (HCC) I50.32 428.32 2D ECHO COMPLETE ADULT (TTE) 428.0   
 2/18 [address]. Pure hypercholesterolemia E78.00 272.0   
 7/17 on lipitor; 9/15 LDL46 
get recent labs from PCP Current Outpatient Prescriptions Medication Sig Dispense Refill  XARELTO 20 mg tab tablet TAKE ONE TABLET BY MOUTH ONCE DAILY WITH DINNER 30 Tab 6  
 naloxone 4 mg/actuation spry 4 mg by Nasal route as needed for up to 2 doses. Indications: OPIOID TOXICITY 1 Box 1  
 atorvastatin (LIPITOR) 20 mg tablet Take  by mouth daily.  naloxegol (MOVANTIK) 25 mg tab tablet Take 25 mg by mouth daily.  insulin lispro (HUMALOG) 100 unit/mL injection by SubCUTAneous route.  insulin glargine (LANTUS) 100 unit/mL injection by SubCUTAneous route nightly.  ipratropium-albuterol (COMBIVENT RESPIMAT)  mcg/actuation inhaler Take 1 Puff by inhalation every six (6) hours as needed for Wheezing or Shortness of Breath. 1 Inhaler 1  pyridoxine (VITAMIN B-6) 100 mg tablet Take 100 mg by mouth daily.  fish oil-dha-epa 1,200-144-216 mg cap Take 1 Cap by mouth daily.  levothyroxine (SYNTHROID) 125 mcg tablet Take 125 mcg by mouth Daily (before breakfast).  tiotropium (SPIRIVA WITH HANDIHALER) 18 mcg inhalation capsule Take 1 Cap by inhalation daily. 30 Cap 11  
 cholecalciferol, vitamin d3, (VITAMIN D) 1,000 unit tablet Take 5,000 Units by mouth daily. Orders Placed This Encounter  2D ECHO COMPLETE ADULT (TTE) Standing Status:   Future Standing Expiration Date:   8/15/2018 Order Specific Question:   Reason for Exam: Answer:   chf  
 
If you have questions, please do not hesitate to call me. I look forward to following Ms. Yoo along with you. Sincerely, Robi Bourgeois MD

## 2018-02-16 NOTE — PROGRESS NOTES
1. Have you been to the ER, urgent care clinic since your last visit? Hospitalized since your last visit? No     2. Have you seen or consulted any other health care providers outside of the [address] [place] [address] since your last visit? Include any pap smears or colon screening. Yes     3. Since your last visit, have you had any of the following symptoms? Dizzy SOB with exertion hurting around pacemaker     4. Have you had any blood work, X-rays or cardiac testing?     Post ablation     Patient did not bring meds or list but states everything is the same

## 2018-02-16 NOTE — PROGRESS NOTES
HISTORY OF PRESENT ILLNESS  Honorio Potts is a 75 y.o. female. HPI Comments: F/u AFib, HTN, CHF, MR, HLD, s/p DDD    7/17 continues with dizziness, rare LOC/fall- last in 6/17 1/17 has a cold for 6 wks    Palpitations    The history is provided by the medical records. This is a chronic problem. The current episode started more than 1 week ago. The problem has been resolved. The problem occurs rarely (off and on). On average, each episode lasts 30 minutes (permanent AF on pacer check). Associated symptoms include irregular heartbeat, dizziness and shortness of breath. Pertinent negatives include no diaphoresis, no fever, no chest pain, no claudication, no orthopnea, no PND, no syncope, no abdominal pain, no nausea, no vomiting, no headaches, no weakness, no cough, no hemoptysis and no sputum production. Risk factors include diabetes mellitus, hypertension, smoking/tobacco exposure and dyslipidemia. Her past medical history is significant for DM, hypertension and atrial fibrillation. Hypertension   The history is provided by the medical records. This is a chronic problem. Associated symptoms include shortness of breath. Pertinent negatives include no chest pain, no abdominal pain and no headaches. Cholesterol Problem   The history is provided by the medical records. This is a chronic problem. Associated symptoms include shortness of breath. Pertinent negatives include no chest pain, no abdominal pain and no headaches. Dizziness   The history is provided by the patient. This is a new problem. The current episode started more than 1 week ago. The problem occurs every several days (3/wk; 5-10 mts; 6/17 LOC for few seconds). The problem has been gradually improving. Associated symptoms include shortness of breath. Pertinent negatives include no chest pain, no abdominal pain and no headaches. The symptoms are aggravated by twisting (looking/reaching up). The symptoms are relieved by rest and laying down. Shortness of Breath   The history is provided by the patient. This is a chronic problem. The problem occurs intermittently. The current episode started more than 1 week ago. The problem has been gradually improving. Pertinent negatives include no fever, no headaches, no ear pain, no neck pain, no cough, no sputum production, no hemoptysis, no wheezing, no PND, no orthopnea, no chest pain, no syncope, no vomiting, no abdominal pain, no rash, no leg swelling and no claudication. The problem's precipitants include exercise (2 blocks). Review of Systems   Constitutional: Negative for chills, diaphoresis, fever and weight loss. HENT: Negative for ear pain and hearing loss. Eyes: Negative for blurred vision. Respiratory: Positive for shortness of breath. Negative for cough, hemoptysis, sputum production, wheezing and stridor. Cardiovascular: Positive for palpitations (AF s/p AVN ablation). Negative for chest pain, orthopnea, claudication, leg swelling, syncope and PND. Gastrointestinal: Negative for abdominal pain, heartburn, nausea and vomiting. Musculoskeletal: Negative for myalgias and neck pain. Skin: Negative for rash. Neurological: Positive for dizziness. Negative for tingling, tremors, focal weakness, loss of consciousness, weakness and headaches. Psychiatric/Behavioral: Negative for depression and suicidal ideas. No Known Allergies    Past Medical History:   Diagnosis Date    Abnormal myocardial perfusion study 05/07/2010    Anterior ischemia c/w at least 1-vessel CAD. No WMA. EF 51%. Positive EKG at 78% pred max HR. Ex time 7 min 30 sec.  Allergic rhinitis 1/30/2010    Atrial fibrillation [address] [address]     Broken ankle 08/22/2013    left    Carotid stenosis 1/30/2010    COPD     Essential hypertension     Heart failure ([facility] [address]     History of amiodarone therapy 5/11/2015    History of cardiac cath 05/25/2010    Patent coronary arteries. LVEDP 13 mmHg. EF 65%.       History of cervical cancer 11/05    History of echocardiogram 08/21/2013    EF 50-55%. No RWMA. Gr 2 DDfx. Mild LAE. Mild MR.      Hypertension     Migraines 1/30/2010    Mitral regurgitation     Orthostatic dizziness 3/30/2016    ? autonomic dysfunction Vs amio side effect     Pacemaker 11/25/13    DDD    Pure hypercholesterolemia 1/30/2010    Rosacea 1/30/2010    Type II or unspecified type diabetes mellitus without mention of complication, not stated as uncontrolled 1/30/2010    Unspecified hypothyroidism 1/30/2010       Family History   Problem Relation Age of Onset    Hypertension Mother     Cancer Maternal Aunt      breast CA 66yo    Breast Cancer Maternal Aunt 79    Diabetes Maternal Grandmother     Cancer Maternal Aunt      uterine CA    Diabetes Daughter     Other Daughter      lupus (SLE) and coagulopathy    Breast Cancer Paternal Aunt 61    Heart Attack Sister     Heart Disease Neg Hx        Social History   Substance Use Topics    Smoking status: Current Some Day Smoker     Packs/day: 0.50     Years: 42.00     Last attempt to quit: 5/7/2013    Smokeless tobacco: Never Used      Comment: per patient some days none and some days a few     Alcohol use No        Current Outpatient Prescriptions   Medication Sig    XARELTO 20 mg tab tablet TAKE ONE TABLET BY MOUTH ONCE DAILY WITH DINNER    naloxone 4 mg/actuation spry 4 mg by Nasal route as needed for up to 2 doses. Indications: OPIOID TOXICITY    digoxin (LANOXIN) 0.25 mg tablet Take 0.5 Tabs by mouth daily.  dilTIAZem XR (DILACOR XR) 180 mg XR capsule TAKE ONE CAPSULE BY MOUTH ONCE DAILY    atorvastatin (LIPITOR) 20 mg tablet Take  by mouth daily.  naloxegol (MOVANTIK) 25 mg tab tablet Take 25 mg by mouth daily.  insulin lispro (HUMALOG) 100 unit/mL injection by SubCUTAneous route.  insulin glargine (LANTUS) 100 unit/mL injection by SubCUTAneous route nightly.     ipratropium-albuterol (COMBIVENT RESPIMAT)  mcg/actuation inhaler Take 1 Puff by inhalation every six (6) hours as needed for Wheezing or Shortness of Breath.  pyridoxine (VITAMIN B-6) 100 mg tablet Take 100 mg by mouth daily.  fish oil-dha-epa 1,200-144-216 mg cap Take 1 Cap by mouth daily.  levothyroxine (SYNTHROID) 125 mcg tablet Take 125 mcg by mouth Daily (before breakfast).  tiotropium (SPIRIVA WITH HANDIHALER) 18 mcg inhalation capsule Take 1 Cap by inhalation daily.  cholecalciferol, vitamin d3, (VITAMIN D) 1,000 unit tablet Take 5,000 Units by mouth daily. No current facility-administered medications for this visit. Past Surgical History:   Procedure Laterality Date    HX AFIB ABLATION  2/8/13    Dr Geri Hoffman HX CAROTID ENDARTERECTOMY  11/06    right Dr. Mariana Siegel HX HYSTERECTOMY  5/06    HX ORTHOPAEDIC Right 07/11/2016    knee replacement    HX PACEMAKER  11/25/13    DDD    HX SVT ABLATION  2/18/2013       Diagnostic Studies: Old records reviewed and show as follows  EKG tracings reviewed by me today. CARDIOLOGY STUDIES 8/2/2013 1/31/2013   EKG Result - 11/12 Atrial Flutter, rapid heart rate, normal axis, diffuse nonspecific ST-T changes   Myocardial Perfusion Scan Result - 12/05 nl scan, EF 51%; 4/08 nl scan, EF 66%; 5/10 abn scan, partially reversible anterior ischemia, EF 51%;   Echocardiogram - Complete Result 55%EF, mild MR, trivial PE 4/07 EF 60-65% mild DD, mild MR; 4/08 EF 60%,mild DD,trace/mild MR; 2/10 EF 45-50%, PAP 32; 5/10; 3/11 EF 55%, mild MR; 11/12 55%EF, mild LVH,mod MR,   Coronary Angiogram Result - CATH 6/06; 5/10 nl ca;   PFT's with DLCO Result - 9/07; 2/10; 7/10; Some recent data might be hidden   10/15 NUC STRESS  CONCLUSION  1. No evidence of ischemia based on this study. 2. Normal left ventricular size and function. Ejection fraction of 49%. 3. Low risk scan. Visit Vitals    /47    Pulse 83    Ht 5' 4\" (1.626 m)    Wt 134 lb (60.8 kg)    BMI 23 kg/m2       Ms.  Florendo has a reminder for a \"due or due soon\" health maintenance. I have asked that she contact her primary care provider for follow-up on this health maintenance. Physical Exam   Constitutional: She is oriented to person, place, and time. She appears well-developed and well-nourished. No distress. HENT:   Head: Atraumatic. Mouth/Throat: No oropharyngeal exudate. Eyes: Conjunctivae are normal. No scleral icterus. Neck: Neck supple. No JVD present. Cardiovascular: An irregularly irregular rhythm present. Tachycardia present. Exam reveals no gallop. No murmur heard. Pulmonary/Chest: Effort normal and breath sounds normal. No stridor. She has no wheezes. She has no rales. Abdominal: Soft. There is no tenderness. Musculoskeletal: Normal range of motion. She exhibits no edema. Neurological: She is alert and oriented to person, place, and time. She exhibits normal muscle tone. Skin: Skin is warm. She is not diaphoretic. Psychiatric: She has a normal mood and affect. Her behavior is normal.       ASSESSMENT and PLAN          Diagnoses and all orders for this visit:    1. Persistent atrial fibrillation ([address]  Comments:  2/18; 8/17 on xarelto;   Persistent despite ablation    Orders:  -     2D ECHO COMPLETE ADULT (TTE); Future    2. S/P ablation operation for arrhythmia  Comments:  8/17 AV node ablation; 9/15 AFib ablation      3. Pacemaker- DDD to VVIR  Comments:   12/17 nl function      4. Essential hypertension, benign  Comments:  2/18 d/c cardizem, digoxin; 7/17 low normal- adv to incr salt/fluids;     5. Chronic diastolic congestive heart failure ([address]  Comments:  2/18 VOAN1  Orders:  -     2D ECHO COMPLETE ADULT (TTE); Future    6. Pure hypercholesterolemia  Comments:  7/17 on lipitor; 9/15 LDL46  get recent labs from PCP        Pertinent laboratory and test data reviewed and discussed with patient.   See patient instructions also for other medical advice given    Medications Discontinued During This Encounter   Medication Reason    dilTIAZem XR (DILACOR XR) 180 mg XR capsule Therapy Completed    digoxin (LANOXIN) 0.25 mg tablet Therapy Completed       Follow-up Disposition:  Return in about 4 months (around 6/16/2018), or if symptoms worsen or fail to improve, for with pacer/ICD check.

## 2018-03-16 ENCOUNTER — OFFICE VISIT (OUTPATIENT)
Dept: VASCULAR SURGERY | Age: 68
End: 2018-03-16

## 2018-03-16 DIAGNOSIS — I65.23 BILATERAL CAROTID ARTERY STENOSIS: ICD-10-CM

## 2018-03-16 DIAGNOSIS — I73.9 PAD (PERIPHERAL ARTERY DISEASE) (HCC): ICD-10-CM

## 2018-03-16 DIAGNOSIS — I70.213 ATHEROSCLEROSIS OF NATIVE ARTERY OF BOTH LOWER EXTREMITIES WITH INTERMITTENT CLAUDICATION (HCC): ICD-10-CM

## 2018-03-16 DIAGNOSIS — I74.09 AORTOILIAC OCCLUSIVE DISEASE (HCC): ICD-10-CM

## 2018-03-16 NOTE — PROCEDURES
[facility]  *** FINAL REPORT ***    Name: Buddy Gutierrez  MRN: [id number]       Outpatient  : 01 May 1950  HIS Order #: [id number]  [id number] [facility] Visit #: [id number]  Date: 16 Mar 2018    TYPE OF TEST: Cerebrovascular Duplex    REASON FOR TEST  Asymptomatic-post op follow up, Carotid stenosis    Right Carotid:-             Proximal               Mid                 Distal  cm/s  Systolic  Diastolic  Systolic  Diastolic  Systolic  Diastolic  CCA:     95.0      12.0       45.0      14.0       41.0      13.0  Bulb:    47.0      13.0  ECA:     48.0       6.0  ICA:     49.0      11.0       79.0      27.0       77.0      20.0  ICA/CCA:  1.3       2.3    ICA Stenosis: <50%    Right Vertebral:-  Finding: Antegrade  Sys:       57.0  Prema:       14.0    Right Subclavian:    Left Carotid:-            Proximal                Mid                 Distal  cm/s  Systolic  Diastolic  Systolic  Diastolic  Systolic  Diastolic  CCA:     01.2      15.0       49.0      14.0       43.0      16.0  Bulb:  ECA:     53.0       6.0  ICA:    188.0      62.0      213.0      57.0       92.0      37.0  ICA/CCA:  3.6       3.8    ICA Stenosis: 50-69%    Left Vertebral:-  Finding: Antegrade  Sys:       40.0  Prema:       13.0    Left Subclavian:    INTERPRETATION/FINDINGS  Duplex images were obtained using 2-D gray scale, color flow and  spectral doppler analysis. 1. Patent right carotid endarterectomy with mild plaquing in the less  than 50% range. 2. Moderate  50-69% stenosis in the left internal carotid artery. 3. No significant stenosis in the external carotid arteries  bilaterally. 4. Antegrade flow in both vertebral arteries. 5. Heterogeneous plaque in the left common carotid artery without  significant stenosis. Plaque Morphology:  1. Heterogeneous plaque in the bulb and right ICA. 2. Irregular varying density plaque in the bulb and left ICA.   Compared to the previous study on 17 the velocities have  increased in the left ICA with the disease remaining in the moderate  range. ADDITIONAL COMMENTS    I have personally reviewed the data relevant to the interpretation of  this  study. TECHNOLOGIST: Essie Marina RVT, WENCESLAO  Signed: 03/16/2018 09:40 AM    PHYSICIAN: Artemio Mcihael D.O.   Signed: 03/16/2018 04:02 PM

## 2018-03-16 NOTE — PROCEDURES
[facility]  *** FINAL REPORT ***    Name: Dea Burns  MRN: [id number]       Outpatient  : 01 May 1950  HIS Order #: [id number]  [id number] [facility] Visit #: [id number]  Date: 16 Mar 2018    TYPE OF TEST: Aorto-Iliac Duplex    REASON FOR TEST  Peripheral Arterial Disease, Iliac stent    B-Mode:-                 (cm)   1     2     3  Aortic diameter:         AP:                 1.3                           TV:                 1.5  Common iliac diameter:   Right:                           Left:    Duplex:-                           PSV  Stenosis                           ----- --------------------  Aorta: (1)                     Normal         (2)         (3)               110.0    Right common iliac:      127.0 Normal  Right external iliac:     63.0 Normal    Left common iliac:       127.0 Normal  Left external iliac:     102.0 Normal    INTERPRETATION/FINDINGS  Duplex images were obtained using 2-D gray scale, color flow and  spectral doppler analysis. 1. Patent bilateral common iliac artery stents with multiphasic flow  and no significant stenosis. 2. Patent external iliac arteries bilaterally with multiphasic flow  and no significant stenosis. 3. The right MIKEY is 1.16 and the left MIKEY is 1.12, both falsely  elevated. Normal arterial perfusion by waveforms. 4. Compared to the previous study on 17 there is no significant  change. ADDITIONAL COMMENTS    I have personally reviewed the data relevant to the interpretation of  this  study. TECHNOLOGIST: Rogerio Means RVT, WENCESLAO  Signed: 2018 09:49 AM    PHYSICIAN: Bijal Menchaca D.O.   Signed: 2018 04:02 PM

## 2018-03-16 NOTE — PROCEDURES
[facility]  *** FINAL REPORT ***    Name: Randy Hernandez  MRN: [id number]       Outpatient  : 01 May 1950  HIS Order #: [id number]  [id number] [facility] Visit #: [id number]  Date: 16 Mar 2018    TYPE OF TEST: Peripheral Arterial Testing    REASON FOR TEST  Peripheral vascular dz NOS, B. RAPHAEL Stents . Right Leg  Segmentals: Normal                     mmHg  Brachial         171  High thigh  Low thigh  Calf             209  Posterior tibial 188  Dorsalis pedis   205  Peroneal  Metatarsal  Toe pressure     116  Doppler:    Normal  Ankle/Brachial: 1.16    Left Leg  Segmentals: Normal                     mmHg  Brachial         176  High thigh  Low thigh  Calf             189  Posterior tibial 195  Dorsalis pedis   197  Peroneal  Metatarsal  Toe pressure     153  Doppler:    Normal  Ankle/Brachial: 1.12    INTERPRETATION/FINDINGS  Physiologic testing was performed using continuous wave doppler and  segmental pressures. 1. No evidence of significant peripheral arterial disease at rest in  both legs with triphasic flow throughout. 2. The right ankle/brachial index is 1.16 and the left ankle/brachial  index is 1.12, both falsely elevated. 3. The right digit/brachial index is 0.66 and the left digit/brachial  index is 0.87.  4. Compared to the previous study on 16 there is no significant  change. ADDITIONAL COMMENTS    I have personally reviewed the data relevant to the interpretation of  this  study. TECHNOLOGIST: Antonio Rahman RVT, RDMS  Signed: 2018 09:43 AM    PHYSICIAN: Brian Huggins D.O.   Signed: 2018 04:02 PM

## 2018-03-23 ENCOUNTER — CLINICAL SUPPORT (OUTPATIENT)
Dept: CARDIOLOGY CLINIC | Age: 68
End: 2018-03-23

## 2018-03-23 ENCOUNTER — TELEPHONE (OUTPATIENT)
Dept: CARDIOLOGY CLINIC | Age: 68
End: 2018-03-23

## 2018-03-23 DIAGNOSIS — R93.1 ABNORMAL ECHOCARDIOGRAM: ICD-10-CM

## 2018-03-23 DIAGNOSIS — I50.32 CHRONIC DIASTOLIC CONGESTIVE HEART FAILURE (HCC): Primary | ICD-10-CM

## 2018-03-23 DIAGNOSIS — I48.19 PERSISTENT ATRIAL FIBRILLATION (HCC): ICD-10-CM

## 2018-03-23 DIAGNOSIS — I50.32 CHRONIC DIASTOLIC CONGESTIVE HEART FAILURE (HCC): ICD-10-CM

## 2018-03-28 ENCOUNTER — OFFICE VISIT (OUTPATIENT)
Dept: VASCULAR SURGERY | Age: 68
End: 2018-03-28

## 2018-03-28 VITALS
HEIGHT: 64 IN | SYSTOLIC BLOOD PRESSURE: 130 MMHG | HEART RATE: 74 BPM | RESPIRATION RATE: 18 BRPM | WEIGHT: 134 LBS | DIASTOLIC BLOOD PRESSURE: 60 MMHG | BODY MASS INDEX: 22.88 KG/M2

## 2018-03-28 DIAGNOSIS — I70.213 ATHEROSCLEROSIS OF NATIVE ARTERY OF BOTH LOWER EXTREMITIES WITH INTERMITTENT CLAUDICATION (HCC): ICD-10-CM

## 2018-03-28 DIAGNOSIS — I65.23 CAROTID STENOSIS, ASYMPTOMATIC, BILATERAL: ICD-10-CM

## 2018-03-28 DIAGNOSIS — I74.09 AORTOILIAC OCCLUSIVE DISEASE (HCC): Primary | ICD-10-CM

## 2018-03-28 NOTE — PROGRESS NOTES
Vera Yoo    No chief complaint on file. History and Physical    Bridgette Tapia is a 75 y.o. female with known aortoiliac occlusive disease and previous bilateral common iliac artery kissing stents. Patient also has had a previous right carotid endarterectomy with known asymptomatic left internal carotid stenosis. Overall patient states that she has had no TIA or strokelike symptoms. She does continue to have a lot of discomfort within her lower extremities and hip areas. A lot of numbness and tingling as well as feeling of heaviness of bilateral legs. No rest pain no ulcerations no edema. Past Medical History:   Diagnosis Date    Abnormal myocardial perfusion study 05/07/2010    Anterior ischemia c/w at least 1-vessel CAD. No WMA. EF 51%. Positive EKG at 78% pred max HR. Ex time 7 min 30 sec.  Allergic rhinitis 1/30/2010    Atrial fibrillation (HCC)     Broken ankle 08/22/2013    left    Carotid stenosis 1/30/2010    Chronic diastolic congestive heart failure ([facility] [address] 2/16/2018 2/18 NYHA2    COPD     Essential hypertension     Heart failure ([facility] [address]     History of amiodarone therapy 5/11/2015    History of cardiac cath 05/25/2010    Patent coronary arteries. LVEDP 13 mmHg. EF 65%.  History of cervical cancer 11/05    History of echocardiogram 08/21/2013    EF 50-55%. No RWMA. Gr 2 DDfx. Mild LAE. Mild MR.      Hypertension     Migraines 1/30/2010    Mitral regurgitation     Orthostatic dizziness 3/30/2016    ? autonomic dysfunction Vs amio side effect     Pacemaker 11/25/13    DDD    Pure hypercholesterolemia 1/30/2010    Rosacea 1/30/2010    Type II or unspecified type diabetes mellitus without mention of complication, not stated as uncontrolled 1/30/2010    Unspecified hypothyroidism 1/30/2010     Past Surgical History:   Procedure Laterality Date    HX AFIB ABLATION  2/8/13    Dr Carlton Campoverde HX CAROTID ENDARTERECTOMY  11/06    right Dr. Rick Chand HX HYSTERECTOMY  5/06    HX ORTHOPAEDIC Right 07/11/2016    knee replacement    HX PACEMAKER  11/25/13    DDD    HX SVT ABLATION  2/18/2013     Patient Active Problem List   Diagnosis Code    Type II or unspecified type diabetes mellitus without mention of complication, not stated as uncontrolled E11.9    Essential hypertension, benign I10    Pure hypercholesterolemia E78.00    Unspecified hypothyroidism E03.9    Allergic rhinitis J30.9    Carotid stenosis I65.29    Rosacea L71.9    Migraines G43.909    COPD (chronic obstructive pulmonary disease) ([facility] [address] J44.9    Edema R60.9    Leg pain M79.606    Encounter for long-term (current) use of high-risk medication Z79.899    DM (diabetes mellitus) ([facility] [address] E11.9    Chronic pain syndrome G89.4    Peripheral neuropathy G62.9    Neuralgia and neuritis M79.2    History of depression Z86.59    Generalized OA M15.9    S/P ablation operation for arrhythmia Z98.890, Z86.79    Tobacco use disorder F17.200    Lesion of ulnar nerve G56.20    Diabetic neuropathy, painful ([facility]) E11.40    Pre-operative cardiovascular examination Z01.810    Syncope and collapse R55    Fracture of fibula with tibia, left, closed S82.202A, S82.402A    Palpitations R00.2    Irregular heart beat I49.9    Elevated INR R79.1    Pacemaker- DDD to VVIR Z95.0    H/O amiodarone therapy Z92.29    Enthesopathy of hip region M76.899    Aortoiliac occlusive disease ([facility]) I74.09    PAD (peripheral artery disease) ([facility]) I73.9    Atherosclerosis of native artery of both lower extremities with intermittent claudication ([facility]) I70.213    History of amiodarone therapy Z92.29    Pneumonia J18.9    Atrial fibrillation with RVR ([facility]) I48.91    Atrial fibrillation ([facility]) I48.91    Orthostatic dizziness R42    S/P RF ablation operation for arrhythmia Z98.890, Z86.79    Chronic diastolic congestive heart failure ([facility]) I50.32    Abnormal echocardiogram R93.1    Carotid stenosis, asymptomatic, bilateral I65.23     Current Outpatient Prescriptions   Medication Sig Dispense Refill    XARELTO 20 mg tab tablet TAKE ONE TABLET BY MOUTH ONCE DAILY WITH DINNER 30 Tab 6    naloxone 4 mg/actuation spry 4 mg by Nasal route as needed for up to 2 doses. Indications: OPIOID TOXICITY 1 Box 1    atorvastatin (LIPITOR) 20 mg tablet Take  by mouth daily.  naloxegol (MOVANTIK) 25 mg tab tablet Take 25 mg by mouth daily.  insulin lispro (HUMALOG) 100 unit/mL injection by SubCUTAneous route.  insulin glargine (LANTUS) 100 unit/mL injection by SubCUTAneous route nightly.  ipratropium-albuterol (COMBIVENT RESPIMAT)  mcg/actuation inhaler Take 1 Puff by inhalation every six (6) hours as needed for Wheezing or Shortness of Breath. 1 Inhaler 1    pyridoxine (VITAMIN B-6) 100 mg tablet Take 100 mg by mouth daily.  fish oil-dha-epa 1,200-144-216 mg cap Take 1 Cap by mouth daily.  levothyroxine (SYNTHROID) 125 mcg tablet Take 125 mcg by mouth Daily (before breakfast).  tiotropium (SPIRIVA WITH HANDIHALER) 18 mcg inhalation capsule Take 1 Cap by inhalation daily. 30 Cap 11    cholecalciferol, vitamin d3, (VITAMIN D) 1,000 unit tablet Take 5,000 Units by mouth daily. No Known Allergies  Social History     Social History    Marital status:      Spouse name: N/A    Number of children: N/A    Years of education: N/A     Occupational History    Not on file.      Social History Main Topics    Smoking status: Current Some Day Smoker     Packs/day: 0.50     Years: 42.00     Last attempt to quit: 5/7/2013    Smokeless tobacco: Never Used      Comment: 2 per day     Alcohol use No    Drug use: No    Sexual activity: Not on file     Other Topics Concern    Not on file     Social History Narrative      Family History   Problem Relation Age of Onset    Hypertension Mother     Cancer Maternal Aunt      breast CA 66yo    Breast Cancer Maternal Aunt 79    Diabetes Maternal Grandmother     Cancer Maternal Aunt      uterine CA    Diabetes Daughter     Other Daughter      lupus (SLE) and coagulopathy    Breast Cancer Paternal Aunt 61    Heart Attack Sister     Heart Disease Neg Hx        Physical Exam:    Visit Vitals    /60    Pulse 74    Resp 18    Ht 5' 4\" (1.626 m)    Wt 134 lb (60.8 kg)    BMI 23 kg/m2      General: Well-appearing female in no acute distress  HEENT: EOMI no scleral icterus is noted patient is wearing eyeglasses  Pulmonary: No increased work of breathing is noted  Extremities: Warm and well-perfused bilaterally no ulcerations are identified bilaterally no edema is located bilaterally  Neuro: Cranial nerves II through XII grossly intact patient is wearing eyeglasses    Impression and Plan:  Kimo Loving is a 75 y.o. female with known aortoiliac occlusive disease now repaired with bilateral common iliac artery kissing stents as well as a previous right carotid endarterectomy and asymptomatic left carotid stenosis. The current discomfort that she is having is difficult to say as to what is causing this. I did review her ultrasounds with her in clinic showing that she has no disease within her right carotid system moderate disease within the left carotid system. She also has no arterial disease that is identifiable on her aortoiliac imaging as well as lower extremity imaging. Everything is widely patent. So I do not feel that her leg pain and discomfort is coming from her arteries given the fact that she has no edema I doubt this is coming from any venous origin. More than likely this is going to be some kind of a neuropathy or some issues with her back. So she will go and talk with neurologist or see her back doctor. Otherwise she can see us again in 1 year's time with repeat ultrasounds. We reviewed the plan with the patient and the patient understands.   We also gave the patient appropriate instructions on their disease process and when to call back. Follow-up Disposition:  Return in about 1 year (around 3/28/2019). Renetta Smart MD    PLEASE NOTE:  This document has been produced using voice recognition software. Unrecognized errors in transcription may be present.

## 2018-03-28 NOTE — MR AVS SNAPSHOT
[address] [address] 200 [facility] 
[phone] Patient: Sarthak Ling MRN: [id number] WPP:3/0/6509 Visit Information Date & Time Provider Department Dept. Phone Encounter #  
 3/28/2018  1:45 PM MD [facility] [id number] Follow-up Instructions Return in about 1 year (around 3/28/2019). Follow-up and Disposition History Your Appointments 3/30/2018  8:30 AM  
PROCEDURE with [facility] N[facility] [place] [facility] Appt Note: les mccarthy yadi then follow up [address] [facility], [address] [facility] [address]  
[address], 9352 [facility] [address] [place] [address] 7/16/2018 10:45 AM  
PROCEDURE with Kathleen Grimes MD  
[facility]) Appt Note: medtronic ck [address] [facility], [address] [facility] [address]  
[address], [address]352 [address] Upcoming Health Maintenance Date Due Hepatitis C Screening 1950 FOOT EXAM Q1 5/1/1959 EYE EXAM RETINAL OR DILATED Q1 5/1/1959 FOBT Q 1 YEAR AGE 50-75 5/1/1999 DTaP/Tdap/Td series (1 - Tdap) 10/2/2005 ZOSTER VACCINE AGE 60> 3/1/2009 GLAUCOMA SCREENING Q2Y 5/1/2014 Bone Densitometry (Dexa) Screening 5/1/2014 Pneumococcal 65+ Low/Medium Risk (2 of 2 - PPSV23) 5/1/2014 BREAST CANCER SCRN MAMMOGRAM 7/25/2015 MICROALBUMIN Q1 1/8/2016 HEMOGLOBIN A1C Q6M 7/10/2016 LIPID PANEL Q1 9/29/2016 Influenza Age 5 to Adult 8/1/2017 MEDICARE YEARLY EXAM 3/14/2018 Allergies as of 3/28/2018  Review Complete On: 3/28/2018 By: Sindi Gould MD  
 No Known Allergies Current Immunizations  Reviewed on 2/9/2011 Name Date  
 TD Vaccine 10/1/2005 ZZZ-RETIRED (DO NOT USE) Pneumococcal Vaccine (Unspecified Type) 12/14/2005 Not reviewed this visit You Were Diagnosed With   
  
 Codes Comments Aortoiliac occlusive disease ([facility]    -  Primary ICD-10-CM: I74.09 
ICD-9-CM: 444.09 Atherosclerosis of native artery of both lower extremities with intermittent claudication ([facility]     ICD-10-CM: U30.588 ICD-9-CM: 440.21 Carotid stenosis, asymptomatic, bilateral     ICD-10-CM: I65.23 ICD-9-CM: 433.10, 433.30 Vitals BP Pulse Resp Height(growth percentile) Weight(growth percentile) BMI  
 130/60 74 18 5' 4\" (1.626 m) 134 lb (60.8 kg) 23 kg/m2 OB Status Smoking Status Hysterectomy Current Some Day Smoker Vitals History BMI and BSA Data Body Mass Index Body Surface Area  
 23 kg/m 2 1.66 m 2 Preferred Pharmacy Pharmacy Name Phone 500 [facility] [address] [place] [address]. [phone] Your Updated Medication List  
  
   
This list is accurate as of 3/28/18  2:16 PM.  Always use your most recent med list.  
  
  
  
  
 fish oil-dha-epa 1,200-144-216 mg Cap Take 1 Cap by mouth daily. HumaLOG U-100 Insulin 100 unit/mL injection Generic drug:  insulin lispro  
by SubCUTAneous route. ipratropium-albuterol  mcg/actuation inhaler Commonly known as:  Missael Seen Take 1 Puff by inhalation every six (6) hours as needed for Wheezing or Shortness of Breath. LANTUS U-100 INSULIN 100 unit/mL injection Generic drug:  insulin glargine  
by SubCUTAneous route nightly. LIPITOR 20 mg tablet Generic drug:  atorvastatin Take  by mouth daily. naloxegol 25 mg Tab tablet Commonly known as:  [place] Nate Take 25 mg by mouth daily. naloxone 4 mg/actuation nasal spray Commonly known as:  NARCAN  
4 mg by Nasal route as needed for up to 2 doses. Indications: OPIOID TOXICITY  
  
 SYNTHROID 125 mcg tablet Generic drug:  levothyroxine Take 125 mcg by mouth Daily (before breakfast). tiotropium 18 mcg inhalation capsule Commonly known as:  [address] Take 1 Cap by inhalation daily. VITAMIN B-6 100 mg tablet Generic drug:  pyridoxine (vitamin B6) Take 100 mg by mouth daily. VITAMIN D3 1,000 unit tablet Generic drug:  cholecalciferol Take 5,000 Units by mouth daily. XARELTO 20 mg Tab tablet Generic drug:  rivaroxaban TAKE ONE TABLET BY MOUTH ONCE DAILY WITH DINNER Follow-up Instructions Return in about 1 year (around 3/28/2019). To-Do List   
 03/28/2019 Imaging:  DUPLEX AORTA ILIAC GRAFT COMPLETE AMB   
  
 03/28/2019 Imaging:  DUPLEX CAROTID BILATERAL AMB   
  
 03/28/2019 Imaging:  LOWER EXT ART PVR MULT LEVEL SEG PRESSURES AMB [facility]! Marlon Redman introduces Slicethepie patient portal. Now you can access parts of your medical record, email your doctor's office, and request medication refills online. 1. In your internet browser, go to [email or web address] 2. Click on the First Time User? Click Here link in the Sign In box. You will see the New Member Sign Up page. 3. Enter your Slicethepie Access Code exactly as it appears below. You will not need to use this code after youve completed the sign-up process. If you do not sign up before the expiration date, you must request a new code. · Slicethepie Access Code: [email or web address] Expires: 5/17/2018  1:12 PM 
 
4. Enter the last four digits of your Social Security Number (xxxx) and Date of Birth (mm/dd/yyyy) as indicated and click Submit. You will be taken to the next sign-up page. 5. Create a Slicethepie ID. This will be your Slicethepie login ID and cannot be changed, so think of one that is secure and easy to remember. 6. Create a Slicethepie password. You can change your password at any time. 7. Enter your Password Reset Question and Answer. This can be used at a later time if you forget your password. 8. Enter your e-mail address. You will receive e-mail notification when new information is available in [address] 9. Click Sign Up. You can now view and download portions of your medical record. 10. Click the Download Summary menu link to download a portable copy of your medical information. If you have questions, please visit the Frequently Asked Questions section of the [employer] website. Remember, [employer] is NOT to be used for urgent needs. For medical emergencies, dial 911. Now available from your iPhone and Android! Please provide this summary of care documentation to your next provider. Your primary care clinician is listed as Rosy Jones. If you have any questions after today's visit, please call [phone].

## 2018-03-30 ENCOUNTER — OFFICE VISIT (OUTPATIENT)
Dept: CARDIOLOGY CLINIC | Age: 68
End: 2018-03-30

## 2018-03-30 ENCOUNTER — CLINICAL SUPPORT (OUTPATIENT)
Dept: CARDIOLOGY CLINIC | Age: 68
End: 2018-03-30

## 2018-03-30 VITALS
BODY MASS INDEX: 23.05 KG/M2 | HEART RATE: 87 BPM | WEIGHT: 135 LBS | DIASTOLIC BLOOD PRESSURE: 56 MMHG | SYSTOLIC BLOOD PRESSURE: 107 MMHG | HEIGHT: 64 IN

## 2018-03-30 DIAGNOSIS — I10 ESSENTIAL HYPERTENSION, BENIGN: ICD-10-CM

## 2018-03-30 DIAGNOSIS — I25.5 CARDIOMYOPATHY, ISCHEMIC: ICD-10-CM

## 2018-03-30 DIAGNOSIS — I48.19 PERSISTENT ATRIAL FIBRILLATION (HCC): ICD-10-CM

## 2018-03-30 DIAGNOSIS — I25.10 CORONARY ARTERY DISEASE INVOLVING NATIVE CORONARY ARTERY OF NATIVE HEART WITHOUT ANGINA PECTORIS: Primary | ICD-10-CM

## 2018-03-30 DIAGNOSIS — I48.19 PERSISTENT ATRIAL FIBRILLATION (HCC): Primary | ICD-10-CM

## 2018-03-30 DIAGNOSIS — I50.32 CHRONIC DIASTOLIC CONGESTIVE HEART FAILURE (HCC): ICD-10-CM

## 2018-03-30 DIAGNOSIS — E78.00 PURE HYPERCHOLESTEROLEMIA: ICD-10-CM

## 2018-03-30 DIAGNOSIS — F17.200 TOBACCO USE DISORDER: ICD-10-CM

## 2018-03-30 RX ORDER — GUAIFENESIN 100 MG/5ML
81 LIQUID (ML) ORAL DAILY
Qty: 100 TAB | Refills: 0
Start: 2018-03-30

## 2018-03-30 RX ORDER — CARVEDILOL 3.12 MG/1
3.12 TABLET ORAL 2 TIMES DAILY WITH MEALS
Qty: 60 TAB | Refills: 2 | Status: SHIPPED | OUTPATIENT
Start: 2018-03-30 | End: 2018-04-09 | Stop reason: SDUPTHER

## 2018-03-30 NOTE — PROGRESS NOTES
[facility]  [address], [address] [place] [address], [place], [address] [place] [address]  [phone] [place]  [phone] [place]       Name: Khanh Harris         MRN#: [id number]        YOB: 1950   Gender: female Ht:5'4\" Wt:134 lbs       . Date of Rest/Stress Images: 3/30/2018   Referring Physician: Bel Dixon MD  Ordering Physician: Elma Hackett. Quinton Sorto MD, [facility]  Technologist: Nickie Marin. JENNIFER Hudson., C.N.M.T  Diagnosis:  1. Persistent atrial fibrillation ([address]    2. Chronic diastolic congestive heart failure (HCC)          Rest/Stress Myoview SPECT Myocardial Perfusion Imaging with  Lexiscan Stress and gated SPECT Imaging      PROCEDURE:      Myocardial perfusion imaging was performed at rest approximately 30 mins following the intravenous injection,(Right hand ) of 11.8 mCi of Tc99m Myoview for evaluation of myocardial function and perfusion at rest.      Baseline:   HR 87, /78. EKG shows ventricular paced rhythm. Left bundle branch block secondary to ventricular pacing. Procedure Note:        0.4 mg of Lexiscan given intravenously over 15-20 seconds during sitting exercises followed by Myoview injection. Heart rate remains between 80 and 90 bpm,  blood pressure increased to 164/80. No new ST-T changes are seen. Patient complained of uneasiness in the chest which improved after IV Aminophyllin. Conculsion:   1. No ischemic ST-T changes after Lexiscan infusion. 2.  Appropriate heart rate and blood pressure response. 3.  No definite ischemic symptoms or arrhythmias seen. Nonspecific uneasiness in the chest noted. 4.  Perfusion images report to follow. Pharmacological:  Patient was injected with . 4 mg/mL with Lexiscan intravenously over a period 10 to 20 sec. After pharmacologic stress, the patient was injected intravenously with 38.8 mCi of Tc99m Myoview.  Gating post stress tomographic imaging performed approximately 45 minutes post tracer injection. The data was reconstructed in the short, horizontal long and vertical long axis views and tomographic slices were generated. NUCLEAR IMAGING:    Findings:  1. Stress images reveal moderate to severely reduced Myoview uptake in the anterior wall and apical and mid segments seen in short axis, vertical and horizontal long axis views. 2. Resting images have no significant redistribution noted. 3. Gated images reveal severe hypokinesis of the apex and anteroseptal area with ejection fraction calculated at 40%. Conclusion:  1. Abnormal perfusion scan. 2. Evidence of a large sized fixed defect involving LV territory in the apical and mid anterior and anteroseptal areas. This indicates previous myocardial infarction most likely. 3. This represents a high risk scan. 4.          Thank you for the referral.    E-signed and Interpreting Physician:    Becki Cruz.  Tonya Liu MD, [facility]     Date of interpretation: 3/30/2018  Date of final report: 3/30/2018

## 2018-03-30 NOTE — MR AVS SNAPSHOT
Caitlyn Valdez 
 
 
 [address] [facility], [address] [facility] [address] 
[phone] Patient: Troy Godinez MRN: [id number] YTX:0/7/0451 Visit Information Date & Time Provider Department Dept. Phone Encounter #  
 3/30/2018  8:15 AM Matt Wan MD [facility] [address] [place] [address] [id number] Follow-up Instructions Return in about 2 months (around 5/30/2018), or if symptoms worsen or fail to improve. Your Appointments 5/25/2018  9:00 AM  
Office Visit with Matt Wan MD  
[facility]) Appt Note: 2 months [address] [facility], [address] [facility] [address]  
[address], [address] 7/16/2018 10:45 AM  
PROCEDURE with Matt Wan MD  
[facility]) Appt Note: medtronic ck [address] [facility], [address] [facility] [address]  
[address], [address] [address]  
  
    
 3/28/2019  8:00 AM  
PROCEDURE with BSVVS IMAGING 2 [facility] ([facility]) Appt Note: asim sonny stents camach 1 year 1212 [facility] [place] [address] 200 [facility]  
[phone] [address] [place] [address]  
  
    
 3/28/2019  9:00 AM  
PROCEDURE with BSVVS IMAGING 2 [facility] ([facility]) Appt Note: CV PUGA [address] [place] [address] 715 200 [facility]  
[phone]  
  
    
 3/28/2019 10:00 AM  
PROCEDURE with BSVVS NONIMAGING [facility] ([facility]) Appt Note: leg art puga 1 year 1212 [facility] [place] 029 200 [facility]  
[phone]  [facility]  
  
    
 4/10/2019  1:45 PM  
Follow Up with Ella Velazquez MD  
 [facility] ([address] [facility]) Appt Note: 1 year follow up after studies with prep 1212 [facility] [place] 690 200 [facility]  
[phone] 1212 [facility] Emili Walkern 200 [facility] Upcoming Health Maintenance Date Due Hepatitis C Screening 1950 FOOT EXAM Q1 5/1/1959 EYE EXAM RETINAL OR DILATED Q1 5/1/1959 FOBT Q 1 YEAR AGE 50-75 5/1/1999 DTaP/Tdap/Td series (1 - Tdap) 10/2/2005 ZOSTER VACCINE AGE 60> 3/1/2009 GLAUCOMA SCREENING Q2Y 5/1/2014 Bone Densitometry (Dexa) Screening 5/1/2014 Pneumococcal 65+ Low/Medium Risk (2 of 2 - PPSV23) 5/1/2014 BREAST CANCER SCRN MAMMOGRAM 7/25/2015 MICROALBUMIN Q1 1/8/2016 HEMOGLOBIN A1C Q6M 7/10/2016 LIPID PANEL Q1 9/29/2016 Influenza Age 5 to Adult 8/1/2017 MEDICARE YEARLY EXAM 3/14/2018 Allergies as of 3/30/2018  Review Complete On: 3/30/2018 By: Chelo Russell MD  
 No Known Allergies Current Immunizations  Reviewed on 2/9/2011 Name Date  
 TD Vaccine 10/1/2005 ZZZ-RETIRED (DO NOT USE) Pneumococcal Vaccine (Unspecified Type) 12/14/2005 Not reviewed this visit You Were Diagnosed With   
  
 Codes Comments Coronary artery disease involving native coronary artery of native heart without angina pectoris    -  Primary ICD-10-CM: I25.10 ICD-9-CM: 414.01 3/18 large fixed LAD defect, 40%EF;   
 Persistent atrial fibrillation (HCC)     ICD-10-CM: I48.1 ICD-9-CM: 427.31 3/18; 2/18; 8/17 on xarelto;  
Persistent despite ablation Pure hypercholesterolemia     ICD-10-CM: E78.00 ICD-9-CM: 272.0 7/17 on lipitor; 9/15 LDL46 
get from PCP Essential hypertension, benign     ICD-10-CM: I10 
ICD-9-CM: 401.1 3/18 no meds- start low dose coreg for CMP;  
 Cardiomyopathy, ischemic     ICD-10-CM: I25.5 ICD-9-CM: 414.8 3/18 no meds- start low dose coreg for CMP; Tobacco use disorder     ICD-10-CM: F17.200 ICD-9-CM: 305.1 3/18; 7/17; 1/17 Patient advised to stop smoking to reduce future cardiovascular events. Restarted 6/16 Vitals BP Pulse Height(growth percentile) Weight(growth percentile) BMI OB Status 107/56 87 5' 4\" (1.626 m) 135 lb (61.2 kg) 23.17 kg/m2 Hysterectomy Smoking Status Current Some Day Smoker Vitals History BMI and BSA Data Body Mass Index Body Surface Area  
 23.17 kg/m 2 1.66 m 2 Preferred Pharmacy Pharmacy Name Phone [address] [place] [address]. [phone] Your Updated Medication List  
  
   
This list is accurate as of 3/30/18 12:15 PM.  Always use your most recent med list.  
  
  
  
  
 aspirin 81 mg chewable tablet Take 1 Tab by mouth daily. Indications: myocardial infarction prevention  
  
 carvedilol 3.125 mg tablet Commonly known as:  Toshia Lloyd Take 1 Tab by mouth two (2) times daily (with meals). fish oil-dha-epa 1,200-144-216 mg Cap Take 1 Cap by mouth daily. HumaLOG U-100 Insulin 100 unit/mL injection Generic drug:  insulin lispro  
by SubCUTAneous route. ipratropium-albuterol  mcg/actuation inhaler Commonly known as:  [place] Amor Take 1 Puff by inhalation every six (6) hours as needed for Wheezing or Shortness of Breath. LANTUS U-100 INSULIN 100 unit/mL injection Generic drug:  insulin glargine  
by SubCUTAneous route nightly. LIPITOR 20 mg tablet Generic drug:  atorvastatin Take  by mouth daily. naloxegol 25 mg Tab tablet Commonly known as:  Richie Higgins Take 25 mg by mouth daily. naloxone 4 mg/actuation nasal spray Commonly known as:  NARCAN  
4 mg by Nasal route as needed for up to 2 doses. Indications: OPIOID TOXICITY  
  
 regadenoson 0.4 mg/5 mL Syrg injection Commonly known as:  LEXISCAN  
5 mL by IntraVENous route once for 1 dose. SYNTHROID 125 mcg tablet Generic drug:  levothyroxine Take 125 mcg by mouth Daily (before breakfast). tiotropium 18 mcg inhalation capsule Commonly known as:  [address] Take 1 Cap by inhalation daily. VITAMIN B-6 100 mg tablet Generic drug:  pyridoxine (vitamin B6) Take 100 mg by mouth daily. VITAMIN D3 1,000 unit tablet Generic drug:  cholecalciferol Take 5,000 Units by mouth daily. XARELTO 20 mg Tab tablet Generic drug:  rivaroxaban TAKE ONE TABLET BY MOUTH ONCE DAILY WITH DINNER Prescriptions Sent to Pharmacy Refills  
 carvedilol (COREG) 3.125 mg tablet 2 Sig: Take 1 Tab by mouth two (2) times daily (with meals). Class: Normal  
 Pharmacy: [facility] DR HOLLAND JONES [address]  #: [phone] Route: Oral  
  
Follow-up Instructions Return in about 2 months (around 5/30/2018), or if symptoms worsen or fail to improve. Patient Instructions After the recommended changes have been made in blood pressure medicines, patient advised to keep BP/HR(pulse rate) chart twice daily and bring us results in next office visit. Patient may send the results via \"My Chart\" if desired. Please rest for 5-10 minutes before checking blood pressure There are no discontinued medications. Stopping Smoking: Care Instructions Your Care Instructions Cigarette smokers crave the nicotine in cigarettes. Giving it up is much harder than simply changing a habit. Your body has to stop craving the nicotine. It is hard to quit, but you can do it. There are many tools that people use to quit smoking. You may find that combining tools works best for you. There are several steps to quitting. First you get ready to quit. Then you get support to help you. After that, you learn new skills and behaviors to become a nonsmoker. For many people, a necessary step is getting and using medicine. Your doctor will help you set up the plan that best meets your needs.  You may want to attend a smoking cessation program to help you quit smoking. When you choose a program, look for one that has proven success. Ask your doctor for ideas. You will greatly increase your chances of success if you take medicine as well as get counseling or join a cessation program. 
Some of the changes you feel when you first quit tobacco are uncomfortable. Your body will miss the nicotine at first, and you may feel short-tempered and grumpy. You may have trouble sleeping or concentrating. Medicine can help you deal with these symptoms. You may struggle with changing your smoking habits and rituals. The last step is the tricky one: Be prepared for the smoking urge to continue for a time. This is a lot to deal with, but keep at it. You will feel better. Follow-up care is a key part of your treatment and safety. Be sure to make and go to all appointments, and call your doctor if you are having problems. It's also a good idea to know your test results and keep a list of the medicines you take. How can you care for yourself at home? · Ask your family, friends, and coworkers for support. You have a better chance of quitting if you have help and support. · Join a support group, such as Nicotine Anonymous, for people who are trying to quit smoking. · Consider signing up for a smoking cessation program, such as the American Lung Association's Freedom from Smoking program. 
· Set a quit date. Pick your date carefully so that it is not right in the middle of a big deadline or stressful time. Once you quit, do not even take a puff. Get rid of all ashtrays and lighters after your last cigarette. Clean your house and your clothes so that they do not smell of smoke. · Learn how to be a nonsmoker. Think about ways you can avoid those things that make you reach for a cigarette. ¨ Avoid situations that put you at greatest risk for smoking. For some people, it is hard to have a drink with friends without smoking.  For others, they might skip a coffee break with coworkers who smoke. ¨ Change your daily routine. Take a different route to work or eat a meal in a different place. · Cut down on stress. Calm yourself or release tension by doing an activity you enjoy, such as reading a book, taking a hot bath, or gardening. · Talk to your doctor or pharmacist about nicotine replacement therapy, which replaces the nicotine in your body. You still get nicotine but you do not use tobacco. Nicotine replacement products help you slowly reduce the amount of nicotine you need. These products come in several forms, many of them available over-the-counter: ¨ Nicotine patches ¨ Nicotine gum and lozenges ¨ Nicotine inhaler · Ask your doctor about bupropion (Wellbutrin) or varenicline (Chantix), which are prescription medicines. They do not contain nicotine. They help you by reducing withdrawal symptoms, such as stress and anxiety. · Some people find hypnosis, acupuncture, and massage helpful for ending the smoking habit. · Eat a healthy diet and get regular exercise. Having healthy habits will help your body move past its craving for nicotine. · Be prepared to keep trying. Most people are not successful the first few times they try to quit. Do not get mad at yourself if you smoke again. Make a list of things you learned and think about when you want to try again, such as next week, next month, or next year. Where can you learn more? Go to http://jose alejandro-lizzy.info/. Enter J960 in the search box to learn more about \"Stopping Smoking: Care Instructions. \" Current as of: March 20, 2017 Content Version: 11.4 © 5055-3076 [employer]. Care instructions adapted under license by [employer] (which disclaims liability or warranty for this information).  If you have questions about a medical condition or this instruction, always ask your healthcare professional. [employer] disclaims any warranty or liability for your use of this information. Introducing [facility]! [employer] introduces [employer] patient portal. Now you can access parts of your medical record, email your doctor's office, and request medication refills online. 1. In your internet browser, go to [email or web address] 2. Click on the First Time User? Click Here link in the Sign In box. You will see the New Member Sign Up page. 3. Enter your [employer] Access Code exactly as it appears below. You will not need to use this code after youve completed the sign-up process. If you do not sign up before the expiration date, you must request a new code. · [employer] Access Code: [email or web address] Expires: 5/17/2018  1:12 PM 
 
4. Enter the last four digits of your Social Security Number (xxxx) and Date of Birth (mm/dd/yyyy) as indicated and click Submit. You will be taken to the next sign-up page. 5. Create a IdleAir ID. This will be your [employer] login ID and cannot be changed, so think of one that is secure and easy to remember. 6. Create a [employer] password. You can change your password at any time. 7. Enter your Password Reset Question and Answer. This can be used at a later time if you forget your password. 8. Enter your e-mail address. You will receive e-mail notification when new information is available in [address] 9. Click Sign Up. You can now view and download portions of your medical record. 10. Click the Download Summary menu link to download a portable copy of your medical information. If you have questions, please visit the Frequently Asked Questions section of the [employer] website. Remember, [employer] is NOT to be used for urgent needs. For medical emergencies, dial 911. Now available from your iPhone and Android! Please provide this summary of care documentation to your next provider. Your primary care clinician is listed as Taiwo Arora. If you have any questions after today's visit, please call [phone].

## 2018-03-30 NOTE — PROGRESS NOTES
HISTORY OF PRESENT ILLNESS  Brynn Renteria is a 75 y.o. female. HPI Comments: F/u AFib, HTN, CHF, MR, HLD, s/p DDD    3/18 seen after low EF, wma and stress test today  7/17 continues with dizziness, rare LOC/fall- last in 6/17 1/17 has a cold for 6 wks    CHF   The history is provided by the medical records. This is a chronic problem. Associated symptoms include shortness of breath. Pertinent negatives include no chest pain, no abdominal pain and no headaches. Palpitations    The history is provided by the medical records. This is a chronic problem. The current episode started more than 1 week ago. The problem has been resolved. The problem occurs rarely (off and on). Episode Length: permanent AF on pacer check. Associated symptoms include irregular heartbeat, dizziness and shortness of breath. Pertinent negatives include no diaphoresis, no fever, no chest pain, no claudication, no orthopnea, no PND, no syncope, no abdominal pain, no nausea, no vomiting, no headaches, no weakness, no cough, no hemoptysis and no sputum production. Risk factors include diabetes mellitus, hypertension, smoking/tobacco exposure and dyslipidemia. Her past medical history is significant for DM, hypertension and atrial fibrillation. Hypertension   The history is provided by the medical records. This is a chronic problem. Associated symptoms include shortness of breath. Pertinent negatives include no chest pain, no abdominal pain and no headaches. Cholesterol Problem   The history is provided by the medical records. This is a chronic problem. Associated symptoms include shortness of breath. Pertinent negatives include no chest pain, no abdominal pain and no headaches. Dizziness   The history is provided by the patient. This is a new problem. The current episode started more than 1 week ago. The problem occurs every several days (3/wk; 5-10 mts; 6/17 LOC for few seconds). The problem has not changed since onset. Associated symptoms include shortness of breath. Pertinent negatives include no chest pain, no abdominal pain and no headaches. The symptoms are aggravated by twisting (looking/reaching up). The symptoms are relieved by rest and laying down. Shortness of Breath   The history is provided by the patient. This is a chronic problem. The problem occurs intermittently. The current episode started more than 1 week ago. The problem has not changed since onset. Pertinent negatives include no fever, no headaches, no ear pain, no neck pain, no cough, no sputum production, no hemoptysis, no wheezing, no PND, no orthopnea, no chest pain, no syncope, no vomiting, no abdominal pain, no rash, no leg swelling and no claudication. The problem's precipitants include exercise (2 blocks). Review of Systems   Constitutional: Negative for chills, diaphoresis, fever and weight loss. HENT: Negative for ear pain and hearing loss. Eyes: Negative for blurred vision. Respiratory: Positive for shortness of breath. Negative for cough, hemoptysis, sputum production, wheezing and stridor. Cardiovascular: Positive for palpitations. Negative for chest pain, orthopnea, claudication, leg swelling, syncope and PND. Gastrointestinal: Negative for abdominal pain, heartburn, nausea and vomiting. Musculoskeletal: Negative for myalgias and neck pain. Skin: Negative for rash. Neurological: Positive for dizziness. Negative for tingling, tremors, focal weakness, loss of consciousness, weakness and headaches. Psychiatric/Behavioral: Negative for depression and suicidal ideas. No Known Allergies    Past Medical History:   Diagnosis Date    Abnormal myocardial perfusion study 05/07/2010    Anterior ischemia c/w at least 1-vessel CAD. No WMA. EF 51%. Positive EKG at 78% pred max HR. Ex time 7 min 30 sec.     Allergic rhinitis 1/30/2010    Atrial fibrillation ([address]     Broken ankle 08/22/2013    left    Carotid stenosis 1/30/2010    Chronic diastolic congestive heart failure ([facility] 2/16/2018 2/18 NYHA2    COPD     Essential hypertension     [facility]     History of amiodarone therapy 5/11/2015    History of cardiac cath 05/25/2010    Patent coronary arteries. LVEDP 13 mmHg. EF 65%.  History of cervical cancer 11/05    History of echocardiogram 08/21/2013    EF 50-55%. No RWMA. Gr 2 DDfx. Mild LAE. Mild MR.      Hypertension     Migraines 1/30/2010    Mitral regurgitation     Orthostatic dizziness 3/30/2016    ? autonomic dysfunction Vs amio side effect     Pacemaker 11/25/13    DDD    Pure hypercholesterolemia 1/30/2010    Rosacea 1/30/2010    Type II or unspecified type diabetes mellitus without mention of complication, not stated as uncontrolled 1/30/2010    Unspecified hypothyroidism 1/30/2010       Family History   Problem Relation Age of Onset    Hypertension Mother     Cancer Maternal Aunt      breast CA 68yo    Breast Cancer Maternal Aunt 79    Diabetes Maternal Grandmother     Cancer Maternal Aunt      uterine CA    Diabetes Daughter     Other Daughter      lupus (SLE) and coagulopathy    Breast Cancer Paternal Aunt 61    Heart Attack Sister     Heart Disease Neg Hx        Social History   Substance Use Topics    Smoking status: Current Some Day Smoker     Packs/day: 0.50     Years: 42.00     Last attempt to quit: 5/7/2013    Smokeless tobacco: Never Used      Comment: 2 per day     Alcohol use No        Current Outpatient Prescriptions   Medication Sig    regadenoson (LEXISCAN) 0.4 mg/5 mL syrg injection 5 mL by IntraVENous route once for 1 dose.  XARELTO 20 mg tab tablet TAKE ONE TABLET BY MOUTH ONCE DAILY WITH DINNER    naloxone 4 mg/actuation spry 4 mg by Nasal route as needed for up to 2 doses. Indications: OPIOID TOXICITY    atorvastatin (LIPITOR) 20 mg tablet Take  by mouth daily.  naloxegol (MOVANTIK) 25 mg tab tablet Take 25 mg by mouth daily.     insulin lispro (HUMALOG) 100 unit/mL injection by SubCUTAneous route.  insulin glargine (LANTUS) 100 unit/mL injection by SubCUTAneous route nightly.  ipratropium-albuterol (COMBIVENT RESPIMAT)  mcg/actuation inhaler Take 1 Puff by inhalation every six (6) hours as needed for Wheezing or Shortness of Breath.  pyridoxine (VITAMIN B-6) 100 mg tablet Take 100 mg by mouth daily.  fish oil-dha-epa 1,200-144-216 mg cap Take 1 Cap by mouth daily.  levothyroxine (SYNTHROID) 125 mcg tablet Take 125 mcg by mouth Daily (before breakfast).  tiotropium (SPIRIVA WITH HANDIHALER) 18 mcg inhalation capsule Take 1 Cap by inhalation daily.  cholecalciferol, vitamin d3, (VITAMIN D) 1,000 unit tablet Take 5,000 Units by mouth daily. No current facility-administered medications for this visit. Past Surgical History:   Procedure Laterality Date    HX AFIB ABLATION  2/8/13    Dr Willow [place] HX CAROTID ENDARTERECTOMY  11/06    right Dr. Guevara Spears HX HYSTERECTOMY  5/06    HX ORTHOPAEDIC Right 07/11/2016    knee replacement    HX PACEMAKER  11/25/13    DDD    HX SVT ABLATION  2/18/2013       Diagnostic Studies: Old records reviewed and show as follows  EKG tracings reviewed by me today. CARDIOLOGY STUDIES 3/30/2018 8/2/2013 1/31/2013   EKG Result - - 11/12 Atrial Flutter, rapid heart rate, normal axis, diffuse nonspecific ST-T changes   Myocardial Perfusion Scan Result - - 12/05 nl scan, EF 51%; 4/08 nl scan, EF 66%; 5/10 abn scan, partially reversible anterior ischemia, EF 51%; Pharmacological Nuclear Stress Test Result large fixed ant/apical/sept defect, 40%EF - -   Echocardiogram - Complete Result - 55%EF, mild MR, trivial PE 4/07 EF 60-65% mild DD, mild MR; 4/08 EF 60%,mild DD,trace/mild MR; 2/10 EF 45-50%, PAP 32; 5/10; 3/11 EF 55%, mild MR; 11/12 55%EF, mild LVH,mod MR,   Coronary Angiogram Result - - CATH 6/06; 5/10 nl ca;   PFT's with DLCO Result - - 9/07; 2/10; 7/10;    Some recent data might be hidden   10/15 NUC STRESS  CONCLUSION  1. No evidence of ischemia based on this study. 2. Normal left ventricular size and function. Ejection fraction of 49%. 3. Low risk scan. Visit Vitals    /56    Pulse 87    Ht 5' 4\" (1.626 m)    Wt 135 lb (61.2 kg)    BMI 23.17 kg/m2       Ms. Yoo has a reminder for a \"due or due soon\" health maintenance. I have asked that she contact her primary care provider for follow-up on this health maintenance. Physical Exam   Constitutional: She is oriented to person, place, and time. She appears well-developed and well-nourished. No distress. HENT:   Head: Atraumatic. Mouth/Throat: No oropharyngeal exudate. Eyes: Conjunctivae are normal. No scleral icterus. Neck: Neck supple. No JVD present. Cardiovascular: An irregularly irregular rhythm present. Tachycardia present. Exam reveals no gallop. No murmur heard. Pulmonary/Chest: Effort normal and breath sounds normal. No stridor. She has no wheezes. She has no rales. Abdominal: Soft. There is no tenderness. Musculoskeletal: Normal range of motion. She exhibits no edema. Neurological: She is alert and oriented to person, place, and time. She exhibits normal muscle tone. Skin: Skin is warm. She is not diaphoretic. Psychiatric: She has a normal mood and affect. Her behavior is normal.       ASSESSMENT and PLAN          Diagnoses and all orders for this visit:    1. Coronary artery disease involving native coronary artery of native heart without angina pectoris  Comments:  3/18 large fixed LAD defect, 40%EF; Orders:  -     carvedilol (COREG) 3.125 mg tablet; Take 1 Tab by mouth two (2) times daily (with meals). -     aspirin 81 mg chewable tablet; Take 1 Tab by mouth daily. Indications: myocardial infarction prevention    2. Persistent atrial fibrillation ([address]  Comments:  3/18; 2/18; 8/17 on xarelto;   Persistent despite ablation      3.  Pure hypercholesterolemia  Comments:  7/17 on lipitor; 9/15 LDL46  get from PCP    4. Essential hypertension, benign  Comments:  3/18 no meds- start low dose coreg for CMP;    5. Cardiomyopathy, ischemic  Comments:  3/18 no meds- start low dose coreg for CMP; Orders:  -     carvedilol (COREG) 3.125 mg tablet; Take 1 Tab by mouth two (2) times daily (with meals). 6. Tobacco use disorder  Comments:  3/18; 7/17; 1/17 Patient advised to stop smoking to reduce future cardiovascular events. Restarted 6/16          Pertinent laboratory and test data reviewed and discussed with patient. See patient instructions also for other medical advice given    There are no discontinued medications. Follow-up Disposition:  Return in about 2 months (around 5/30/2018), or if symptoms worsen or fail to improve.

## 2018-03-30 NOTE — PROGRESS NOTES
1. Have you been to the ER, urgent care clinic since your last visit? Hospitalized since your last visit?    no    2. Have you seen or consulted any other health care providers outside of the [address] [place] [address] since your last visit? Include any pap smears or colon screening. Yes, pcp    3. Since your last visit, have you had any of the following symptoms? 4.  Have you had any blood work, X-rays or cardiac testing?      yes, pcp            5.  Where do you normally have your labs drawn?   pcp    6. Do you need any refills today?   no

## 2018-03-30 NOTE — PATIENT INSTRUCTIONS
After the recommended changes have been made in blood pressure medicines, patient advised to keep BP/HR(pulse rate) chart twice daily and bring us results in next office visit. Patient may send the results via \"My Chart\" if desired. Please rest for 5-10 minutes before checking blood pressure  There are no discontinued medications. Stopping Smoking: Care Instructions  Your Care Instructions    Cigarette smokers crave the nicotine in cigarettes. Giving it up is much harder than simply changing a habit. Your body has to stop craving the nicotine. It is hard to quit, but you can do it. There are many tools that people use to quit smoking. You may find that combining tools works best for you. There are several steps to quitting. First you get ready to quit. Then you get support to help you. After that, you learn new skills and behaviors to become a nonsmoker. For many people, a necessary step is getting and using medicine. Your doctor will help you set up the plan that best meets your needs. You may want to attend a smoking cessation program to help you quit smoking. When you choose a program, look for one that has proven success. Ask your doctor for ideas. You will greatly increase your chances of success if you take medicine as well as get counseling or join a cessation program.  Some of the changes you feel when you first quit tobacco are uncomfortable. Your body will miss the nicotine at first, and you may feel short-tempered and grumpy. You may have trouble sleeping or concentrating. Medicine can help you deal with these symptoms. You may struggle with changing your smoking habits and rituals. The last step is the tricky one: Be prepared for the smoking urge to continue for a time. This is a lot to deal with, but keep at it. You will feel better. Follow-up care is a key part of your treatment and safety. Be sure to make and go to all appointments, and call your doctor if you are having problems.  It's also a good idea to know your test results and keep a list of the medicines you take. How can you care for yourself at home? · Ask your family, friends, and coworkers for support. You have a better chance of quitting if you have help and support. · Join a support group, such as Nicotine Anonymous, for people who are trying to quit smoking. · Consider signing up for a smoking cessation program, such as the American Lung Association's Freedom from Smoking program.  · Set a quit date. Pick your date carefully so that it is not right in the middle of a big deadline or stressful time. Once you quit, do not even take a puff. Get rid of all ashtrays and lighters after your last cigarette. Clean your house and your clothes so that they do not smell of smoke. · Learn how to be a nonsmoker. Think about ways you can avoid those things that make you reach for a cigarette. ¨ Avoid situations that put you at greatest risk for smoking. For some people, it is hard to have a drink with friends without smoking. For others, they might skip a coffee break with coworkers who smoke. ¨ Change your daily routine. Take a different route to work or eat a meal in a different place. · Cut down on stress. Calm yourself or release tension by doing an activity you enjoy, such as reading a book, taking a hot bath, or gardening. · Talk to your doctor or pharmacist about nicotine replacement therapy, which replaces the nicotine in your body. You still get nicotine but you do not use tobacco. Nicotine replacement products help you slowly reduce the amount of nicotine you need. These products come in several forms, many of them available over-the-counter:  ¨ Nicotine patches  ¨ Nicotine gum and lozenges  ¨ Nicotine inhaler  · Ask your doctor about bupropion (Wellbutrin) or varenicline (Chantix), which are prescription medicines. They do not contain nicotine. They help you by reducing withdrawal symptoms, such as stress and anxiety.   · Some people find hypnosis, acupuncture, and massage helpful for ending the smoking habit. · Eat a healthy diet and get regular exercise. Having healthy habits will help your body move past its craving for nicotine. · Be prepared to keep trying. Most people are not successful the first few times they try to quit. Do not get mad at yourself if you smoke again. Make a list of things you learned and think about when you want to try again, such as next week, next month, or next year. Where can you learn more? Go to http://jose alejandro-lizzy.info/. Enter F306 in the search box to learn more about \"Stopping Smoking: Care Instructions. \"  Current as of: March 20, 2017  Content Version: 11.4  © 8384-0391 [employer]. Care instructions adapted under license by [employer] (which disclaims liability or warranty for this information). If you have questions about a medical condition or this instruction, always ask your healthcare professional. [address] any warranty or liability for your use of this information.

## 2018-03-30 NOTE — LETTER
[address] 1950 
 
3/30/2018 Dear Charisse Ch MD 
 
I had the pleasure of evaluating  Ms. Yoo in office today. Below are the relevant portions of my assessment and plan of care. ICD-10-CM ICD-9-CM 1. Coronary artery disease involving native coronary artery of native heart without angina pectoris I25.10 414.01 carvedilol (COREG) 3.125 mg tablet  
   aspirin 81 mg chewable tablet 3/18 large fixed LAD defect, 40%EF;   
2. Persistent atrial fibrillation ([address] I48.1 427.31   
 3/18; 2/18; 8/17 on xarelto;  
Persistent despite ablation 3. Pure hypercholesterolemia E78.00 272.0   
 7/17 on lipitor; 9/15 LDL46 
get from PCP 4. Essential hypertension, benign I10 401.1 3/18 no meds- start low dose coreg for CMP;  
5. Cardiomyopathy, ischemic I25.5 414.8 carvedilol (COREG) 3.125 mg tablet 3/18 no meds- start low dose coreg for CMP;  
6. Tobacco use disorder F17.200 305.1 3/18; 7/17; 1/17 Patient advised to stop smoking to reduce future cardiovascular events. Restarted 6/16 Current Outpatient Prescriptions Medication Sig Dispense Refill  regadenoson (LEXISCAN) 0.4 mg/5 mL syrg injection 5 mL by IntraVENous route once for 1 dose. 5 mL 0  
 carvedilol (COREG) 3.125 mg tablet Take 1 Tab by mouth two (2) times daily (with meals). 60 Tab 2  
 aspirin 81 mg chewable tablet Take 1 Tab by mouth daily. Indications: myocardial infarction prevention 100 Tab 0  XARELTO 20 mg tab tablet TAKE ONE TABLET BY MOUTH ONCE DAILY WITH DINNER 30 Tab 6  
 naloxone 4 mg/actuation spry 4 mg by Nasal route as needed for up to 2 doses. Indications: OPIOID TOXICITY 1 Box 1  
 atorvastatin (LIPITOR) 20 mg tablet Take  by mouth daily.  naloxegol (MOVANTIK) 25 mg tab tablet Take 25 mg by mouth daily.  insulin lispro (HUMALOG) 100 unit/mL injection by SubCUTAneous route.  insulin glargine (LANTUS) 100 unit/mL injection by SubCUTAneous route nightly.  ipratropium-albuterol (COMBIVENT RESPIMAT)  mcg/actuation inhaler Take 1 Puff by inhalation every six (6) hours as needed for Wheezing or Shortness of Breath. 1 Inhaler 1  pyridoxine (VITAMIN B-6) 100 mg tablet Take 100 mg by mouth daily.  fish oil-dha-epa 1,200-144-216 mg cap Take 1 Cap by mouth daily.  levothyroxine (SYNTHROID) 125 mcg tablet Take 125 mcg by mouth Daily (before breakfast).  tiotropium (SPIRIVA WITH HANDIHALER) 18 mcg inhalation capsule Take 1 Cap by inhalation daily. 30 Cap 11  
 cholecalciferol, vitamin d3, (VITAMIN D) 1,000 unit tablet Take 5,000 Units by mouth daily. Orders Placed This Encounter  carvedilol (COREG) 3.125 mg tablet Sig: Take 1 Tab by mouth two (2) times daily (with meals). Dispense:  60 Tab Refill:  2  
 aspirin 81 mg chewable tablet Sig: Take 1 Tab by mouth daily. Indications: myocardial infarction prevention Dispense:  100 Tab Refill:  0 If you have questions, please do not hesitate to call me. I look forward to following Ms. Yoo along with you. Sincerely, Robi Bourgeois MD

## 2018-04-05 ENCOUNTER — APPOINTMENT (OUTPATIENT)
Dept: GENERAL RADIOLOGY | Age: 68
End: 2018-04-05
Attending: EMERGENCY MEDICINE
Payer: MEDICARE

## 2018-04-05 ENCOUNTER — HOSPITAL ENCOUNTER (EMERGENCY)
Age: 68
Discharge: HOME | End: 2018-04-06
Attending: EMERGENCY MEDICINE
Payer: MEDICARE

## 2018-04-05 DIAGNOSIS — I50.21 ACUTE SYSTOLIC CONGESTIVE HEART FAILURE (HCC): Primary | ICD-10-CM

## 2018-04-05 PROCEDURE — 99284 EMERGENCY DEPT VISIT MOD MDM: CPT

## 2018-04-05 PROCEDURE — 93005 ELECTROCARDIOGRAM TRACING: CPT

## 2018-04-05 PROCEDURE — 77030029684 HC NEB SM VOL KT MONA -A

## 2018-04-05 PROCEDURE — 71045 X-RAY EXAM CHEST 1 VIEW: CPT

## 2018-04-05 PROCEDURE — 83880 ASSAY OF NATRIURETIC PEPTIDE: CPT | Performed by: EMERGENCY MEDICINE

## 2018-04-05 PROCEDURE — 80048 BASIC METABOLIC PNL TOTAL CA: CPT | Performed by: EMERGENCY MEDICINE

## 2018-04-05 PROCEDURE — 85025 COMPLETE CBC W/AUTO DIFF WBC: CPT | Performed by: EMERGENCY MEDICINE

## 2018-04-05 PROCEDURE — 82550 ASSAY OF CK (CPK): CPT | Performed by: EMERGENCY MEDICINE

## 2018-04-06 VITALS
WEIGHT: 135 LBS | HEART RATE: 80 BPM | HEIGHT: 64 IN | DIASTOLIC BLOOD PRESSURE: 76 MMHG | SYSTOLIC BLOOD PRESSURE: 152 MMHG | RESPIRATION RATE: 18 BRPM | BODY MASS INDEX: 23.05 KG/M2 | TEMPERATURE: 98.2 F | OXYGEN SATURATION: 94 %

## 2018-04-06 LAB
ANION GAP SERPL CALC-SCNC: 10 MMOL/L (ref 3–18)
ATRIAL RATE: 75 BPM
BASOPHILS # BLD: 0 K/UL (ref 0–0.06)
BASOPHILS NFR BLD: 0 % (ref 0–2)
BNP SERPL-MCNC: 4090 PG/ML (ref 0–900)
BUN SERPL-MCNC: 17 MG/DL (ref 7–18)
BUN/CREAT SERPL: 19 (ref 12–20)
CALCIUM SERPL-MCNC: 8.8 MG/DL (ref 8.5–10.1)
CALCULATED R AXIS, ECG10: -79 DEGREES
CALCULATED T AXIS, ECG11: 103 DEGREES
CHLORIDE SERPL-SCNC: 97 MMOL/L (ref 100–108)
CK MB CFR SERPL CALC: 1.6 % (ref 0–4)
CK MB SERPL-MCNC: 1.8 NG/ML (ref 5–25)
CK SERPL-CCNC: 114 U/L (ref 26–192)
CO2 SERPL-SCNC: 27 MMOL/L (ref 21–32)
CREAT SERPL-MCNC: 0.89 MG/DL (ref 0.6–1.3)
DIAGNOSIS, 93000: NORMAL
DIFFERENTIAL METHOD BLD: ABNORMAL
EOSINOPHIL # BLD: 0.1 K/UL (ref 0–0.4)
EOSINOPHIL NFR BLD: 1 % (ref 0–5)
ERYTHROCYTE [DISTWIDTH] IN BLOOD BY AUTOMATED COUNT: 12.8 % (ref 11.6–14.5)
GLUCOSE SERPL-MCNC: 167 MG/DL (ref 74–99)
HCT VFR BLD AUTO: 41.2 % (ref 35–45)
HGB BLD-MCNC: 13.8 G/DL (ref 12–16)
LYMPHOCYTES # BLD: 0.8 K/UL (ref 0.9–3.6)
LYMPHOCYTES NFR BLD: 10 % (ref 21–52)
MCH RBC QN AUTO: 31.7 PG (ref 24–34)
MCHC RBC AUTO-ENTMCNC: 33.5 G/DL (ref 31–37)
MCV RBC AUTO: 94.5 FL (ref 74–97)
MONOCYTES # BLD: 0.6 K/UL (ref 0.05–1.2)
MONOCYTES NFR BLD: 7 % (ref 3–10)
NEUTS SEG # BLD: 6.8 K/UL (ref 1.8–8)
NEUTS SEG NFR BLD: 82 % (ref 40–73)
PLATELET # BLD AUTO: 144 K/UL (ref 135–420)
PMV BLD AUTO: 9.9 FL (ref 9.2–11.8)
POTASSIUM SERPL-SCNC: 3.9 MMOL/L (ref 3.5–5.5)
Q-T INTERVAL, ECG07: 396 MS
QRS DURATION, ECG06: 150 MS
QTC CALCULATION (BEZET), ECG08: 462 MS
RBC # BLD AUTO: 4.36 M/UL (ref 4.2–5.3)
SODIUM SERPL-SCNC: 134 MMOL/L (ref 136–145)
TROPONIN I SERPL-MCNC: 0.03 NG/ML (ref 0–0.06)
VENTRICULAR RATE, ECG03: 82 BPM
WBC # BLD AUTO: 8.3 K/UL (ref 4.6–13.2)

## 2018-04-06 PROCEDURE — 94640 AIRWAY INHALATION TREATMENT: CPT

## 2018-04-06 PROCEDURE — 96374 THER/PROPH/DIAG INJ IV PUSH: CPT

## 2018-04-06 PROCEDURE — 74011000250 HC RX REV CODE- 250: Performed by: EMERGENCY MEDICINE

## 2018-04-06 PROCEDURE — 74011250636 HC RX REV CODE- 250/636: Performed by: EMERGENCY MEDICINE

## 2018-04-06 PROCEDURE — 94761 N-INVAS EAR/PLS OXIMETRY MLT: CPT

## 2018-04-06 RX ORDER — ALBUTEROL SULFATE 0.83 MG/ML
SOLUTION RESPIRATORY (INHALATION)
Status: DISCONTINUED
Start: 2018-04-06 | End: 2018-04-06 | Stop reason: HOSPADM

## 2018-04-06 RX ORDER — ALBUTEROL SULFATE 0.83 MG/ML
2.5 SOLUTION RESPIRATORY (INHALATION)
Status: COMPLETED | OUTPATIENT
Start: 2018-04-06 | End: 2018-04-06

## 2018-04-06 RX ORDER — FUROSEMIDE 10 MG/ML
20 INJECTION INTRAMUSCULAR; INTRAVENOUS
Status: COMPLETED | OUTPATIENT
Start: 2018-04-06 | End: 2018-04-06

## 2018-04-06 RX ORDER — FUROSEMIDE 10 MG/ML
INJECTION INTRAMUSCULAR; INTRAVENOUS
Status: DISCONTINUED
Start: 2018-04-06 | End: 2018-04-06 | Stop reason: HOSPADM

## 2018-04-06 RX ADMIN — ALBUTEROL SULFATE 2.5 MG: 2.5 SOLUTION RESPIRATORY (INHALATION) at 00:53

## 2018-04-06 RX ADMIN — FUROSEMIDE 20 MG: 10 INJECTION, SOLUTION INTRAMUSCULAR; INTRAVENOUS at 00:45

## 2018-04-06 NOTE — DISCHARGE INSTRUCTIONS
Learning About Heart Failure  What is heart failure? Heart failure means that your heart muscle does not pump as much blood as your body needs. Failure does not mean that your heart has stopped. It means that your heart is not pumping as well as it should. Your body has an amazing ability to make up for heart failure. It may do such a good job that you don't know you have a disease. But at some point, your heart and body will no longer be able to keep up. Then fluid starts to build up in your lungs and other parts of your body. What can you expect when you have heart failure? Heart failure is a lifelong (chronic) disease. Treatment may be able to slow the disease and help you feel better. But heart failure tends to get worse over time. Despite this, there are many steps you can take to feel better and stay healthy longer. Early on, your symptoms may not be too bad. As heart failure gets worse, symptoms typically get worse, and you may need to limit your activities. Heart failure can also get worse suddenly. If this happens, you need emergency care. Then, after treatment, your symptoms may go back to being stable (which means they stay the same) for a long time. Heart failure can lead to other health problems, such as heart rhythm problems. Over time, your treatment options may change, especially as your symptoms get worse. As heart failure gets worse, palliative care can help improve the quality of your life. You can do advance care planning todecide what kind of care you want at the end of your life. What are the symptoms? Symptoms of heart failure start to happen when your heart can't pump enough blood to the rest of your body. In the early stages of heart failure, you may:  · Feel tired easily. · Be short of breath when you exert yourself. · Feel like your heart is pounding or racing (palpitations). · Feel weak or dizzy.   As heart failure gets worse, fluid starts to build up in your lungs and other parts of your body. This may cause you to:  · Feel short of breath even at rest.  · Have swelling (edema), especially in your legs, ankles, and feet. · Gain weight. This may happen over just a day or two, or more slowly. · Cough or wheeze, especially when you lie down. How is heart failure treated? · You'll probably take several medicines. · You might attend cardiac rehabilitation (rehab) to get education and support that help you make lifestyle changes and stay as healthy as possible. · You may get a heart device. A pacemaker helps your heart pump blood. An ICD can stop abnormal heart rhythms. How can you care for yourself? There are many steps you can take to feel better and stay healthy longer. These steps are an important part of treatment. They can help you stay active and enjoy life. · Take your medicine the right way. Avoid medicines that can make your symptoms worse. · Check your weight and symptoms every day. Know what to do if your symptoms get worse. · Limit sodium. This helps keep fluid from building up. It may help you feel better. · Be active. Exercise regularly, but don't exercise too hard. · Be heart-healthy. Eat healthy foods, stay at a healthy weight, limit alcohol, and don't smoke. · Stay as healthy as possible. Avoid colds and flu, get help for depression and anxiety, and manage stress. Follow-up care is a key part of your treatment and safety. Be sure to make and go to all appointments, and call your doctor if you are having problems. It's also a good idea to know your test results and keep a list of the medicines you take. Where can you learn more? Go to http://jose alejandro-lizzy.info/. Enter B724 in the search box to learn more about \"Learning About Heart Failure. \"  Current as of: September 21, 2016  Content Version: 11.4  © 9286-2511 [employer].  Care instructions adapted under license by [employer] (which disclaims liability or warranty for this information). If you have questions about a medical condition or this instruction, always ask your healthcare professional. [place] [address] any warranty or liability for your use of this information.

## 2018-04-06 NOTE — ED PROVIDER NOTES
EMERGENCY DEPARTMENT HISTORY AND PHYSICAL EXAM    11:29 PM      Date: 4/5/2018  Patient Name: Sharmin Grimaldo    History of Presenting Illness     Chief Complaint   Patient presents with    Shortness of Breath         History Provided By: Patient and Family Member    Chief Complaint: Chest Pain  Duration:  Hours  Timing:  Acute  Location: Chest  Quality: Aching and Tightness  Severity: Moderate  Modifying Factors:   Associated Symptoms: SOB, Dizziness, FAtigue, Ear pain w/ rad to jaw      Additional History (Context): Sharmin Grimaldo is a 75 y.o. female with history of DM Type II, HTN, HLD, Atrial Fib, COPD, CHF and Hypothyroidism  who presents to the ED c/o Chest Pain. Pt reports that she has bee having a \"pain and tightness\" in the back of her chest since yesterday. Pt also c/o SOB, dizziness, fatigue and ear pain w/ radiation into jaw. Family member at bedside reports that pt has hx of similar sx in the past that was dx as MI and was told to bring pt to ED if she ever had similar sx in the future. Pt notes that she has a pacemaker and is followed closely by cardiology. Pt is current smoker. Pt denies any other acute sx at this time. PCP: Tayo Ross MD    Current Outpatient Prescriptions   Medication Sig Dispense Refill    carvedilol (COREG) 3.125 mg tablet Take 1 Tab by mouth two (2) times daily (with meals). 60 Tab 2    aspirin 81 mg chewable tablet Take 1 Tab by mouth daily. Indications: myocardial infarction prevention 100 Tab 0    XARELTO 20 mg tab tablet TAKE ONE TABLET BY MOUTH ONCE DAILY WITH DINNER 30 Tab 6    naloxone 4 mg/actuation spry 4 mg by Nasal route as needed for up to 2 doses. Indications: OPIOID TOXICITY 1 Box 1    atorvastatin (LIPITOR) 20 mg tablet Take  by mouth daily.  naloxegol (MOVANTIK) 25 mg tab tablet Take 25 mg by mouth daily.  insulin lispro (HUMALOG) 100 unit/mL injection by SubCUTAneous route.       insulin glargine (LANTUS) 100 unit/mL injection by SubCUTAneous route nightly.  ipratropium-albuterol (COMBIVENT RESPIMAT)  mcg/actuation inhaler Take 1 Puff by inhalation every six (6) hours as needed for Wheezing or Shortness of Breath. 1 Inhaler 1    pyridoxine (VITAMIN B-6) 100 mg tablet Take 100 mg by mouth daily.  fish oil-dha-epa 1,200-144-216 mg cap Take 1 Cap by mouth daily.  levothyroxine (SYNTHROID) 125 mcg tablet Take 125 mcg by mouth Daily (before breakfast).  tiotropium (SPIRIVA WITH HANDIHALER) 18 mcg inhalation capsule Take 1 Cap by inhalation daily. 30 Cap 11    cholecalciferol, vitamin d3, (VITAMIN D) 1,000 unit tablet Take 5,000 Units by mouth daily. Past History     Past Medical History:  Past Medical History:   Diagnosis Date    Abnormal myocardial perfusion study 05/07/2010    Anterior ischemia c/w at least 1-vessel CAD. No WMA. EF 51%. Positive EKG at 78% pred max HR. Ex time 7 min 30 sec.  Allergic rhinitis 1/30/2010    Atrial fibrillation (HCC)     Broken ankle 08/22/2013    left    Carotid stenosis 1/30/2010    Chronic diastolic congestive heart failure ([facility] [address] 2/16/2018 2/18 NYHA2    COPD     Essential hypertension     Heart failure ([facility] [address]     History of amiodarone therapy 5/11/2015    History of cardiac cath 05/25/2010    Patent coronary arteries. LVEDP 13 mmHg. EF 65%.  History of cervical cancer 11/05    History of echocardiogram 08/21/2013    EF 50-55%. No RWMA. Gr 2 DDfx. Mild LAE. Mild MR.      Hypertension     Migraines 1/30/2010    Mitral regurgitation     Orthostatic dizziness 3/30/2016    ? autonomic dysfunction Vs amio side effect     Pacemaker 11/25/13    DDD    Pure hypercholesterolemia 1/30/2010    Rosacea 1/30/2010    Type II or unspecified type diabetes mellitus without mention of complication, not stated as uncontrolled 1/30/2010    Unspecified hypothyroidism 1/30/2010       Past Surgical History:  Past Surgical History:   Procedure Laterality Date    HX AFIB ABLATION  2/8/13    Dr Boo Dunbar  11/06    right Dr. Bang Shane  5/06    HX ORTHOPAEDIC Right 07/11/2016    knee replacement    HX PACEMAKER  11/25/13    DDD    HX SVT ABLATION  2/18/2013       Family History:  Family History   Problem Relation Age of Onset    Hypertension Mother     Cancer Maternal Aunt      breast CA 68yo    Breast Cancer Maternal Aunt 79    Diabetes Maternal Grandmother     Cancer Maternal Aunt      uterine CA    Diabetes Daughter     Other Daughter      lupus (SLE) and coagulopathy    Breast Cancer Paternal Aunt 61    Heart Attack Sister     Heart Disease Neg Hx        Social History:  Social History   Substance Use Topics    Smoking status: Current Some Day Smoker     Packs/day: 0.50     Years: 42.00     Last attempt to quit: 5/7/2013    Smokeless tobacco: Never Used      Comment: 2 per day     Alcohol use No       Allergies:  No Known Allergies      Review of Systems     Review of Systems   Constitutional: Positive for fatigue. HENT: Positive for ear pain (R Ear w/ rad into jaw). Eyes: Negative. Respiratory: Positive for chest tightness and shortness of breath. Cardiovascular: Positive for chest pain. Gastrointestinal: Negative. Endocrine: Negative. Genitourinary: Negative. Musculoskeletal: Negative. Skin: Negative. Allergic/Immunologic: Negative. Neurological: Positive for dizziness. Hematological: Negative. Psychiatric/Behavioral: Negative. All other systems reviewed and are negative. Physical Exam     Visit Vitals    /76    Pulse 80    Temp 98.2 °F (36.8 °C)    Resp 18    Ht 5' 4\" (1.626 m)    Wt 61.2 kg (135 lb)    SpO2 94%    BMI 23.17 kg/m2       Physical Exam   Constitutional: She is oriented to person, place, and time. She appears well-developed and well-nourished. No distress. HENT:   Head: Normocephalic.    Right Ear: External ear normal.   Left Ear: External ear normal.   Mouth/Throat: No oropharyngeal exudate. Bilateral TM's - no erythema, (+) mild dullness, no bulghing   Eyes: Conjunctivae and EOM are normal. Pupils are equal, round, and reactive to light. Right eye exhibits no discharge. Left eye exhibits no discharge. No scleral icterus. Neck: Normal range of motion. Neck supple. No JVD present. No tracheal deviation present. No thyromegaly present. Cardiovascular: Normal rate, regular rhythm, normal heart sounds and intact distal pulses. Exam reveals no gallop and no friction rub. No murmur heard. Pulmonary/Chest: Effort normal and breath sounds normal. No stridor. No respiratory distress. She has no wheezes. She has no rales. She exhibits no tenderness. Abdominal: Soft. Bowel sounds are normal. She exhibits no distension and no mass. There is no tenderness. There is no rebound and no guarding. Musculoskeletal: Normal range of motion. She exhibits no edema or tenderness. Lymphadenopathy:     She has no cervical adenopathy. Neurological: She is alert and oriented to person, place, and time. She displays normal reflexes. No cranial nerve deficit. She exhibits normal muscle tone. Coordination normal.   Skin: Skin is warm and dry. No rash noted. She is not diaphoretic. No erythema. No pallor. Nursing note and vitals reviewed. Diagnostic Study Results     Labs -  Recent Results (from the past 12 hour(s))   EKG, 12 LEAD, INITIAL    Collection Time: 04/05/18 11:21 PM   Result Value Ref Range    Ventricular Rate 82 BPM    Atrial Rate 75 BPM    QRS Duration 150 ms    Q-T Interval 396 ms    QTC Calculation (Bezet) 462 ms    Calculated R Axis -79 degrees    Calculated T Axis 103 degrees    Diagnosis       Electronic ventricular pacemaker  When compared with ECG of 21-AUG-2017 14:26,  Vent.  rate has increased BY   2 BPM     CBC WITH AUTOMATED DIFF    Collection Time: 04/05/18 11:29 PM   Result Value Ref Range    WBC 8.3 4.6 - 13.2 K/uL    RBC 4.36 4.20 - 5.30 M/uL    HGB 13.8 12.0 - 16.0 g/dL    HCT 41.2 35.0 - 45.0 %    MCV 94.5 74.0 - 97.0 FL    MCH 31.7 24.0 - 34.0 PG    MCHC 33.5 31.0 - 37.0 g/dL    RDW 12.8 11.6 - 14.5 %    PLATELET 640 936 - 011 K/uL    MPV 9.9 9.2 - 11.8 FL    NEUTROPHILS 82 (H) 40 - 73 %    LYMPHOCYTES 10 (L) 21 - 52 %    MONOCYTES 7 3 - 10 %    EOSINOPHILS 1 0 - 5 %    BASOPHILS 0 0 - 2 %    ABS. NEUTROPHILS 6.8 1.8 - 8.0 K/UL    ABS. LYMPHOCYTES 0.8 (L) 0.9 - 3.6 K/UL    ABS. MONOCYTES 0.6 0.05 - 1.2 K/UL    ABS. EOSINOPHILS 0.1 0.0 - 0.4 K/UL    ABS. BASOPHILS 0.0 0.0 - 0.06 K/UL    DF AUTOMATED     METABOLIC PANEL, BASIC    Collection Time: 04/05/18 11:29 PM   Result Value Ref Range    Sodium 134 (L) 136 - 145 mmol/L    Potassium 3.9 3.5 - 5.5 mmol/L    Chloride 97 (L) 100 - 108 mmol/L    CO2 27 21 - 32 mmol/L    Anion gap 10 3.0 - 18 mmol/L    Glucose 167 (H) 74 - 99 mg/dL    BUN 17 7.0 - 18 MG/DL    Creatinine 0.89 0.6 - 1.3 MG/DL    BUN/Creatinine ratio 19 12 - 20      GFR est AA >60 >60 ml/min/1.73m2    GFR est non-AA >60 >60 ml/min/1.73m2    Calcium 8.8 8.5 - 10.1 MG/DL   CARDIAC PANEL,(CK, CKMB & TROPONIN)    Collection Time: 04/05/18 11:29 PM   Result Value Ref Range     26 - 192 U/L    CK - MB 1.8 <3.6 ng/ml    CK-MB Index 1.6 0.0 - 4.0 %    Troponin-I, Qt. 0.03 0.00 - 0.06 NG/ML   NT-PRO BNP    Collection Time: 04/05/18 11:29 PM   Result Value Ref Range    NT pro-BNP 4090 (H) 0 - 900 PG/ML       Radiologic Studies -   XR CHEST PORT    (Results Pending)         Medical Decision Making   I am the first provider for this patient. I reviewed the vital signs, available nursing notes, past medical history, past surgical history, family history and social history. Vital Signs-Reviewed the patient's vital signs. Pulse Oximetry Analysis -  98% on room air (Normal)    EKG: Interpreted by the EP.    Time Interpreted: 23:23:37   Rate: 81   Rhythm: Paced    Interpretation: Pacemaker Pace   Comparison: N/A    Records Reviewed: Nursing Notes and Old Medical Records (Time of Review: 11:29 PM)    ED Course: Progress Notes, Reevaluation, and Consults:  1:46 AM Pt states feeling better s/p being diuresed. Pt denies current SOB. Will keep her appointment with Dr. Mendel Bast for tomorrow. Ready for d/c at this time. Provider Notes (Medical Decision Making): CHF, pneumonia, bronchitis, URI      Diagnosis     Clinical Impression:   1. Acute systolic congestive heart failure (HCC)        Disposition: DISCHARGE    Follow-up Information     None           Patient's Medications   Start Taking    No medications on file   Continue Taking    ASPIRIN 81 MG CHEWABLE TABLET    Take 1 Tab by mouth daily. Indications: myocardial infarction prevention    ATORVASTATIN (LIPITOR) 20 MG TABLET    Take  by mouth daily. CARVEDILOL (COREG) 3.125 MG TABLET    Take 1 Tab by mouth two (2) times daily (with meals). CHOLECALCIFEROL, VITAMIN D3, (VITAMIN D) 1,000 UNIT TABLET    Take 5,000 Units by mouth daily. FISH OIL-DHA-EPA 1,200-144-216 MG CAP    Take 1 Cap by mouth daily. INSULIN GLARGINE (LANTUS) 100 UNIT/ML INJECTION    by SubCUTAneous route nightly. INSULIN LISPRO (HUMALOG) 100 UNIT/ML INJECTION    by SubCUTAneous route. IPRATROPIUM-ALBUTEROL (COMBIVENT RESPIMAT)  MCG/ACTUATION INHALER    Take 1 Puff by inhalation every six (6) hours as needed for Wheezing or Shortness of Breath. LEVOTHYROXINE (SYNTHROID) 125 MCG TABLET    Take 125 mcg by mouth Daily (before breakfast). NALOXEGOL (MOVANTIK) 25 MG TAB TABLET    Take 25 mg by mouth daily. NALOXONE 4 MG/ACTUATION SPRY    4 mg by Nasal route as needed for up to 2 doses. Indications: OPIOID TOXICITY    PYRIDOXINE (VITAMIN B-6) 100 MG TABLET    Take 100 mg by mouth daily. TIOTROPIUM (SPIRIVA WITH HANDIHALER) 18 MCG INHALATION CAPSULE    Take 1 Cap by inhalation daily.     XARELTO 20 MG TAB TABLET    TAKE ONE TABLET BY MOUTH ONCE DAILY WITH DINNER   These Medications have changed    No medications on file   Stop Taking    No medications on file     _______________________________    Attestations:  [address] [facility] acting as a scribe for and in the presence of Linda Chavira MD      April 05, 2018 at 11:29 PM       Provider Attestation:      I personally performed the services described in the documentation, reviewed the documentation, as recorded by the scribe in my presence, and it accurately and completely records my words and actions.  April 05, 2018 at 11:29 PM - Linda Chavira MD    _______________________________

## 2018-04-09 ENCOUNTER — OFFICE VISIT (OUTPATIENT)
Dept: CARDIOLOGY CLINIC | Age: 68
End: 2018-04-09

## 2018-04-09 VITALS
DIASTOLIC BLOOD PRESSURE: 53 MMHG | BODY MASS INDEX: 22.88 KG/M2 | HEART RATE: 87 BPM | HEIGHT: 64 IN | WEIGHT: 134 LBS | SYSTOLIC BLOOD PRESSURE: 105 MMHG

## 2018-04-09 DIAGNOSIS — I25.10 CORONARY ARTERY DISEASE INVOLVING NATIVE CORONARY ARTERY OF NATIVE HEART WITHOUT ANGINA PECTORIS: ICD-10-CM

## 2018-04-09 DIAGNOSIS — I48.19 PERSISTENT ATRIAL FIBRILLATION (HCC): ICD-10-CM

## 2018-04-09 DIAGNOSIS — I25.5 CARDIOMYOPATHY, ISCHEMIC: ICD-10-CM

## 2018-04-09 DIAGNOSIS — R07.89 OTHER CHEST PAIN: Primary | ICD-10-CM

## 2018-04-09 DIAGNOSIS — I50.21 ACUTE SYSTOLIC CONGESTIVE HEART FAILURE (HCC): ICD-10-CM

## 2018-04-09 DIAGNOSIS — E78.00 PURE HYPERCHOLESTEROLEMIA: ICD-10-CM

## 2018-04-09 RX ORDER — CARVEDILOL 6.25 MG/1
6.25 TABLET ORAL 2 TIMES DAILY WITH MEALS
Qty: 60 TAB | Refills: 1 | Status: SHIPPED | OUTPATIENT
Start: 2018-04-09 | End: 2018-04-20 | Stop reason: SDUPTHER

## 2018-04-09 RX ORDER — OMEPRAZOLE 20 MG/1
20 CAPSULE, DELAYED RELEASE ORAL DAILY
Qty: 30 CAP | Refills: 1 | Status: SHIPPED | OUTPATIENT
Start: 2018-04-09 | End: 2018-05-25 | Stop reason: SDUPTHER

## 2018-04-09 RX ORDER — FUROSEMIDE 20 MG/1
20 TABLET ORAL
Qty: 30 TAB | Refills: 0 | Status: SHIPPED | OUTPATIENT
Start: 2018-04-09 | End: 2018-07-03 | Stop reason: SDUPTHER

## 2018-04-09 NOTE — MR AVS SNAPSHOT
Clarissa Romero 
 
 
 [address], [address] [facility] [address] 
[phone] Patient: Agnes Campo MRN: [id number] QTF:4/1/[birth year]191 Visit Information Date & Time Provider Department Dept. Phone Encounter #  
 4/9/2018  9:45 AM Marge Hoffman MD [facility] [address] [place] [address] [id number] Follow-up Instructions Return in about 6 weeks (around 5/21/2018), or if symptoms worsen or fail to improve. Your Appointments 5/25/2018  9:15 AM  
Office Visit with Marge Hoffman MD  
[facility]) Appt Note: 6 weeks [address] [facility], [address] [facility] [address]  
[address], [address] [place] [address]  35 [facility] 7/16/2018 10:45 AM  
PROCEDURE with Marge Hoffman MD  
[facility]) Appt Note: medtronic ck [address] [facility], [address] [facility] [address]  
[address], [address] [address]  
  
    
 3/28/2019  8:00 AM  
PROCEDURE with [facility] IMAGING 2 [facility] ([address]) Appt Note: asim sonny stents camach 1 year 2300 [facility] 224 200 [facility]  
[phone] [address] [place] [address]  
  
    
 3/28/2019  9:00 AM  
PROCEDURE with [facility] IMAGING 2 [facility] ([address]) Appt Note: CV PUGA [address] [place] [address] 25 430 200 [facility] [address]  
[phone]  
  
    
 3/28/2019 10:00 AM  
PROCEDURE with [facility] NONIMAGING [facility] ([address]) Appt Note: leg art puga 1 year 2300 [facility] 163 200 [facility]  
[phone]  [address] [place] [address]  
  
    
 4/10/2019  1:45 PM  
Follow Up with Binu Reynolds MD  
 [facility] ([facility]) Appt Note: 1 year follow up after studies with prep 2300 [facility] Jeri Necessary 711 200 [facility] [address]  
[phone] 2300 [facility] Nicolás lei, [place] 200 [facility] [address] Upcoming Health Maintenance Date Due Hepatitis C Screening 1950 FOOT EXAM Q1 5/1/1959 EYE EXAM RETINAL OR DILATED Q1 5/1/1959 FOBT Q 1 YEAR AGE 50-75 5/1/1999 DTaP/Tdap/Td series (1 - Tdap) 10/2/2005 ZOSTER VACCINE AGE 60> 3/1/2009 GLAUCOMA SCREENING Q2Y 5/1/2014 Bone Densitometry (Dexa) Screening 5/1/2014 Pneumococcal 65+ Low/Medium Risk (2 of 2 - PPSV23) 5/1/2014 BREAST CANCER SCRN MAMMOGRAM 7/25/2015 MICROALBUMIN Q1 1/8/2016 HEMOGLOBIN A1C Q6M 7/10/2016 LIPID PANEL Q1 9/29/2016 Influenza Age 5 to Adult 8/1/2017 MEDICARE YEARLY EXAM 3/14/2018 Allergies as of 4/9/2018  Review Complete On: 4/9/2018 By: Charles Huynh MD  
 No Known Allergies Current Immunizations  Reviewed on 2/9/2011 Name Date  
 TD Vaccine 10/1/2005 ZZZ-RETIRED (DO NOT USE) Pneumococcal Vaccine (Unspecified Type) 12/14/2005 Not reviewed this visit You Were Diagnosed With   
  
 Codes Comments Other chest pain    -  Primary ICD-10-CM: R07.89 ICD-9-CM: 786.59 4/18 new, likely GERD as or tender hepatomegaly with CHF; me dRx for CAD as fixed defect 
try PPI Coronary artery disease involving native coronary artery of native heart without angina pectoris     ICD-10-CM: I25.10 ICD-9-CM: 414.01 3/18 large fixed LAD defect, 40%EF;  
  
 Persistent atrial fibrillation (HCC)     ICD-10-CM: I48.1 ICD-9-CM: 427.31 Permanent; 3/18; 2/18; 8/17 on xarelto;   
 Coronary artery disease involving native coronary artery of native heart without angina pectoris     ICD-10-CM: I25.10 ICD-9-CM: 414.01 3/18 large fixed LAD defect, 40%EF;   
 Cardiomyopathy, ischemic     ICD-10-CM: I25.5 ICD-9-CM: 414.8 3/18 no meds- start low dose coreg for CMP;  
 Pure hypercholesterolemia     ICD-10-CM: E78.00 ICD-9-CM: 272.0 on lipitor; 9/15 LDL46 Acute systolic congestive heart failure (HCC)     ICD-10-CM: I50.21 ICD-9-CM: 428.21, 428.0 4/18 new, likely she was told of low EF and MI- med Rx for now Vitals BP Pulse Height(growth percentile) Weight(growth percentile) BMI OB Status 105/53 87 5' 4\" (1.626 m) 134 lb (60.8 kg) 23 kg/m2 Hysterectomy Smoking Status Former Smoker Vitals History BMI and BSA Data Body Mass Index Body Surface Area  
 23 kg/m 2 1.66 m 2 Preferred Pharmacy Pharmacy Name Phone [address] [place] [address]. [phone] Your Updated Medication List  
  
   
This list is accurate as of 4/9/18 10:45 AM.  Always use your most recent med list.  
  
  
  
  
 aspirin 81 mg chewable tablet Take 1 Tab by mouth daily. Indications: myocardial infarction prevention  
  
 carvedilol 6.25 mg tablet Commonly known as:  Glynn Lacrosse Take 1 Tab by mouth two (2) times daily (with meals). fish oil-dha-epa 1,200-144-216 mg Cap Take 1 Cap by mouth daily. furosemide 20 mg tablet Commonly known as:  LASIX Take 1 Tab by mouth daily as needed. HumaLOG U-100 Insulin 100 unit/mL injection Generic drug:  insulin lispro  
by SubCUTAneous route. ipratropium-albuterol  mcg/actuation inhaler Commonly known as:  Ivon Kin Take 1 Puff by inhalation every six (6) hours as needed for Wheezing or Shortness of Breath. LANTUS U-100 INSULIN 100 unit/mL injection Generic drug:  insulin glargine  
by SubCUTAneous route nightly. LIPITOR 20 mg tablet Generic drug:  atorvastatin Take  by mouth daily. naloxegol 25 mg Tab tablet Commonly known as:  Lizeth Chew Take 25 mg by mouth daily. naloxone 4 mg/actuation nasal spray Commonly known as:  NARCAN  
4 mg by Nasal route as needed for up to 2 doses. Indications: OPIOID TOXICITY  
  
 omeprazole 20 mg capsule Commonly known as:  PRILOSEC Take 1 Cap by mouth daily. SYNTHROID 125 mcg tablet Generic drug:  levothyroxine Take 125 mcg by mouth Daily (before breakfast). tiotropium 18 mcg inhalation capsule Commonly known as:  [address] Take 1 Cap by inhalation daily. VITAMIN B-6 100 mg tablet Generic drug:  pyridoxine (vitamin B6) Take 100 mg by mouth daily. VITAMIN D3 1,000 unit tablet Generic drug:  cholecalciferol Take 5,000 Units by mouth daily. XARELTO 20 mg Tab tablet Generic drug:  rivaroxaban TAKE ONE TABLET BY MOUTH ONCE DAILY WITH DINNER Prescriptions Sent to Pharmacy Refills  
 carvedilol (COREG) 6.25 mg tablet 1 Sig: Take 1 Tab by mouth two (2) times daily (with meals). Class: Normal  
 Pharmacy: [facility] [address] [place] [address]. Ph #: [phone] Route: Oral  
 furosemide (LASIX) 20 mg tablet 0 Sig: Take 1 Tab by mouth daily as needed. Class: Normal  
 Pharmacy: [facility] [address] [address] [place] [address]. Ph #: [phone] Route: Oral  
 omeprazole (PRILOSEC) 20 mg capsule 1 Sig: Take 1 Cap by mouth daily. Class: Normal  
 Pharmacy: [facility] [address] [address] [place] [address]. Ph #: [phone] Route: Oral  
  
Follow-up Instructions Return in about 6 weeks (around 5/21/2018), or if symptoms worsen or fail to improve. Patient Instructions Medications Discontinued During This Encounter Medication Reason  carvedilol (COREG) 3.125 mg tablet Reorder Avoiding Triggers With Heart Failure: Care Instructions Your Care Instructions Triggers are anything that make your heart failure flare up. A flare-up is also called \"sudden heart failure\" or \"acute heart failure. \" When you have a flare-up, fluid builds up in your lungs, and you have problems breathing. You might need to go to the hospital. By watching for changes in your condition and avoiding triggers, you can prevent heart failure flare-ups. Follow-up care is a key part of your treatment and safety. Be sure to make and go to all appointments, and call your doctor if you are having problems. It's also a good idea to know your test results and keep a list of the medicines you take. How can you care for yourself at home? Watch for changes in your weight and condition · Weigh yourself without clothing at the same time each day. Record your weight. Call your doctor if you have sudden weight gain, such as more than 2 to 3 pounds in a day or 5 pounds in a week. (Your doctor may suggest a different range of weight gain.) A sudden weight gain may mean that your heart failure is getting worse. · Keep a daily record of your symptoms. Write down any changes in how you feel, such as new shortness of breath, cough, or problems eating. Also record if your ankles are more swollen than usual and if you feel more tired than usual. Note anything that you ate or did that could have triggered these changes. Limit sodium Sodium causes your body to hold on to extra water. This may cause your heart failure symptoms to get worse. People get most of their sodium from processed foods. Fast food and restaurant meals also tend to be very high in sodium. · Your doctor may suggest that you limit sodium to 2,000 milligrams (mg) a day or less. That is less than 1 teaspoon of salt a day, including all the salt you eat in cooking or in packaged foods. · Read food labels on cans and food packages. They tell you how much sodium you get in one serving. Check the serving size. If you eat more than one serving, you are getting more sodium.  
· Be aware that sodium can come in forms other than salt, including monosodium glutamate (MSG), sodium citrate, and sodium bicarbonate (baking soda). MSG is often added to Asian food. You can sometimes ask for food without MSG or salt. · Slowly reducing salt will help you adjust to the taste. Take the salt shaker off the table. · Flavor your food with garlic, lemon juice, onion, vinegar, herbs, and spices instead of salt. Do not use soy sauce, steak sauce, onion salt, garlic salt, mustard, or ketchup on your food, unless it is labeled \"low-sodium\" or \"low-salt. \" 
· Make your own salad dressings, sauces, and ketchup without adding salt. · Use fresh or frozen ingredients, instead of canned ones, whenever you can. Choose low-sodium canned goods. · Eat less processed food and food from restaurants, including fast food. Exercise as directed Moderate, regular exercise is very good for your heart. It improves your blood flow and helps control your weight. But too much exercise can stress your heart and cause a heart failure flare-up. · Check with your doctor before you start an exercise program. 
· Walking is an easy way to get exercise. Start out slowly. Gradually increase the length and pace of your walk. Swimming, riding a bike, and using a treadmill are also good forms of exercise. · When you exercise, watch for signs that your heart is working too hard. You are pushing yourself too hard if you cannot talk while you are exercising. If you become short of breath or dizzy or have chest pain, stop, sit down, and rest. 
· Do not exercise when you do not feel well. Take medicines correctly · Take your medicines exactly as prescribed. Call your doctor if you think you are having a problem with your medicine. · Make a list of all the medicines you take. Include those prescribed to you by other doctors and any over-the-counter medicines, vitamins, or supplements you take. Take this list with you when you go to any doctor. · Take your medicines at the same time every day. It may help you to post a list of all the medicines you take every day and what time of day you take them. · Make taking your medicine as simple as you can. Plan times to take your medicines when you are doing other things, such as eating a meal or getting ready for bed. This will make it easier to remember to take your medicines. · Get organized. Use helpful tools, such as daily or weekly pill containers. When should you call for help? Call 911 if you have symptoms of sudden heart failure such as: 
? · You have severe trouble breathing. ? · You cough up pink, foamy mucus. ? · You have a new irregular or rapid heartbeat. ?Call your doctor now or seek immediate medical care if: 
? · You have new or increased shortness of breath. ? · You are dizzy or lightheaded, or you feel like you may faint. ? · You have sudden weight gain, such as more than 2 to 3 pounds in a day or 5 pounds in a week. (Your doctor may suggest a different range of weight gain.) ? · You have increased swelling in your legs, ankles, or feet. ? · You are suddenly so tired or weak that you cannot do your usual activities. ? Watch closely for changes in your health, and be sure to contact your doctor if you develop new symptoms. Where can you learn more? Go to http://jose alejandro-lizzy.info/. Enter I252 in the search box to learn more about \"Avoiding Triggers With Heart Failure: Care Instructions. \" Current as of: September 21, 2016 Content Version: 11.4 © 8402-1775 [employer]. Care instructions adapted under license by [employer] (which disclaims liability or warranty for this information). If you have questions about a medical condition or this instruction, always ask your healthcare professional. [place] [address] any warranty or liability for your use of this information. Introducing [facility]! [employer] introduces Wisr patient portal. Now you can access parts of your medical record, email your doctor's office, and request medication refills online. 1. In your internet browser, go to [email or web address] 2. Click on the First Time User? Click Here link in the Sign In box. You will see the New Member Sign Up page. 3. Enter your Wisr Access Code exactly as it appears below. You will not need to use this code after youve completed the sign-up process. If you do not sign up before the expiration date, you must request a new code. · Wisr Access Code: [email or web address] Expires: 5/17/2018  1:12 PM 
 
4. Enter the last four digits of your Social Security Number (xxxx) and Date of Birth (mm/dd/yyyy) as indicated and click Submit. You will be taken to the next sign-up page. 5. Create a Wisr ID. This will be your Wisr login ID and cannot be changed, so think of one that is secure and easy to remember. 6. Create a Wisr password. You can change your password at any time. 7. Enter your Password Reset Question and Answer. This can be used at a later time if you forget your password. 8. Enter your e-mail address. You will receive e-mail notification when new information is available in [address] 9. Click Sign Up. You can now view and download portions of your medical record. 10. Click the Download Summary menu link to download a portable copy of your medical information. If you have questions, please visit the Frequently Asked Questions section of the Wisr website. Remember, Wisr is NOT to be used for urgent needs. For medical emergencies, dial 911. Now available from your iPhone and Android! Please provide this summary of care documentation to your next provider. Your primary care clinician is listed as Angela Enciso. If you have any questions after today's visit, please call [phone].

## 2018-04-09 NOTE — PATIENT INSTRUCTIONS
Medications Discontinued During This Encounter   Medication Reason    carvedilol (COREG) 3.125 mg tablet Reorder          Avoiding Triggers With Heart Failure: Care Instructions  Your Care Instructions    Triggers are anything that make your heart failure flare up. A flare-up is also called \"sudden heart failure\" or \"acute heart failure. \" When you have a flare-up, fluid builds up in your lungs, and you have problems breathing. You might need to go to the hospital. By watching for changes in your condition and avoiding triggers, you can prevent heart failure flare-ups. Follow-up care is a key part of your treatment and safety. Be sure to make and go to all appointments, and call your doctor if you are having problems. It's also a good idea to know your test results and keep a list of the medicines you take. How can you care for yourself at home? Watch for changes in your weight and condition  · Weigh yourself without clothing at the same time each day. Record your weight. Call your doctor if you have sudden weight gain, such as more than 2 to 3 pounds in a day or 5 pounds in a week. (Your doctor may suggest a different range of weight gain.) A sudden weight gain may mean that your heart failure is getting worse. · Keep a daily record of your symptoms. Write down any changes in how you feel, such as new shortness of breath, cough, or problems eating. Also record if your ankles are more swollen than usual and if you feel more tired than usual. Note anything that you ate or did that could have triggered these changes. Limit sodium  Sodium causes your body to hold on to extra water. This may cause your heart failure symptoms to get worse. People get most of their sodium from processed foods. Fast food and restaurant meals also tend to be very high in sodium. · Your doctor may suggest that you limit sodium to 2,000 milligrams (mg) a day or less.  That is less than 1 teaspoon of salt a day, including all the salt you eat in cooking or in packaged foods. · Read food labels on cans and food packages. They tell you how much sodium you get in one serving. Check the serving size. If you eat more than one serving, you are getting more sodium. · Be aware that sodium can come in forms other than salt, including monosodium glutamate (MSG), sodium citrate, and sodium bicarbonate (baking soda). MSG is often added to Asian food. You can sometimes ask for food without MSG or salt. · Slowly reducing salt will help you adjust to the taste. Take the salt shaker off the table. · Flavor your food with garlic, lemon juice, onion, vinegar, herbs, and spices instead of salt. Do not use soy sauce, steak sauce, onion salt, garlic salt, mustard, or ketchup on your food, unless it is labeled \"low-sodium\" or \"low-salt. \"  · Make your own salad dressings, sauces, and ketchup without adding salt. · Use fresh or frozen ingredients, instead of canned ones, whenever you can. Choose low-sodium canned goods. · Eat less processed food and food from restaurants, including fast food. Exercise as directed  Moderate, regular exercise is very good for your heart. It improves your blood flow and helps control your weight. But too much exercise can stress your heart and cause a heart failure flare-up. · Check with your doctor before you start an exercise program.  · Walking is an easy way to get exercise. Start out slowly. Gradually increase the length and pace of your walk. Swimming, riding a bike, and using a treadmill are also good forms of exercise. · When you exercise, watch for signs that your heart is working too hard. You are pushing yourself too hard if you cannot talk while you are exercising. If you become short of breath or dizzy or have chest pain, stop, sit down, and rest.  · Do not exercise when you do not feel well. Take medicines correctly  · Take your medicines exactly as prescribed.  Call your doctor if you think you are having a problem with your medicine. · Make a list of all the medicines you take. Include those prescribed to you by other doctors and any over-the-counter medicines, vitamins, or supplements you take. Take this list with you when you go to any doctor. · Take your medicines at the same time every day. It may help you to post a list of all the medicines you take every day and what time of day you take them. · Make taking your medicine as simple as you can. Plan times to take your medicines when you are doing other things, such as eating a meal or getting ready for bed. This will make it easier to remember to take your medicines. · Get organized. Use helpful tools, such as daily or weekly pill containers. When should you call for help? Call 911 if you have symptoms of sudden heart failure such as:  ? · You have severe trouble breathing. ? · You cough up pink, foamy mucus. ? · You have a new irregular or rapid heartbeat. ?Call your doctor now or seek immediate medical care if:  ? · You have new or increased shortness of breath. ? · You are dizzy or lightheaded, or you feel like you may faint. ? · You have sudden weight gain, such as more than 2 to 3 pounds in a day or 5 pounds in a week. (Your doctor may suggest a different range of weight gain.)   ? · You have increased swelling in your legs, ankles, or feet. ? · You are suddenly so tired or weak that you cannot do your usual activities. ? Watch closely for changes in your health, and be sure to contact your doctor if you develop new symptoms. Where can you learn more? Go to http://jose alejandro-lizzy.info/. Enter T929 in the search box to learn more about \"Avoiding Triggers With Heart Failure: Care Instructions. \"  Current as of: September 21, 2016  Content Version: 11.4  © 6235-5740 [employer], [employer]. Care instructions adapted under license by [employer] (which disclaims liability or warranty for this information).  If you have questions about a medical condition or this instruction, always ask your healthcare professional. [place] [address] any warranty or liability for your use of this information.

## 2018-04-09 NOTE — LETTER
[address] 1950 4/9/2018 Dear Zak Prado MD 
 
I had the pleasure of evaluating  Ms. Yoo in office today. Below are the relevant portions of my assessment and plan of care. ICD-10-CM ICD-9-CM 1. Other chest pain R07.89 786.59 omeprazole (PRILOSEC) 20 mg capsule 4/18 new, likely GERD as or tender hepatomegaly with CHF; me dRx for CAD as fixed defect 
try PPI 2. Coronary artery disease involving native coronary artery of native heart without angina pectoris I25.10 414.01 carvedilol (COREG) 6.25 mg tablet 3/18 large fixed LAD defect, 40%EF;  
  
3. Persistent atrial fibrillation (HCC) I48.1 427.31 Permanent; 3/18; 2/18; 8/17 on xarelto; 4. Coronary artery disease involving native coronary artery of native heart without angina pectoris I25.10 414.01 carvedilol (COREG) 6.25 mg tablet 3/18 large fixed LAD defect, 40%EF;   
5. Cardiomyopathy, ischemic I25.5 414.8 carvedilol (COREG) 6.25 mg tablet 3/18 no meds- start low dose coreg for CMP;  
6. Pure hypercholesterolemia E78.00 272.0   
 on lipitor; 9/15 LDL46 
  
7. Acute systolic congestive heart failure (HCC) I50.21 428.21 carvedilol (COREG) 6.25 mg tablet 428.0 furosemide (LASIX) 20 mg tablet 4/18 new, likely she was told of low EF and MI- med Rx for now Current Outpatient Prescriptions Medication Sig Dispense Refill  carvedilol (COREG) 6.25 mg tablet Take 1 Tab by mouth two (2) times daily (with meals). 60 Tab 1  
 furosemide (LASIX) 20 mg tablet Take 1 Tab by mouth daily as needed. 30 Tab 0  
 omeprazole (PRILOSEC) 20 mg capsule Take 1 Cap by mouth daily. 30 Cap 1  
 aspirin 81 mg chewable tablet Take 1 Tab by mouth daily. Indications: myocardial infarction prevention 100 Tab 0  XARELTO 20 mg tab tablet TAKE ONE TABLET BY MOUTH ONCE DAILY WITH DINNER 30 Tab 6  
 naloxone 4 mg/actuation spry 4 mg by Nasal route as needed for up to 2 doses. Indications: OPIOID TOXICITY 1 Box 1  
 atorvastatin (LIPITOR) 20 mg tablet Take  by mouth daily.  naloxegol (MOVANTIK) 25 mg tab tablet Take 25 mg by mouth daily.  insulin lispro (HUMALOG) 100 unit/mL injection by SubCUTAneous route.  insulin glargine (LANTUS) 100 unit/mL injection by SubCUTAneous route nightly.  ipratropium-albuterol (COMBIVENT RESPIMAT)  mcg/actuation inhaler Take 1 Puff by inhalation every six (6) hours as needed for Wheezing or Shortness of Breath. 1 Inhaler 1  pyridoxine (VITAMIN B-6) 100 mg tablet Take 100 mg by mouth daily.  fish oil-dha-epa 1,200-144-216 mg cap Take 1 Cap by mouth daily.  levothyroxine (SYNTHROID) 125 mcg tablet Take 125 mcg by mouth Daily (before breakfast).  tiotropium (SPIRIVA WITH HANDIHALER) 18 mcg inhalation capsule Take 1 Cap by inhalation daily. 30 Cap 11  
 cholecalciferol, vitamin d3, (VITAMIN D) 1,000 unit tablet Take 5,000 Units by mouth daily. Orders Placed This Encounter  carvedilol (COREG) 6.25 mg tablet Sig: Take 1 Tab by mouth two (2) times daily (with meals). Dispense:  60 Tab Refill:  1  
 furosemide (LASIX) 20 mg tablet Sig: Take 1 Tab by mouth daily as needed. Dispense:  30 Tab Refill:  0  
 omeprazole (PRILOSEC) 20 mg capsule Sig: Take 1 Cap by mouth daily. Dispense:  30 Cap Refill:  1 If you have questions, please do not hesitate to call me. I look forward to following Ms. Yoo along with you. Sincerely, Pippa Reeder MD

## 2018-04-09 NOTE — PROGRESS NOTES
HISTORY OF PRESENT ILLNESS  Sheri Dumont is a 75 y.o. female. HPI Comments: F/u AFib, HTN, CHF, MR, HLD, s/p DDD    3/18 seen after low EF, wma and stress test today  7/17 continues with dizziness, rare LOC/fall- last in 6/17 1/17 has a cold for 6 wks    Hospital Follow Up   The history is provided by the patient and medical records (f/u of chf - er visit). Associated symptoms include chest pain and shortness of breath. Pertinent negatives include no abdominal pain and no headaches. Palpitations    The history is provided by the medical records. This is a recurrent problem. The current episode started more than 1 week ago. The problem has been gradually worsening (few days). The problem occurs rarely (off and on). Episode Length: permanent AF on pacer check. Associated symptoms include chest pain, irregular heartbeat, dizziness and shortness of breath. Pertinent negatives include no diaphoresis, no fever, no claudication, no orthopnea, no PND, no syncope, no abdominal pain, no nausea, no vomiting, no headaches, no weakness, no cough, no hemoptysis and no sputum production. Risk factors include diabetes mellitus, hypertension, smoking/tobacco exposure and dyslipidemia. Her past medical history is significant for DM, hypertension and atrial fibrillation. Hypertension   The history is provided by the medical records. This is a chronic problem. Associated symptoms include chest pain and shortness of breath. Pertinent negatives include no abdominal pain and no headaches. Cholesterol Problem   The history is provided by the medical records. This is a chronic problem. Associated symptoms include chest pain and shortness of breath. Pertinent negatives include no abdominal pain and no headaches. CHF   The history is provided by the medical records. This is a chronic problem. Associated symptoms include chest pain and shortness of breath. Pertinent negatives include no abdominal pain and no headaches. Dizziness   The history is provided by the patient. This is a new problem. The current episode started more than 1 week ago. The problem occurs every several days (3/wk; 5-10 mts; 6/17 LOC for few seconds). The problem has not changed since onset. Associated symptoms include chest pain and shortness of breath. Pertinent negatives include no abdominal pain and no headaches. The symptoms are aggravated by twisting (looking/reaching up). The symptoms are relieved by rest and laying down. Shortness of Breath   The history is provided by the patient. This is a chronic problem. The problem occurs intermittently. The current episode started more than 1 week ago. The problem has been gradually worsening (few days). Associated symptoms include chest pain. Pertinent negatives include no fever, no headaches, no ear pain, no neck pain, no cough, no sputum production, no hemoptysis, no wheezing, no PND, no orthopnea, no syncope, no vomiting, no abdominal pain, no rash, no leg swelling and no claudication. The problem's precipitants include exercise (2 blocks; 4/18 within house). Chest Pain (Angina)    The history is provided by the patient. This is a new problem. The current episode started 2 days ago (was in ER). The problem occurs daily (many). The pain is present in the epigastric region. The pain does not radiate. Associated symptoms include dizziness, irregular heartbeat, palpitations and shortness of breath. Pertinent negatives include no abdominal pain, no claudication, no cough, no diaphoresis, no fever, no headaches, no hemoptysis, no nausea, no orthopnea, no PND, no sputum production, no syncope, no vomiting and no weakness. Her past medical history is significant for DM. Review of Systems   Constitutional: Negative for chills, diaphoresis, fever and weight loss. HENT: Negative for ear pain and hearing loss. Eyes: Negative for blurred vision. Respiratory: Positive for shortness of breath.  Negative for cough, hemoptysis, sputum production, wheezing and stridor. Cardiovascular: Positive for chest pain and palpitations. Negative for orthopnea, claudication, leg swelling, syncope and PND. Gastrointestinal: Negative for abdominal pain, heartburn, nausea and vomiting. Musculoskeletal: Negative for myalgias and neck pain. Skin: Negative for rash. Neurological: Positive for dizziness. Negative for tingling, tremors, focal weakness, loss of consciousness, weakness and headaches. Psychiatric/Behavioral: Negative for depression and suicidal ideas. No Known Allergies    Past Medical History:   Diagnosis Date    Abnormal myocardial perfusion study 05/07/2010    Anterior ischemia c/w at least 1-vessel CAD. No WMA. EF 51%. Positive EKG at 78% pred max HR. Ex time 7 min 30 sec.  Allergic rhinitis 1/30/2010    Atrial fibrillation (HCC)     Broken ankle 08/22/2013    left    Carotid stenosis 1/30/2010    Chronic diastolic congestive heart failure ([facility] [address] 2/16/2018 2/18 NYHA2    COPD     Essential hypertension     Heart failure ([facility] [address]     History of amiodarone therapy 5/11/2015    History of cardiac cath 05/25/2010    Patent coronary arteries. LVEDP 13 mmHg. EF 65%.  History of cervical cancer 11/05    History of echocardiogram 08/21/2013    EF 50-55%. No RWMA. Gr 2 DDfx. Mild LAE. Mild MR.      Hypertension     Migraines 1/30/2010    Mitral regurgitation     Orthostatic dizziness 3/30/2016    ? autonomic dysfunction Vs amio side effect     Pacemaker 11/25/13    DDD    Pure hypercholesterolemia 1/30/2010    Rosacea 1/30/2010    Type II or unspecified type diabetes mellitus without mention of complication, not stated as uncontrolled 1/30/2010    Unspecified hypothyroidism 1/30/2010       Family History   Problem Relation Age of Onset    Hypertension Mother     Cancer Maternal Aunt      breast CA 66yo    Breast Cancer Maternal Aunt 79    Diabetes Maternal Grandmother     Cancer Maternal Aunt      uterine CA    Diabetes Daughter     Other Daughter      lupus (SLE) and coagulopathy    Breast Cancer Paternal Aunt 61    Heart Attack Sister     Heart Disease Neg Hx        Social History   Substance Use Topics    Smoking status: Former Smoker     Packs/day: 0.50     Years: 42.00     Quit date: 3/30/2018    Smokeless tobacco: Never Used      Comment: 2 per day     Alcohol use No        Current Outpatient Prescriptions   Medication Sig    carvedilol (COREG) 3.125 mg tablet Take 1 Tab by mouth two (2) times daily (with meals).  aspirin 81 mg chewable tablet Take 1 Tab by mouth daily. Indications: myocardial infarction prevention    XARELTO 20 mg tab tablet TAKE ONE TABLET BY MOUTH ONCE DAILY WITH DINNER    naloxone 4 mg/actuation spry 4 mg by Nasal route as needed for up to 2 doses. Indications: OPIOID TOXICITY    atorvastatin (LIPITOR) 20 mg tablet Take  by mouth daily.  naloxegol (MOVANTIK) 25 mg tab tablet Take 25 mg by mouth daily.  insulin lispro (HUMALOG) 100 unit/mL injection by SubCUTAneous route.  insulin glargine (LANTUS) 100 unit/mL injection by SubCUTAneous route nightly.  ipratropium-albuterol (COMBIVENT RESPIMAT)  mcg/actuation inhaler Take 1 Puff by inhalation every six (6) hours as needed for Wheezing or Shortness of Breath.  pyridoxine (VITAMIN B-6) 100 mg tablet Take 100 mg by mouth daily.  fish oil-dha-epa 1,200-144-216 mg cap Take 1 Cap by mouth daily.  levothyroxine (SYNTHROID) 125 mcg tablet Take 125 mcg by mouth Daily (before breakfast).  tiotropium (SPIRIVA WITH HANDIHALER) 18 mcg inhalation capsule Take 1 Cap by inhalation daily.  cholecalciferol, vitamin d3, (VITAMIN D) 1,000 unit tablet Take 5,000 Units by mouth daily. No current facility-administered medications for this visit.          Past Surgical History:   Procedure Laterality Date    HX AFIB ABLATION  2/8/13    Dr Prudence Squires 11/06    right Dr. Anish Rodríguez  5/06    HX ORTHOPAEDIC Right 07/11/2016    knee replacement    HX PACEMAKER  11/25/13    DDD    HX SVT ABLATION  2/18/2013       Visit Vitals    /53    Pulse 87    Ht 5' 4\" (1.626 m)    Wt 134 lb (60.8 kg)    BMI 23 kg/m2       Diagnostic Studies:  I have reviewed the relevant tests done on the patient and show as follows  EKG tracings reviewed by me today. CARDIOLOGY STUDIES 3/30/2018 8/2/2013 1/31/2013   EKG Result - - 11/12 Atrial Flutter, rapid heart rate, normal axis, diffuse nonspecific ST-T changes   Myocardial Perfusion Scan Result - - 12/05 nl scan, EF 51%; 4/08 nl scan, EF 66%; 5/10 abn scan, partially reversible anterior ischemia, EF 51%; Pharmacological Nuclear Stress Test Result large fixed ant/apical/sept defect, 40%EF - -   Echocardiogram - Complete Result - 55%EF, mild MR, trivial PE 4/07 EF 60-65% mild DD, mild MR; 4/08 EF 60%,mild DD,trace/mild MR; 2/10 EF 45-50%, PAP 32; 5/10; 3/11 EF 55%, mild MR; 11/12 55%EF, mild LVH,mod MR,   Coronary Angiogram Result - - CATH 6/06; 5/10 nl ca;   PFT's with DLCO Result - - 9/07; 2/10; 7/10; Some recent data might be hidden   3/18 [facility] Stress  Conclusion:  1. Abnormal perfusion scan. 2. Evidence of a large sized fixed defect involving LV territory in the apical and mid anterior and anteroseptal areas. This indicates previous myocardial infarction most likely. 3. This represents a high risk scan. Ms. Mimi Hoyt has a reminder for a \"due or due soon\" health maintenance. I have asked that she contact her primary care provider for follow-up on this health maintenance. Physical Exam   Constitutional: She is oriented to person, place, and time. She appears well-developed and well-nourished. No distress. HENT:   Head: Normocephalic and atraumatic. Mouth/Throat: Normal dentition. Eyes: Right eye exhibits no discharge. Left eye exhibits no discharge. No scleral icterus.    Neck: Neck supple. No JVD present. Carotid bruit is present (b/l). No thyromegaly present. Cardiovascular: Normal rate, regular rhythm, S1 normal, S2 normal and intact distal pulses. Exam reveals no gallop and no friction rub. Murmur heard. Scratchy early systolic murmur is present with a grade of 3/6  at the upper right sternal border  High-pitched blowing midsystolic murmur of grade 2/6 is also present at the apex. High-pitched blowing decrescendo early diastolic murmur is present with a grade of 2/6  at the upper right sternal border radiating to the apex  Pulmonary/Chest: Effort normal and breath sounds normal. She has no wheezes. She has no rales. Abdominal: Soft. She exhibits no mass. There is tenderness (epigastric). Musculoskeletal: She exhibits no edema. Lymphadenopathy:        Right cervical: No superficial cervical adenopathy present. Left cervical: No superficial cervical adenopathy present. Neurological: She is alert and oriented to person, place, and time. Skin: Skin is warm and dry. No rash noted. Psychiatric: She has a normal mood and affect. Her behavior is normal.       ASSESSMENT and PLAN    Patient advised to stop smoking to reduce future cardiovascular events. Diagnoses and all orders for this visit:    1. Other chest pain  Comments:  4/18 new, likely GERD as or tender hepatomegaly with CHF; me dRx for CAD as fixed defect  try PPI  Orders:  -     omeprazole (PRILOSEC) 20 mg capsule; Take 1 Cap by mouth daily. 2. Coronary artery disease involving native coronary artery of native heart without angina pectoris  Comments:  3/18 large fixed LAD defect, 40%EF; Orders:  -     carvedilol (COREG) 6.25 mg tablet; Take 1 Tab by mouth two (2) times daily (with meals). 3. Persistent atrial fibrillation ([address]  Comments:  Permanent; 3/18; 2/18; 8/17 on xarelto;     4.  Coronary artery disease involving native coronary artery of native heart without angina pectoris  Comments:  3/18 large fixed LAD defect, 40%EF; Orders:  -     carvedilol (COREG) 6.25 mg tablet; Take 1 Tab by mouth two (2) times daily (with meals). 5. Cardiomyopathy, ischemic  Comments:  3/18 no meds- start low dose coreg for CMP; Orders:  -     carvedilol (COREG) 6.25 mg tablet; Take 1 Tab by mouth two (2) times daily (with meals). 6. Pure hypercholesterolemia  Comments:  on lipitor; 9/15 LDL46      7. Acute systolic congestive heart failure ([facility] [address]  Comments:  4/18 new, likely she was told of low EF and MI- med Rx for now  Orders:  -     carvedilol (COREG) 6.25 mg tablet; Take 1 Tab by mouth two (2) times daily (with meals). -     furosemide (LASIX) 20 mg tablet; Take 1 Tab by mouth daily as needed. Pertinent laboratory and test data reviewed and discussed with patient. See patient instructions also for other medical advice given    Medications Discontinued During This Encounter   Medication Reason    carvedilol (COREG) 3.125 mg tablet Reorder       Follow-up Disposition:  Return in about 6 weeks (around 5/21/2018), or if symptoms worsen or fail to improve.

## 2018-04-20 DIAGNOSIS — I50.21 ACUTE SYSTOLIC CONGESTIVE HEART FAILURE (HCC): ICD-10-CM

## 2018-04-20 DIAGNOSIS — I25.10 CORONARY ARTERY DISEASE INVOLVING NATIVE CORONARY ARTERY OF NATIVE HEART WITHOUT ANGINA PECTORIS: ICD-10-CM

## 2018-04-20 DIAGNOSIS — I25.5 CARDIOMYOPATHY, ISCHEMIC: ICD-10-CM

## 2018-04-20 RX ORDER — CARVEDILOL 6.25 MG/1
6.25 TABLET ORAL 2 TIMES DAILY WITH MEALS
Qty: 60 TAB | Refills: 5 | Status: SHIPPED | OUTPATIENT
Start: 2018-04-20 | End: 2018-05-25 | Stop reason: SDUPTHER

## 2018-04-30 RX ORDER — RIVAROXABAN 20 MG/1
TABLET, FILM COATED ORAL
Qty: 30 TAB | Refills: 6 | Status: SHIPPED | OUTPATIENT
Start: 2018-04-30 | End: 2018-12-06 | Stop reason: SDUPTHER

## 2018-05-25 ENCOUNTER — OFFICE VISIT (OUTPATIENT)
Dept: CARDIOLOGY CLINIC | Age: 68
End: 2018-05-25

## 2018-05-25 VITALS
HEIGHT: 64 IN | BODY MASS INDEX: 23.22 KG/M2 | DIASTOLIC BLOOD PRESSURE: 58 MMHG | SYSTOLIC BLOOD PRESSURE: 108 MMHG | WEIGHT: 136 LBS | HEART RATE: 86 BPM

## 2018-05-25 DIAGNOSIS — F17.200 TOBACCO USE DISORDER: ICD-10-CM

## 2018-05-25 DIAGNOSIS — Z95.0 PACEMAKER: ICD-10-CM

## 2018-05-25 DIAGNOSIS — I48.19 PERSISTENT ATRIAL FIBRILLATION (HCC): ICD-10-CM

## 2018-05-25 DIAGNOSIS — I25.10 CORONARY ARTERY DISEASE INVOLVING NATIVE CORONARY ARTERY OF NATIVE HEART WITHOUT ANGINA PECTORIS: ICD-10-CM

## 2018-05-25 DIAGNOSIS — I50.21 ACUTE SYSTOLIC CONGESTIVE HEART FAILURE (HCC): ICD-10-CM

## 2018-05-25 DIAGNOSIS — I50.22 CHRONIC SYSTOLIC CONGESTIVE HEART FAILURE (HCC): ICD-10-CM

## 2018-05-25 DIAGNOSIS — R07.89 OTHER CHEST PAIN: ICD-10-CM

## 2018-05-25 DIAGNOSIS — I25.10 CORONARY ARTERY DISEASE INVOLVING NATIVE CORONARY ARTERY OF NATIVE HEART WITHOUT ANGINA PECTORIS: Primary | ICD-10-CM

## 2018-05-25 DIAGNOSIS — E78.00 PURE HYPERCHOLESTEROLEMIA: ICD-10-CM

## 2018-05-25 DIAGNOSIS — I25.5 CARDIOMYOPATHY, ISCHEMIC: ICD-10-CM

## 2018-05-25 DIAGNOSIS — I10 ESSENTIAL HYPERTENSION, BENIGN: ICD-10-CM

## 2018-05-25 RX ORDER — OMEPRAZOLE 20 MG/1
20 CAPSULE, DELAYED RELEASE ORAL DAILY
Qty: 30 CAP | Refills: 0 | Status: SHIPPED | OUTPATIENT
Start: 2018-05-25

## 2018-05-25 RX ORDER — CARVEDILOL 12.5 MG/1
12.5 TABLET ORAL 2 TIMES DAILY WITH MEALS
Qty: 60 TAB | Refills: 2 | Status: SHIPPED | OUTPATIENT
Start: 2018-05-25 | End: 2018-09-17 | Stop reason: SDUPTHER

## 2018-05-25 NOTE — PATIENT INSTRUCTIONS
Medications Discontinued During This Encounter   Medication Reason    carvedilol (COREG) 6.25 mg tablet Reorder     After the recommended changes have been made in blood pressure medicines, patient advised to keep BP/HR(pulse rate) chart twice daily and bring us results in next 2 weeks or so. Patient may send the results via \"My Chart\" if desired. Please rest for 5-10 minutes before checking blood pressure       Avoiding Triggers With Heart Failure: Care Instructions  Your Care Instructions    Triggers are anything that make your heart failure flare up. A flare-up is also called \"sudden heart failure\" or \"acute heart failure. \" When you have a flare-up, fluid builds up in your lungs, and you have problems breathing. You might need to go to the hospital. By watching for changes in your condition and avoiding triggers, you can prevent heart failure flare-ups. Follow-up care is a key part of your treatment and safety. Be sure to make and go to all appointments, and call your doctor if you are having problems. It's also a good idea to know your test results and keep a list of the medicines you take. How can you care for yourself at home? Watch for changes in your weight and condition  · Weigh yourself without clothing at the same time each day. Record your weight. Call your doctor if you have sudden weight gain, such as more than 2 to 3 pounds in a day or 5 pounds in a week. (Your doctor may suggest a different range of weight gain.) A sudden weight gain may mean that your heart failure is getting worse. · Keep a daily record of your symptoms. Write down any changes in how you feel, such as new shortness of breath, cough, or problems eating. Also record if your ankles are more swollen than usual and if you feel more tired than usual. Note anything that you ate or did that could have triggered these changes. Limit sodium  Sodium causes your body to hold on to extra water.  This may cause your heart failure symptoms to get worse. People get most of their sodium from processed foods. Fast food and restaurant meals also tend to be very high in sodium. · Your doctor may suggest that you limit sodium to 2,000 milligrams (mg) a day or less. That is less than 1 teaspoon of salt a day, including all the salt you eat in cooking or in packaged foods. · Read food labels on cans and food packages. They tell you how much sodium you get in one serving. Check the serving size. If you eat more than one serving, you are getting more sodium. · Be aware that sodium can come in forms other than salt, including monosodium glutamate (MSG), sodium citrate, and sodium bicarbonate (baking soda). MSG is often added to Asian food. You can sometimes ask for food without MSG or salt. · Slowly reducing salt will help you adjust to the taste. Take the salt shaker off the table. · Flavor your food with garlic, lemon juice, onion, vinegar, herbs, and spices instead of salt. Do not use soy sauce, steak sauce, onion salt, garlic salt, mustard, or ketchup on your food, unless it is labeled \"low-sodium\" or \"low-salt. \"  · Make your own salad dressings, sauces, and ketchup without adding salt. · Use fresh or frozen ingredients, instead of canned ones, whenever you can. Choose low-sodium canned goods. · Eat less processed food and food from restaurants, including fast food. Exercise as directed  Moderate, regular exercise is very good for your heart. It improves your blood flow and helps control your weight. But too much exercise can stress your heart and cause a heart failure flare-up. · Check with your doctor before you start an exercise program.  · Walking is an easy way to get exercise. Start out slowly. Gradually increase the length and pace of your walk. Swimming, riding a bike, and using a treadmill are also good forms of exercise. · When you exercise, watch for signs that your heart is working too hard.  You are pushing yourself too hard if you cannot talk while you are exercising. If you become short of breath or dizzy or have chest pain, stop, sit down, and rest.  · Do not exercise when you do not feel well. Take medicines correctly  · Take your medicines exactly as prescribed. Call your doctor if you think you are having a problem with your medicine. · Make a list of all the medicines you take. Include those prescribed to you by other doctors and any over-the-counter medicines, vitamins, or supplements you take. Take this list with you when you go to any doctor. · Take your medicines at the same time every day. It may help you to post a list of all the medicines you take every day and what time of day you take them. · Make taking your medicine as simple as you can. Plan times to take your medicines when you are doing other things, such as eating a meal or getting ready for bed. This will make it easier to remember to take your medicines. · Get organized. Use helpful tools, such as daily or weekly pill containers. When should you call for help? Call 911 if you have symptoms of sudden heart failure such as:  ? · You have severe trouble breathing. ? · You cough up pink, foamy mucus. ? · You have a new irregular or rapid heartbeat. ?Call your doctor now or seek immediate medical care if:  ? · You have new or increased shortness of breath. ? · You are dizzy or lightheaded, or you feel like you may faint. ? · You have sudden weight gain, such as more than 2 to 3 pounds in a day or 5 pounds in a week. (Your doctor may suggest a different range of weight gain.)   ? · You have increased swelling in your legs, ankles, or feet. ? · You are suddenly so tired or weak that you cannot do your usual activities. ? Watch closely for changes in your health, and be sure to contact your doctor if you develop new symptoms. Where can you learn more? Go to http://jose alejandro-lizzy.info/.   Enter B596 in the search box to learn more about \"Avoiding Triggers With Heart Failure: Care Instructions. \"  Current as of: September 21, 2016  Content Version: 11.4  © 8590-0509 [employer]. Care instructions adapted under license by [employer] (which disclaims liability or warranty for this information). If you have questions about a medical condition or this instruction, always ask your healthcare professional. [address] any warranty or liability for your use of this information.

## 2018-05-25 NOTE — PROGRESS NOTES
HISTORY OF PRESENT ILLNESS  Naman Laird is a 70 y.o. female. HPI Comments: F/u AFib, HTN, CHF, MR, HLD, s/p DDD    3/18 seen after low EF, wma and stress test today  7/17 continues with dizziness, rare LOC/fall- last in 6/17 1/17 has a cold for 6 wks    CHF   The history is provided by the medical records. This is a chronic problem. Associated symptoms include shortness of breath. Pertinent negatives include no chest pain, no abdominal pain and no headaches. Cardiomyopathy   The history is provided by the medical records (ischemic). This is a chronic problem. Associated symptoms include shortness of breath. Pertinent negatives include no chest pain, no abdominal pain and no headaches. Palpitations    The history is provided by the medical records. This is a recurrent problem. The current episode started more than 1 week ago. The problem has been gradually worsening (few days). The problem occurs rarely (off and on). Episode Length: permanent AF on pacer check. Associated symptoms include irregular heartbeat, dizziness and shortness of breath. Pertinent negatives include no diaphoresis, no fever, no chest pain, no claudication, no orthopnea, no PND, no syncope, no abdominal pain, no nausea, no vomiting, no headaches, no weakness, no cough, no hemoptysis and no sputum production. Risk factors include diabetes mellitus, hypertension, smoking/tobacco exposure and dyslipidemia. Her past medical history is significant for DM, hypertension and atrial fibrillation. Hypertension   The history is provided by the medical records. This is a chronic problem. Associated symptoms include shortness of breath. Pertinent negatives include no chest pain, no abdominal pain and no headaches. Cholesterol Problem   The history is provided by the medical records. This is a chronic problem. Associated symptoms include shortness of breath. Pertinent negatives include no chest pain, no abdominal pain and no headaches. Dizziness   The history is provided by the patient. This is a new problem. The current episode started more than 1 week ago. The problem occurs every several days (3/wk; 5-10 mts; 6/17 LOC for few seconds). The problem has not changed since onset. Associated symptoms include shortness of breath. Pertinent negatives include no chest pain, no abdominal pain and no headaches. The symptoms are aggravated by twisting (looking/reaching up). The symptoms are relieved by rest and laying down. Shortness of Breath   The history is provided by the patient. This is a chronic problem. The problem occurs intermittently. The current episode started more than 1 week ago. The problem has been gradually improving. Pertinent negatives include no fever, no headaches, no ear pain, no neck pain, no cough, no sputum production, no hemoptysis, no wheezing, no PND, no orthopnea, no chest pain, no syncope, no vomiting, no abdominal pain, no rash, no leg swelling and no claudication. The problem's precipitants include exercise (2 blocks; 4/18 within house). Chest Pain (Angina)    The history is provided by the patient. This is a new problem. The current episode started 2 days ago (was in ER). The problem has been resolved. The problem occurs daily (many). The pain is present in the epigastric region. The pain does not radiate. Associated symptoms include dizziness, irregular heartbeat, palpitations and shortness of breath. Pertinent negatives include no abdominal pain, no claudication, no cough, no diaphoresis, no fever, no headaches, no hemoptysis, no nausea, no orthopnea, no PND, no sputum production, no syncope, no vomiting and no weakness. Her past medical history is significant for DM. Review of Systems   Constitutional: Negative for chills, diaphoresis, fever and weight loss. HENT: Negative for ear pain and hearing loss. Eyes: Negative for blurred vision. Respiratory: Positive for shortness of breath.  Negative for cough, hemoptysis, sputum production, wheezing and stridor. Cardiovascular: Positive for palpitations. Negative for chest pain, orthopnea, claudication, leg swelling, syncope and PND. Gastrointestinal: Negative for abdominal pain, heartburn, nausea and vomiting. Musculoskeletal: Negative for myalgias and neck pain. Skin: Negative for rash. Neurological: Positive for dizziness. Negative for tingling, tremors, focal weakness, loss of consciousness, weakness and headaches. Psychiatric/Behavioral: Negative for depression and suicidal ideas. No Known Allergies    Past Medical History:   Diagnosis Date    Abnormal myocardial perfusion study 05/07/2010    Anterior ischemia c/w at least 1-vessel CAD. No WMA. EF 51%. Positive EKG at 78% pred max HR. Ex time 7 min 30 sec.  Acute systolic congestive heart failure ([address] 4/9/2018 4/18 new, likely she was told of low EF and MI- med Rx for now    Allergic rhinitis 1/30/2010    Atrial fibrillation ([address]     Broken ankle 08/22/2013    left    Carotid stenosis 1/30/2010    Chronic diastolic congestive heart failure ([address] 2/16/2018 2/18 NYHA2    COPD     Essential hypertension     Heart failure ([address]     History of amiodarone therapy 5/11/2015    History of cardiac cath 05/25/2010    Patent coronary arteries. LVEDP 13 mmHg. EF 65%.  History of cervical cancer 11/05    History of echocardiogram 08/21/2013    EF 50-55%. No RWMA. Gr 2 DDfx. Mild LAE. Mild MR.      Hypertension     Migraines 1/30/2010    Mitral regurgitation     Orthostatic dizziness 3/30/2016    ? autonomic dysfunction Vs amio side effect     Pacemaker 11/25/13    DDD    Pure hypercholesterolemia 1/30/2010    Rosacea 1/30/2010    Type II or unspecified type diabetes mellitus without mention of complication, not stated as uncontrolled 1/30/2010    Unspecified hypothyroidism 1/30/2010       Family History   Problem Relation Age of Onset    Hypertension Mother    Sandy Kidd Cancer Maternal Aunt      breast CA 66yo    Breast Cancer Maternal Aunt 79    Diabetes Maternal Grandmother     Cancer Maternal Aunt      uterine CA    Diabetes Daughter     Other Daughter      lupus (SLE) and coagulopathy    Breast Cancer Paternal Aunt 61    Heart Attack Sister     Heart Disease Neg Hx        Social History   Substance Use Topics    Smoking status: Former Smoker     Packs/day: 0.50     Years: 42.00     Quit date: 3/30/2018    Smokeless tobacco: Never Used      Comment: 2 per day     Alcohol use No        Current Outpatient Prescriptions   Medication Sig    XARELTO 20 mg tab tablet TAKE ONE TABLET BY MOUTH ONCE DAILY WITH  DINNER    carvedilol (COREG) 6.25 mg tablet Take 1 Tab by mouth two (2) times daily (with meals).  furosemide (LASIX) 20 mg tablet Take 1 Tab by mouth daily as needed.  omeprazole (PRILOSEC) 20 mg capsule Take 1 Cap by mouth daily.  aspirin 81 mg chewable tablet Take 1 Tab by mouth daily. Indications: myocardial infarction prevention    naloxone 4 mg/actuation spry 4 mg by Nasal route as needed for up to 2 doses. Indications: OPIOID TOXICITY    atorvastatin (LIPITOR) 20 mg tablet Take  by mouth daily.  naloxegol (MOVANTIK) 25 mg tab tablet Take 25 mg by mouth daily.  insulin lispro (HUMALOG) 100 unit/mL injection by SubCUTAneous route.  insulin glargine (LANTUS) 100 unit/mL injection by SubCUTAneous route nightly.  ipratropium-albuterol (COMBIVENT RESPIMAT)  mcg/actuation inhaler Take 1 Puff by inhalation every six (6) hours as needed for Wheezing or Shortness of Breath.  pyridoxine (VITAMIN B-6) 100 mg tablet Take 100 mg by mouth daily.  fish oil-dha-epa 1,200-144-216 mg cap Take 1 Cap by mouth daily.  levothyroxine (SYNTHROID) 125 mcg tablet Take 125 mcg by mouth Daily (before breakfast).  tiotropium (SPIRIVA WITH HANDIHALER) 18 mcg inhalation capsule Take 1 Cap by inhalation daily.     cholecalciferol, vitamin d3, (VITAMIN D) 1,000 unit tablet Take 5,000 Units by mouth daily. No current facility-administered medications for this visit. Past Surgical History:   Procedure Laterality Date    HX AFIB ABLATION  2/8/13    Dr Rama Gonzalez HX CAROTID ENDARTERECTOMY  11/06    right Dr. Natasha Arnold HX HYSTERECTOMY  5/06    HX ORTHOPAEDIC Right 07/11/2016    knee replacement    HX PACEMAKER  11/25/13    DDD    HX SVT ABLATION  2/18/2013       Visit Vitals    /58    Pulse 86    Ht 5' 4\" (1.626 m)    Wt 136 lb (61.7 kg)    BMI 23.34 kg/m2       Diagnostic Studies:  I have reviewed the relevant tests done on the patient and show as follows  EKG tracings reviewed by me today. CARDIOLOGY STUDIES 3/30/2018 8/2/2013 1/31/2013   EKG Result - - 11/12 Atrial Flutter, rapid heart rate, normal axis, diffuse nonspecific ST-T changes   Myocardial Perfusion Scan Result - - 12/05 nl scan, EF 51%; 4/08 nl scan, EF 66%; 5/10 abn scan, partially reversible anterior ischemia, EF 51%; Pharmacological Nuclear Stress Test Result large fixed ant/apical/sept defect, 40%EF - -   Echocardiogram - Complete Result - 55%EF, mild MR, trivial PE 4/07 EF 60-65% mild DD, mild MR; 4/08 EF 60%,mild DD,trace/mild MR; 2/10 EF 45-50%, PAP 32; 5/10; 3/11 EF 55%, mild MR; 11/12 55%EF, mild LVH,mod MR,   Coronary Angiogram Result - - CATH 6/06; 5/10 nl ca;   PFT's with DLCO Result - - 9/07; 2/10; 7/10; Some recent data might be hidden   3/18 [facility] Stress  Conclusion:  1. Abnormal perfusion scan. 2. Evidence of a large sized fixed defect involving LV territory in the apical and mid anterior and anteroseptal areas. This indicates previous myocardial infarction most likely. 3. This represents a high risk scan. Ms. Odiila Tefsaye has a reminder for a \"due or due soon\" health maintenance. I have asked that she contact her primary care provider for follow-up on this health maintenance.     Physical Exam   Constitutional: She is oriented to person, place, and time. She appears well-developed and well-nourished. No distress. HENT:   Head: Normocephalic and atraumatic. Mouth/Throat: Normal dentition. Eyes: Right eye exhibits no discharge. Left eye exhibits no discharge. No scleral icterus. Neck: Neck supple. No JVD present. Carotid bruit is present (b/l). No thyromegaly present. Cardiovascular: Normal rate, regular rhythm, S1 normal, S2 normal and intact distal pulses. Exam reveals no gallop and no friction rub. Murmur heard. Scratchy early systolic murmur is present with a grade of 3/6  at the upper right sternal border  High-pitched blowing midsystolic murmur of grade 2/6 is also present at the apex. High-pitched blowing decrescendo early diastolic murmur is present with a grade of 2/6  at the upper right sternal border radiating to the apex  Pulmonary/Chest: Effort normal and breath sounds normal. She has no wheezes. She has no rales. Abdominal: Soft. She exhibits no mass. There is no tenderness. Musculoskeletal: She exhibits no edema. Lymphadenopathy:        Right cervical: No superficial cervical adenopathy present. Left cervical: No superficial cervical adenopathy present. Neurological: She is alert and oriented to person, place, and time. Skin: Skin is warm and dry. No rash noted. Psychiatric: She has a normal mood and affect. Her behavior is normal.       ASSESSMENT and PLAN      Diagnoses and all orders for this visit:    1. Coronary artery disease involving native coronary artery of native heart without angina pectoris  Comments:  3/18 large fixed LAD defect, 40%EF; Orders:  -     carvedilol (COREG) 12.5 mg tablet; Take 1 Tab by mouth two (2) times daily (with meals). Indications: chronic heart failure    2. Cardiomyopathy, ischemic  Comments:  3/18 no meds- start low dose coreg for CMP; Orders:  -     carvedilol (COREG) 12.5 mg tablet; Take 1 Tab by mouth two (2) times daily (with meals).  Indications: chronic heart failure    3. Coronary artery disease involving native coronary artery of native heart without angina pectoris  Comments:  3/18 large fixed LAD defect, 40%EF; Orders:  -     carvedilol (COREG) 12.5 mg tablet; Take 1 Tab by mouth two (2) times daily (with meals). Indications: chronic heart failure    4. Essential hypertension, benign  Comments:  5/18 watch as coreg incr due to cmp;     5. Pure hypercholesterolemia  Comments:  on lipitor; 9/15 LDL46  Orders:  -     LIPID PANEL; Future  -     HEPATIC FUNCTION PANEL; Future    6. Persistent atrial fibrillation ([facility]  Comments:  Permanent; 3/18; 2/18; 8/17 on xarelto;     7. Tobacco use disorder  Comments:  5/18 quit 4/18;     8. Pacemaker- DDD to VVIR  Comments:   12/17 nl function  carelink asap    9. Chronic systolic congestive heart failure ([facility]  Comments:  5/18 improving    10. Acute systolic congestive heart failure ([facility]  Comments:  improving  Orders:  -     METABOLIC PANEL, BASIC; Future        Pertinent laboratory and test data reviewed and discussed with patient.   See patient instructions also for other medical advice given    Medications Discontinued During This Encounter   Medication Reason    carvedilol (COREG) 6.25 mg tablet Reorder       Follow-up Disposition:  Return in about 3 months (around 8/25/2018), or if symptoms worsen or fail to improve, for post test.

## 2018-05-25 NOTE — MR AVS SNAPSHOT
[address] [facility] 
 
 
 [address] [facility], [address] [facility] [address] 
[phone] Patient: Nathaly Robertson MRN: [id number] Visit Information Date & Time Provider Department Dept. Phone Encounter #  
 5/25/2018  9:15 AM Pippa Reeder MD [facility] [address] [place] [address] [id number] Follow-up Instructions Return in about 3 months (around 8/25/2018), or if symptoms worsen or fail to improve, for post test.  
  
Your Appointments 6/18/2018 11:45 AM  
PROCEDURE with Pippa Reeder MD  
[facility]) Appt Note: medtronic ck  only; medtronic ck only [address] [facility], [address] [facility] [address]  
[address] [place] [address]  
  
    
 8/31/2018 10:30 AM  
PROCEDURE with Pippa Reeder MD  
[facility]) Appt Note: 3 month follow up post Lipid, LFT and BMP  
 [address] [facility] [address] [facility] [address]  
[phone]  
  
    
 3/28/2019  8:00 AM  
PROCEDURE with BSVVS IMAGING 2 [facility] ([facility]) Appt Note: asim sonny stents camach 1 year 1212 [facility] Gloria Cespedesner 633 200 [facility] [address]  
[phone] [address] [place], [place] [address]Suite [facility]  
  
    
 3/28/2019  9:00 AM  
PROCEDURE with BSVVS IMAGING 2 [facility] ([facility]) Appt Note: CV PUGA [address] [place] [address] 290 200 [facility] [address]  
[phone]  
  
    
 3/28/2019 10:00 AM  
PROCEDURE with BSVVS NONIMAGING [facility] ([facility]) Appt Note: leg art puga 1 year 1212 [facility] 562 200 [facility] [address]  
[phone]  [address] [place], [place] [address]Suite [facility]  
  
    
 4/10/2019  1:45 PM  
Follow Up with Levon Lawrence MD  
 [facility] ([facility]) Appt Note: 1 year follow up after studies with prep 23[address] [facility] Aldair Walter 241 200 [facility] [address]  
[phone] [address] [facility] Nicolás lei, [place] 200 [facility] [address] Upcoming Health Maintenance Date Due Hepatitis C Screening 1950 FOOT EXAM Q1 5/1/1959 EYE EXAM RETINAL OR DILATED Q1 5/1/1959 FOBT Q 1 YEAR AGE 50-75 5/1/1999 DTaP/Tdap/Td series (1 - Tdap) 10/2/2005 ZOSTER VACCINE AGE 60> 3/1/2009 GLAUCOMA SCREENING Q2Y 5/1/2014 Bone Densitometry (Dexa) Screening 5/1/2014 Pneumococcal 65+ Low/Medium Risk (2 of 2 - PPSV23) 5/1/2014 BREAST CANCER SCRN MAMMOGRAM 7/25/2015 MICROALBUMIN Q1 1/8/2016 HEMOGLOBIN A1C Q6M 7/10/2016 LIPID PANEL Q1 9/29/2016 MEDICARE YEARLY EXAM 3/14/2018 Influenza Age 5 to Adult 8/1/2018 Allergies as of 5/25/2018  Review Complete On: 5/25/2018 By: Adam Jalloh MD  
 No Known Allergies Current Immunizations  Reviewed on 2/9/2011 Name Date  
 TD Vaccine 10/1/2005 ZZZ-RETIRED (DO NOT USE) Pneumococcal Vaccine (Unspecified Type) 12/14/2005 Not reviewed this visit You Were Diagnosed With   
  
 Codes Comments Coronary artery disease involving native coronary artery of native heart without angina pectoris    -  Primary ICD-10-CM: I25.10 ICD-9-CM: 414.01 3/18 large fixed LAD defect, 40%EF;   
 Cardiomyopathy, ischemic     ICD-10-CM: I25.5 ICD-9-CM: 414.8 3/18 no meds- start low dose coreg for CMP; Coronary artery disease involving native coronary artery of native heart without angina pectoris     ICD-10-CM: I25.10 ICD-9-CM: 414.01 3/18 large fixed LAD defect, 40%EF;  
  
 Essential hypertension, benign     ICD-10-CM: I10 
ICD-9-CM: 401.1 5/18 watch as coreg incr due to cmp;   
 Pure hypercholesterolemia     ICD-10-CM: E78.00 ICD-9-CM: 272.0 on lipitor; 9/15 LDL46 Persistent atrial fibrillation (HCC)     ICD-10-CM: I48.1 ICD-9-CM: 427.31 Permanent; 3/18; 2/18; 8/17 on xarelto; Tobacco use disorder     ICD-10-CM: F17.200 ICD-9-CM: 305.1 5/18 quit 4/18; Pacemaker     ICD-10-CM: Z95.0 ICD-9-CM: V45.01  12/17 nl function 
carelink asap Chronic systolic congestive heart failure (HCC)     ICD-10-CM: I50.22 ICD-9-CM: 428.22, 428.0 5/18 improving Acute systolic congestive heart failure (HCC)     ICD-10-CM: I50.21 ICD-9-CM: 428.21, 428.0 improving Vitals BP Pulse Height(growth percentile) Weight(growth percentile) BMI OB Status 108/58 86 5' 4\" (1.626 m) 136 lb (61.7 kg) 23.34 kg/m2 Hysterectomy Smoking Status Former Smoker Vitals History BMI and BSA Data Body Mass Index Body Surface Area  
 23.34 kg/m 2 1.67 m 2 Preferred Pharmacy Pharmacy Name Phone [address] [place] [address]. [phone] Your Updated Medication List  
  
   
This list is accurate as of 5/25/18  9:39 AM.  Always use your most recent med list.  
  
  
  
  
 aspirin 81 mg chewable tablet Take 1 Tab by mouth daily. Indications: myocardial infarction prevention  
  
 carvedilol 12.5 mg tablet Commonly known as:  Mary Anne Xochitl Take 1 Tab by mouth two (2) times daily (with meals). Indications: chronic heart failure  
  
 fish oil-dha-epa 1,200-144-216 mg Cap Take 1 Cap by mouth daily. furosemide 20 mg tablet Commonly known as:  LASIX Take 1 Tab by mouth daily as needed. HumaLOG U-100 Insulin 100 unit/mL injection Generic drug:  insulin lispro  
by SubCUTAneous route. ipratropium-albuterol  mcg/actuation inhaler Commonly known as:  Thad Tomasa Take 1 Puff by inhalation every six (6) hours as needed for Wheezing or Shortness of Breath. LANTUS U-100 INSULIN 100 unit/mL injection Generic drug:  insulin glargine  
by SubCUTAneous route nightly. LIPITOR 20 mg tablet Generic drug:  atorvastatin Take  by mouth daily. naloxegol 25 mg Tab tablet Commonly known as:  Alvin Sultana Take 25 mg by mouth daily. naloxone 4 mg/actuation nasal spray Commonly known as:  NARCAN  
4 mg by Nasal route as needed for up to 2 doses. Indications: OPIOID TOXICITY  
  
 omeprazole 20 mg capsule Commonly known as:  PRILOSEC Take 1 Cap by mouth daily. SYNTHROID 125 mcg tablet Generic drug:  levothyroxine Take 125 mcg by mouth Daily (before breakfast). tiotropium 18 mcg inhalation capsule Commonly known as:  [facility] [address] Take 1 Cap by inhalation daily. VITAMIN B-6 100 mg tablet Generic drug:  pyridoxine (vitamin B6) Take 100 mg by mouth daily. VITAMIN D3 1,000 unit tablet Generic drug:  cholecalciferol Take 5,000 Units by mouth daily. XARELTO 20 mg Tab tablet Generic drug:  rivaroxaban TAKE ONE TABLET BY MOUTH ONCE DAILY WITH  DINNER Prescriptions Sent to Pharmacy Refills  
 carvedilol (COREG) 12.5 mg tablet 2 Sig: Take 1 Tab by mouth two (2) times daily (with meals). Indications: chronic heart failure Class: Normal  
 Pharmacy: [facility] [address] HOLLAND JONES [address] [address].  #: [phone] Route: Oral  
  
Follow-up Instructions Return in about 3 months (around 8/25/2018), or if symptoms worsen or fail to improve, for post test.  
  
To-Do List   
 Around 05/25/2018 Lab:  HEPATIC FUNCTION PANEL Around 05/25/2018 Lab:  LIPID PANEL Around 05/25/2018 Lab:  METABOLIC PANEL, BASIC Patient Instructions Medications Discontinued During This Encounter Medication Reason  carvedilol (COREG) 6.25 mg tablet Reorder After the recommended changes have been made in blood pressure medicines, patient advised to keep BP/HR(pulse rate) chart twice daily and bring us results in next 2 weeks or so. Patient may send the results via \"My Chart\" if desired. Please rest for 5-10 minutes before checking blood pressure Avoiding Triggers With Heart Failure: Care Instructions Your Care Instructions Triggers are anything that make your heart failure flare up. A flare-up is also called \"sudden heart failure\" or \"acute heart failure. \" When you have a flare-up, fluid builds up in your lungs, and you have problems breathing. You might need to go to the hospital. By watching for changes in your condition and avoiding triggers, you can prevent heart failure flare-ups. Follow-up care is a key part of your treatment and safety. Be sure to make and go to all appointments, and call your doctor if you are having problems. It's also a good idea to know your test results and keep a list of the medicines you take. How can you care for yourself at home? Watch for changes in your weight and condition · Weigh yourself without clothing at the same time each day. Record your weight. Call your doctor if you have sudden weight gain, such as more than 2 to 3 pounds in a day or 5 pounds in a week. (Your doctor may suggest a different range of weight gain.) A sudden weight gain may mean that your heart failure is getting worse. · Keep a daily record of your symptoms. Write down any changes in how you feel, such as new shortness of breath, cough, or problems eating. Also record if your ankles are more swollen than usual and if you feel more tired than usual. Note anything that you ate or did that could have triggered these changes. Limit sodium Sodium causes your body to hold on to extra water. This may cause your heart failure symptoms to get worse. People get most of their sodium from processed foods. Fast food and restaurant meals also tend to be very high in sodium.  
· Your doctor may suggest that you limit sodium to 2,000 milligrams (mg) a day or less. That is less than 1 teaspoon of salt a day, including all the salt you eat in cooking or in packaged foods. · Read food labels on cans and food packages. They tell you how much sodium you get in one serving. Check the serving size. If you eat more than one serving, you are getting more sodium. · Be aware that sodium can come in forms other than salt, including monosodium glutamate (MSG), sodium citrate, and sodium bicarbonate (baking soda). MSG is often added to Asian food. You can sometimes ask for food without MSG or salt. · Slowly reducing salt will help you adjust to the taste. Take the salt shaker off the table. · Flavor your food with garlic, lemon juice, onion, vinegar, herbs, and spices instead of salt. Do not use soy sauce, steak sauce, onion salt, garlic salt, mustard, or ketchup on your food, unless it is labeled \"low-sodium\" or \"low-salt. \" 
· Make your own salad dressings, sauces, and ketchup without adding salt. · Use fresh or frozen ingredients, instead of canned ones, whenever you can. Choose low-sodium canned goods. · Eat less processed food and food from restaurants, including fast food. Exercise as directed Moderate, regular exercise is very good for your heart. It improves your blood flow and helps control your weight. But too much exercise can stress your heart and cause a heart failure flare-up. · Check with your doctor before you start an exercise program. 
· Walking is an easy way to get exercise. Start out slowly. Gradually increase the length and pace of your walk. Swimming, riding a bike, and using a treadmill are also good forms of exercise. · When you exercise, watch for signs that your heart is working too hard. You are pushing yourself too hard if you cannot talk while you are exercising. If you become short of breath or dizzy or have chest pain, stop, sit down, and rest. 
· Do not exercise when you do not feel well. Take medicines correctly · Take your medicines exactly as prescribed. Call your doctor if you think you are having a problem with your medicine. · Make a list of all the medicines you take. Include those prescribed to you by other doctors and any over-the-counter medicines, vitamins, or supplements you take. Take this list with you when you go to any doctor. · Take your medicines at the same time every day. It may help you to post a list of all the medicines you take every day and what time of day you take them. · Make taking your medicine as simple as you can. Plan times to take your medicines when you are doing other things, such as eating a meal or getting ready for bed. This will make it easier to remember to take your medicines. · Get organized. Use helpful tools, such as daily or weekly pill containers. When should you call for help? Call 911 if you have symptoms of sudden heart failure such as: 
? · You have severe trouble breathing. ? · You cough up pink, foamy mucus. ? · You have a new irregular or rapid heartbeat. ?Call your doctor now or seek immediate medical care if: 
? · You have new or increased shortness of breath. ? · You are dizzy or lightheaded, or you feel like you may faint. ? · You have sudden weight gain, such as more than 2 to 3 pounds in a day or 5 pounds in a week. (Your doctor may suggest a different range of weight gain.) ? · You have increased swelling in your legs, ankles, or feet. ? · You are suddenly so tired or weak that you cannot do your usual activities. ? Watch closely for changes in your health, and be sure to contact your doctor if you develop new symptoms. Where can you learn more? Go to http://jose alejandro-lizzy.info/. Enter Y472 in the search box to learn more about \"Avoiding Triggers With Heart Failure: Care Instructions. \" Current as of: September 21, 2016 Content Version: 11.4 © 7752-4952 Healthwise[employer] Incorporated.  Care instructions adapted under license by [address] (which disclaims liability or warranty for this information). If you have questions about a medical condition or this instruction, always ask your healthcare professional. [address] any warranty or liability for your use of this information. Introducing [facility]! [facility] introduces Entrisphere patient portal. Now you can access parts of your medical record, email your doctor's office, and request medication refills online. 1. In your internet browser, go to [email or web address] 2. Click on the First Time User? Click Here link in the Sign In box. You will see the New Member Sign Up page. 3. Enter your Entrisphere Access Code exactly as it appears below. You will not need to use this code after youve completed the sign-up process. If you do not sign up before the expiration date, you must request a new code. · Entrisphere Access Code: [email or web address] Expires: 8/23/2018  8:56 AM 
 
4. Enter the last four digits of your Social Security Number (xxxx) and Date of Birth (mm/dd/yyyy) as indicated and click Submit. You will be taken to the next sign-up page. 5. Create a Entrisphere ID. This will be your Entrisphere login ID and cannot be changed, so think of one that is secure and easy to remember. 6. Create a Entrisphere password. You can change your password at any time. 7. Enter your Password Reset Question and Answer. This can be used at a later time if you forget your password. 8. Enter your e-mail address. You will receive e-mail notification when new information is available in [address] 9. Click Sign Up. You can now view and download portions of your medical record. 10. Click the Download Summary menu link to download a portable copy of your medical information. If you have questions, please visit the Frequently Asked Questions section of the Entrisphere website.  Remember, Entrisphere is NOT to be used for urgent needs. For medical emergencies, dial 911. Now available from your iPhone and Android! Please provide this summary of care documentation to your next provider. Your primary care clinician is listed as Laurie Stokes. If you have any questions after today's visit, please call [phone].

## 2018-05-25 NOTE — LETTER
[address] 1950 5/25/2018 Dear Antione Ku MD 
 
I had the pleasure of evaluating  Ms. Yoo in office today. Below are the relevant portions of my assessment and plan of care. ICD-10-CM ICD-9-CM 1. Coronary artery disease involving native coronary artery of native heart without angina pectoris I25.10 414.01 carvedilol (COREG) 12.5 mg tablet 3/18 large fixed LAD defect, 40%EF;   
2. Cardiomyopathy, ischemic I25.5 414.8 carvedilol (COREG) 12.5 mg tablet 3/18 no meds- start low dose coreg for CMP;  
3. Coronary artery disease involving native coronary artery of native heart without angina pectoris I25.10 414.01 carvedilol (COREG) 12.5 mg tablet 3/18 large fixed LAD defect, 40%EF;  
  
4. Essential hypertension, benign I10 401.1 5/18 watch as coreg incr due to cmp;   
5. Pure hypercholesterolemia E78.00 272.0 LIPID PANEL  
   HEPATIC FUNCTION PANEL  
 on lipitor; 9/15 LDL46 6. Persistent atrial fibrillation (HCC) I48.1 427.31 Permanent; 3/18; 2/18; 8/17 on xarelto;   
7. Tobacco use disorder F17.200 305.1 5/18 quit 4/18;   
8. Pacemaker- DDD to VVIR Z95.0 V45.01   
  12/17 nl function 
carelink asap 9. Chronic systolic congestive heart failure (HCC) I50.22 428.22   
  428.0   
 5/18 improving 10. Acute systolic congestive heart failure (HCC) D93.61 515.25 METABOLIC PANEL, BASIC  
  428.0   
 improving Current Outpatient Prescriptions Medication Sig Dispense Refill  carvedilol (COREG) 12.5 mg tablet Take 1 Tab by mouth two (2) times daily (with meals). Indications: chronic heart failure 60 Tab 2  XARELTO 20 mg tab tablet TAKE ONE TABLET BY MOUTH ONCE DAILY WITH  DINNER 30 Tab 6  furosemide (LASIX) 20 mg tablet Take 1 Tab by mouth daily as needed. 30 Tab 0  
 omeprazole (PRILOSEC) 20 mg capsule Take 1 Cap by mouth daily. 30 Cap 1  
 aspirin 81 mg chewable tablet Take 1 Tab by mouth daily.  Indications: myocardial infarction prevention 100 Tab 0  
 naloxone 4 mg/actuation spry 4 mg by Nasal route as needed for up to 2 doses. Indications: OPIOID TOXICITY 1 Box 1  
 atorvastatin (LIPITOR) 20 mg tablet Take  by mouth daily.  naloxegol (MOVANTIK) 25 mg tab tablet Take 25 mg by mouth daily.  insulin lispro (HUMALOG) 100 unit/mL injection by SubCUTAneous route.  insulin glargine (LANTUS) 100 unit/mL injection by SubCUTAneous route nightly.  ipratropium-albuterol (COMBIVENT RESPIMAT)  mcg/actuation inhaler Take 1 Puff by inhalation every six (6) hours as needed for Wheezing or Shortness of Breath. 1 Inhaler 1  pyridoxine (VITAMIN B-6) 100 mg tablet Take 100 mg by mouth daily.  fish oil-dha-epa 1,200-144-216 mg cap Take 1 Cap by mouth daily.  levothyroxine (SYNTHROID) 125 mcg tablet Take 125 mcg by mouth Daily (before breakfast).  tiotropium (SPIRIVA WITH HANDIHALER) 18 mcg inhalation capsule Take 1 Cap by inhalation daily. 30 Cap 11  
 cholecalciferol, vitamin d3, (VITAMIN D) 1,000 unit tablet Take 5,000 Units by mouth daily. Orders Placed This Encounter  METABOLIC PANEL, BASIC Standing Status:   Future Standing Expiration Date:   8/25/2018  LIPID PANEL Standing Status:   Future Standing Expiration Date:   8/25/2018  
 HEPATIC FUNCTION PANEL Standing Status:   Future Standing Expiration Date:   8/25/2018  carvedilol (COREG) 12.5 mg tablet Sig: Take 1 Tab by mouth two (2) times daily (with meals). Indications: chronic heart failure Dispense:  60 Tab Refill:  2 If you have questions, please do not hesitate to call me. I look forward to following Ms. Yoo along with you. Sincerely, Karen Herrera MD

## 2018-05-25 NOTE — PROGRESS NOTES
Patient didn't bring medications, verbally reviewed    1. Have you been to the ER, urgent care clinic since your last visit? Hospitalized since your last visit? No    2. Have you seen or consulted any other health care providers outside of the [address] [place] [address] since your last visit? Include any pap smears or colon screening. Yes Where: PCP/Routine EVMS/Pain Clinic     3. Since your last visit, have you had any of the following symptoms? chest pains, palpitations, shortness of breath and dizziness. 4.  Have you had any blood work, X-rays or cardiac testing? No      5. Where do you normally have your labs drawn? [facility]    6. Do you need any refills today?    No

## 2018-06-18 ENCOUNTER — TELEPHONE (OUTPATIENT)
Dept: CARDIOLOGY CLINIC | Age: 68
End: 2018-06-18

## 2018-06-18 ENCOUNTER — CLINICAL SUPPORT (OUTPATIENT)
Dept: CARDIOLOGY CLINIC | Age: 68
End: 2018-06-18

## 2018-06-18 DIAGNOSIS — Z95.0 PACEMAKER: ICD-10-CM

## 2018-06-18 NOTE — TELEPHONE ENCOUNTER
If the weight at home has gone up. Get me HR/BP chart twice daily for 2 days.   Then, we can decide for any changes in medications

## 2018-06-21 ENCOUNTER — TELEPHONE (OUTPATIENT)
Dept: CARDIOLOGY CLINIC | Age: 68
End: 2018-06-21

## 2018-06-21 NOTE — TELEPHONE ENCOUNTER
If she is dizzy, she can reduce carvedilol by half. If she is not dizzy, continue present medications.

## 2018-06-21 NOTE — TELEPHONE ENCOUNTER
Patient's daughter called with BP readings    6/19    Weight         BP               Pulse    Am     132       110/62    86  Pm         103/45  90     6/20    Am 138  90/42  87   Pm   103/50  84     6/21  Am 134  91/54  88     Please advise

## 2018-06-21 NOTE — TELEPHONE ENCOUNTER
Called and spoke with Gill , daughter, states some dizziness, advised to cut Coreg in half repeat BP log and call on Monday.  Daughter states understanding

## 2018-06-28 ENCOUNTER — TELEPHONE (OUTPATIENT)
Dept: CARDIOLOGY CLINIC | Age: 68
End: 2018-06-28

## 2018-06-28 NOTE — TELEPHONE ENCOUNTER
6/25/18   lb  BP-97/44 in am 106/56 pm  P-84  6/26/18  WT-139  BP-107/65 in am 103/55 in pm  P-85  6/27/18  WT- 138  BP-82/45 in am 84/44 in pm   P-88  6/28/18  WT-138  BP-91/54  P-88   Sx of increase dizziness

## 2018-07-03 DIAGNOSIS — R07.89 OTHER CHEST PAIN: ICD-10-CM

## 2018-07-03 DIAGNOSIS — I50.21 ACUTE SYSTOLIC CONGESTIVE HEART FAILURE (HCC): ICD-10-CM

## 2018-07-03 RX ORDER — FUROSEMIDE 20 MG/1
20 TABLET ORAL
Qty: 30 TAB | Refills: 6 | Status: SHIPPED | OUTPATIENT
Start: 2018-07-03 | End: 2019-02-07 | Stop reason: SDUPTHER

## 2018-07-05 RX ORDER — OMEPRAZOLE 20 MG/1
CAPSULE, DELAYED RELEASE ORAL
Qty: 30 CAP | Refills: 0 | OUTPATIENT
Start: 2018-07-05

## 2018-08-07 ENCOUNTER — HOSPITAL ENCOUNTER (OUTPATIENT)
Dept: VASCULAR SURGERY | Age: 68
Discharge: HOME | End: 2018-08-07
Attending: INTERNAL MEDICINE
Payer: MEDICARE

## 2018-08-07 DIAGNOSIS — R10.13 EPIGASTRIC PAIN: ICD-10-CM

## 2018-08-07 PROCEDURE — 93975 VASCULAR STUDY: CPT

## 2018-08-09 LAB
ABDOMINAL PROX AORTA VEL: 87 CM/S
CELIAC EDV: 16 CM/S
CELIAC PSV: 82 CM/S
COMMON HEPATIC EDV: 16 CM/S
COMMON HEPATIC PSV: 121 CM/S
DIST SMA EDV: 18 CM/S
DIST SMA PSV: 93 CM/S
MID SMA EDV: 10 CM/S
MID SMA PSV: 96 CM/S
ORIGIN SMA EDV: 21 CM/S
ORIGIN SMA PSV: 172 CM/S
PROX AORTIC AP: 2.8 CM
PROX AORTIC TRANS: 3 CM
PROX SMA EDV: 25 CM/S
PROX SMA PSV: 175 CM/S
SPLENIC EDV: 21 CM/S
SPLENIC PSV: 117 CM/S

## 2018-09-17 DIAGNOSIS — I25.5 CARDIOMYOPATHY, ISCHEMIC: ICD-10-CM

## 2018-09-17 DIAGNOSIS — I25.10 CORONARY ARTERY DISEASE INVOLVING NATIVE CORONARY ARTERY OF NATIVE HEART WITHOUT ANGINA PECTORIS: ICD-10-CM

## 2018-09-17 RX ORDER — CARVEDILOL 12.5 MG/1
12.5 TABLET ORAL 2 TIMES DAILY WITH MEALS
Qty: 180 TAB | Refills: 2 | Status: SHIPPED | OUTPATIENT
Start: 2018-09-17 | End: 2019-06-30 | Stop reason: SDUPTHER

## 2018-11-05 ENCOUNTER — OFFICE VISIT (OUTPATIENT)
Dept: CARDIOLOGY CLINIC | Age: 68
End: 2018-11-05

## 2018-11-05 VITALS
SYSTOLIC BLOOD PRESSURE: 106 MMHG | HEART RATE: 85 BPM | WEIGHT: 147 LBS | DIASTOLIC BLOOD PRESSURE: 55 MMHG | BODY MASS INDEX: 25.1 KG/M2 | HEIGHT: 64 IN

## 2018-11-05 DIAGNOSIS — J44.9 CHRONIC OBSTRUCTIVE PULMONARY DISEASE, UNSPECIFIED COPD TYPE (HCC): ICD-10-CM

## 2018-11-05 DIAGNOSIS — I25.10 CORONARY ARTERY DISEASE INVOLVING NATIVE CORONARY ARTERY OF NATIVE HEART WITHOUT ANGINA PECTORIS: ICD-10-CM

## 2018-11-05 DIAGNOSIS — I48.19 PERSISTENT ATRIAL FIBRILLATION (HCC): ICD-10-CM

## 2018-11-05 DIAGNOSIS — Z98.890 S/P RF ABLATION OPERATION FOR ARRHYTHMIA: ICD-10-CM

## 2018-11-05 DIAGNOSIS — Z95.0 PACEMAKER: ICD-10-CM

## 2018-11-05 DIAGNOSIS — I25.5 CARDIOMYOPATHY, ISCHEMIC: ICD-10-CM

## 2018-11-05 DIAGNOSIS — F17.200 TOBACCO USE DISORDER: ICD-10-CM

## 2018-11-05 DIAGNOSIS — Z86.79 S/P RF ABLATION OPERATION FOR ARRHYTHMIA: ICD-10-CM

## 2018-11-05 DIAGNOSIS — I50.22 CHRONIC SYSTOLIC CONGESTIVE HEART FAILURE (HCC): Primary | ICD-10-CM

## 2018-11-05 RX ORDER — LOSARTAN POTASSIUM 25 MG/1
12.5 TABLET ORAL DAILY
Qty: 30 TAB | Refills: 5 | Status: SHIPPED | OUTPATIENT
Start: 2018-11-05 | End: 2019-02-25 | Stop reason: ALTCHOICE

## 2018-11-05 RX ORDER — FENTANYL 62.5 UG/H
PATCH, EXTENDED RELEASE TRANSDERMAL
COMMUNITY
End: 2021-06-14 | Stop reason: ALTCHOICE

## 2018-11-05 RX ORDER — OXYCODONE HYDROCHLORIDE 30 MG/1
20 TABLET ORAL
COMMUNITY

## 2018-11-05 NOTE — PROGRESS NOTES
HISTORY OF PRESENT ILLNESS  Horace Kang is a 70 y.o. female. F/u AFib, HTN, CHF, MR, HLD, s/p DDD    3/18 seen after low EF, wma and stress test today  7/17 continues with dizziness, rare LOC/fall- last in 6/17 1/17 has a cold for 6 wks      CHF   The history is provided by the medical records. This is a chronic problem. Associated symptoms include shortness of breath. Pertinent negatives include no chest pain, no abdominal pain and no headaches. Cardiomyopathy   The history is provided by the medical records (ischemic). This is a chronic problem. Associated symptoms include shortness of breath. Pertinent negatives include no chest pain, no abdominal pain and no headaches. Palpitations    The history is provided by the medical records. This is a recurrent problem. The current episode started more than 1 week ago. The problem has been resolved (few days). The problem occurs rarely (off and on). Episode Length: permanent AF on pacer check. Associated symptoms include irregular heartbeat, lower extremity edema, dizziness and shortness of breath. Pertinent negatives include no diaphoresis, no fever, no chest pain, no claudication, no orthopnea, no PND, no syncope, no abdominal pain, no nausea, no vomiting, no headaches, no weakness, no cough, no hemoptysis and no sputum production. Risk factors include diabetes mellitus, hypertension, smoking/tobacco exposure and dyslipidemia. Her past medical history is significant for DM, hypertension and atrial fibrillation. Hypertension   The history is provided by the medical records. This is a chronic problem. Associated symptoms include shortness of breath. Pertinent negatives include no chest pain, no abdominal pain and no headaches. Cholesterol Problem   The history is provided by the medical records. This is a chronic problem. Associated symptoms include shortness of breath. Pertinent negatives include no chest pain, no abdominal pain and no headaches. Dizziness   The history is provided by the patient. This is a new problem. The current episode started more than 1 week ago. The problem occurs every several days (3/wk; 5-10 mts; 6/17 LOC for few seconds). The problem has been gradually improving. Associated symptoms include shortness of breath. Pertinent negatives include no chest pain, no abdominal pain and no headaches. The symptoms are aggravated by twisting (looking/reaching up). The symptoms are relieved by rest and laying down. Shortness of Breath   The history is provided by the patient. This is a chronic problem. The problem occurs intermittently. The current episode started more than 1 week ago. The problem has been gradually improving. Associated symptoms include leg swelling. Pertinent negatives include no fever, no headaches, no ear pain, no neck pain, no cough, no sputum production, no hemoptysis, no wheezing, no PND, no orthopnea, no chest pain, no syncope, no vomiting, no abdominal pain, no rash and no claudication. The problem's precipitants include exercise (2 blocks; 4/18 within house; 11/18 1 block). Chest Pain (Angina)    The history is provided by the patient. This is a recurrent problem. The current episode started 2 days ago (was in ER). The problem occurs every several days (thinks it is gas). The pain is present in the epigastric region. The pain does not radiate. Associated symptoms include dizziness, irregular heartbeat, lower extremity edema, palpitations and shortness of breath. Pertinent negatives include no abdominal pain, no claudication, no cough, no diaphoresis, no fever, no headaches, no hemoptysis, no nausea, no orthopnea, no PND, no sputum production, no syncope, no vomiting and no weakness. Her past medical history is significant for DM. Leg Swelling   The history is provided by the patient. This is a new problem. The current episode started more than 1 week ago (4/18). The problem has been gradually improving. Associated symptoms include shortness of breath. Pertinent negatives include no chest pain, no abdominal pain and no headaches. The symptoms are relieved by medications. Review of Systems   Constitutional: Negative for chills, diaphoresis, fever and weight loss. HENT: Negative for ear pain and hearing loss. Eyes: Negative for blurred vision. Respiratory: Positive for shortness of breath. Negative for cough, hemoptysis, sputum production, wheezing and stridor. Cardiovascular: Positive for palpitations and leg swelling. Negative for chest pain, orthopnea, claudication, syncope and PND. Gastrointestinal: Negative for abdominal pain, heartburn, nausea and vomiting. Musculoskeletal: Negative for myalgias and neck pain. Skin: Negative for rash. Neurological: Positive for dizziness. Negative for tingling, tremors, focal weakness, loss of consciousness, weakness and headaches. Psychiatric/Behavioral: Negative for depression and suicidal ideas. No Known Allergies    Past Medical History:   Diagnosis Date    Abnormal myocardial perfusion study 05/07/2010    Anterior ischemia c/w at least 1-vessel CAD. No WMA. EF 51%. Positive EKG at 78% pred max HR. Ex time 7 min 30 sec.  Acute systolic congestive heart failure ([address] 4/9/2018 4/18 new, likely she was told of low EF and MI- med Rx for now    Allergic rhinitis 1/30/2010    Atrial fibrillation ([address]     Broken ankle 08/22/2013    left    Carotid stenosis 1/30/2010    Chronic diastolic congestive heart failure ([address] 2/16/2018 2/18 NYHA2    COPD     Essential hypertension     Heart failure ([address]     History of amiodarone therapy 5/11/2015    History of cardiac cath 05/25/2010    Patent coronary arteries. LVEDP 13 mmHg. EF 65%.  History of cervical cancer 11/05    History of echocardiogram 08/21/2013    EF 50-55%. No RWMA. Gr 2 DDfx. Mild LAE.   Mild MR.      Hypertension     MI (myocardial infarction) ([address] 2018    Migraines 2010    Mitral regurgitation     Orthostatic dizziness 3/30/2016    ? autonomic dysfunction Vs amio side effect     Pacemaker 13    DDD    Pure hypercholesterolemia 2010    Rosacea 2010    Type II or unspecified type diabetes mellitus without mention of complication, not stated as uncontrolled 2010    Unspecified hypothyroidism 2010       Family History   Problem Relation Age of Onset    Hypertension Mother     Cancer Maternal Aunt         breast CA 66yo    Breast Cancer Maternal Aunt 79    Diabetes Maternal Grandmother     Cancer Maternal Aunt         uterine CA    Diabetes Daughter     Other Daughter         lupus (SLE) and coagulopathy    Breast Cancer Paternal Aunt 61    Heart Attack Sister     Heart Disease Neg Hx        Social History     Tobacco Use    Smoking status: Former Smoker     Packs/day: 0.50     Years: 42.00     Pack years: 21.00     Last attempt to quit: 3/30/2018     Years since quittin.6    Smokeless tobacco: Never Used    Tobacco comment: 2 per day    Substance Use Topics    Alcohol use: No     Alcohol/week: 0.0 oz    Drug use: No        Current Outpatient Medications   Medication Sig    oxyCODONE IR (ROXICODONE) 20 mg immediate release tablet Take  by mouth every four (4) hours as needed for Pain.  fentaNYL 62.5 mcg/hour pt72 by TransDERmal route.  carvedilol (COREG) 12.5 mg tablet Take 1 Tab by mouth two (2) times daily (with meals). Indications: chronic heart failure    furosemide (LASIX) 20 mg tablet Take 1 Tab by mouth daily as needed.  omeprazole (PRILOSEC) 20 mg capsule Take 1 Cap by mouth daily.  XARELTO 20 mg tab tablet TAKE ONE TABLET BY MOUTH ONCE DAILY WITH  DINNER    aspirin 81 mg chewable tablet Take 1 Tab by mouth daily. Indications: myocardial infarction prevention    naloxone 4 mg/actuation spry 4 mg by Nasal route as needed for up to 2 doses.  Indications: OPIOID TOXICITY    atorvastatin (LIPITOR) 20 mg tablet Take  by mouth daily.  naloxegol (MOVANTIK) 25 mg tab tablet Take 25 mg by mouth daily.  insulin lispro (HUMALOG) 100 unit/mL injection by SubCUTAneous route.  insulin glargine (LANTUS) 100 unit/mL injection by SubCUTAneous route nightly.  ipratropium-albuterol (COMBIVENT RESPIMAT)  mcg/actuation inhaler Take 1 Puff by inhalation every six (6) hours as needed for Wheezing or Shortness of Breath.  pyridoxine (VITAMIN B-6) 100 mg tablet Take 100 mg by mouth daily.  fish oil-dha-epa 1,200-144-216 mg cap Take 1 Cap by mouth daily.  levothyroxine (SYNTHROID) 125 mcg tablet Take 125 mcg by mouth Daily (before breakfast).  tiotropium (SPIRIVA WITH HANDIHALER) 18 mcg inhalation capsule Take 1 Cap by inhalation daily.  cholecalciferol, vitamin d3, (VITAMIN D) 1,000 unit tablet Take 5,000 Units by mouth daily. No current facility-administered medications for this visit. Past Surgical History:   Procedure Laterality Date    HX AFIB ABLATION  2/8/13    Dr Rae Arreola HX CAROTID ENDARTERECTOMY  11/06    right Dr. Victorino Eaton HX HYSTERECTOMY  5/06    HX ORTHOPAEDIC Right 07/11/2016    knee replacement    HX PACEMAKER  11/25/13    DDD    HX SVT ABLATION  2/18/2013       Visit Vitals  /55   Pulse 85   Ht 5' 4\" (1.626 m)   Wt 66.7 kg (147 lb)   BMI 25.23 kg/m²       Diagnostic Studies:  I have reviewed the relevant tests done on the patient and show as follows  EKG tracings reviewed by me today. CARDIOLOGY STUDIES 3/30/2018 8/2/2013   Pharmacological Nuclear Stress Test Result large fixed ant/apical/sept defect, 40%EF -   Echocardiogram - Complete Result - 55%EF, mild MR, trivial PE   Some recent data might be hidden   3/18 NUc Stress  Conclusion:  1. Abnormal perfusion scan. 2. Evidence of a large sized fixed defect involving LV territory in the apical and mid anterior and anteroseptal areas.   This indicates previous myocardial infarction most likely. 3. This represents a high risk scan. Ms. Pancho Christianson has a reminder for a \"due or due soon\" health maintenance. I have asked that she contact her primary care provider for follow-up on this health maintenance. Physical Exam   Constitutional: She is oriented to person, place, and time. She appears well-developed and well-nourished. No distress. HENT:   Head: Normocephalic and atraumatic. Mouth/Throat: Normal dentition. Eyes: Right eye exhibits no discharge. Left eye exhibits no discharge. No scleral icterus. Neck: Neck supple. No JVD present. Carotid bruit is present (b/l). No thyromegaly present. Cardiovascular: Normal rate, regular rhythm, S1 normal, S2 normal and intact distal pulses. Exam reveals no gallop and no friction rub. Murmur heard. Scratchy early systolic murmur is present with a grade of 3/6 at the upper right sternal border. High-pitched blowing midsystolic murmur of grade 2/6 is also present at the apex. High-pitched blowing decrescendo early diastolic murmur is present with a grade of 2/6 at the upper right sternal border radiating to the apex. Pulmonary/Chest: Effort normal and breath sounds normal. She has no wheezes. She has no rales. Abdominal: Soft. She exhibits no mass. There is no tenderness. Musculoskeletal: She exhibits edema (trace to 1+). Lymphadenopathy:        Right cervical: No superficial cervical adenopathy present. Left cervical: No superficial cervical adenopathy present. Neurological: She is alert and oriented to person, place, and time. Skin: Skin is warm and dry. No rash noted. Psychiatric: She has a normal mood and affect.  Her behavior is normal.       ASSESSMENT and PLAN    AFib: Permanent; 3/18; 2/18; 8/17 on xarelto;   Persistent despite ablation  CAD: 3/18 large fixed LAD defect, 40%EF;   DDD to VVVIR : Dual chamber [employer] pacemaker implant 11/25/13  Office Checks: 12/13 nl function; 1/15 nl function, freq but very short AHR(likely a fib); 1/16 freq long AF, nl function; 1/17; 7/17; 6/18 nl function, perm AFib  Remote Checks : 3/14; 10/14; 4/16; 10/16; 12/17 nl function    CHF is fairly stable clinically. Will add low-dose losartan and follow the BP chart at home. Consider increasing losartan if tolerated. Diagnoses and all orders for this visit:    1. Chronic systolic congestive heart failure (HCC)  -     losartan (COZAAR) 25 mg tablet; Take 0.5 Tabs by mouth daily.  -     METABOLIC PANEL, BASIC; Future    2. Persistent atrial fibrillation ([address]    3. Coronary artery disease involving native coronary artery of native heart without angina pectoris    4. Cardiomyopathy, ischemic    5. S/P RF ablation operation for arrhythmia    6. Tobacco use disorder    7. Pacemaker- DDD to VVIR    8. Chronic obstructive pulmonary disease, unspecified COPD type ([address]        Pertinent laboratory and test data reviewed and discussed with patient. See patient instructions also for other medical advice given    There are no discontinued medications.     Follow-up Disposition:  Return in about 3 months (around 2/5/2019), or if symptoms worsen or fail to improve, for post test.

## 2018-11-05 NOTE — PATIENT INSTRUCTIONS
There are no discontinued medications. After the recommended changes have been made in blood pressure medicines, patient advised to keep BP/HR(pulse rate) chart twice daily and bring us results in next 2 weeks or so. Patient may send the results via \"My Chart\" if desired. Please rest for 5-10 minutes before checking blood pressure       Avoiding Triggers With Heart Failure: Care Instructions  Your Care Instructions    Triggers are anything that make your heart failure flare up. A flare-up is also called \"sudden heart failure\" or \"acute heart failure. \" When you have a flare-up, fluid builds up in your lungs, and you have problems breathing. You might need to go to the hospital. By watching for changes in your condition and avoiding triggers, you can prevent heart failure flare-ups. Follow-up care is a key part of your treatment and safety. Be sure to make and go to all appointments, and call your doctor if you are having problems. It's also a good idea to know your test results and keep a list of the medicines you take. How can you care for yourself at home? Watch for changes in your weight and condition  · Weigh yourself without clothing at the same time each day. Record your weight. Call your doctor if you have sudden weight gain, such as more than 2 to 3 pounds in a day or 5 pounds in a week. (Your doctor may suggest a different range of weight gain.) A sudden weight gain may mean that your heart failure is getting worse. · Keep a daily record of your symptoms. Write down any changes in how you feel, such as new shortness of breath, cough, or problems eating. Also record if your ankles are more swollen than usual and if you feel more tired than usual. Note anything that you ate or did that could have triggered these changes. Limit sodium  Sodium causes your body to hold on to extra water. This may cause your heart failure symptoms to get worse. People get most of their sodium from processed foods.  Fast food and restaurant meals also tend to be very high in sodium. · Your doctor may suggest that you limit sodium to 2,000 milligrams (mg) a day or less. That is less than 1 teaspoon of salt a day, including all the salt you eat in cooking or in packaged foods. · Read food labels on cans and food packages. They tell you how much sodium you get in one serving. Check the serving size. If you eat more than one serving, you are getting more sodium. · Be aware that sodium can come in forms other than salt, including monosodium glutamate (MSG), sodium citrate, and sodium bicarbonate (baking soda). MSG is often added to Asian food. You can sometimes ask for food without MSG or salt. · Slowly reducing salt will help you adjust to the taste. Take the salt shaker off the table. · Flavor your food with garlic, lemon juice, onion, vinegar, herbs, and spices instead of salt. Do not use soy sauce, steak sauce, onion salt, garlic salt, mustard, or ketchup on your food, unless it is labeled \"low-sodium\" or \"low-salt. \"  · Make your own salad dressings, sauces, and ketchup without adding salt. · Use fresh or frozen ingredients, instead of canned ones, whenever you can. Choose low-sodium canned goods. · Eat less processed food and food from restaurants, including fast food. Exercise as directed  Moderate, regular exercise is very good for your heart. It improves your blood flow and helps control your weight. But too much exercise can stress your heart and cause a heart failure flare-up. · Check with your doctor before you start an exercise program.  · Walking is an easy way to get exercise. Start out slowly. Gradually increase the length and pace of your walk. Swimming, riding a bike, and using a treadmill are also good forms of exercise. · When you exercise, watch for signs that your heart is working too hard. You are pushing yourself too hard if you cannot talk while you are exercising.  If you become short of breath or dizzy or have chest pain, stop, sit down, and rest.  · Do not exercise when you do not feel well. Take medicines correctly  · Take your medicines exactly as prescribed. Call your doctor if you think you are having a problem with your medicine. · Make a list of all the medicines you take. Include those prescribed to you by other doctors and any over-the-counter medicines, vitamins, or supplements you take. Take this list with you when you go to any doctor. · Take your medicines at the same time every day. It may help you to post a list of all the medicines you take every day and what time of day you take them. · Make taking your medicine as simple as you can. Plan times to take your medicines when you are doing other things, such as eating a meal or getting ready for bed. This will make it easier to remember to take your medicines. · Get organized. Use helpful tools, such as daily or weekly pill containers. When should you call for help? Call 911 if you have symptoms of sudden heart failure such as:    · You have severe trouble breathing.     · You cough up pink, foamy mucus.     · You have a new irregular or rapid heartbeat.    Call your doctor now or seek immediate medical care if:    · You have new or increased shortness of breath.     · You are dizzy or lightheaded, or you feel like you may faint.     · You have sudden weight gain, such as more than 2 to 3 pounds in a day or 5 pounds in a week. (Your doctor may suggest a different range of weight gain.)     · You have increased swelling in your legs, ankles, or feet.     · You are suddenly so tired or weak that you cannot do your usual activities.    Watch closely for changes in your health, and be sure to contact your doctor if you develop new symptoms. Where can you learn more? Go to http://jose alejandro-lizzy.info/. Enter C385 in the search box to learn more about \"Avoiding Triggers With Heart Failure: Care Instructions. \"  Current as of: December 6, 2017  Content Version: 11.8  © 5679-9167 [employer]. Care instructions adapted under license by [employer] (which disclaims liability or warranty for this information). If you have questions about a medical condition or this instruction, always ask your healthcare professional. [address] any warranty or liability for your use of this information.

## 2018-11-05 NOTE — PROGRESS NOTES
1. Have you been to the ER, urgent care clinic since your last visit? Hospitalized since your last visit? No    2. Have you seen or consulted any other health care providers outside of the [address] [place] [address] since your last visit? Include any pap smears or colon screening. Yes Where: [facility] Reason for visit: Pain Management     3. Since your last visit, have you had any of the following symptoms?      palpitations, shortness of breath, dizziness and swelling in legs/arms. 4.  Have you had any blood work, X-rays or cardiac testing? No    5. Where do you normally have your labs drawn? [facility]    6. Do you need any refills today?    No

## 2018-11-15 LAB
BUN SERPL-MCNC: 18 MG/DL (ref 8–27)
BUN/CREAT SERPL: 17 (ref 12–28)
CALCIUM SERPL-MCNC: 8.7 MG/DL (ref 8.7–10.3)
CHLORIDE SERPL-SCNC: 96 MMOL/L (ref 96–106)
CO2 SERPL-SCNC: 27 MMOL/L (ref 20–29)
CREAT SERPL-MCNC: 1.07 MG/DL (ref 0.57–1)
GLUCOSE SERPL-MCNC: 105 MG/DL (ref 65–99)
POTASSIUM SERPL-SCNC: 4.4 MMOL/L (ref 3.5–5.2)
SODIUM SERPL-SCNC: 135 MMOL/L (ref 134–144)

## 2018-12-07 RX ORDER — RIVAROXABAN 20 MG/1
TABLET, FILM COATED ORAL
Qty: 30 TAB | Refills: 6 | Status: SHIPPED | OUTPATIENT
Start: 2018-12-07 | End: 2019-07-14 | Stop reason: SDUPTHER

## 2019-01-24 ENCOUNTER — TELEPHONE (OUTPATIENT)
Dept: CARDIOLOGY CLINIC | Age: 69
End: 2019-01-24

## 2019-01-24 DIAGNOSIS — I50.32 CHRONIC DIASTOLIC CONGESTIVE HEART FAILURE (HCC): Primary | ICD-10-CM

## 2019-01-24 NOTE — TELEPHONE ENCOUNTER
Per Dr. Hillary Daily written order (please see scanned document in media dated 12/3/18) Echocardiogram ordered.

## 2019-02-07 DIAGNOSIS — I50.21 ACUTE SYSTOLIC CONGESTIVE HEART FAILURE (HCC): ICD-10-CM

## 2019-02-07 RX ORDER — FUROSEMIDE 20 MG/1
20 TABLET ORAL
Qty: 90 TAB | Refills: 2 | Status: SHIPPED | OUTPATIENT
Start: 2019-02-07 | End: 2021-03-22 | Stop reason: SDUPTHER

## 2019-02-25 ENCOUNTER — OFFICE VISIT (OUTPATIENT)
Dept: CARDIOLOGY CLINIC | Age: 69
End: 2019-02-25

## 2019-02-25 ENCOUNTER — TELEPHONE (OUTPATIENT)
Dept: CARDIOLOGY CLINIC | Age: 69
End: 2019-02-25

## 2019-02-25 VITALS
SYSTOLIC BLOOD PRESSURE: 102 MMHG | HEART RATE: 72 BPM | HEIGHT: 64 IN | DIASTOLIC BLOOD PRESSURE: 46 MMHG | BODY MASS INDEX: 24.72 KG/M2 | WEIGHT: 144.8 LBS

## 2019-02-25 DIAGNOSIS — Z98.890 S/P RF ABLATION OPERATION FOR ARRHYTHMIA: ICD-10-CM

## 2019-02-25 DIAGNOSIS — I50.22 CHRONIC SYSTOLIC CONGESTIVE HEART FAILURE (HCC): Primary | ICD-10-CM

## 2019-02-25 DIAGNOSIS — Z86.79 S/P RF ABLATION OPERATION FOR ARRHYTHMIA: ICD-10-CM

## 2019-02-25 DIAGNOSIS — I48.19 PERSISTENT ATRIAL FIBRILLATION (HCC): ICD-10-CM

## 2019-02-25 DIAGNOSIS — F17.200 TOBACCO USE DISORDER: ICD-10-CM

## 2019-02-25 DIAGNOSIS — Z95.0 PACEMAKER: ICD-10-CM

## 2019-02-25 DIAGNOSIS — R07.89 OTHER CHEST PAIN: ICD-10-CM

## 2019-02-25 RX ORDER — NITROGLYCERIN 0.4 MG/1
0.4 TABLET SUBLINGUAL
Qty: 25 TAB | Refills: 0 | Status: SHIPPED | OUTPATIENT
Start: 2019-02-25

## 2019-02-25 NOTE — TELEPHONE ENCOUNTER
Please talk to María Faulkner who is the medical rep and apply for preauthorization. Lena Ulrich can help  Tell the patient to continue losartan until she gets Entresto.

## 2019-02-25 NOTE — LETTER
[address] 1950 2/25/2019 Dear Sheron Judd MD 
 
I had the pleasure of evaluating  Ms. Yoo in office today. Below are the relevant portions of my assessment and plan of care. ICD-10-CM ICD-9-CM 1. Chronic systolic congestive heart failure (HCC) I50.22 428.22 sacubitril-valsartan (ENTRESTO) 24 mg/26 mg tablet 768.8 METABOLIC PANEL, BASIC METABOLIC PANEL, BASIC 2. Persistent atrial fibrillation (HCC) I48.1 427.31   
3. Tobacco use disorder F17.200 305.1 4. S/P RF ablation operation for arrhythmia Z98.890 V45.89   
 Z86.79    
5. Other chest pain R07.89 786.59 nitroglycerin (NITROSTAT) 0.4 mg SL tablet 6. Pacemaker- DDD to VVIR Z95.0 V45.01 PROGRAM EVAL (IN PERSON) IMPLANT DEVICE,PACEMAKER,MULT LEAD Current Outpatient Medications Medication Sig Dispense Refill  nitroglycerin (NITROSTAT) 0.4 mg SL tablet 1 Tab by SubLINGual route every five (5) minutes as needed for Chest Pain for up to 3 doses. 25 Tab 0  
 sacubitril-valsartan (ENTRESTO) 24 mg/26 mg tablet Take 1 Tab by mouth two (2) times a day. 60 Tab 1  
 furosemide (LASIX) 20 mg tablet Take 1 Tab by mouth daily as needed. 90 Tab 2  XARELTO 20 mg tab tablet TAKE 1 TABLET BY MOUTH ONCE DAILY WITH  DINNER 30 Tab 6  
 oxyCODONE IR (ROXICODONE) 20 mg immediate release tablet Take  by mouth every four (4) hours as needed for Pain.  fentaNYL 62.5 mcg/hour pt72 by TransDERmal route.  carvedilol (COREG) 12.5 mg tablet Take 1 Tab by mouth two (2) times daily (with meals). Indications: chronic heart failure 180 Tab 2  
 omeprazole (PRILOSEC) 20 mg capsule Take 1 Cap by mouth daily. 30 Cap 0  
 aspirin 81 mg chewable tablet Take 1 Tab by mouth daily. Indications: myocardial infarction prevention 100 Tab 0  
 naloxone 4 mg/actuation spry 4 mg by Nasal route as needed for up to 2 doses. Indications: OPIOID TOXICITY 1 Box 1  
 atorvastatin (LIPITOR) 20 mg tablet Take  by mouth daily.  naloxegol (MOVANTIK) 25 mg tab tablet Take 25 mg by mouth daily.  insulin lispro (HUMALOG) 100 unit/mL injection by SubCUTAneous route.  ipratropium-albuterol (COMBIVENT RESPIMAT)  mcg/actuation inhaler Take 1 Puff by inhalation every six (6) hours as needed for Wheezing or Shortness of Breath. 1 Inhaler 1  pyridoxine (VITAMIN B-6) 100 mg tablet Take 100 mg by mouth daily.  fish oil-dha-epa 1,200-144-216 mg cap Take 1 Cap by mouth daily.  levothyroxine (SYNTHROID) 125 mcg tablet Take 125 mcg by mouth Daily (before breakfast).  tiotropium (SPIRIVA WITH HANDIHALER) 18 mcg inhalation capsule Take 1 Cap by inhalation daily. 30 Cap 11  
 cholecalciferol, vitamin d3, (VITAMIN D) 1,000 unit tablet Take 5,000 Units by mouth daily.  insulin glargine (LANTUS) 100 unit/mL injection by SubCUTAneous route nightly. Orders Placed This Encounter  METABOLIC PANEL, BASIC Standing Status:   Future Standing Expiration Date:   2019  METABOLIC PANEL, BASIC Standing Status:   Future Standing Expiration Date:   2019  
 PROGRAM EVAL (IN PERSON) IMPLANT DEVICE,PACEMAKER,MULT LEAD Order Specific Question:   Reason for Exam: Answer:   f/u pacer  nitroglycerin (NITROSTAT) 0.4 mg SL tablet Si Tab by SubLINGual route every five (5) minutes as needed for Chest Pain for up to 3 doses. Dispense:  25 Tab Refill:  0  
 sacubitril-valsartan (ENTRESTO) 24 mg/26 mg tablet Sig: Take 1 Tab by mouth two (2) times a day. Dispense:  60 Tab Refill:  1 If you have questions, please do not hesitate to call me. I look forward to following Ms. Yoo along with you. Sincerely, Radha Perrin MD

## 2019-02-25 NOTE — PROGRESS NOTES
1. Have you been to the ER, urgent care clinic since your last visit? Hospitalized since your last visit? NO 
 
2. Have you seen or consulted any other health care providers outside of the [facility] since your last visit? Include any pap smears or colon screening. NO 
 
3. Since your last visit, have you had any of the following symptoms? NO CARDIAC SYMPTOMS. 4.  Have you had any blood work, X-rays or cardiac testing? NO 
 5. Where do you normally have your labs drawn? [facility] 6. Do you need any refills today?   NO

## 2019-02-25 NOTE — PROGRESS NOTES
HISTORY OF PRESENT ILLNESS Anna Tucker is a 70 y.o. female. F/u AFib, HTN, CHF, MR, HLD, s/p DDD 
 
3/18 seen after low EF, wma and stress test today 7/17 continues with dizziness, rare LOC/fall- last in 6/17 1/17 has a cold for 6 wks Hypertension The history is provided by the medical records. This is a chronic problem. Associated symptoms include shortness of breath. Pertinent negatives include no chest pain, no abdominal pain and no headaches. Palpitations The history is provided by the medical records (perm AF). This is a recurrent problem. The current episode started more than 1 week ago. The problem has been resolved (few days). The problem occurs rarely (off and on). Episode Length: permanent AF on pacer check. Associated symptoms include irregular heartbeat, lower extremity edema, dizziness and shortness of breath. Pertinent negatives include no diaphoresis, no fever, no chest pain, no claudication, no orthopnea, no PND, no syncope, no abdominal pain, no nausea, no vomiting, no headaches, no weakness, no cough, no hemoptysis and no sputum production. Risk factors include diabetes mellitus, hypertension, smoking/tobacco exposure and dyslipidemia. Her past medical history is significant for DM, hypertension and atrial fibrillation. CHF The history is provided by the medical records. This is a chronic problem. Associated symptoms include shortness of breath. Pertinent negatives include no chest pain, no abdominal pain and no headaches. Cardiomyopathy The history is provided by the medical records (ischemic). This is a chronic problem. Associated symptoms include shortness of breath. Pertinent negatives include no chest pain, no abdominal pain and no headaches. Cholesterol Problem The history is provided by the medical records. This is a chronic problem. Associated symptoms include shortness of breath.  Pertinent negatives include no chest pain, no abdominal pain and no headaches. Dizziness The history is provided by the patient. This is a new problem. The current episode started more than 1 week ago. The problem occurs every several days (3/wk; 5-10 mts; 6/17 LOC for few seconds). The problem has been gradually improving. Associated symptoms include shortness of breath. Pertinent negatives include no chest pain, no abdominal pain and no headaches. The symptoms are aggravated by twisting (looking/reaching up; sitting). The symptoms are relieved by rest and laying down. Shortness of Breath The history is provided by the patient. This is a chronic problem. The problem occurs intermittently. The current episode started more than 1 week ago. The problem has not changed since onset. Associated symptoms include leg swelling. Pertinent negatives include no fever, no headaches, no ear pain, no neck pain, no cough, no sputum production, no hemoptysis, no wheezing, no PND, no orthopnea, no chest pain, no syncope, no vomiting, no abdominal pain, no rash and no claudication. The problem's precipitants include exercise (2 blocks; 4/18 within house; 11/18 1 block). Chest Pain (Angina) The history is provided by the patient. This is a recurrent problem. The current episode started more than 1 week ago. Duration of episode(s) is 3 minutes. The problem occurs every several days (2-3/wk). The pain is associated with normal activity and rest. The pain is present in the lateral region, right side and substernal region. The quality of the pain is described as sharp. The pain radiates to the upper back. Associated symptoms include dizziness, irregular heartbeat, lower extremity edema, palpitations and shortness of breath.  Pertinent negatives include no abdominal pain, no claudication, no cough, no diaphoresis, no fever, no headaches, no hemoptysis, no nausea, no orthopnea, no PND, no sputum production, no syncope, no vomiting and no weakness. Her past medical history is significant for DM. Leg Swelling The history is provided by the patient. This is a new problem. The current episode started more than 1 week ago (4/18). The problem occurs every several days (EOD). The problem has not changed since onset. Associated symptoms include shortness of breath. Pertinent negatives include no chest pain, no abdominal pain and no headaches. The symptoms are relieved by medications. Review of Systems Constitutional: Negative for chills, diaphoresis, fever and weight loss. HENT: Negative for ear pain and hearing loss. Eyes: Negative for blurred vision. Respiratory: Positive for shortness of breath. Negative for cough, hemoptysis, sputum production, wheezing and stridor. Cardiovascular: Positive for palpitations and leg swelling. Negative for chest pain, orthopnea, claudication, syncope and PND. Gastrointestinal: Negative for abdominal pain, heartburn, nausea and vomiting. Musculoskeletal: Negative for myalgias and neck pain. Skin: Negative for rash. Neurological: Positive for dizziness. Negative for tingling, tremors, focal weakness, loss of consciousness, weakness and headaches. Psychiatric/Behavioral: Negative for depression and suicidal ideas. Allergies Allergen Reactions  Lisinopril Cough Past Medical History:  
Diagnosis Date  Abnormal myocardial perfusion study 05/07/2010 Anterior ischemia c/w at least 1-vessel CAD. No WMA. EF 51%. Positive EKG at 78% pred max HR. Ex time 7 min 30 sec.  Acute systolic congestive heart failure ([address] 4/9/2018 4/18 new, likely she was told of low EF and MI- med Rx for now  Allergic rhinitis 1/30/2010  Atrial fibrillation ([address]  Broken ankle 08/22/2013  
 left  Carotid stenosis 1/30/2010  Chronic diastolic congestive heart failure ([address] 2/16/2018 2/18 ACFI9  COPD  Essential hypertension  Heart failure ([facility]  History of amiodarone therapy 2015  History of cardiac cath 2010 Patent coronary arteries. LVEDP 13 mmHg. EF 65%.  History of cervical cancer   
 History of echocardiogram 2013 EF 50-55%. No RWMA. Gr 2 DDfx. Mild LAE. Mild MR.    
 Hypertension  MI [facility] infarction[facility] 2018  Migraines 2010  Mitral regurgitation  Orthostatic dizziness 3/30/2016  
 ? autonomic dysfunction Vs amio side effect  Pacemaker 13 DDD  Pure hypercholesterolemia 2010  Rosacea 2010  Type II or unspecified type diabetes mellitus without mention of complication, not stated as uncontrolled 2010  Unspecified hypothyroidism 2010 Family History Problem Relation Age of Onset  Hypertension Mother  Cancer Maternal Aunt   
     breast CA 78yo  Breast Cancer Maternal Aunt 79  
 Diabetes Maternal Grandmother  Cancer Maternal Aunt   
     uterine CA  Diabetes Daughter  Other Daughter   
     lupus (SLE) and coagulopathy  Breast Cancer Paternal Aunt 61  
 Heart Attack Sister  Heart Disease Neg Hx Social History Tobacco Use  Smoking status: Current Some Day Smoker Packs/day: 0.10 Years: 42.00 Pack years: 4.20 Last attempt to quit: 3/30/2018 Years since quittin.9  Smokeless tobacco: Never Used  Tobacco comment: 2 per day Substance Use Topics  Alcohol use: No  
  Alcohol/week: 0.0 oz  Drug use: No  
  
 
Current Outpatient Medications Medication Sig  furosemide (LASIX) 20 mg tablet Take 1 Tab by mouth daily as needed.  XARELTO 20 mg tab tablet TAKE 1 TABLET BY MOUTH ONCE DAILY WITH  DINNER  
 oxyCODONE IR (ROXICODONE) 20 mg immediate release tablet Take  by mouth every four (4) hours as needed for Pain.  fentaNYL 62.5 mcg/hour pt72 by TransDERmal route.  losartan (COZAAR) 25 mg tablet Take 0.5 Tabs by mouth daily.  carvedilol (COREG) 12.5 mg tablet Take 1 Tab by mouth two (2) times daily (with meals). Indications: chronic heart failure  omeprazole (PRILOSEC) 20 mg capsule Take 1 Cap by mouth daily.  aspirin 81 mg chewable tablet Take 1 Tab by mouth daily. Indications: myocardial infarction prevention  naloxone 4 mg/actuation spry 4 mg by Nasal route as needed for up to 2 doses. Indications: OPIOID TOXICITY  atorvastatin (LIPITOR) 20 mg tablet Take  by mouth daily.  naloxegol (MOVANTIK) 25 mg tab tablet Take 25 mg by mouth daily.  insulin lispro (HUMALOG) 100 unit/mL injection by SubCUTAneous route.  ipratropium-albuterol (COMBIVENT RESPIMAT)  mcg/actuation inhaler Take 1 Puff by inhalation every six (6) hours as needed for Wheezing or Shortness of Breath.  pyridoxine (VITAMIN B-6) 100 mg tablet Take 100 mg by mouth daily.  fish oil-dha-epa 1,200-144-216 mg cap Take 1 Cap by mouth daily.  levothyroxine (SYNTHROID) 125 mcg tablet Take 125 mcg by mouth Daily (before breakfast).  tiotropium (SPIRIVA WITH HANDIHALER) 18 mcg inhalation capsule Take 1 Cap by inhalation daily.  cholecalciferol, vitamin d3, (VITAMIN D) 1,000 unit tablet Take 5,000 Units by mouth daily.  insulin glargine (LANTUS) 100 unit/mL injection by SubCUTAneous route nightly. No current facility-administered medications for this visit. Past Surgical History:  
Procedure Laterality Date  HX AFIB ABLATION  2/8/13 Dr Andre May  HX CAROTID ENDARTERECTOMY  11/06 right Dr. Estee Hutson  HX HYSTERECTOMY  5/06  HX ORTHOPAEDIC Right 07/11/2016  
 knee replacement  HX PACEMAKER  11/25/13 DDD  HX SVT ABLATION  2/18/2013 Visit Vitals /46 Pulse 72 Ht 5' 4\" (1.626 m) Wt 65.7 kg (144 lb 12.8 oz) BMI 24.85 kg/m² Diagnostic Studies: 
I have reviewed the relevant tests done on the patient and show as follows EKG tracings reviewed by me today. CARDIOLOGY STUDIES 3/30/2018 8/2/2013 Pharmacological Nuclear Stress Test Result large fixed ant/apical/sept defect, 40%EF -  
Echocardiogram - Complete Result - 55%EF, mild MR, trivial PE Some recent data might be hidden 3/18 [facility] Stress Conclusion: 1. Abnormal perfusion scan. 2. Evidence of a large sized fixed defect involving LV territory in the apical and mid anterior and anteroseptal areas. This indicates previous myocardial infarction most likely. 3. This represents a high risk scan. Ms. Luis F Mcclain has a reminder for a \"due or due soon\" health maintenance. I have asked that she contact her primary care provider for follow-up on this health maintenance. Physical Exam  
Constitutional: She is oriented to person, place, and time. She appears well-developed and well-nourished. No distress. HENT:  
Head: Normocephalic and atraumatic. Mouth/Throat: Normal dentition. Eyes: Right eye exhibits no discharge. Left eye exhibits no discharge. No scleral icterus. Neck: Neck supple. No JVD present. Carotid bruit is present (b/l). No thyromegaly present. Cardiovascular: Normal rate, regular rhythm, S1 normal, S2 normal and intact distal pulses. Exam reveals no gallop and no friction rub. Murmur heard. Scratchy early systolic murmur is present with a grade of 3/6 at the upper right sternal border. High-pitched blowing midsystolic murmur of grade 2/6 is also present at the apex. High-pitched blowing decrescendo early diastolic murmur is present with a grade of 2/6 at the upper right sternal border radiating to the apex. Pulmonary/Chest: Effort normal and breath sounds normal. She has no wheezes. She has no rales. Abdominal: Soft. She exhibits no mass. There is no tenderness. Musculoskeletal: She exhibits edema (trace to 1+). Lymphadenopathy:  
     Right cervical: No superficial cervical adenopathy present. Left cervical: No superficial cervical adenopathy present. Neurological: She is alert and oriented to person, place, and time. Skin: Skin is warm and dry. No rash noted. Psychiatric: She has a normal mood and affect. Her behavior is normal.  
 
 
ASSESSMENT and PLAN 
 
AFib: Permanent; 3/18; 2/18; 8/17 on xarelto;  
Persistent despite ablation CAD: 3/18 large fixed LAD defect, 40%EF;  
DDD to VVVIR : Dual chamber [employer] pacemaker implant 11/25/13 Office Checks: 12/13 nl function; 1/15 nl function, freq but very short AHR(likely a fib); 1/16 freq long AF, nl function; 1/17; 7/17; 6/18 nl function, perm AFib; 2/19 normal function Remote Checks : 3/14; 10/14; 4/16; 10/16; 12/17 nl function CHF is fairly stable clinically. Will change losartan to Entresto. Start the low-dose follow-up labs. Atypical chest discomfort and will try nitroglycerin for that and if it consistently relieves, will consider cardiac cath. Diagnoses and all orders for this visit: 
 
1. Chronic systolic congestive heart failure (HCC) 
-     sacubitril-valsartan (ENTRESTO) 24 mg/26 mg tablet; Take 1 Tab by mouth two (2) times a day. -     METABOLIC PANEL, BASIC; Future -     METABOLIC PANEL, BASIC; Future 2. Persistent atrial fibrillation ([address] 3. Tobacco use disorder 4. S/P RF ablation operation for arrhythmia 5. Other chest pain 
-     nitroglycerin (NITROSTAT) 0.4 mg SL tablet; 1 Tab by SubLINGual route every five (5) minutes as needed for Chest Pain for up to 3 doses. 6. Pacemaker- DDD to VVIR 
-     PROGRAM EVAL (IN PERSON) IMPLANT DEVICE,PACEMAKER,MULT LEAD Pertinent laboratory and test data reviewed and discussed with patient. See patient instructions also for other medical advice given Medications Discontinued During This Encounter Medication Reason  losartan (COZAAR) 25 mg tablet Alternate Therapy Follow-up Disposition: Return in about 6 weeks (around 4/8/2019), or if symptoms worsen or fail to improve, for post test.

## 2019-02-25 NOTE — PATIENT INSTRUCTIONS
Medications Discontinued During This Encounter Medication Reason  losartan (COZAAR) 25 mg tablet Alternate Therapy After the recommended changes have been made in blood pressure medicines, patient advised to keep BP/HR(pulse rate) chart twice daily and bring us results in next office visit. Patient may send the results via \"My Chart\" if desired. Please rest for 5-10 minutes before checking blood pressure

## 2019-02-26 ENCOUNTER — TELEPHONE (OUTPATIENT)
Dept: CARDIOLOGY CLINIC | Age: 69
End: 2019-02-26

## 2019-02-26 NOTE — TELEPHONE ENCOUNTER
After you finish working on the prior auth for this patient, for her [employer] [place], please let her daughter, Cindy Busby know what the status is. thanks

## 2019-05-09 ENCOUNTER — HOSPITAL ENCOUNTER (OUTPATIENT)
Dept: VASCULAR SURGERY | Age: 69
Discharge: HOME | End: 2019-05-09
Attending: PHYSICIAN ASSISTANT
Payer: MEDICARE

## 2019-05-09 DIAGNOSIS — R10.13 EPIGASTRIC PAIN: ICD-10-CM

## 2019-05-09 DIAGNOSIS — Z87.891 HISTORY OF TOBACCO USE: ICD-10-CM

## 2019-05-09 DIAGNOSIS — R63.4 UNINTENTIONAL WEIGHT LOSS: ICD-10-CM

## 2019-05-09 DIAGNOSIS — K59.03 DRUG-INDUCED CONSTIPATION: ICD-10-CM

## 2019-05-09 DIAGNOSIS — R13.10 DYSPHAGIA, UNSPECIFIED: ICD-10-CM

## 2019-05-09 LAB
ABDOMINAL PROX AORTA VEL: 71.1 CM/S
CELIAC EDV: 11 CM/S
CELIAC PSV: 142.4 CM/S
COMMON HEPATIC EDV: 30.1 CM/S
COMMON HEPATIC PSV: 118.2 CM/S
DIST SMA EDV: 10.2 CM/S
DIST SMA PSV: 96.1 CM/S
IMA EDV: 10.2 CM/S
IMA PSV: 101.5 CM/S
MID SMA EDV: 22.1 CM/S
MID SMA PSV: 203.8 CM/S
PROX AORTIC AP: 1.38 CM
PROX AORTIC TRANS: 1.34 CM
PROX SMA EDV: 22.1 CM/S
PROX SMA PSV: 199.9 CM/S
SPLENIC EDV: 9.8 CM/S
SPLENIC PSV: 56.3 CM/S

## 2019-05-09 PROCEDURE — 93975 VASCULAR STUDY: CPT

## 2019-05-10 ENCOUNTER — HOSPITAL ENCOUNTER (OUTPATIENT)
Dept: GENERAL RADIOLOGY | Age: 69
Discharge: HOME | End: 2019-05-10
Attending: PHYSICIAN ASSISTANT
Payer: MEDICARE

## 2019-05-10 DIAGNOSIS — R63.4 UNINTENTIONAL WEIGHT LOSS: ICD-10-CM

## 2019-05-10 DIAGNOSIS — R10.13 EPIGASTRIC PAIN: ICD-10-CM

## 2019-05-10 DIAGNOSIS — Z87.891 HISTORY OF TOBACCO USE: ICD-10-CM

## 2019-05-10 DIAGNOSIS — K59.03 DRUG-INDUCED CONSTIPATION: ICD-10-CM

## 2019-05-10 DIAGNOSIS — R13.10 DYSPHAGIA, UNSPECIFIED: ICD-10-CM

## 2019-05-10 PROCEDURE — 74220 X-RAY XM ESOPHAGUS 1CNTRST: CPT

## 2019-05-10 PROCEDURE — 74011000250 HC RX REV CODE- 250: Performed by: PHYSICIAN ASSISTANT

## 2019-05-10 PROCEDURE — 74011000255 HC RX REV CODE- 255: Performed by: PHYSICIAN ASSISTANT

## 2019-05-10 RX ADMIN — ANTACID/ANTIFLATULENT 4 G: 380; 550; 10; 10 GRANULE, EFFERVESCENT ORAL at 09:00

## 2019-05-10 RX ADMIN — BARIUM SULFATE 135 ML: 980 POWDER, FOR SUSPENSION ORAL at 09:00

## 2019-05-10 RX ADMIN — BARIUM SULFATE 30 G: 960 POWDER, FOR SUSPENSION ORAL at 09:00

## 2019-05-10 RX ADMIN — BARIUM SULFATE 700 MG: 700 TABLET ORAL at 09:00

## 2019-05-20 ENCOUNTER — OFFICE VISIT (OUTPATIENT)
Dept: CARDIOLOGY CLINIC | Age: 69
End: 2019-05-20

## 2019-05-20 VITALS
BODY MASS INDEX: 25.27 KG/M2 | HEART RATE: 85 BPM | SYSTOLIC BLOOD PRESSURE: 98 MMHG | WEIGHT: 148 LBS | HEIGHT: 64 IN | DIASTOLIC BLOOD PRESSURE: 55 MMHG

## 2019-05-20 DIAGNOSIS — Z95.0 PACEMAKER: ICD-10-CM

## 2019-05-20 DIAGNOSIS — I50.22 CHRONIC SYSTOLIC CONGESTIVE HEART FAILURE (HCC): Primary | ICD-10-CM

## 2019-05-20 DIAGNOSIS — J44.9 CHRONIC OBSTRUCTIVE PULMONARY DISEASE, UNSPECIFIED COPD TYPE (HCC): ICD-10-CM

## 2019-05-20 DIAGNOSIS — Z98.890 S/P RF ABLATION OPERATION FOR ARRHYTHMIA: ICD-10-CM

## 2019-05-20 DIAGNOSIS — I48.19 PERSISTENT ATRIAL FIBRILLATION (HCC): ICD-10-CM

## 2019-05-20 DIAGNOSIS — Z86.79 S/P RF ABLATION OPERATION FOR ARRHYTHMIA: ICD-10-CM

## 2019-05-20 DIAGNOSIS — F17.200 TOBACCO USE DISORDER: ICD-10-CM

## 2019-05-20 RX ORDER — LOSARTAN POTASSIUM 25 MG/1
12.5 TABLET ORAL DAILY
Qty: 30 TAB | Refills: 5 | Status: SHIPPED | OUTPATIENT
Start: 2019-05-20 | End: 2020-06-09

## 2019-05-20 RX ORDER — LANOLIN ALCOHOL/MO/W.PET/CERES
CREAM (GRAM) TOPICAL
COMMUNITY

## 2019-05-20 NOTE — PROGRESS NOTES
HISTORY OF PRESENT ILLNESS  Rodrigo Ricks is a 78 y.o. female. F/u AFib, HTN, CHF, MR, HLD, s/p DDD    3/18 seen after low EF, wma and stress test today  7/17 continues with dizziness, rare LOC/fall- last in 6/17 1/17 has a cold for 6 wks    Palpitations    The history is provided by the medical records (perm AF). This is a recurrent problem. The current episode started more than 1 week ago. The problem has been resolved (few days). The problem occurs rarely (off and on). Episode Length: permanent AF on pacer check. Associated symptoms include irregular heartbeat, lower extremity edema, dizziness and shortness of breath. Pertinent negatives include no diaphoresis, no fever, no chest pain, no claudication, no orthopnea, no PND, no syncope, no abdominal pain, no nausea, no vomiting, no headaches, no weakness, no cough, no hemoptysis and no sputum production. Risk factors include diabetes mellitus, hypertension, smoking/tobacco exposure and dyslipidemia. Her past medical history is significant for DM, hypertension and atrial fibrillation. Cardiomyopathy   The history is provided by the medical records (ischemic). This is a chronic problem. Associated symptoms include shortness of breath. Pertinent negatives include no chest pain, no abdominal pain and no headaches. CHF   The history is provided by the medical records. This is a chronic problem. Associated symptoms include shortness of breath. Pertinent negatives include no chest pain, no abdominal pain and no headaches. Hypertension   The history is provided by the medical records. This is a chronic problem. Associated symptoms include shortness of breath. Pertinent negatives include no chest pain, no abdominal pain and no headaches. Cholesterol Problem   The history is provided by the medical records. This is a chronic problem. Associated symptoms include shortness of breath.  Pertinent negatives include no chest pain, no abdominal pain and no headaches. Dizziness   The history is provided by the patient. This is a new problem. The current episode started more than 1 week ago. The problem occurs every several days (3/wk; 5-10 mts; 6/17 LOC for few seconds). The problem has not changed (Likely equilibrium issue now 5/19) since onset. Associated symptoms include shortness of breath. Pertinent negatives include no chest pain, no abdominal pain and no headaches. The symptoms are aggravated by twisting (looking/reaching up; sitting). The symptoms are relieved by rest and laying down. Shortness of Breath   The history is provided by the patient. This is a chronic problem. The problem occurs intermittently. The current episode started more than 1 week ago. The problem has not changed since onset. Associated symptoms include leg swelling. Pertinent negatives include no fever, no headaches, no ear pain, no neck pain, no cough, no sputum production, no hemoptysis, no wheezing, no PND, no orthopnea, no chest pain, no syncope, no vomiting, no abdominal pain, no rash and no claudication. The problem's precipitants include exercise (2 blocks; 4/18 within house; 11/18 1 block). Chest Pain (Angina)    The history is provided by the patient. This is a recurrent problem. The current episode started more than 1 week ago. The problem has been resolved. Duration of episode(s) is 3 minutes. The problem occurs every several days (2-3/wk). The pain is associated with normal activity and rest. The pain is present in the lateral region, right side and substernal region. The quality of the pain is described as sharp. The pain radiates to the upper back. Associated symptoms include dizziness, irregular heartbeat, lower extremity edema, palpitations and shortness of breath.  Pertinent negatives include no abdominal pain, no claudication, no cough, no diaphoresis, no fever, no headaches, no hemoptysis, no nausea, no orthopnea, no PND, no sputum production, no syncope, no vomiting and no weakness. Her past medical history is significant for DM. Leg Swelling   The history is provided by the patient. This is a new problem. The current episode started more than 1 week ago (4/18). The problem occurs daily (EOD). The problem has not changed since onset. Associated symptoms include shortness of breath. Pertinent negatives include no chest pain, no abdominal pain and no headaches. The symptoms are relieved by medications. Review of Systems   Constitutional: Negative for chills, diaphoresis, fever and weight loss. HENT: Negative for ear pain and hearing loss. Eyes: Negative for blurred vision. Respiratory: Positive for shortness of breath. Negative for cough, hemoptysis, sputum production, wheezing and stridor. Cardiovascular: Positive for palpitations and leg swelling. Negative for chest pain, orthopnea, claudication, syncope and PND. Gastrointestinal: Negative for abdominal pain, heartburn, nausea and vomiting. Musculoskeletal: Negative for myalgias and neck pain. Skin: Negative for rash. Neurological: Positive for dizziness. Negative for tingling, tremors, focal weakness, loss of consciousness, weakness and headaches. Psychiatric/Behavioral: Negative for depression and suicidal ideas. Allergies   Allergen Reactions    Lisinopril Cough     Patient denies         Past Medical History:   Diagnosis Date    Abnormal myocardial perfusion study 05/07/2010    Anterior ischemia c/w at least 1-vessel CAD. No WMA. EF 51%. Positive EKG at 78% pred max HR. Ex time 7 min 30 sec.     Acute systolic congestive heart failure ([address] 4/9/2018 4/18 new, likely she was told of low EF and MI- med Rx for now    Allergic rhinitis 1/30/2010    Atrial fibrillation ([address]     Broken ankle 08/22/2013    left    Carotid stenosis 1/30/2010    Chronic diastolic congestive heart failure ([address] 2/16/2018 2/18 NYHA2    COPD     Essential hypertension     Heart failure ([address]     History of amiodarone therapy 2015    History of cardiac cath 2010    Patent coronary arteries. LVEDP 13 mmHg. EF 65%.  History of cervical cancer     History of echocardiogram 2013    EF 50-55%. No RWMA. Gr 2 DDfx. Mild LAE. Mild MR.      Hypertension     MI (myocardial infarction) ([address]     2018    Migraines 2010    Mitral regurgitation     Orthostatic dizziness 3/30/2016    ? autonomic dysfunction Vs amio side effect     Pacemaker 13    DDD    Pure hypercholesterolemia 2010    Rosacea 2010    Type II or unspecified type diabetes mellitus without mention of complication, not stated as uncontrolled 2010    Unspecified hypothyroidism 2010       Family History   Problem Relation Age of Onset    Hypertension Mother     Cancer Maternal Aunt         breast CA 68yo    Breast Cancer Maternal Aunt 79    Diabetes Maternal Grandmother     Cancer Maternal Aunt         uterine CA    Diabetes Daughter     Other Daughter         lupus (SLE) and coagulopathy    Breast Cancer Paternal Aunt 61    Heart Attack Sister     Heart Disease Neg Hx        Social History     Tobacco Use    Smoking status: Current Some Day Smoker     Packs/day: 0.10     Years: 42.00     Pack years: 4.20     Last attempt to quit: 3/30/2018     Years since quittin.1    Smokeless tobacco: Never Used    Tobacco comment: Patient states hasn't smoked in 2 weeks   Substance Use Topics    Alcohol use: No     Alcohol/week: 0.0 oz    Drug use: No        Current Outpatient Medications   Medication Sig    ferrous sulfate (IRON) 325 mg (65 mg iron) tablet Take  by mouth Daily (before breakfast).  nitroglycerin (NITROSTAT) 0.4 mg SL tablet 1 Tab by SubLINGual route every five (5) minutes as needed for Chest Pain for up to 3 doses.  furosemide (LASIX) 20 mg tablet Take 1 Tab by mouth daily as needed.     XARELTO 20 mg tab tablet TAKE 1 TABLET BY MOUTH ONCE DAILY WITH  DINNER    oxyCODONE IR (ROXICODONE) 20 mg immediate release tablet Take  by mouth every four (4) hours as needed for Pain.  fentaNYL 62.5 mcg/hour pt72 by TransDERmal route.  carvedilol (COREG) 12.5 mg tablet Take 1 Tab by mouth two (2) times daily (with meals). Indications: chronic heart failure    omeprazole (PRILOSEC) 20 mg capsule Take 1 Cap by mouth daily.  aspirin 81 mg chewable tablet Take 1 Tab by mouth daily. Indications: myocardial infarction prevention    naloxone 4 mg/actuation spry 4 mg by Nasal route as needed for up to 2 doses. Indications: OPIOID TOXICITY    atorvastatin (LIPITOR) 20 mg tablet Take  by mouth daily.  naloxegol (MOVANTIK) 25 mg tab tablet Take 25 mg by mouth daily.  insulin lispro (HUMALOG) 100 unit/mL injection by SubCUTAneous route.  insulin glargine (LANTUS) 100 unit/mL injection by SubCUTAneous route nightly.  ipratropium-albuterol (COMBIVENT RESPIMAT)  mcg/actuation inhaler Take 1 Puff by inhalation every six (6) hours as needed for Wheezing or Shortness of Breath.  pyridoxine (VITAMIN B-6) 100 mg tablet Take 100 mg by mouth daily.  fish oil-dha-epa 1,200-144-216 mg cap Take 1 Cap by mouth daily.  levothyroxine (SYNTHROID) 125 mcg tablet Take 125 mcg by mouth Daily (before breakfast).  tiotropium (SPIRIVA WITH HANDIHALER) 18 mcg inhalation capsule Take 1 Cap by inhalation daily.  cholecalciferol, vitamin d3, (VITAMIN D) 1,000 unit tablet Take 5,000 Units by mouth daily. No current facility-administered medications for this visit.          Past Surgical History:   Procedure Laterality Date    HX AFIB ABLATION  2/8/13    Dr Pablo Peres HX CAROTID ENDARTERECTOMY  11/06    right Dr. Vicente Brown HX HYSTERECTOMY  5/06    HX ORTHOPAEDIC Right 07/11/2016    knee replacement    HX PACEMAKER  11/25/13    DDD    HX SVT ABLATION  2/18/2013       Visit Vitals  BP 98/55   Pulse 85   Ht 5' 4\" (1.626 m)   Wt 67.1 kg (148 lb)   BMI 25.40 kg/m²       Diagnostic Studies:  I have reviewed the relevant tests done on the patient and show as follows  EKG tracings reviewed by me today. CARDIOLOGY STUDIES 3/30/2018   Pharmacological Nuclear Stress Test Result large fixed ant/apical/sept defect, 40%EF   Some recent data might be hidden   3/18 NUc Stress  Conclusion:  1. Abnormal perfusion scan. 2. Evidence of a large sized fixed defect involving LV territory in the apical and mid anterior and anteroseptal areas. This indicates previous myocardial infarction most likely. 3. This represents a high risk scan. 1/19 ECHO Interpretation Summary     · Normal cavity size. Mild concentric hypertrophy observed. There is moderately decreased systolic function. The estimated ejection fraction is 36 - 40%. Abnormal wall motion as described on the wall scoring diagram below. Global longitudinal strain is -4.90%. Abnormal left ventricular strain. There is severe (grade 3) left ventricular diastolic dysfunction. · Left atrial cavity size is severely dilated. · Right ventricular global systolic function is reduced. Pacing wire present  · Mitral valve thickening. Mild mitral valve regurgitation. · Mild tricuspid valve regurgitation. Pulmonary arterial systolic pressure is 93.4 mmHg. Mild pulmonary hypertension. Comparison Study Information     Prior Study     When compared to the previous study from 3/23/18, there is no longer evidence of a small pericardial effusion. Ms. Jenny Camacho has a reminder for a \"due or due soon\" health maintenance. I have asked that she contact her primary care provider for follow-up on this health maintenance. Physical Exam   Constitutional: She is oriented to person, place, and time. She appears well-developed and well-nourished. No distress. HENT:   Head: Normocephalic and atraumatic. Mouth/Throat: Normal dentition. Eyes: Right eye exhibits no discharge. Left eye exhibits no discharge. No scleral icterus.    Neck: Neck supple. No JVD present. Carotid bruit is present (b/l). No thyromegaly present. Cardiovascular: Normal rate, regular rhythm, S1 normal, S2 normal and intact distal pulses. Exam reveals no gallop and no friction rub. Murmur heard. Scratchy early systolic murmur is present with a grade of 3/6 at the upper right sternal border. High-pitched blowing midsystolic murmur of grade 2/6 is also present at the apex. High-pitched blowing decrescendo early diastolic murmur is present with a grade of 2/6 at the upper right sternal border radiating to the apex. Pulmonary/Chest: Effort normal and breath sounds normal. She has no wheezes. She has no rales. Abdominal: Soft. She exhibits no mass. There is no tenderness. Musculoskeletal: She exhibits edema (trace). Lymphadenopathy:        Right cervical: No superficial cervical adenopathy present. Left cervical: No superficial cervical adenopathy present. Neurological: She is alert and oriented to person, place, and time. Skin: Skin is warm and dry. No rash noted. Psychiatric: She has a normal mood and affect. Her behavior is normal.       ASSESSMENT and PLAN    Patient advised to stop smoking to reduce future cardiovascular events. AFib: Permanent; 3/18; 2/18; 8/17 on xarelto;   Persistent despite ablation  CAD: 3/18 large fixed LAD defect, 40%EF;   DDD to VVVIR : Dual chamber [employer] pacemaker implant 11/25/13  Office Checks: 12/13 nl function; 1/15 nl function, freq but very short AHR(likely a fib); 1/16 freq long AF, nl function; 1/17; 7/17; 6/18 nl function, perm AFib; 2/19 normal function  Remote Checks : 3/14; 10/14; 4/16; 10/16; 12/17 nl function    CHF is fairly stable clinically. Chest pain has resolved. Will wait on cardiac cath. Belinda Reece was not covered. Will restart low-dose losartan. Diagnoses and all orders for this visit:    1. Chronic systolic congestive heart failure (HCC)  -     losartan (COZAAR) 25 mg tablet;  Take 0.5 Tabs by mouth daily. -     Comp Stocking,Knee,Regular,Sml misc; 2 Box by Does Not Apply route daily. Use while upright  Indications: edema  -     METABOLIC PANEL, BASIC; Future    2. Persistent atrial fibrillation ([facility]    3. Tobacco use disorder    4. S/P RF ablation operation for arrhythmia    5. Pacemaker- DDD to VVIR    6. Chronic obstructive pulmonary disease, unspecified COPD type ([facility]        Pertinent laboratory and test data reviewed and discussed with patient.   See patient instructions also for other medical advice given    Medications Discontinued During This Encounter   Medication Reason    sacubitril-valsartan (ENTRESTO) 24 mg/26 mg tablet        Follow-up and Dispositions    · Return in about 6 months (around 11/20/2019), or if symptoms worsen or fail to improve, for post test.

## 2019-05-20 NOTE — PATIENT INSTRUCTIONS
Medications Discontinued During This Encounter   Medication Reason    sacubitril-valsartan (ENTRESTO) 24 mg/26 mg tablet      After the recommended changes have been made in blood pressure medicines, patient advised to keep BP/HR(pulse rate) chart twice daily and bring us results in next 2 weeks or so. Patient may send the results via \"My Chart\" if desired. Please rest for 5-10 minutes before checking blood pressure       Learning About Benefits From Quitting Smoking  How does quitting smoking make you healthier? If you're thinking about quitting smoking, you may have a few reasons to be smoke-free. Your health may be one of them. · When you quit smoking, you lower your risks for cancer, lung disease, heart attack, stroke, blood vessel disease, and blindness from macular degeneration. · When you're smoke-free, you get sick less often, and you heal faster. You are less likely to get colds, flu, bronchitis, and pneumonia. · As a nonsmoker, you may find that your mood is better and you are less stressed. When and how will you feel healthier? Quitting has real health benefits that start from day 1 of being smoke-free. And the longer you stay smoke-free, the healthier you get and the better you feel. The first hours  · After just 20 minutes, your blood pressure and heart rate go down. That means there's less stress on your heart and blood vessels. · Within 12 hours, the level of carbon monoxide in your blood drops back to normal. That makes room for more oxygen. With more oxygen in your body, you may notice that you have more energy than when you smoked. After 2 weeks  · Your lungs start to work better. · Your risk of heart attack starts to drop. After 1 month  · When your lungs are clear, you cough less and breathe deeper, so it's easier to be active. · Your sense of taste and smell return. That means you can enjoy food more than you have since you started smoking.   Over the years  · After 1 year, your risk of heart disease is half what it would be if you kept smoking. · After 5 years, your risk of stroke starts to shrink. Within a few years after that, it's about the same as if you'd never smoked. · After 10 years, your risk of dying from lung cancer is cut by about half. And your risk for many other types of cancer is lower too. How would quitting help others in your life? When you quit smoking, you improve the health of everyone who now breathes in your smoke. · Their heart, lung, and cancer risks drop, much like yours. · They are sick less. For babies and small children, living smoke-free means they're less likely to have ear infections, pneumonia, and bronchitis. · If you're a woman who is or will be pregnant someday, quitting smoking means a healthier . · Children who are close to you are less likely to become adult smokers. Where can you learn more? Go to http://jose alejandro-lizzy.info/. Enter 052 [id number] [id number] 11 in the search box to learn more about \"Learning About Benefits From Quitting Smoking. \"  Current as of: 2018  Content Version: 11.9  © 8841-3889 [employer]. Care instructions adapted under license by [employer] (which disclaims liability or warranty for this information). If you have questions about a medical condition or this instruction, always ask your healthcare professional. [place] [address] any warranty or liability for your use of this information.

## 2019-05-20 NOTE — PROGRESS NOTES
1. Have you been to the ER, urgent care clinic since your last visit? Hospitalized since your last visit? No    2. Have you seen or consulted any other health care providers outside of the [address] [place] [address] since your last visit? Include any pap smears or colon screening. Yes Where: PCP Reason for visit: Routine     3. Since your last visit, have you had any of the following symptoms? shortness of breath, dizziness and swelling in legs/arms. Explain: Bilateral Leg and Foot swelling    4. Have you had any blood work, X-rays or cardiac testing? Yes Where: PCP Reason for visit: Routine Labs     Requested: NO     In [facility]: NO    5. Where do you normally have your labs drawn? [facility]    6. Do you need any refills today?    No

## 2019-06-30 DIAGNOSIS — I25.5 CARDIOMYOPATHY, ISCHEMIC: ICD-10-CM

## 2019-06-30 DIAGNOSIS — I25.10 CORONARY ARTERY DISEASE INVOLVING NATIVE CORONARY ARTERY OF NATIVE HEART WITHOUT ANGINA PECTORIS: ICD-10-CM

## 2019-07-01 RX ORDER — CARVEDILOL 12.5 MG/1
TABLET ORAL
Qty: 180 TAB | Refills: 2 | Status: SHIPPED | OUTPATIENT
Start: 2019-07-01 | End: 2020-04-15 | Stop reason: SDUPTHER

## 2019-07-15 RX ORDER — RIVAROXABAN 20 MG/1
TABLET, FILM COATED ORAL
Qty: 30 TAB | Refills: 6 | Status: SHIPPED | OUTPATIENT
Start: 2019-07-15 | End: 2020-03-27 | Stop reason: SDUPTHER

## 2019-08-12 ENCOUNTER — TELEPHONE (OUTPATIENT)
Dept: CARDIOLOGY CLINIC | Age: 69
End: 2019-08-12

## 2019-08-12 NOTE — TELEPHONE ENCOUNTER
Scheduled for cataract surgery on 8/15/19 and needs instructions for stopping Xarelto.  (Dr Raji Murillo patient)

## 2019-08-13 NOTE — TELEPHONE ENCOUNTER
Called and advised thomas Garcia for patient to hold Eliquis x 2 days and to restart when ok with MD

## 2019-08-14 ENCOUNTER — OFFICE VISIT (OUTPATIENT)
Dept: VASCULAR SURGERY | Age: 69
End: 2019-08-14

## 2019-08-14 VITALS
RESPIRATION RATE: 15 BRPM | WEIGHT: 148 LBS | SYSTOLIC BLOOD PRESSURE: 120 MMHG | DIASTOLIC BLOOD PRESSURE: 60 MMHG | HEIGHT: 64 IN | BODY MASS INDEX: 25.27 KG/M2 | HEART RATE: 80 BPM

## 2019-08-14 DIAGNOSIS — I73.9 PAD (PERIPHERAL ARTERY DISEASE) (HCC): ICD-10-CM

## 2019-08-14 DIAGNOSIS — I65.23 CAROTID STENOSIS, ASYMPTOMATIC, BILATERAL: ICD-10-CM

## 2019-08-14 DIAGNOSIS — I74.09 AORTOILIAC OCCLUSIVE DISEASE (HCC): Primary | ICD-10-CM

## 2019-08-14 DIAGNOSIS — I70.213 ATHEROSCLEROSIS OF NATIVE ARTERY OF BOTH LOWER EXTREMITIES WITH INTERMITTENT CLAUDICATION (HCC): ICD-10-CM

## 2019-08-14 NOTE — PROGRESS NOTES
[facility] [address]    Chief Complaint   Patient presents with    Abdominal Aortic Aneurysm     aortailliac       History and Physical    [address] [place] [address] Drive is a 78 y.o. female with numerous vascular disorders. In short patient has had stenting performed for her iliac system. For aortoiliac occlusive disease and bilateral lower extremity claudication. Currently she has no symptoms of claudication or rest pain. No TIA or strokelike symptoms. No abdominal pain with postprandial discomfort. No fevers or chills. Past Medical History:   Diagnosis Date    Abnormal myocardial perfusion study 05/07/2010    Anterior ischemia c/w at least 1-vessel CAD. No WMA. EF 51%. Positive EKG at 78% pred max HR. Ex time 7 min 30 sec.  Acute systolic congestive heart failure ([address] 4/9/2018 4/18 new, likely she was told of low EF and MI- med Rx for now    Allergic rhinitis 1/30/2010    Atrial fibrillation ([address]     Broken ankle 08/22/2013    left    Carotid stenosis 1/30/2010    Chronic diastolic congestive heart failure ([address] 2/16/2018 2/18 NYHA2    COPD     Essential hypertension     Heart failure ([address]     History of amiodarone therapy 5/11/2015    History of cardiac cath 05/25/2010    Patent coronary arteries. LVEDP 13 mmHg. EF 65%.  History of cervical cancer 11/05    History of echocardiogram 08/21/2013    EF 50-55%. No RWMA. Gr 2 DDfx. Mild LAE. Mild MR.      Hypertension     MI (myocardial infarction) ([address]     March/April 2018    Migraines 1/30/2010    Mitral regurgitation     Orthostatic dizziness 3/30/2016    ? autonomic dysfunction Vs amio side effect     Pacemaker 11/25/13    DDD    Pure hypercholesterolemia 1/30/2010    Rosacea 1/30/2010    Type II or unspecified type diabetes mellitus without mention of complication, not stated as uncontrolled 1/30/2010    Unspecified hypothyroidism 1/30/2010     Past Surgical History:   Procedure Laterality Date    HX AFIB ABLATION 2/8/13    Dr Severa Ferretti  11/06    right Dr. Hussein Vasquez  5/06    HX ORTHOPAEDIC Right 07/11/2016    knee replacement    HX PACEMAKER  11/25/13    DDD    HX SVT ABLATION  2/18/2013     Patient Active Problem List   Diagnosis Code    Type II or unspecified type diabetes mellitus without mention of complication, not stated as uncontrolled E11.9    Essential hypertension, benign I10    Pure hypercholesterolemia E78.00    Unspecified hypothyroidism E03.9    Allergic rhinitis J30.9    Carotid stenosis I65.29    Rosacea L71.9    Migraines G43.909    Chronic obstructive pulmonary disease ([facility] J44.9    Edema R60.9    Leg pain M79.606    Encounter for long-term (current) use of high-risk medication Z79.899    DM (diabetes mellitus) ([facility] E11.9    Chronic pain syndrome G89.4    Peripheral neuropathy G62.9    Neuralgia and neuritis M79.2    History of depression Z86.59    Generalized OA M15.9    S/P ablation operation for arrhythmia Z98.890, Z86.79    Tobacco use disorder F17.200    Lesion of ulnar nerve G56.20    Diabetic neuropathy, painful ([facility]) E11.40    Pre-operative cardiovascular examination Z01.810    Syncope and collapse R55    Fracture of fibula with tibia, left, closed S82.202A, S82.402A    Palpitations R00.2    Irregular heart beat I49.9    Elevated INR R79.1    Pacemaker- DDD to VVIR Z95.0    H/O amiodarone therapy Z92.29    Enthesopathy of hip region M76.899    Aortoiliac occlusive disease ([facility]) I74.09    PAD (peripheral artery disease) ([facility]) I73.9    Atherosclerosis of native artery of both lower extremities with intermittent claudication ([facility]) I70.213    History of amiodarone therapy Z92.29    Pneumonia J18.9    Atrial fibrillation with RVR ([facility]) I48.91    Atrial fibrillation ([facility]) I48.91    Orthostatic dizziness R42    S/P RF ablation operation for arrhythmia Z98.890, Z86.79    Chronic diastolic congest[facility] failure [facility] I50.32    Abnormal echocardiogram R93.1    Carotid stenosis, asymptomatic, bilateral I65.23    Coronary artery disease involving native coronary artery of native heart without angina pectoris I25.10    Cardiomyopathy, ischemic F19.3    Acute systolic congestive heart failure (HCC) I50.21    Chronic systolic congestive heart failure (HCC) I50.22     Current Outpatient Medications   Medication Sig Dispense Refill    XARELTO 20 mg tab tablet TAKE 1 TABLET BY MOUTH ONCE DAILY WITH  DINNER 30 Tab 6    carvedilol (COREG) 12.5 mg tablet TAKE 1 TABLET BY MOUTH TWICE DAILY WITH MEALS FOR CHRONIC HEART FAILURE 180 Tab 2    ferrous sulfate (IRON) 325 mg (65 mg iron) tablet Take  by mouth Daily (before breakfast).  losartan (COZAAR) 25 mg tablet Take 0.5 Tabs by mouth daily. 30 Tab 5    Comp Stocking,Knee,Regular,Sml misc 2 Box by Does Not Apply route daily. Use while upright  Indications: edema 2 Box 0    nitroglycerin (NITROSTAT) 0.4 mg SL tablet 1 Tab by SubLINGual route every five (5) minutes as needed for Chest Pain for up to 3 doses. 25 Tab 0    furosemide (LASIX) 20 mg tablet Take 1 Tab by mouth daily as needed. 90 Tab 2    oxyCODONE IR (ROXICODONE) 20 mg immediate release tablet Take  by mouth every four (4) hours as needed for Pain.  fentaNYL 62.5 mcg/hour pt72 by TransDERmal route.  omeprazole (PRILOSEC) 20 mg capsule Take 1 Cap by mouth daily. 30 Cap 0    aspirin 81 mg chewable tablet Take 1 Tab by mouth daily. Indications: myocardial infarction prevention 100 Tab 0    naloxone 4 mg/actuation spry 4 mg by Nasal route as needed for up to 2 doses. Indications: OPIOID TOXICITY 1 Box 1    atorvastatin (LIPITOR) 20 mg tablet Take  by mouth daily.  naloxegol (MOVANTIK) 25 mg tab tablet Take 25 mg by mouth daily.  insulin lispro (HUMALOG) 100 unit/mL injection by SubCUTAneous route.  insulin glargine (LANTUS) 100 unit/mL injection by SubCUTAneous route nightly.       ipratropium-albuterol (COMBIVENT RESPIMAT)  mcg/actuation inhaler Take 1 Puff by inhalation every six (6) hours as needed for Wheezing or Shortness of Breath. 1 Inhaler 1    pyridoxine (VITAMIN B-6) 100 mg tablet Take 100 mg by mouth daily.  fish oil-dha-epa 1,200-144-216 mg cap Take 1 Cap by mouth daily.  levothyroxine (SYNTHROID) 125 mcg tablet Take 125 mcg by mouth Daily (before breakfast).  tiotropium (SPIRIVA WITH HANDIHALER) 18 mcg inhalation capsule Take 1 Cap by inhalation daily. 30 Cap 11    cholecalciferol, vitamin d3, (VITAMIN D) 1,000 unit tablet Take 5,000 Units by mouth daily.        Allergies   Allergen Reactions    Lisinopril Cough     Patient denies       Social History     Socioeconomic History    Marital status:      Spouse name: Not on file    Number of children: Not on file    Years of education: Not on file    Highest education level: Not on file   Occupational History    Not on file   Social Needs    Financial resource strain: Not on file    Food insecurity:     Worry: Not on file     Inability: Not on file    Transportation needs:     Medical: Not on file     Non-medical: Not on file   Tobacco Use    Smoking status: Current Some Day Smoker     Packs/day: 0.10     Years: 42.00     Pack years: 4.20     Last attempt to quit: 3/30/2018     Years since quittin.3    Smokeless tobacco: Never Used    Tobacco comment: Patient states hasn't smoked in 2 weeks   Substance and Sexual Activity    Alcohol use: No     Alcohol/week: 0.0 standard drinks    Drug use: No    Sexual activity: Not on file   Lifestyle    Physical activity:     Days per week: Not on file     Minutes per session: Not on file    Stress: Not on file   Relationships    Social connections:     Talks on phone: Not on file     Gets together: Not on file     Attends Rastafari service: Not on file     Active member of club or organization: Not on file     Attends meetings of clubs or organizations: Not on file     Relationship status: Not on file    Intimate partner violence:     Fear of current or ex partner: Not on file     Emotionally abused: Not on file     Physically abused: Not on file     Forced sexual activity: Not on file   Other Topics Concern    Not on file   Social History Narrative    Not on file      Family History   Problem Relation Age of Onset    Hypertension Mother     Cancer Maternal Aunt         breast CA 66yo    Breast Cancer Maternal Aunt 79    Diabetes Maternal Grandmother     Cancer Maternal Aunt         uterine CA    Diabetes Daughter     Other Daughter         lupus (SLE) and coagulopathy    Breast Cancer Paternal Aunt 61    Heart Attack Sister     Heart Disease Neg Hx        Physical Exam:    Visit Vitals  /60 (BP 1 Location: Left arm, BP Patient Position: Sitting)   Pulse 80   Resp 15   Ht 5' 4\" (1.626 m)   Wt 148 lb (67.1 kg)   BMI 25.40 kg/m²      General: Well-appearing female no acute distress  HEENT: EOMI no scleral icterus is noted patient is wearing eyeglasses  Pulmonary: No increased work of breathing is noted  Extremities: Warm and perfused bilaterally no evidence of edema in bilateral lower extremities and no ulcerations identified  Neuro: Cranial nerves II through XII grossly intact within normal gait    Impression and Plan:  Sarah Laird is a 78 y.o. female with numerous vascular issues. I reviewed all of her ultrasounds in clinic today showing moderate stenosis of her left internal carotid artery and mild on the right. She is asymptomatic we will continue to follow this yearly. Her lower extremity and abdominal ultrasound show no aortoiliac disease with good flow going through both of her stents as well as no hemodynamically significant stenosis of bilateral lower extremities. I will continue to follow this on a yearly basis.   As far as her mesenteric ultrasounds that she seems to continue to get there is no hemodynamically significant stenosis on either of these. I do not feel that she has any chronic or acute mesenteric ischemia symptoms. I do not see any usefulness of continuing to get these ultrasounds. I will see her back next year with repeat ultrasounds. We reviewed the plan with the patient and the patient understands. We also gave the patient appropriate instructions on their disease process and when to call back. Greater than 50% of this visit was spent with face to face discussion. Follow-up and Dispositions    · Return in about 1 year (around 8/14/2020). Tal David MD    PLEASE NOTE:  This document has been produced using voice recognition software. Unrecognized errors in transcription may be present.

## 2019-11-18 ENCOUNTER — OFFICE VISIT (OUTPATIENT)
Dept: CARDIOLOGY CLINIC | Age: 69
End: 2019-11-18

## 2019-11-18 VITALS
BODY MASS INDEX: 24.59 KG/M2 | SYSTOLIC BLOOD PRESSURE: 112 MMHG | HEART RATE: 88 BPM | HEIGHT: 64 IN | WEIGHT: 144 LBS | DIASTOLIC BLOOD PRESSURE: 60 MMHG

## 2019-11-18 DIAGNOSIS — I25.10 CORONARY ARTERY DISEASE INVOLVING NATIVE CORONARY ARTERY OF NATIVE HEART WITHOUT ANGINA PECTORIS: ICD-10-CM

## 2019-11-18 DIAGNOSIS — Z86.79 S/P RF ABLATION OPERATION FOR ARRHYTHMIA: ICD-10-CM

## 2019-11-18 DIAGNOSIS — I50.22 CHRONIC SYSTOLIC CONGESTIVE HEART FAILURE (HCC): Primary | ICD-10-CM

## 2019-11-18 DIAGNOSIS — E78.00 PURE HYPERCHOLESTEROLEMIA: ICD-10-CM

## 2019-11-18 DIAGNOSIS — I48.19 PERSISTENT ATRIAL FIBRILLATION (HCC): ICD-10-CM

## 2019-11-18 DIAGNOSIS — I25.5 CARDIOMYOPATHY, ISCHEMIC: ICD-10-CM

## 2019-11-18 DIAGNOSIS — F17.200 TOBACCO USE DISORDER: ICD-10-CM

## 2019-11-18 DIAGNOSIS — Z95.0 PACEMAKER: ICD-10-CM

## 2019-11-18 DIAGNOSIS — J44.9 CHRONIC OBSTRUCTIVE PULMONARY DISEASE, UNSPECIFIED COPD TYPE (HCC): ICD-10-CM

## 2019-11-18 DIAGNOSIS — Z98.890 S/P RF ABLATION OPERATION FOR ARRHYTHMIA: ICD-10-CM

## 2019-11-18 NOTE — PROGRESS NOTES
HISTORY OF PRESENT ILLNESS  Marianna Hernandez is a 78 y.o. female. F/u AFib, HTN, CHF, MR, HLD, s/p DDD    3/18 seen after low EF, wma and stress test today  7/17 continues with dizziness, rare LOC/fall- last in 6/17 1/17 has a cold for 6 wks    CHF   The history is provided by the medical records. This is a chronic problem. Associated symptoms include shortness of breath. Pertinent negatives include no chest pain, no abdominal pain and no headaches. Palpitations    The history is provided by the medical records (perm AF). This is a recurrent problem. The current episode started more than 1 week ago. Progression since onset: few days. The problem occurs rarely (off and on). Episode Length: permanent AF on pacer check. Associated symptoms include irregular heartbeat, lower extremity edema, dizziness and shortness of breath. Pertinent negatives include no diaphoresis, no fever, no chest pain, no claudication, no orthopnea, no PND, no syncope, no abdominal pain, no nausea, no vomiting, no headaches, no weakness, no cough, no hemoptysis and no sputum production. Risk factors include diabetes mellitus, hypertension, smoking/tobacco exposure and dyslipidemia. Her past medical history is significant for DM, hypertension and atrial fibrillation. Hypertension   The history is provided by the medical records. This is a chronic problem. Associated symptoms include shortness of breath. Pertinent negatives include no chest pain, no abdominal pain and no headaches. Cholesterol Problem   The history is provided by the medical records. This is a chronic problem. Associated symptoms include shortness of breath. Pertinent negatives include no chest pain, no abdominal pain and no headaches. Cardiomyopathy   The history is provided by the medical records (ischemic). This is a chronic problem. Associated symptoms include shortness of breath. Pertinent negatives include no chest pain, no abdominal pain and no headaches. Dizziness   The history is provided by the patient. This is a new problem. The current episode started more than 1 week ago. The problem occurs every several days (3/wk; 5-10 mts; 6/17 LOC for few seconds). The problem has not changed (Likely equilibrium issue now 5/19) since onset. Associated symptoms include shortness of breath. Pertinent negatives include no chest pain, no abdominal pain and no headaches. The symptoms are aggravated by twisting (looking/reaching up; sitting). The symptoms are relieved by rest and laying down. Shortness of Breath   The history is provided by the patient. This is a chronic problem. The problem occurs intermittently. The current episode started more than 1 week ago. The problem has been gradually improving. Associated symptoms include leg swelling. Pertinent negatives include no fever, no headaches, no ear pain, no neck pain, no cough, no sputum production, no hemoptysis, no wheezing, no PND, no orthopnea, no chest pain, no syncope, no vomiting, no abdominal pain, no rash and no claudication. The problem's precipitants include exercise (2 blocks; 4/18 within house; 11/18 1 block; 11/19 2 blocks). Leg Swelling   The history is provided by the patient. This is a new problem. The current episode started more than 1 week ago (4/18). The problem occurs every several days. The problem has been gradually improving. Associated symptoms include shortness of breath. Pertinent negatives include no chest pain, no abdominal pain and no headaches. The symptoms are relieved by medications. Review of Systems   Constitutional: Negative for chills, diaphoresis, fever and weight loss. HENT: Negative for ear pain and hearing loss. Eyes: Negative for blurred vision. Respiratory: Positive for shortness of breath. Negative for cough, hemoptysis, sputum production, wheezing and stridor. Cardiovascular: Positive for palpitations and leg swelling.  Negative for chest pain, orthopnea, claudication, syncope and PND. Gastrointestinal: Negative for abdominal pain, heartburn, nausea and vomiting. Musculoskeletal: Negative for myalgias and neck pain. Skin: Negative for rash. Neurological: Positive for dizziness. Negative for tingling, tremors, focal weakness, loss of consciousness, weakness and headaches. Psychiatric/Behavioral: Negative for depression and suicidal ideas. Allergies   Allergen Reactions    Lisinopril Cough     Patient denies         Past Medical History:   Diagnosis Date    Abnormal myocardial perfusion study 05/07/2010    Anterior ischemia c/w at least 1-vessel CAD. No WMA. EF 51%. Positive EKG at 78% pred max HR. Ex time 7 min 30 sec.  Acute systolic congestive heart failure ([address] 4/9/2018 4/18 new, likely she was told of low EF and MI- med Rx for now    Allergic rhinitis 1/30/2010    Atrial fibrillation ([address]     Broken ankle 08/22/2013    left    Carotid stenosis 1/30/2010    Chronic diastolic congestive heart failure ([address] 2/16/2018 2/18 NYHA2    COPD     Essential hypertension     Heart failure ([address]     History of amiodarone therapy 5/11/2015    History of cardiac cath 05/25/2010    Patent coronary arteries. LVEDP 13 mmHg. EF 65%.  History of cervical cancer 11/05    History of echocardiogram 08/21/2013    EF 50-55%. No RWMA. Gr 2 DDfx. Mild LAE. Mild MR.      Hypertension     MI (myocardial infarction) ([address]     March/April 2018    Migraines 1/30/2010    Mitral regurgitation     Orthostatic dizziness 3/30/2016    ? autonomic dysfunction Vs amio side effect     Pacemaker 11/25/13    DDD    Pure hypercholesterolemia 1/30/2010    Rosacea 1/30/2010    Type II or unspecified type diabetes mellitus without mention of complication, not stated as uncontrolled 1/30/2010    Unspecified hypothyroidism 1/30/2010       Family History   Problem Relation Age of Onset    Hypertension Mother     Cancer Maternal Aunt         breast CA 68yo    Breast Cancer Maternal Aunt 79    Diabetes Maternal Grandmother     Cancer Maternal Aunt         uterine CA    Diabetes Daughter     Other Daughter         lupus (SLE) and coagulopathy    Breast Cancer Paternal Aunt 61    Heart Attack Sister     Heart Disease Neg Hx        Social History     Tobacco Use    Smoking status: Current Every Day Smoker     Packs/day: 0.50     Years: 42.00     Pack years: 21.00     Types: Cigarettes     Last attempt to quit: 3/30/2018     Years since quittin.6    Smokeless tobacco: Never Used    Tobacco comment: 10 cigs per day   Substance Use Topics    Alcohol use: No     Alcohol/week: 0.0 standard drinks    Drug use: No        Current Outpatient Medications   Medication Sig    XARELTO 20 mg tab tablet TAKE 1 TABLET BY MOUTH ONCE DAILY WITH  DINNER    carvedilol (COREG) 12.5 mg tablet TAKE 1 TABLET BY MOUTH TWICE DAILY WITH MEALS FOR CHRONIC HEART FAILURE    ferrous sulfate (IRON) 325 mg (65 mg iron) tablet Take  by mouth Daily (before breakfast).  losartan (COZAAR) 25 mg tablet Take 0.5 Tabs by mouth daily.  Comp Stocking,Knee,Regular,Sml misc 2 Box by Does Not Apply route daily. Use while upright  Indications: edema    nitroglycerin (NITROSTAT) 0.4 mg SL tablet 1 Tab by SubLINGual route every five (5) minutes as needed for Chest Pain for up to 3 doses.  furosemide (LASIX) 20 mg tablet Take 1 Tab by mouth daily as needed.  oxyCODONE IR (ROXICODONE) 20 mg immediate release tablet Take  by mouth every four (4) hours as needed for Pain.  fentaNYL 62.5 mcg/hour pt72 by TransDERmal route.  omeprazole (PRILOSEC) 20 mg capsule Take 1 Cap by mouth daily.  aspirin 81 mg chewable tablet Take 1 Tab by mouth daily. Indications: myocardial infarction prevention    naloxone 4 mg/actuation spry 4 mg by Nasal route as needed for up to 2 doses. Indications: OPIOID TOXICITY    atorvastatin (LIPITOR) 20 mg tablet Take  by mouth daily.     naloxegol (MOVANTIK) 25 mg tab tablet Take 25 mg by mouth daily.  insulin lispro (HUMALOG) 100 unit/mL injection by SubCUTAneous route.  insulin glargine (LANTUS) 100 unit/mL injection by SubCUTAneous route nightly.  ipratropium-albuterol (COMBIVENT RESPIMAT)  mcg/actuation inhaler Take 1 Puff by inhalation every six (6) hours as needed for Wheezing or Shortness of Breath.  pyridoxine (VITAMIN B-6) 100 mg tablet Take 100 mg by mouth daily.  fish oil-dha-epa 1,200-144-216 mg cap Take 1 Cap by mouth daily.  levothyroxine (SYNTHROID) 125 mcg tablet Take 125 mcg by mouth Daily (before breakfast).  tiotropium (SPIRIVA WITH HANDIHALER) 18 mcg inhalation capsule Take 1 Cap by inhalation daily.  cholecalciferol, vitamin d3, (VITAMIN D) 1,000 unit tablet Take 5,000 Units by mouth daily. No current facility-administered medications for this visit. Past Surgical History:   Procedure Laterality Date    HX AFIB ABLATION  2/8/13    Dr Vaishnavi Oconnell HX CAROTID ENDARTERECTOMY  11/06    right Dr. Trav Barrios HX HYSTERECTOMY  5/06    HX ORTHOPAEDIC Right 07/11/2016    knee replacement    HX PACEMAKER  11/25/13    DDD    HX SVT ABLATION  2/18/2013       Visit Vitals  /60   Pulse 88   Ht 5' 4\" (1.626 m)   Wt 65.3 kg (144 lb)   BMI 24.72 kg/m²       Diagnostic Studies:  I have reviewed the relevant tests done on the patient and show as follows  EKG tracings reviewed by me today. CARDIOLOGY STUDIES 3/30/2018   Pharmacological Nuclear Stress Test Result large fixed ant/apical/sept defect, 40%EF   Some recent data might be hidden   3/18 NUc Stress  Conclusion:  1. Abnormal perfusion scan. 2. Evidence of a large sized fixed defect involving LV territory in the apical and mid anterior and anteroseptal areas. This indicates previous myocardial infarction most likely. 3. This represents a high risk scan. 1/19 ECHO Interpretation Summary     · Normal cavity size.  Mild concentric hypertrophy observed. There is moderately decreased systolic function. The estimated ejection fraction is 36 - 40%. Abnormal wall motion as described on the wall scoring diagram below. Global longitudinal strain is -4.90%. Abnormal left ventricular strain. There is severe (grade 3) left ventricular diastolic dysfunction. · Left atrial cavity size is severely dilated. · Right ventricular global systolic function is reduced. Pacing wire present  · Mitral valve thickening. Mild mitral valve regurgitation. · Mild tricuspid valve regurgitation. Pulmonary arterial systolic pressure is 18.5 mmHg. Mild pulmonary hypertension. Comparison Study Information     Prior Study     When compared to the previous study from 3/23/18, there is no longer evidence of a small pericardial effusion. Ms. Nolvia Smart has a reminder for a \"due or due soon\" health maintenance. I have asked that she contact her primary care provider for follow-up on this health maintenance. Physical Exam   Constitutional: She is oriented to person, place, and time. She appears well-developed and well-nourished. No distress. HENT:   Head: Normocephalic and atraumatic. Mouth/Throat: Normal dentition. Eyes: Right eye exhibits no discharge. Left eye exhibits no discharge. No scleral icterus. Neck: Neck supple. No JVD present. Carotid bruit is present (b/l). No thyromegaly present. Cardiovascular: Normal rate, regular rhythm, S1 normal, S2 normal and intact distal pulses. Exam reveals no gallop and no friction rub. Murmur heard. Scratchy early systolic murmur is present with a grade of 3/6 at the upper right sternal border. High-pitched blowing midsystolic murmur of grade 2/6 is also present at the apex. High-pitched blowing decrescendo early diastolic murmur is present with a grade of 2/6 at the upper right sternal border radiating to the apex. Pulmonary/Chest: Effort normal and breath sounds normal. She has no wheezes. She has no rales. Abdominal: Soft. She exhibits no mass. There is no tenderness. Musculoskeletal: She exhibits edema (trace). Lymphadenopathy:        Right cervical: No superficial cervical adenopathy present. Left cervical: No superficial cervical adenopathy present. Neurological: She is alert and oriented to person, place, and time. Skin: Skin is warm and dry. No rash noted. Psychiatric: She has a normal mood and affect. Her behavior is normal.       ASSESSMENT and PLAN    Patient advised to stop smoking to reduce future cardiovascular events. AFib: Permanent; 3/18; 2/18; 8/17 on xarelto;   Persistent despite ablation  CAD: 3/18 large fixed LAD defect, 40%EF;   DDD to VVVIR : Dual chamber [employer] pacemaker implant 11/25/13  Office Checks: 12/13 nl function; 1/15 nl function, freq but very short AHR(likely a fib); 1/16 freq long AF, nl function; 1/17; 7/17; 6/18 nl function, perm AFib; 2/19 normal function  Remote Checks : 3/14; 10/14; 4/16; 10/16; 12/17; 11/18 nl function    CHF is fairly stable clinically. Chest pain has resolved. Will wait on cardiac cath. Deveron Dys was not covered. Will continue low-dose losartan. [facility] is overdue. Requested patient to send us from home ASAP. Diagnoses and all orders for this visit:    1. Chronic systolic congestive heart failure (HCC)    2. Cardiomyopathy, ischemic    3. Coronary artery disease involving native coronary artery of native heart without angina pectoris    4. Persistent atrial fibrillation    5. Tobacco use disorder    6. S/P RF ablation operation for arrhythmia    7. Pacemaker- DDD to VVIR    8. Chronic obstructive pulmonary disease, unspecified COPD type ([facility]    9. Pure hypercholesterolemia        Pertinent laboratory and test data reviewed and discussed with patient. See patient instructions also for other medical advice given    There are no discontinued medications.     Follow-up and Dispositions    · Return in about 6 months (around 5/18/2020), or if symptoms worsen or fail to improve, for get carelink asap.

## 2019-11-18 NOTE — PATIENT INSTRUCTIONS
There are no discontinued medications. Avoiding Triggers With Heart Failure: Care Instructions  Your Care Instructions    Triggers are anything that make your heart failure flare up. A flare-up is also called \"sudden heart failure\" or \"acute heart failure. \" When you have a flare-up, fluid builds up in your lungs, and you have problems breathing. You might need to go to the hospital. By watching for changes in your condition and avoiding triggers, you can prevent heart failure flare-ups. Follow-up care is a key part of your treatment and safety. Be sure to make and go to all appointments, and call your doctor if you are having problems. It's also a good idea to know your test results and keep a list of the medicines you take. How can you care for yourself at home? Watch for changes in your weight and condition  · Weigh yourself without clothing at the same time each day. Record your weight. Call your doctor if you have sudden weight gain, such as more than 2 to 3 pounds in a day or 5 pounds in a week. (Your doctor may suggest a different range of weight gain.) A sudden weight gain may mean that your heart failure is getting worse. · Keep a daily record of your symptoms. Write down any changes in how you feel, such as new shortness of breath, cough, or problems eating. Also record if your ankles are more swollen than usual and if you feel more tired than usual. Note anything that you ate or did that could have triggered these changes. Limit sodium  Sodium causes your body to hold on to extra water. This may cause your heart failure symptoms to get worse. People get most of their sodium from processed foods. Fast food and restaurant meals also tend to be very high in sodium. · Your doctor may suggest that you limit sodium to 2,000 milligrams (mg) a day or less. That is less than 1 teaspoon of salt a day, including all the salt you eat in cooking or in packaged foods.   · Read food labels on cans and food packages. They tell you how much sodium you get in one serving. Check the serving size. If you eat more than one serving, you are getting more sodium. · Be aware that sodium can come in forms other than salt, including monosodium glutamate (MSG), sodium citrate, and sodium bicarbonate (baking soda). MSG is often added to Asian food. You can sometimes ask for food without MSG or salt. · Slowly reducing salt will help you adjust to the taste. Take the salt shaker off the table. · Flavor your food with garlic, lemon juice, onion, vinegar, herbs, and spices instead of salt. Do not use soy sauce, steak sauce, onion salt, garlic salt, mustard, or ketchup on your food, unless it is labeled \"low-sodium\" or \"low-salt. \"  · Make your own salad dressings, sauces, and ketchup without adding salt. · Use fresh or frozen ingredients, instead of canned ones, whenever you can. Choose low-sodium canned goods. · Eat less processed food and food from restaurants, including fast food. Exercise as directed  Moderate, regular exercise is very good for your heart. It improves your blood flow and helps control your weight. But too much exercise can stress your heart and cause a heart failure flare-up. · Check with your doctor before you start an exercise program.  · Walking is an easy way to get exercise. Start out slowly. Gradually increase the length and pace of your walk. Swimming, riding a bike, and using a treadmill are also good forms of exercise. · When you exercise, watch for signs that your heart is working too hard. You are pushing yourself too hard if you cannot talk while you are exercising. If you become short of breath or dizzy or have chest pain, stop, sit down, and rest.  · Do not exercise when you do not feel well. Take medicines correctly  · Take your medicines exactly as prescribed. Call your doctor if you think you are having a problem with your medicine. · Make a list of all the medicines you take.  Include those prescribed to you by other doctors and any over-the-counter medicines, vitamins, or supplements you take. Take this list with you when you go to any doctor. · Take your medicines at the same time every day. It may help you to post a list of all the medicines you take every day and what time of day you take them. · Make taking your medicine as simple as you can. Plan times to take your medicines when you are doing other things, such as eating a meal or getting ready for bed. This will make it easier to remember to take your medicines. · Get organized. Use helpful tools, such as daily or weekly pill containers. When should you call for help? Call 911 if you have symptoms of sudden heart failure such as:    · You have severe trouble breathing.     · You cough up pink, foamy mucus.     · You have a new irregular or rapid heartbeat.    Call your doctor now or seek immediate medical care if:    · You have new or increased shortness of breath.     · You are dizzy or lightheaded, or you feel like you may faint.     · You have sudden weight gain, such as more than 2 to 3 pounds in a day or 5 pounds in a week. (Your doctor may suggest a different range of weight gain.)     · You have increased swelling in your legs, ankles, or feet.     · You are suddenly so tired or weak that you cannot do your usual activities.    Watch closely for changes in your health, and be sure to contact your doctor if you develop new symptoms. Where can you learn more? Go to http://jose alejandro-lizzy.info/. Enter I520 in the search box to learn more about \"Avoiding Triggers With Heart Failure: Care Instructions. \"  Current as of: April 9, 2019  Content Version: 12.2  © 6424-7998 [employer]. Care instructions adapted under license by [employer] (which disclaims liability or warranty for this information).  If you have questions about a medical condition or this instruction, always ask your healthcare professional. [address] any warranty or liability for your use of this information. Learning About Benefits From Quitting Smoking  How does quitting smoking make you healthier? If you're thinking about quitting smoking, you may have a few reasons to be smoke-free. Your health may be one of them. · When you quit smoking, you lower your risks for cancer, lung disease, heart attack, stroke, blood vessel disease, and blindness from macular degeneration. · When you're smoke-free, you get sick less often, and you heal faster. You are less likely to get colds, flu, bronchitis, and pneumonia. · As a nonsmoker, you may find that your mood is better and you are less stressed. When and how will you feel healthier? Quitting has real health benefits that start from day 1 of being smoke-free. And the longer you stay smoke-free, the healthier you get and the better you feel. The first hours  · After just 20 minutes, your blood pressure and heart rate go down. That means there's less stress on your heart and blood vessels. · Within 12 hours, the level of carbon monoxide in your blood drops back to normal. That makes room for more oxygen. With more oxygen in your body, you may notice that you have more energy than when you smoked. After 2 weeks  · Your lungs start to work better. · Your risk of heart attack starts to drop. After 1 month  · When your lungs are clear, you cough less and breathe deeper, so it's easier to be active. · Your sense of taste and smell return. That means you can enjoy food more than you have since you started smoking. Over the years  · After 1 year, your risk of heart disease is half what it would be if you kept smoking. · After 5 years, your risk of stroke starts to shrink. Within a few years after that, it's about the same as if you'd never smoked. · After 10 years, your risk of dying from lung cancer is cut by about half.  And your risk for many other types of cancer is lower too. How would quitting help others in your life? When you quit smoking, you improve the health of everyone who now breathes in your smoke. · Their heart, lung, and cancer risks drop, much like yours. · They are sick less. For babies and small children, living smoke-free means they're less likely to have ear infections, pneumonia, and bronchitis. · If you're a woman who is or will be pregnant someday, quitting smoking means a healthier . · Children who are close to you are less likely to become adult smokers. Where can you learn more? Go to http://jose alejandroNiwalizzy.info/. Enter 052 [id number] 72 11 in the search box to learn more about \"Learning About Benefits From Quitting Smoking. \"  Current as of: 2018  Content Version: 12.2  © 8746-7184 [employer]. Care instructions adapted under license by "[employer] (which disclaims liability or warranty for this information). If you have questions about a medical condition or this instruction, always ask your healthcare professional. [place] [address] any warranty or liability for your use of this information.

## 2019-11-18 NOTE — PROGRESS NOTES
Patient didn't bring medications, verbally reviewed. 1. Have you been to the ER, urgent care clinic since your last visit? Hospitalized since your last visit? No    2. Have you seen or consulted any other health care providers outside of the [address] [place] [address] since your last visit? Include any pap smears or colon screening. Yes Where: PCP Reason for visit: Routine     3. Since your last visit, have you had any of the following symptoms?      palpitations, shortness of breath, dizziness and swelling in legs/arms. 4.  Have you had any blood work, X-rays or cardiac testing? No      5. Where do you normally have your labs drawn? [facility]    6. Do you need any refills today?    No

## 2020-02-24 ENCOUNTER — CLINICAL SUPPORT (OUTPATIENT)
Dept: CARDIOLOGY CLINIC | Age: 70
End: 2020-02-24

## 2020-02-24 DIAGNOSIS — Z95.0 PACEMAKER: Primary | ICD-10-CM

## 2020-04-15 DIAGNOSIS — I25.10 CORONARY ARTERY DISEASE INVOLVING NATIVE CORONARY ARTERY OF NATIVE HEART WITHOUT ANGINA PECTORIS: ICD-10-CM

## 2020-04-15 DIAGNOSIS — I25.5 CARDIOMYOPATHY, ISCHEMIC: ICD-10-CM

## 2020-04-15 RX ORDER — CARVEDILOL 12.5 MG/1
12.5 TABLET ORAL 2 TIMES DAILY
Qty: 180 TAB | Refills: 2 | Status: SHIPPED | OUTPATIENT
Start: 2020-04-15 | End: 2021-01-25

## 2020-05-21 ENCOUNTER — TELEPHONE (OUTPATIENT)
Dept: CARDIOLOGY CLINIC | Age: 70
End: 2020-05-21

## 2020-05-21 ENCOUNTER — VIRTUAL VISIT (OUTPATIENT)
Dept: CARDIOLOGY CLINIC | Age: 70
End: 2020-05-21

## 2020-05-21 VITALS
DIASTOLIC BLOOD PRESSURE: 54 MMHG | BODY MASS INDEX: 23.05 KG/M2 | HEIGHT: 64 IN | HEART RATE: 76 BPM | SYSTOLIC BLOOD PRESSURE: 109 MMHG | WEIGHT: 135 LBS

## 2020-05-21 DIAGNOSIS — J44.9 CHRONIC OBSTRUCTIVE PULMONARY DISEASE, UNSPECIFIED COPD TYPE (HCC): ICD-10-CM

## 2020-05-21 DIAGNOSIS — Z86.79 S/P RF ABLATION OPERATION FOR ARRHYTHMIA: ICD-10-CM

## 2020-05-21 DIAGNOSIS — F17.200 TOBACCO USE DISORDER: ICD-10-CM

## 2020-05-21 DIAGNOSIS — I48.11 LONGSTANDING PERSISTENT ATRIAL FIBRILLATION (HCC): ICD-10-CM

## 2020-05-21 DIAGNOSIS — I25.5 CARDIOMYOPATHY, ISCHEMIC: ICD-10-CM

## 2020-05-21 DIAGNOSIS — I50.23 ACUTE ON CHRONIC SYSTOLIC CONGESTIVE HEART FAILURE (HCC): Primary | ICD-10-CM

## 2020-05-21 DIAGNOSIS — Z98.890 S/P RF ABLATION OPERATION FOR ARRHYTHMIA: ICD-10-CM

## 2020-05-21 DIAGNOSIS — E78.00 PURE HYPERCHOLESTEROLEMIA: ICD-10-CM

## 2020-05-21 DIAGNOSIS — I25.10 CORONARY ARTERY DISEASE INVOLVING NATIVE CORONARY ARTERY OF NATIVE HEART WITHOUT ANGINA PECTORIS: ICD-10-CM

## 2020-05-21 DIAGNOSIS — Z95.0 PACEMAKER: ICD-10-CM

## 2020-05-21 NOTE — PATIENT INSTRUCTIONS
There are no discontinued medications. Avoiding Triggers With Heart Failure: Care Instructions Your Care Instructions Triggers are anything that make your heart failure flare up. A flare-up is also called \"sudden heart failure\" or \"acute heart failure. \" When you have a flare-up, fluid builds up in your lungs, and you have problems breathing. You might need to go to the hospital. By watching for changes in your condition and avoiding triggers, you can prevent heart failure flare-ups. Follow-up care is a key part of your treatment and safety. Be sure to make and go to all appointments, and call your doctor if you are having problems. It's also a good idea to know your test results and keep a list of the medicines you take. How can you care for yourself at home? Watch for changes in your weight and condition · Weigh yourself without clothing at the same time each day. Record your weight. Call your doctor if you have sudden weight gain, such as more than 2 to 3 pounds in a day or 5 pounds in a week. (Your doctor may suggest a different range of weight gain.) A sudden weight gain may mean that your heart failure is getting worse. · Keep a daily record of your symptoms. Write down any changes in how you feel, such as new shortness of breath, cough, or problems eating. Also record if your ankles are more swollen than usual and if you feel more tired than usual. Note anything that you ate or did that could have triggered these changes. Limit sodium Sodium causes your body to hold on to extra water. This may cause your heart failure symptoms to get worse. People get most of their sodium from processed foods. Fast food and restaurant meals also tend to be very high in sodium. · Your doctor may suggest that you limit sodium. Your doctor can tell you how much sodium is right for you. This includes limiting sodium in cooked and packaged foods. · Read food labels on cans and food packages. They tell you how much sodium you get in one serving. Check the serving size. If you eat more than one serving, you are getting more sodium. · Be aware that sodium can come in forms other than salt, including monosodium glutamate (MSG), sodium citrate, and sodium bicarbonate (baking soda). MSG is often added to Asian food. You can sometimes ask for food without MSG or salt. · Slowly reducing salt will help you adjust to the taste. Take the salt shaker off the table. · Flavor your food with garlic, lemon juice, onion, vinegar, herbs, and spices instead of salt. Do not use soy sauce, steak sauce, onion salt, garlic salt, mustard, or ketchup on your food, unless it is labeled \"low-sodium\" or \"low-salt. \" 
· Make your own salad dressings, sauces, and ketchup without adding salt. · Use fresh or frozen ingredients, instead of canned ones, whenever you can. Choose low-sodium canned goods. · Eat less processed food and food from restaurants, including fast food. Exercise as directed Moderate, regular exercise is very good for your heart. It improves your blood flow and helps control your weight. But too much exercise can stress your heart and cause a heart failure flare-up. · Check with your doctor before you start an exercise program. 
· Walking is an easy way to get exercise. Start out slowly. Gradually increase the length and pace of your walk. Swimming, riding a bike, and using a treadmill are also good forms of exercise. · When you exercise, watch for signs that your heart is working too hard. You are pushing yourself too hard if you cannot talk while you are exercising. If you become short of breath or dizzy or have chest pain, stop, sit down, and rest. 
· Do not exercise when you do not feel well. Take medicines correctly · Take your medicines exactly as prescribed. Call your doctor if you think you are having a problem with your medicine. · Make a list of all the medicines you take. Include those prescribed to you by other doctors and any over-the-counter medicines, vitamins, or supplements you take. Take this list with you when you go to any doctor. · Take your medicines at the same time every day. It may help you to post a list of all the medicines you take every day and what time of day you take them. · Make taking your medicine as simple as you can. Plan times to take your medicines when you are doing other things, such as eating a meal or getting ready for bed. This will make it easier to remember to take your medicines. · Get organized. Use helpful tools, such as daily or weekly pill containers. When should you call for help? Call 911 if you have symptoms of sudden heart failure such as: 
  · You have severe trouble breathing.  
  · You cough up pink, foamy mucus.  
  · You have a new irregular or rapid heartbeat.  
 Call your doctor now or seek immediate medical care if: 
  · You have new or increased shortness of breath.  
  · You are dizzy or lightheaded, or you feel like you may faint.  
  · You have sudden weight gain, such as more than 2 to 3 pounds in a day or 5 pounds in a week. (Your doctor may suggest a different range of weight gain.)  
  · You have increased swelling in your legs, ankles, or feet.  
  · You are suddenly so tired or weak that you cannot do your usual activities.  
 Watch closely for changes in your health, and be sure to contact your doctor if you develop new symptoms. Where can you learn more? Go to http://jose alejandro-lizzy.info/ Enter V280 in the search box to learn more about \"Avoiding Triggers With Heart Failure: Care Instructions. \" Current as of: December 15, 2019Content Version: 12.4 © 9667-7379 [employer]. Care instructions adapted under license by [employer] (which disclaims liability or warranty for this information).  If you have questions about a medical condition or this instruction, always ask your healthcare professional. [address] any warranty or liability for your use of this information. Body Mass Index: Care Instructions Your Care Instructions Body mass index (BMI) can help you see if your weight is raising your risk for health problems. It uses a formula to compare how much you weigh with how tall you are. · A BMI lower than 18.5 is considered underweight. · A BMI between 18.5 and 24.9 is considered healthy. · A BMI between 25 and 29.9 is considered overweight. A BMI of 30 or higher is considered obese. If your BMI is in the normal range, it means that you have a lower risk for weight-related health problems. If your BMI is in the overweight or obese range, you may be at increased risk for weight-related health problems, such as high blood pressure, heart disease, stroke, arthritis or joint pain, and diabetes. If your BMI is in the underweight range, you may be at increased risk for health problems such as fatigue, lower protection (immunity) against illness, muscle loss, bone loss, hair loss, and hormone problems. BMI is just one measure of your risk for weight-related health problems. You may be at higher risk for health problems if you are not active, you eat an unhealthy diet, or you drink too much alcohol or use tobacco products. Follow-up care is a key part of your treatment and safety. Be sure to make and go to all appointments, and call your doctor if you are having problems. It's also a good idea to know your test results and keep a list of the medicines you take. How can you care for yourself at home? · Practice healthy eating habits. This includes eating plenty of fruits, vegetables, whole grains, lean protein, and low-fat dairy. · If your doctor recommends it, get more exercise. Walking is a good choice. Bit by bit, increase the amount you walk every day.  Try for at least 30 minutes on most days of the week. · Do not smoke. Smoking can increase your risk for health problems. If you need help quitting, talk to your doctor about stop-smoking programs and medicines. These can increase your chances of quitting for good. · Limit alcohol to 2 drinks a day for men and 1 drink a day for women. Too much alcohol can cause health problems. If you have a BMI higher than 25 · Your doctor may do other tests to check your risk for weight-related health problems. This may include measuring the distance around your waist. A waist measurement of more than 40 inches in men or 35 inches in women can increase the risk of weight-related health problems. · Talk with your doctor about steps you can take to stay healthy or improve your health. You may need to make lifestyle changes to lose weight and stay healthy, such as changing your diet and getting regular exercise. If you have a BMI lower than 18.5 · Your doctor may do other tests to check your risk for health problems. · Talk with your doctor about steps you can take to stay healthy or improve your health. You may need to make lifestyle changes to gain or maintain weight and stay healthy, such as getting more healthy foods in your diet and doing exercises to build muscle. Where can you learn more? Go to http://jose alejandro-lizzy.info/ Enter S176 in the search box to learn more about \"Body Mass Index: Care Instructions. \" Current as of: December 10, 2019Content Version: 12.4 © 8256-8924 [employer]. Care instructions adapted under license by [employer] (which disclaims liability or warranty for this information). If you have questions about a medical condition or this instruction, always ask your healthcare professional. [address] any warranty or liability for your use of this information.

## 2020-05-21 NOTE — PROGRESS NOTES
HISTORY OF PRESENT ILLNESS  Félix Blackwood is a 69 y.o. female. Félix Blackwood is a 69 y.o. female who was seen by synchronous (real-time) audio-video technology on 5/21/2020. Consent:  She and/or her healthcare decision maker is aware that this patient-initiated Telehealth encounter is a billable service, with coverage as determined by her insurance carrier. She is aware that she may receive a bill and has provided verbal consent to proceed: Yes    I was at home while conducting this encounter. F/u AFib, HTN, CHF, MR, HLD, s/p DDD    3/18 seen after low EF, wma and stress test today  7/17 continues with dizziness, rare LOC/fall- last in 6/17 1/17 has a cold for 6 wks    Hypertension   The history is provided by the medical records. This is a chronic problem. Associated symptoms include shortness of breath. Pertinent negatives include no chest pain, no abdominal pain and no headaches. CHF   The history is provided by the medical records. This is a chronic problem. Associated symptoms include shortness of breath. Pertinent negatives include no chest pain, no abdominal pain and no headaches. Cardiomyopathy   The history is provided by the medical records (ischemic). This is a chronic problem. Associated symptoms include shortness of breath. Pertinent negatives include no chest pain, no abdominal pain and no headaches. Palpitations    The history is provided by the medical records (perm AF). This is a recurrent problem. The current episode started more than 1 week ago. Progression since onset: few days. The problem occurs rarely (off and on). Episode Length: permanent AF on pacer check. Associated symptoms include irregular heartbeat, lower extremity edema, dizziness and shortness of breath.  Pertinent negatives include no diaphoresis, no fever, no chest pain, no claudication, no orthopnea, no PND, no syncope, no abdominal pain, no nausea, no vomiting, no headaches, no weakness, no cough, no hemoptysis and no sputum production. Risk factors include diabetes mellitus, hypertension, smoking/tobacco exposure and dyslipidemia. Her past medical history is significant for DM, hypertension and atrial fibrillation. Cholesterol Problem   The history is provided by the medical records. This is a chronic problem. Associated symptoms include shortness of breath. Pertinent negatives include no chest pain, no abdominal pain and no headaches. Dizziness   The history is provided by the patient. This is a new problem. The current episode started more than 1 week ago. The problem occurs every several days (3/wk; 5-10 mts; 6/17 LOC for few seconds). The problem has not changed (Likely equilibrium issue now 5/19) since onset. Associated symptoms include shortness of breath. Pertinent negatives include no chest pain, no abdominal pain and no headaches. The symptoms are aggravated by twisting (looking/reaching up; sitting). The symptoms are relieved by rest and laying down. Shortness of Breath   The history is provided by the patient. This is a chronic problem. The problem occurs intermittently. The current episode started more than 1 week ago. The problem has been gradually worsening (1 month). Associated symptoms include leg swelling. Pertinent negatives include no fever, no headaches, no ear pain, no neck pain, no cough, no sputum production, no hemoptysis, no wheezing, no PND, no orthopnea, no chest pain, no syncope, no vomiting, no abdominal pain, no rash and no claudication. The problem's precipitants include exercise (2 blocks; 4/18 within house; 11/18 1 block; 11/19 2 blocks; 5/20 1 block). Leg Swelling   The history is provided by the patient. This is a new problem. The current episode started more than 1 week ago (4/18). The problem occurs every several days. The problem has been gradually improving. Associated symptoms include shortness of breath.  Pertinent negatives include no chest pain, no abdominal pain and no headaches. The symptoms are relieved by medications. Review of Systems   Constitutional: Negative for chills, diaphoresis, fever and weight loss. HENT: Negative for ear pain and hearing loss. Eyes: Negative for blurred vision. Respiratory: Positive for shortness of breath. Negative for cough, hemoptysis, sputum production, wheezing and stridor. Cardiovascular: Positive for palpitations and leg swelling. Negative for chest pain, orthopnea, claudication, syncope and PND. Gastrointestinal: Negative for abdominal pain, heartburn, nausea and vomiting. Musculoskeletal: Negative for myalgias and neck pain. Skin: Negative for rash. Neurological: Positive for dizziness. Negative for tingling, tremors, focal weakness, loss of consciousness, weakness and headaches. Psychiatric/Behavioral: Negative for depression and suicidal ideas. Allergies   Allergen Reactions    Lisinopril Cough     Patient denies         Past Medical History:   Diagnosis Date    Abnormal myocardial perfusion study 05/07/2010    Anterior ischemia c/w at least 1-vessel CAD. No WMA. EF 51%. Positive EKG at 78% pred max HR. Ex time 7 min 30 sec.  Acute systolic congestive heart failure ([address] 4/9/2018 4/18 new, likely she was told of low EF and MI- med Rx for now    Allergic rhinitis 1/30/2010    Atrial fibrillation ([address]     Broken ankle 08/22/2013    left    Carotid stenosis 1/30/2010    Chronic diastolic congestive heart failure ([address] 2/16/2018 2/18 NYHA2    COPD     Essential hypertension     Heart failure ([address]     History of amiodarone therapy 5/11/2015    History of cardiac cath 05/25/2010    Patent coronary arteries. LVEDP 13 mmHg. EF 65%.  History of cervical cancer 11/05    History of echocardiogram 08/21/2013    EF 50-55%. No RWMA. Gr 2 DDfx. Mild LAE.   Mild MR.      Hypertension     MI (myocardial infarction) ([address]     March/April 2018    Migraines 1/30/2010    Mitral regurgitation     Orthostatic dizziness 3/30/2016    ? autonomic dysfunction Vs amio side effect     Pacemaker 13    DDD    Pure hypercholesterolemia 2010    Rosacea 2010    Type II or unspecified type diabetes mellitus without mention of complication, not stated as uncontrolled 2010    Unspecified hypothyroidism 2010       Family History   Problem Relation Age of Onset    Hypertension Mother     Cancer Maternal Aunt         breast CA 66yo    Breast Cancer Maternal Aunt 79    Diabetes Maternal Grandmother     Cancer Maternal Aunt         uterine CA    Diabetes Daughter     Other Daughter         lupus (SLE) and coagulopathy    Breast Cancer Paternal Aunt 61    Heart Attack Sister     Heart Disease Neg Hx        Social History     Tobacco Use    Smoking status: Current Every Day Smoker     Packs/day: 0.50     Years: 42.00     Pack years: 21.00     Types: Cigarettes     Last attempt to quit: 3/30/2018     Years since quittin.1    Smokeless tobacco: Never Used    Tobacco comment: 10 cigs per day   Substance Use Topics    Alcohol use: No     Alcohol/week: 0.0 standard drinks    Drug use: No        Current Outpatient Medications   Medication Sig    carvediloL (COREG) 12.5 mg tablet Take 1 Tab by mouth two (2) times a day.  rivaroxaban (Xarelto) 20 mg tab tablet Take 1 Tab by mouth daily (with dinner).  ferrous sulfate (IRON) 325 mg (65 mg iron) tablet Take  by mouth Daily (before breakfast).  losartan (COZAAR) 25 mg tablet Take 0.5 Tabs by mouth daily.  Comp Stocking,Knee,Regular,Sml misc 2 Box by Does Not Apply route daily. Use while upright  Indications: edema    nitroglycerin (NITROSTAT) 0.4 mg SL tablet 1 Tab by SubLINGual route every five (5) minutes as needed for Chest Pain for up to 3 doses.  furosemide (LASIX) 20 mg tablet Take 1 Tab by mouth daily as needed.     oxyCODONE IR (ROXICODONE) 20 mg immediate release tablet Take  by mouth every four (4) hours as needed for Pain.  fentaNYL 62.5 mcg/hour pt72 by TransDERmal route.  omeprazole (PRILOSEC) 20 mg capsule Take 1 Cap by mouth daily.  aspirin 81 mg chewable tablet Take 1 Tab by mouth daily. Indications: myocardial infarction prevention    naloxone 4 mg/actuation spry 4 mg by Nasal route as needed for up to 2 doses. Indications: OPIOID TOXICITY    atorvastatin (LIPITOR) 20 mg tablet Take  by mouth daily.  naloxegol (MOVANTIK) 25 mg tab tablet Take 25 mg by mouth daily.  insulin lispro (HUMALOG) 100 unit/mL injection by SubCUTAneous route.  insulin glargine (LANTUS) 100 unit/mL injection by SubCUTAneous route nightly.  ipratropium-albuterol (COMBIVENT RESPIMAT)  mcg/actuation inhaler Take 1 Puff by inhalation every six (6) hours as needed for Wheezing or Shortness of Breath.  pyridoxine (VITAMIN B-6) 100 mg tablet Take 100 mg by mouth daily.  fish oil-dha-epa 1,200-144-216 mg cap Take 1 Cap by mouth daily.  levothyroxine (SYNTHROID) 125 mcg tablet Take 125 mcg by mouth Daily (before breakfast).  tiotropium (SPIRIVA WITH HANDIHALER) 18 mcg inhalation capsule Take 1 Cap by inhalation daily.  cholecalciferol, vitamin d3, (VITAMIN D) 1,000 unit tablet Take 5,000 Units by mouth daily. No current facility-administered medications for this visit. Past Surgical History:   Procedure Laterality Date    HX AFIB ABLATION  2/8/13    Dr Adelfo Gabriel HX CAROTID ENDARTERECTOMY  11/06    right Dr. Waldo Bunch HX HYSTERECTOMY  5/06    HX ORTHOPAEDIC Right 07/11/2016    knee replacement    HX PACEMAKER  11/25/13    DDD    HX SVT ABLATION  2/18/2013       Visit Vitals  /54   Pulse 76   Ht 5' 4\" (1.626 m) Comment: Simultaneous filing. User may not have seen previous data. Wt 61.2 kg (135 lb)   BMI 23.17 kg/m²       Diagnostic Studies:  I have reviewed the relevant tests done on the patient and show as follows  EKG tracings reviewed by me today.     CARDIOLOGY STUDIES 3/30/2018   Pharmacological Nuclear Stress Test Result large fixed ant/apical/sept defect, 40%EF   Some recent data might be hidden   3/18 NUc Stress  Conclusion:  1. Abnormal perfusion scan. 2. Evidence of a large sized fixed defect involving LV territory in the apical and mid anterior and anteroseptal areas. This indicates previous myocardial infarction most likely. 3. This represents a high risk scan. 1/19 ECHO Interpretation Summary     · Normal cavity size. Mild concentric hypertrophy observed. There is moderately decreased systolic function. The estimated ejection fraction is 36 - 40%. Abnormal wall motion as described on the wall scoring diagram below. Global longitudinal strain is -4.90%. Abnormal left ventricular strain. There is severe (grade 3) left ventricular diastolic dysfunction. · Left atrial cavity size is severely dilated. · Right ventricular global systolic function is reduced. Pacing wire present  · Mitral valve thickening. Mild mitral valve regurgitation. · Mild tricuspid valve regurgitation. Pulmonary arterial systolic pressure is 55.3 mmHg. Mild pulmonary hypertension. Comparison Study Information     Prior Study     When compared to the previous study from 3/23/18, there is no longer evidence of a small pericardial effusion. Ms. Serge Arredondo has a reminder for a \"due or due soon\" health maintenance. I have asked that she contact her primary care provider for follow-up on this health maintenance. Physical Exam   Constitutional: She is oriented to person, place, and time. She appears well-developed and well-nourished. No distress. HENT:   Head: Normocephalic and atraumatic. Mouth/Throat: Normal dentition. Eyes: Right eye exhibits no discharge. Left eye exhibits no discharge. No scleral icterus. Neck: Neck supple. No JVD present. Carotid bruit is present (b/l). No thyromegaly present.    Cardiovascular: Normal rate, regular rhythm, S1 normal, S2 normal and intact distal pulses. Exam reveals no gallop and no friction rub. Murmur heard. Scratchy early systolic murmur is present with a grade of 3/6 at the upper right sternal border. High-pitched blowing midsystolic murmur of grade 2/6 is also present at the apex. High-pitched blowing decrescendo early diastolic murmur is present with a grade of 2/6 at the upper right sternal border radiating to the apex. Pulmonary/Chest: Effort normal and breath sounds normal. She has no wheezes. She has no rales. Abdominal: Soft. She exhibits no mass. There is no abdominal tenderness. Musculoskeletal:         General: Edema (trace) present. Lymphadenopathy:        Right cervical: No superficial cervical adenopathy present. Left cervical: No superficial cervical adenopathy present. Neurological: She is alert and oriented to person, place, and time. Skin: Skin is warm and dry. No rash noted. Psychiatric: She has a normal mood and affect. Her behavior is normal.       ASSESSMENT and PLAN    Patient advised to stop smoking to reduce future cardiovascular events. AFib: Permanent; 3/18; 2/18; 8/17 on xarelto;   Persistent despite ablation  CAD: 3/18 large fixed LAD defect, 40%EF;   DDD to VVVIR : Dual chamber [employer] pacemaker implant 11/25/13  Office Checks: 12/13 nl function; 1/15 nl function, freq but very short AHR(likely a fib); 1/16 freq long AF, nl function; 1/17; 7/17; 6/18 nl function, perm AFib; 2/19; 2/20 normal function  Remote Checks : 3/14; 10/14; 4/16; 10/16; 12/17; 11/18 nl function    CHF is worsening clinically. She is also losing some weight continues to smoke. Check labs ordered. Check x-ray chest.  Check echo to follow-up on LV function. [facility] will be due next week. Smoking cessation discussed again but she does not seem to be ready to quit but has reduced her smoking to 3 to 4 cigarettes a day. Diagnoses and all orders for this visit:    1.  Acute on chronic systolic congestive heart failure (HCC)  -     METABOLIC PANEL, BASIC; Future  -     HEPATIC FUNCTION PANEL; Future  -     LIPID PANEL; Future  -     TSH 3RD GENERATION; Future  -     CBC W/O DIFF; Future  -     ECHO ADULT COMPLETE; Future  -     XR CHEST PA LAT; Future    2. Longstanding persistent atrial fibrillation ([facility]    3. Tobacco use disorder    4. Pacemaker- DDD to VVIR    5. Coronary artery disease involving native coronary artery of native heart without angina pectoris    6. Cardiomyopathy, ischemic    7. S/P RF ablation operation for arrhythmia    8. Chronic obstructive pulmonary disease, unspecified COPD type ([facility]    9. Pure hypercholesterolemia        Pertinent laboratory and test data reviewed and discussed with patient. See patient instructions also for other medical advice given    There are no discontinued medications. Follow-up and Dispositions    · Return in about 1 month (around 6/21/2020), or if symptoms worsen or fail to improve, for post test.         Vital Signs: (As obtained by patient/caregiver at home)  Visit Vitals  /54   Pulse 76   Ht 5' 4\" (1.626 m) Comment: Simultaneous filing. User may not have seen previous data. Wt 61.2 kg (135 lb)   BMI 23.17 kg/m²          Other pertinent observable physical exam findings:-      We discussed the expected course, resolution and complications of the diagnosis(es) in detail. Medication risks, benefits, costs, interactions, and alternatives were discussed as indicated. I advised her to contact the office if her condition worsens, changes or fails to improve as anticipated. She expressed understanding with the diagnosis(es) and plan.      Pursuant to the emergency declaration under the [facility], [facility] waiver authority and the [employer] and [employer] Act, this Virtual  Visit was conducted, with patient's consent, to reduce the patient's risk of exposure to COVID-19 and provide continuity of care for an established patient. Services were provided through a video synchronous discussion virtually to substitute for in-person clinic visit.     Regan Watkins MD

## 2020-06-09 DIAGNOSIS — I50.22 CHRONIC SYSTOLIC CONGESTIVE HEART FAILURE (HCC): ICD-10-CM

## 2020-06-09 RX ORDER — LOSARTAN POTASSIUM 25 MG/1
TABLET ORAL
Qty: 30 TAB | Refills: 0 | Status: SHIPPED | OUTPATIENT
Start: 2020-06-09 | End: 2020-07-13

## 2020-07-13 DIAGNOSIS — I50.22 CHRONIC SYSTOLIC CONGESTIVE HEART FAILURE (HCC): ICD-10-CM

## 2020-07-13 RX ORDER — LOSARTAN POTASSIUM 25 MG/1
TABLET ORAL
Qty: 30 TAB | Refills: 0 | Status: SHIPPED | OUTPATIENT
Start: 2020-07-13 | End: 2020-11-20 | Stop reason: SDUPTHER

## 2020-11-20 DIAGNOSIS — I50.22 CHRONIC SYSTOLIC CONGESTIVE HEART FAILURE (HCC): ICD-10-CM

## 2020-11-20 RX ORDER — LOSARTAN POTASSIUM 25 MG/1
25 TABLET ORAL DAILY
Qty: 90 TAB | Refills: 1 | Status: SHIPPED | OUTPATIENT
Start: 2020-11-20 | End: 2020-12-07 | Stop reason: SDUPTHER

## 2020-12-07 DIAGNOSIS — I50.22 CHRONIC SYSTOLIC CONGESTIVE HEART FAILURE (HCC): ICD-10-CM

## 2020-12-07 RX ORDER — LOSARTAN POTASSIUM 25 MG/1
25 TABLET ORAL DAILY
Qty: 90 TAB | Refills: 1 | Status: SHIPPED | OUTPATIENT
Start: 2020-12-07 | End: 2021-11-15

## 2021-01-25 DIAGNOSIS — I25.5 CARDIOMYOPATHY, ISCHEMIC: ICD-10-CM

## 2021-01-25 DIAGNOSIS — I25.10 CORONARY ARTERY DISEASE INVOLVING NATIVE CORONARY ARTERY OF NATIVE HEART WITHOUT ANGINA PECTORIS: ICD-10-CM

## 2021-01-25 RX ORDER — CARVEDILOL 12.5 MG/1
TABLET ORAL
Qty: 180 TAB | Refills: 0 | Status: SHIPPED | OUTPATIENT
Start: 2021-01-25 | End: 2021-03-22 | Stop reason: SDUPTHER

## 2021-03-22 ENCOUNTER — OFFICE VISIT (OUTPATIENT)
Dept: CARDIOLOGY CLINIC | Age: 71
End: 2021-03-22
Payer: MEDICARE

## 2021-03-22 VITALS
DIASTOLIC BLOOD PRESSURE: 63 MMHG | BODY MASS INDEX: 26.46 KG/M2 | HEART RATE: 69 BPM | HEIGHT: 64 IN | SYSTOLIC BLOOD PRESSURE: 151 MMHG | WEIGHT: 155 LBS | OXYGEN SATURATION: 95 % | TEMPERATURE: 98.1 F

## 2021-03-22 DIAGNOSIS — I42.8 NONISCHEMIC CARDIOMYOPATHY (HCC): ICD-10-CM

## 2021-03-22 DIAGNOSIS — R25.9 INVOLUNTARY MOVEMENTS: ICD-10-CM

## 2021-03-22 DIAGNOSIS — I25.10 CORONARY ARTERY DISEASE INVOLVING NATIVE CORONARY ARTERY OF NATIVE HEART WITHOUT ANGINA PECTORIS: ICD-10-CM

## 2021-03-22 DIAGNOSIS — Z98.890 S/P RF ABLATION OPERATION FOR ARRHYTHMIA: ICD-10-CM

## 2021-03-22 DIAGNOSIS — E78.00 PURE HYPERCHOLESTEROLEMIA: ICD-10-CM

## 2021-03-22 DIAGNOSIS — Z95.0 PACEMAKER: ICD-10-CM

## 2021-03-22 DIAGNOSIS — I50.23 ACUTE ON CHRONIC SYSTOLIC CONGESTIVE HEART FAILURE (HCC): Primary | ICD-10-CM

## 2021-03-22 DIAGNOSIS — I48.19 PERSISTENT ATRIAL FIBRILLATION (HCC): ICD-10-CM

## 2021-03-22 DIAGNOSIS — Z86.79 S/P RF ABLATION OPERATION FOR ARRHYTHMIA: ICD-10-CM

## 2021-03-22 DIAGNOSIS — Z98.890 HISTORY OF CEA (CAROTID ENDARTERECTOMY): ICD-10-CM

## 2021-03-22 DIAGNOSIS — F17.200 TOBACCO USE DISORDER: ICD-10-CM

## 2021-03-22 PROCEDURE — 99215 OFFICE O/P EST HI 40 MIN: CPT | Performed by: INTERNAL MEDICINE

## 2021-03-22 PROCEDURE — G8753 SYS BP > OR = 140: HCPCS | Performed by: INTERNAL MEDICINE

## 2021-03-22 PROCEDURE — G8754 DIAS BP LESS 90: HCPCS | Performed by: INTERNAL MEDICINE

## 2021-03-22 PROCEDURE — G8536 NO DOC ELDER MAL SCRN: HCPCS | Performed by: INTERNAL MEDICINE

## 2021-03-22 PROCEDURE — 1101F PT FALLS ASSESS-DOCD LE1/YR: CPT | Performed by: INTERNAL MEDICINE

## 2021-03-22 PROCEDURE — 1090F PRES/ABSN URINE INCON ASSESS: CPT | Performed by: INTERNAL MEDICINE

## 2021-03-22 PROCEDURE — G8400 PT W/DXA NO RESULTS DOC: HCPCS | Performed by: INTERNAL MEDICINE

## 2021-03-22 PROCEDURE — G8427 DOCREV CUR MEDS BY ELIG CLIN: HCPCS | Performed by: INTERNAL MEDICINE

## 2021-03-22 PROCEDURE — G8510 SCR DEP NEG, NO PLAN REQD: HCPCS | Performed by: INTERNAL MEDICINE

## 2021-03-22 PROCEDURE — 3017F COLORECTAL CA SCREEN DOC REV: CPT | Performed by: INTERNAL MEDICINE

## 2021-03-22 PROCEDURE — G8419 CALC BMI OUT NRM PARAM NOF/U: HCPCS | Performed by: INTERNAL MEDICINE

## 2021-03-22 RX ORDER — FUROSEMIDE 20 MG/1
20 TABLET ORAL
Qty: 30 TAB | Refills: 0 | Status: SHIPPED | OUTPATIENT
Start: 2021-03-22 | End: 2021-11-15

## 2021-03-22 RX ORDER — HYDROCHLOROTHIAZIDE 12.5 MG/1
12.5 TABLET ORAL DAILY
Qty: 30 TAB | Refills: 3 | Status: ON HOLD | OUTPATIENT
Start: 2021-03-22 | End: 2021-06-16 | Stop reason: SDUPTHER

## 2021-03-22 RX ORDER — CARVEDILOL 12.5 MG/1
12.5 TABLET ORAL 2 TIMES DAILY
Qty: 60 TAB | Refills: 3 | Status: SHIPPED | OUTPATIENT
Start: 2021-03-22 | End: 2021-09-14 | Stop reason: SDUPTHER

## 2021-03-22 NOTE — PATIENT INSTRUCTIONS
Medications Discontinued During This Encounter Medication Reason  furosemide (LASIX) 20 mg tablet REORDER  carvediloL (COREG) 12.5 mg tablet REORDER After the recommended changes have been made in blood pressure medicines, patient advised to keep BP/HR(pulse rate) chart twice daily and bring us results in next 4 to 5 days. Patient may send the results via \"My Chart\" if desired. Please rest for 5-10 minutes before checking blood pressure. Sit on a comfortable chair without crossing the legs and put your arm on a table. We recommend that you use an upper arm cuff. Check the blood pressure 3 times each time you check the blood pressure and record the lowest reading. If you check the blood pressure in both arms, use the higher reading. Avoiding Triggers With Heart Failure: Care Instructions Your Care Instructions Triggers are anything that make your heart failure flare up. A flare-up is also called \"sudden heart failure\" or \"acute heart failure. \" When you have a flare-up, fluid builds up in your lungs, and you have problems breathing. You might need to go to the hospital. By watching for changes in your condition and avoiding triggers, you can prevent heart failure flare-ups. Follow-up care is a key part of your treatment and safety. Be sure to make and go to all appointments, and call your doctor if you are having problems. It's also a good idea to know your test results and keep a list of the medicines you take. How can you care for yourself at home? Watch for changes in your weight and condition · Weigh yourself without clothing at the same time each day. Record your weight. Call your doctor if you have sudden weight gain, such as more than 2 to 3 pounds in a day or 5 pounds in a week. (Your doctor may suggest a different range of weight gain.) A sudden weight gain may mean that your heart failure is getting worse. · Keep a daily record of your symptoms.  Write down any changes in how you feel, such as new shortness of breath, cough, or problems eating. Also record if your ankles are more swollen than usual and if you feel more tired than usual. Note anything that you ate or did that could have triggered these changes. Limit sodium Sodium causes your body to hold on to extra water. This may cause your heart failure symptoms to get worse. People get most of their sodium from processed foods. Fast food and restaurant meals also tend to be very high in sodium. · Your doctor may suggest that you limit sodium. Your doctor can tell you how much sodium is right for you. This includes limiting sodium in cooked and packaged foods. · Read food labels on cans and food packages. They tell you how much sodium you get in one serving. Check the serving size. If you eat more than one serving, you are getting more sodium. · Be aware that sodium can come in forms other than salt, including monosodium glutamate (MSG), sodium citrate, and sodium bicarbonate (baking soda). MSG is often added to Asian food. You can sometimes ask for food without MSG or salt. · Slowly reducing salt will help you adjust to the taste. Take the salt shaker off the table. · Flavor your food with garlic, lemon juice, onion, vinegar, herbs, and spices instead of salt. Do not use soy sauce, steak sauce, onion salt, garlic salt, mustard, or ketchup on your food, unless it is labeled \"low-sodium\" or \"low-salt. \" 
· Make your own salad dressings, sauces, and ketchup without adding salt. · Use fresh or frozen ingredients, instead of canned ones, whenever you can. Choose low-sodium canned goods. · Eat less processed food and food from restaurants, including fast food. Exercise as directed Moderate, regular exercise is very good for your heart. It improves your blood flow and helps control your weight. But too much exercise can stress your heart and cause a heart failure flare-up.  
· Check with your doctor before you start an exercise program. 
· Walking is an easy way to get exercise. Start out slowly. Gradually increase the length and pace of your walk. Swimming, riding a bike, and using a treadmill are also good forms of exercise. · When you exercise, watch for signs that your heart is working too hard. You are pushing yourself too hard if you cannot talk while you are exercising. If you become short of breath or dizzy or have chest pain, stop, sit down, and rest. 
· Do not exercise when you do not feel well. Take medicines correctly · Take your medicines exactly as prescribed. Call your doctor if you think you are having a problem with your medicine. · Make a list of all the medicines you take. Include those prescribed to you by other doctors and any over-the-counter medicines, vitamins, or supplements you take. Take this list with you when you go to any doctor. · Take your medicines at the same time every day. It may help you to post a list of all the medicines you take every day and what time of day you take them. · Make taking your medicine as simple as you can. Plan times to take your medicines when you are doing other things, such as eating a meal or getting ready for bed. This will make it easier to remember to take your medicines. · Get organized. Use helpful tools, such as daily or weekly pill containers. When should you call for help? Call 911 if you have symptoms of sudden heart failure such as: 
  · You have severe trouble breathing.  
  · You cough up pink, foamy mucus.  
  · You have a new irregular or rapid heartbeat. Call your doctor now or seek immediate medical care if: 
  · You have new or increased shortness of breath.  
  · You are dizzy or lightheaded, or you feel like you may faint.  
  · You have sudden weight gain, such as more than 2 to 3 pounds in a day or 5 pounds in a week.  (Your doctor may suggest a different range of weight gain.)  
  · You have increased swelling in your legs, ankles, or feet.  
  · You are suddenly so tired or weak that you cannot do your usual activities. Watch closely for changes in your health, and be sure to contact your doctor if you develop new symptoms. Where can you learn more? Go to http://www.gray.com/ Enter A637 in the search box to learn more about \"Avoiding Triggers With Heart Failure: Care Instructions. \" Current as of: December 16, 2019               Content Version: 12.6 © 2364-1776 [employer]. Care instructions adapted under license by [employer] (which disclaims liability or warranty for this information). If you have questions about a medical condition or this instruction, always ask your healthcare professional. [address] any warranty or liability for your use of this information.

## 2021-03-22 NOTE — PROGRESS NOTES
HISTORY OF PRESENT ILLNESS  Maral Shin is a 69 y.o. female. F/u AFib, HTN, CHF, MR, HLD, s/p DDD    3/21 says that has involuntary movements of arm/leg or even the trunk. Happening 2-3/wk  3/18 seen after low EF, wma and stress test today  7/17 continues with dizziness, rare LOC/fall- last in 6/17 1/17 has a cold for 6 wks    Hypertension  The history is provided by the medical records. This is a chronic problem. Associated symptoms include shortness of breath. Pertinent negatives include no chest pain, no abdominal pain and no headaches. CHF  The history is provided by the medical records. This is a chronic problem. Associated symptoms include shortness of breath. Pertinent negatives include no chest pain, no abdominal pain and no headaches. Cardiomyopathy  The history is provided by the medical records (ischemic). This is a chronic problem. Associated symptoms include shortness of breath. Pertinent negatives include no chest pain, no abdominal pain and no headaches. Palpitations   The history is provided by the medical records (perm AF). This is a recurrent problem. The current episode started more than 1 week ago. Progression since onset: few days. The problem occurs rarely (off and on). Episode Length: permanent AF on pacer check. Associated symptoms include irregular heartbeat, lower extremity edema, dizziness and shortness of breath. Pertinent negatives include no diaphoresis, no fever, no chest pain, no claudication, no orthopnea, no PND, no syncope, no abdominal pain, no nausea, no vomiting, no headaches, no weakness, no cough, no hemoptysis and no sputum production. Risk factors include diabetes mellitus, hypertension, smoking/tobacco exposure and dyslipidemia. Her past medical history is significant for DM, hypertension and atrial fibrillation. Cholesterol Problem  The history is provided by the medical records. This is a chronic problem. Associated symptoms include shortness of breath. Pertinent negatives include no chest pain, no abdominal pain and no headaches. Dizziness  The history is provided by the patient. This is a new problem. The current episode started more than 1 week ago. The problem occurs every several days (3/wk; 5-10 mts; 6/17 LOC for few seconds). The problem has not changed (Likely equilibrium issue/vertigo now 5/19) since onset. Associated symptoms include shortness of breath. Pertinent negatives include no chest pain, no abdominal pain and no headaches. The symptoms are aggravated by twisting (looking/reaching up; sitting). The symptoms are relieved by rest and laying down. Shortness of Breath  The history is provided by the patient. This is a chronic problem. The problem occurs intermittently. The current episode started more than 1 week ago. The problem has been gradually worsening. Associated symptoms include leg swelling. Pertinent negatives include no fever, no headaches, no ear pain, no neck pain, no cough, no sputum production, no hemoptysis, no wheezing, no PND, no orthopnea, no chest pain, no syncope, no vomiting, no abdominal pain, no rash and no claudication. The problem's precipitants include exercise (2 blocks; 4/18 within house; 11/18 1 block; 11/19 2 blocks; 3/21 200 ft; ). Leg Swelling  The history is provided by the patient. This is a new problem. The current episode started more than 1 week ago (4/18). The problem occurs every several days. The problem has not changed since onset. Associated symptoms include shortness of breath. Pertinent negatives include no chest pain, no abdominal pain and no headaches. The symptoms are relieved by medications. Review of Systems   Constitutional: Negative for chills, diaphoresis, fever and weight loss. HENT: Negative for ear pain and hearing loss. Eyes: Negative for blurred vision. Respiratory: Positive for shortness of breath. Negative for cough, hemoptysis, sputum production, wheezing and stridor. Cardiovascular: Positive for palpitations and leg swelling. Negative for chest pain, orthopnea, claudication, syncope and PND. Gastrointestinal: Negative for abdominal pain, heartburn, nausea and vomiting. Musculoskeletal: Negative for myalgias and neck pain. Skin: Negative for rash. Neurological: Positive for dizziness. Negative for tingling, tremors, focal weakness, loss of consciousness, weakness and headaches. Psychiatric/Behavioral: Negative for depression and suicidal ideas. Allergies   Allergen Reactions    Lisinopril Cough     Patient denies         Past Medical History:   Diagnosis Date    Abnormal myocardial perfusion study 05/07/2010    Anterior ischemia c/w at least 1-vessel CAD. No WMA. EF 51%. Positive EKG at 78% pred max HR. Ex time 7 min 30 sec.  Acute systolic congestive heart failure ([address] 4/9/2018 4/18 new, likely she was told of low EF and MI- med Rx for now    Allergic rhinitis 1/30/2010    Atrial fibrillation ([address]     Broken ankle 08/22/2013    left    Carotid stenosis 1/30/2010    Chronic diastolic congestive heart failure ([address] 2/16/2018 2/18 NYHA2    COPD     Essential hypertension     Heart failure ([address]     History of amiodarone therapy 5/11/2015    History of cardiac cath 05/25/2010    Patent coronary arteries. LVEDP 13 mmHg. EF 65%.  History of cervical cancer 11/05    History of echocardiogram 08/21/2013    EF 50-55%. No RWMA. Gr 2 DDfx. Mild LAE. Mild MR.      Hypertension     MI (myocardial infarction) ([address]     March/April 2018    Migraines 1/30/2010    Mitral regurgitation     Orthostatic dizziness 3/30/2016    ? autonomic dysfunction Vs amio side effect     Pacemaker 11/25/13    DDD    Pure hypercholesterolemia 1/30/2010    Rosacea 1/30/2010    Type II or unspecified type diabetes mellitus without mention of complication, not stated as uncontrolled 1/30/2010    Unspecified hypothyroidism 1/30/2010       Family History Problem Relation Age of Onset    Hypertension Mother     Cancer Maternal Aunt         breast CA 66yo    Breast Cancer Maternal Aunt 79    Diabetes Maternal Grandmother     Cancer Maternal Aunt         uterine CA    Diabetes Daughter     Other Daughter         lupus (SLE) and coagulopathy    Breast Cancer Paternal Aunt 61    Heart Attack Sister     Heart Disease Neg Hx        Social History     Tobacco Use    Smoking status: Current Every Day Smoker     Packs/day: 0.50     Years: 42.00     Pack years: 21.00     Types: Cigarettes     Last attempt to quit: 3/30/2018     Years since quittin.9    Smokeless tobacco: Never Used    Tobacco comment: 10 cigs per day   Substance Use Topics    Alcohol use: No     Alcohol/week: 0.0 standard drinks    Drug use: No        Current Outpatient Medications   Medication Sig    carvediloL (COREG) 12.5 mg tablet Take 1 tablet by mouth twice daily    losartan (COZAAR) 25 mg tablet Take 1 Tab by mouth daily.  rivaroxaban (Xarelto) 20 mg tab tablet Take 1 Tab by mouth daily (with dinner).  ferrous sulfate (IRON) 325 mg (65 mg iron) tablet Take  by mouth Daily (before breakfast).  Comp Stocking,Knee,Regular,Sml misc 2 Box by Does Not Apply route daily. Use while upright  Indications: edema    nitroglycerin (NITROSTAT) 0.4 mg SL tablet 1 Tab by SubLINGual route every five (5) minutes as needed for Chest Pain for up to 3 doses.  furosemide (LASIX) 20 mg tablet Take 1 Tab by mouth daily as needed.  oxyCODONE IR (ROXICODONE) 20 mg immediate release tablet Take  by mouth every four (4) hours as needed for Pain.  fentaNYL 62.5 mcg/hour pt72 by TransDERmal route.  omeprazole (PRILOSEC) 20 mg capsule Take 1 Cap by mouth daily.  aspirin 81 mg chewable tablet Take 1 Tab by mouth daily. Indications: myocardial infarction prevention    naloxone 4 mg/actuation spry 4 mg by Nasal route as needed for up to 2 doses.  Indications: OPIOID TOXICITY    atorvastatin (LIPITOR) 20 mg tablet Take  by mouth daily.  naloxegol (MOVANTIK) 25 mg tab tablet Take 25 mg by mouth daily.  insulin lispro (HUMALOG) 100 unit/mL injection by SubCUTAneous route.  insulin glargine (LANTUS) 100 unit/mL injection by SubCUTAneous route nightly.  ipratropium-albuterol (COMBIVENT RESPIMAT)  mcg/actuation inhaler Take 1 Puff by inhalation every six (6) hours as needed for Wheezing or Shortness of Breath.  pyridoxine (VITAMIN B-6) 100 mg tablet Take 100 mg by mouth daily.  fish oil-dha-epa 1,200-144-216 mg cap Take 1 Cap by mouth daily.  levothyroxine (SYNTHROID) 125 mcg tablet Take 125 mcg by mouth Daily (before breakfast).  tiotropium (SPIRIVA WITH HANDIHALER) 18 mcg inhalation capsule Take 1 Cap by inhalation daily.  cholecalciferol, vitamin d3, (VITAMIN D) 1,000 unit tablet Take 5,000 Units by mouth daily. No current facility-administered medications for this visit. Past Surgical History:   Procedure Laterality Date    HX AFIB ABLATION  2/8/13    Dr Paco Barrett HX CAROTID ENDARTERECTOMY  11/06    right Dr. Cally Phan HX HYSTERECTOMY  5/06    HX ORTHOPAEDIC Right 07/11/2016    knee replacement    HX PACEMAKER  11/25/13    DDD    HX SVT ABLATION  2/18/2013       Visit Vitals  Temp 98.1 °F (36.7 °C) (Temporal)   Ht 5' 4\" (1.626 m)   Wt 70.3 kg (155 lb)   BMI 26.61 kg/m²       Diagnostic Studies:  I have reviewed the relevant tests done on the patient and show as follows  EKG tracings reviewed by me today. CARDIOLOGY STUDIES 3/30/2018   Pharmacological Nuclear Stress Test Result large fixed ant/apical/sept defect, 40%EF   Some recent data might be hidden   3/18 NUc Stress  Conclusion:  1. Abnormal perfusion scan. 2. Evidence of a large sized fixed defect involving LV territory in the apical and mid anterior and anteroseptal areas. This indicates previous myocardial infarction most likely. 3. This represents a high risk scan.   1/19 ECHO Interpretation Summary     · Normal cavity size. Mild concentric hypertrophy observed. There is moderately decreased systolic function. The estimated ejection fraction is 36 - 40%. Abnormal wall motion as described on the wall scoring diagram below. Global longitudinal strain is -4.90%. Abnormal left ventricular strain. There is severe (grade 3) left ventricular diastolic dysfunction. · Left atrial cavity size is severely dilated. · Right ventricular global systolic function is reduced. Pacing wire present  · Mitral valve thickening. Mild mitral valve regurgitation. · Mild tricuspid valve regurgitation. Pulmonary arterial systolic pressure is 78.7 mmHg. Mild pulmonary hypertension. Comparison Study Information     Prior Study     When compared to the previous study from 3/23/18, there is no longer evidence of a small pericardial effusion. Ms. Nandini Beckham has a reminder for a \"due or due soon\" health maintenance. I have asked that she contact her primary care provider for follow-up on this health maintenance. Physical Exam   Constitutional: She is oriented to person, place, and time. She appears well-developed and well-nourished. No distress. HENT:   Head: Normocephalic and atraumatic. Mouth/Throat: Normal dentition. Eyes: Right eye exhibits no discharge. Left eye exhibits no discharge. No scleral icterus. Neck: Neck supple. No JVD present. Carotid bruit is present (b/l). No thyromegaly present. Cardiovascular: Normal rate, regular rhythm, S1 normal, S2 normal and intact distal pulses. Exam reveals no gallop and no friction rub. Murmur heard. Scratchy early systolic murmur is present with a grade of 3/6 at the upper right sternal border. High-pitched blowing midsystolic murmur of grade 2/6 is also present at the apex. High-pitched blowing decrescendo early diastolic murmur is present with a grade of 2/6 at the upper right sternal border radiating to the apex.   Pulmonary/Chest: Effort normal and breath sounds normal. She has no wheezes. She has no rales. Abdominal: Soft. She exhibits no mass. There is no abdominal tenderness. Musculoskeletal:         General: Edema (trace) present. Lymphadenopathy:        Right cervical: No superficial cervical adenopathy present. Left cervical: No superficial cervical adenopathy present. Neurological: She is alert and oriented to person, place, and time. Skin: Skin is warm and dry. No rash noted. Psychiatric: She has a normal mood and affect. Her behavior is normal.       ASSESSMENT and PLAN    Patient advised to stop smoking to reduce future cardiovascular events. AFib: Permanent; 3/18; 2/18; 8/17 on xarelto;   Persistent despite ablation  CAD: 3/18 large fixed LAD defect, 40%EF;   DDD to [facility] : Dual chamber [employer] pacemaker implant 11/25/13  Office Checks: 12/13 nl function; 1/15 nl function, freq but very short AHR(likely a fib); 1/16 freq long AF, nl function; 1/17; 7/17; 6/18 nl function, perm AFib; 2/19 normal function  Remote Checks : 3/14; 10/14; 4/16; 10/16; 12/17; 11/18; 1/21 nl function    11/19 CHF is fairly stable clinically. Chest pain has resolved. Will wait on cardiac cath. Fabi Robertson was not covered. Will continue low-dose losartan. [facility] is overdue. Requested patient to send us from home ASA. Diagnoses and all orders for this visit:    1. Acute on chronic systolic congestive heart failure ([facility]  Comments:  4/18 new, likely she was told of low EF and MI- med Rx for now  Orders:  -     furosemide (LASIX) 20 mg tablet; Take 1 Tab by mouth daily as needed (edema). -     ECHO ADULT COMPLETE; Future  -     hydroCHLOROthiazide (HYDRODIURIL) 12.5 mg tablet; Take 1 Tab by mouth daily.  -     METABOLIC PANEL, BASIC; Future  -     HEPATIC FUNCTION PANEL; Future  -     CBC W/O DIFF; Future  -     TSH 3RD GENERATION; Future    2.  Nonischemic cardiomyopathy ([facility]  Comments:  3/18 no meds- start low dose coreg for CMP;  Orders:  -     carvediloL (COREG) 12.5 mg tablet; Take 1 Tab by mouth two (2) times a day. 3. Coronary artery disease involving native coronary artery of native heart without angina pectoris  Comments:  3/18 large fixed LAD defect, 40%EF; Orders:  -     carvediloL (COREG) 12.5 mg tablet; Take 1 Tab by mouth two (2) times a day. 4. Persistent atrial fibrillation ([address]    5. Pure hypercholesterolemia  -     LIPID PANEL; Future    6. History of CEA (carotid endarterectomy)    7. Tobacco use disorder    8. Pacemaker- DDD to VVIR    9. S/P RF ablation operation for arrhythmia    10. Involuntary movements  -     REFERRAL TO NEUROLOGY        Pertinent laboratory and test data reviewed and discussed with patient. See patient instructions also for other medical advice given    Medications Discontinued During This Encounter   Medication Reason    furosemide (LASIX) 20 mg tablet REORDER    carvediloL (COREG) 12.5 mg tablet REORDER       Follow-up and Dispositions    · Return in about 2 months (around 5/22/2021), or if symptoms worsen or fail to improve, for post test.       3/22/2021. CHF is worsening with worsening dyspnea. Add HCTZ which will be helpful for hypertension as well as CHF. Follow-up electrolytes closely. Blood pressure is elevated. Check home chart as HCTZ 30. Pacemaker function normal.  CAD seems to be clinically stable. A. fib is persistent with dependency on the permanent pacemaker which is functioning normally. Complains of involuntary movements of arms legs and trunk at different times. Refer to neurology for an opinion.

## 2021-03-22 NOTE — PROGRESS NOTES
Patient didn't bring medications, verbally reviewed. 1. Have you been to the ER, urgent care clinic since your last visit? Hospitalized since your last visit? No    2. Have you seen or consulted any other health care providers outside of the [address] [place] [address] since your last visit? Include any pap smears or colon screening. No     3. Since your last visit, have you had any of the following symptoms? chest pains, palpitations, shortness of breath, dizziness and swelling in legs/arms. 4.  Have you had any blood work, X-rays or cardiac testing? No    5. Where do you normally have your labs drawn? [facility]    6. Do you need any refills today?    No

## 2021-04-03 LAB
ALBUMIN SERPL-MCNC: 4 G/DL (ref 3.7–4.7)
ALP SERPL-CCNC: 95 IU/L (ref 39–117)
ALT SERPL-CCNC: 14 IU/L (ref 0–32)
AST SERPL-CCNC: 27 IU/L (ref 0–40)
BILIRUB DIRECT SERPL-MCNC: 0.13 MG/DL (ref 0–0.4)
BILIRUB SERPL-MCNC: 0.3 MG/DL (ref 0–1.2)
BUN SERPL-MCNC: 18 MG/DL (ref 8–27)
BUN/CREAT SERPL: 15 (ref 12–28)
CALCIUM SERPL-MCNC: 9 MG/DL (ref 8.7–10.3)
CHLORIDE SERPL-SCNC: 96 MMOL/L (ref 96–106)
CHOLEST SERPL-MCNC: 109 MG/DL (ref 100–199)
CO2 SERPL-SCNC: 30 MMOL/L (ref 20–29)
CREAT SERPL-MCNC: 1.2 MG/DL (ref 0.57–1)
ERYTHROCYTE [DISTWIDTH] IN BLOOD BY AUTOMATED COUNT: 12.2 % (ref 11.7–15.4)
GLUCOSE SERPL-MCNC: 150 MG/DL (ref 65–99)
HCT VFR BLD AUTO: 47.4 % (ref 34–46.6)
HDLC SERPL-MCNC: 47 MG/DL
HGB BLD-MCNC: 15.9 G/DL (ref 11.1–15.9)
LDLC SERPL CALC-MCNC: 36 MG/DL (ref 0–99)
MCH RBC QN AUTO: 32.5 PG (ref 26.6–33)
MCHC RBC AUTO-ENTMCNC: 33.5 G/DL (ref 31.5–35.7)
MCV RBC AUTO: 97 FL (ref 79–97)
PLATELET # BLD AUTO: 198 X10E3/UL (ref 150–450)
POTASSIUM SERPL-SCNC: 3.5 MMOL/L (ref 3.5–5.2)
PROT SERPL-MCNC: 6.7 G/DL (ref 6–8.5)
RBC # BLD AUTO: 4.89 X10E6/UL (ref 3.77–5.28)
SODIUM SERPL-SCNC: 138 MMOL/L (ref 134–144)
TRIGL SERPL-MCNC: 158 MG/DL (ref 0–149)
TSH SERPL DL<=0.005 MIU/L-ACNC: 2.48 UIU/ML (ref 0.45–4.5)
VLDLC SERPL CALC-MCNC: 26 MG/DL (ref 5–40)
WBC # BLD AUTO: 6.6 X10E3/UL (ref 3.4–10.8)

## 2021-04-03 NOTE — PROGRESS NOTES
Please contact patient and do the following asap    If edema and shortness of breath are improved, tell patient to skip p.m. dose of Lasix sometimes.   Fax to PCP for increased glucose

## 2021-04-05 ENCOUNTER — TELEPHONE (OUTPATIENT)
Dept: CARDIOLOGY CLINIC | Age: 71
End: 2021-04-05

## 2021-04-05 NOTE — TELEPHONE ENCOUNTER
Called and left detail message on patient voicemail regarding lab/test, lab reviewed if edema and shortness of breath are improved, tell patient to skip p.m.dosee of Lasix sometimes. Fax to PCP for increased glucose. Lab faxed to PCP for elevated glucose. If any questions to call office.

## 2021-04-05 NOTE — TELEPHONE ENCOUNTER
----- Message from Yung Smith MD sent at 4/3/2021  3:04 PM EDT -----  Please contact patient and do the following asap    If edema and shortness of breath are improved, tell patient to skip p.m. dose of Lasix sometimes.   Fax to PCP for increased glucose

## 2021-04-08 ENCOUNTER — HOSPITAL ENCOUNTER (OUTPATIENT)
Dept: NON INVASIVE DIAGNOSTICS | Age: 71
Discharge: HOME | End: 2021-04-08
Attending: INTERNAL MEDICINE
Payer: MEDICARE

## 2021-04-08 VITALS
WEIGHT: 155 LBS | BODY MASS INDEX: 26.46 KG/M2 | HEIGHT: 64 IN | DIASTOLIC BLOOD PRESSURE: 63 MMHG | SYSTOLIC BLOOD PRESSURE: 151 MMHG

## 2021-04-08 DIAGNOSIS — I50.23 ACUTE ON CHRONIC SYSTOLIC CONGESTIVE HEART FAILURE (HCC): ICD-10-CM

## 2021-04-08 LAB
ECHO AO ROOT DIAM: 3.25 CM
ECHO LA AREA 4C: 29.21 CM2
ECHO LA VOL 2C: 87.31 ML (ref 22–52)
ECHO LA VOL 4C: 107.1 ML (ref 22–52)
ECHO LA VOL BP: 105.66 ML (ref 22–52)
ECHO LA VOL/BSA BIPLANE: 60.2 ML/M2 (ref 16–28)
ECHO LA VOLUME INDEX A2C: 49.74 ML/M2 (ref 16–28)
ECHO LA VOLUME INDEX A4C: 61.02 ML/M2 (ref 16–28)
ECHO LV INTERNAL DIMENSION DIASTOLIC: 4.33 CM (ref 3.9–5.3)
ECHO LV INTERNAL DIMENSION SYSTOLIC: 3.27 CM
ECHO LV IVSD: 1.2 CM (ref 0.6–0.9)
ECHO LV MASS 2D: 188.9 G (ref 67–162)
ECHO LV MASS INDEX 2D: 107.6 G/M2 (ref 43–95)
ECHO LV POSTERIOR WALL DIASTOLIC: 1.22 CM (ref 0.6–0.9)
ECHO LVOT DIAM: 2 CM
ECHO LVOT PEAK GRADIENT: 1.33 MMHG
ECHO LVOT PEAK VELOCITY: 57.68 CM/S
ECHO LVOT SV: 36.5 ML
ECHO LVOT VTI: 11.61 CM
ECHO TV REGURGITANT MAX VELOCITY: 246.29 CM/S
ECHO TV REGURGITANT PEAK GRADIENT: 24.26 MMHG
LVOT MG: 0.59 MMHG

## 2021-04-08 PROCEDURE — 93306 TTE W/DOPPLER COMPLETE: CPT

## 2021-05-14 NOTE — MR AVS SNAPSHOT
Pt was isolated and withdrawn to his room for most of the evening. He presents as irritable and unhappy about being in the hospital.  He was cooperative with a BG check prior to dinner. BG = 152. He continues to endorse abdominal pain reporting that it's consistent but no worse than it has been for last week. He declined PRN Tylenol, but requested PRN Maalox.  V/S/S.  He refused almost all of his dinner, saying that he can not stomach it.  He did drink an ensure, but very few other liquids or food.  He reports that he is not using the bathroom, but staff reported hearing him flush his toilet in his room.  He was medication compliant.  He denied SI/SIB/HI or any psychotic symptoms.     Visit Information Date & Time Provider Department Dept. Phone Encounter #  
 4/21/2017  9:20 AM Elliot Alexander MD [facility] [phone] Follow-up Instructions Return in about 3 months (around 7/21/2017). Your Appointments 7/17/2017 10:00 AM  
ESTABLISHED PATIENT with June Aceves MD  
[facility]) Appt Note: 6 months with [employer] [address] [facility], [address] [facility] [address]  
[address], [address] [place] [address] 3/16/2018  8:00 AM  
PROCEDURE with [facility] IMAGING 1 [facility] ([address] [place] [address]) Appt Note: LISA ILIAC STENTS 1YR FAGAN; MAILED CARDS AND PREP INFORMATION; .  
 [address] [facility] [address]ones [address] [facility] [address]  
[phone] [address] [facility] [address]  
  
    
 3/16/2018 10:00 AM  
PROCEDURE with [facility] NONIMAGING [facility] ([address] [place] [address]) Appt Note: LEG ART 1YR FAGAN; MAILED CARDS AND PREP INFORMATION; .  
 [address] [place] [address] [facility] [address]  
[phone] [address] [facility] [address]  
  
    
 3/28/2018  1:45 PM  
Follow Up with Yazmin Matos MD  
[address] [place] [address] and [facility] [address] [place] [address]) Appt Note: 1 year fu after studies at our lab on 3/16/2018 with prep; MAILED 21 [facility]; CALLED PATIENT TO MOVE APPT TIME DUE TO DR Navy Feliz Hinton PT IS AWARE; 1 year fu after studies at our lab on 3/16/2018 with prep MAILED CARDS AND PREP INFORMATION CALLED PATIENT TO MOVE APPT TIME DUE TO DR Navy Feliz Hinton PT IS AWARE  
 [address] [facility] Jonathan [address]ones [address] [facility] [address]  
[phone] [facility]2 [facility] Upcoming Health Maintenance Date Due Hepatitis C Screening 1950 FOOT EXAM Q1 5/1/1959 EYE EXAM RETINAL OR DILATED Q1 5/1/1959 FOBT Q 1 YEAR AGE 50-75 5/1/1999 DTaP/Tdap/Td series (1 - Tdap) 10/2/2005 ZOSTER VACCINE AGE 60> 5/1/2009 GLAUCOMA SCREENING Q2Y 5/1/2014 OSTEOPOROSIS SCREENING (DEXA) 5/1/2014 Pneumococcal 65+ Low/Medium Risk (2 of 2 - PPSV23) 5/1/2014 MEDICARE YEARLY EXAM 5/1/2014 BREAST CANCER SCRN MAMMOGRAM 7/25/2015 MICROALBUMIN Q1 1/8/2016 HEMOGLOBIN A1C Q6M 7/10/2016 INFLUENZA AGE 9 TO ADULT 8/1/2016 LIPID PANEL Q1 9/29/2016 Allergies as of 4/21/2017  Review Complete On: 4/21/2017 By: Stanley Marie RN No Known Allergies Current Immunizations  Reviewed on 2/9/2011 Name Date Pneumococcal Vaccine (Unspecified Type) 12/14/2005 TD Vaccine 10/1/2005 Not reviewed this visit You Were Diagnosed With   
  
 Codes Comments Encounter for long-term (current) use of high-risk medication    -  Primary ICD-10-CM: H00.153 ICD-9-CM: V58.69 Vitals BP Pulse Weight(growth percentile) BMI OB Status Smoking Status 131/56 92 128 lb (58.1 kg) 21.97 kg/m2 Hysterectomy Current Some Day Smoker BMI and BSA Data Body Mass Index Body Surface Area  
 21.97 kg/m 2 1.62 m 2 Preferred Pharmacy Pharmacy Name Phone [address] [place] [address], [address] [place] [address] [phone] Your Updated Medication List  
  
   
This list is accurate as of: 4/21/17 10:01 AM.  Always use your most recent med list.  
  
  
  
  
 digoxin 0.25 mg tablet Commonly known as:  LANOXIN Take 1 Tab by mouth daily. dilTIAZem  mg XR capsule Commonly known as:  DILACOR XR  
TAKE ONE CAPSULE BY MOUTH ONCE DAILY * fentaNYL 62.5 mcg/hour Pt72  
62.5 mcg by TransDERmal route every seventy-two (72) hours for 30 days. Max Daily Amount: 62.5 mcg. Indications: Chronic Pain with Opioid Tolerance, SEVERE PAIN WITH OPIOID TOLERANCE Start taking on:  4/30/2017 * fentaNYL 62.5 mcg/hour Pt72  
62.5 mcg by TransDERmal route every seventy-two (72) hours for 30 days. Max Daily Amount: 62.5 mcg. Indications: Chronic Pain with Opioid Tolerance, SEVERE PAIN WITH OPIOID TOLERANCE Start taking on:  5/28/2017 * fentaNYL 62.5 mcg/hour Pt72  
62.5 mcg by TransDERmal route every seventy-two (72) hours for 30 days. Max Daily Amount: 62.5 mcg. Indications: Chronic Pain with Opioid Tolerance, SEVERE PAIN WITH OPIOID TOLERANCE Start taking on:  6/26/2017  
  
 fish oil-dha-epa 1,200-144-216 mg Cap Take 1 Cap by mouth daily. HumaLOG 100 unit/mL injection Generic drug:  insulin lispro  
by SubCUTAneous route. ipratropium-albuterol  mcg/actuation inhaler Commonly known as:  Thomas Peaches Take 1 Puff by inhalation every six (6) hours as needed for Wheezing or Shortness of Breath. LANTUS 100 unit/mL injection Generic drug:  insulin glargine  
by SubCUTAneous route nightly. LIPITOR 20 mg tablet Generic drug:  atorvastatin Take  by mouth daily. naloxegol 25 mg Tab tablet Commonly known as:  Maxcine Nephew Take 25 mg by mouth daily. * OTHER Fentanyl 62 mcg patches, apply one patch every 72 hours. * OTHER Fentanyl 62 mcg patch. Apply one patch every 72 hours * OTHER Fentanyl 62 mcg patch. Apply one patch every 72 hours. * oxyCODONE IR 30 mg immediate release tablet Commonly known as:  OXY-IR Take 1 Tab by mouth four (4) times daily as needed for Pain for up to 30 days. Max Daily Amount: 120 mg. Indications: NEUROPATHIC PAIN, Pain Start taking on:  4/28/2017 * oxyCODONE IR 30 mg immediate release tablet Commonly known as:  OXY-IR Take 1 Tab by mouth four (4) times daily as needed for Pain for up to 30 days. Max Daily Amount: 120 mg. Indications: NEUROPATHIC PAIN, Pain Start taking on:  5/26/2017 * oxyCODONE IR 30 mg immediate release tablet Commonly known as:  OXY-IR  
 Take 1 Tab by mouth four (4) times daily as needed for Pain for up to 30 days. Max Daily Amount: 120 mg. Indications: NEUROPATHIC PAIN, Pain Start taking on:  2017 SYNTHROID 125 mcg tablet Generic drug:  levothyroxine Take 125 mcg by mouth Daily (before breakfast). tiotropium 18 mcg inhalation capsule Commonly known as:  [address] Take 1 Cap by inhalation daily. VITAMIN B-6 100 mg tablet Generic drug:  pyridoxine (vitamin B6) Take 100 mg by mouth daily. VITAMIN D3 1,000 unit tablet Generic drug:  cholecalciferol Take 5,000 Units by mouth daily. XARELTO 20 mg Tab tablet Generic drug:  rivaroxaban TAKE ONE TABLET BY MOUTH ONCE DAILY WITH DINNER  
  
 * Notice: This list has 9 medication(s) that are the same as other medications prescribed for you. Read the directions carefully, and ask your doctor or other care provider to review them with you. Prescriptions Printed Refills  
 oxyCODONE IR (OXY-IR) 30 mg immediate release tablet 0 Starting on: 2017 Sig: Take 1 Tab by mouth four (4) times daily as needed for Pain for up to 30 days. Max Daily Amount: 120 mg. Indications: NEUROPATHIC PAIN, Pain Class: Print Route: Oral  
 fentaNYL 62.5 mcg/hour pt72 0 Starting on: 2017  Si.5 mcg by TransDERmal route every seventy-two (72) hours for 30 days. Max Daily Amount: 62.5 mcg. Indications: Chronic Pain with Opioid Tolerance, SEVERE PAIN WITH OPIOID TOLERANCE Class: Print Route: TransDERmal  
  
We Performed the Following AMB POC DRUG SCREEN () [ [facility]] DRUG SCREEN [[id number] Custom] Follow-up Instructions Return in about 3 months (around 2017). Patient Instructions Current health maintenance issues were reviewed and the patient was advised to followup with his/her PCP for completion of these items. Introducing \[facility] [place] [facility]\"" & HEALTH SERVICES! [employer] introduces [employer] patient portal. Now you can access parts of your medical record, email your doctor's office, and request medication refills online. 1. In your internet browser, go to [email or web address] 2. Click on the First Time User? Click Here link in the Sign In box. You will see the New Member Sign Up page. 3. Enter your [employer] Access Code exactly as it appears below. You will not need to use this code after youve completed the sign-up process. If you do not sign up before the expiration date, you must request a new code. · [employer] Access Code: [email or web address] Expires: 2017 10:01 AM 
 
4. Enter the last four digits of your Social Security Number (xxxx) and Date of Birth (mm/dd/yyyy) as indicated and click Submit.  You will be taken to the next sign-up page. 5. Create a Te[employer] ID. This will be your [employer] login ID and cannot be changed, so think of one that is secure and easy to remember. 6. Create a [employer] password. You can change your password at any time. 7. Enter your Password Reset Question and Answer. This can be used at a later time if you forget your password. 8. Enter your e-mail address. You will receive e-mail notification when new information is available in [address] 9. Click Sign Up. You can now view and download portions of your medical record. 10. Click the Download Summary menu link to download a portable copy of your medical information. If you have questions, please visit the Frequently Asked Questions section of the [employer] website. Remember, [employer] is NOT to be used for urgent needs. For medical emergencies, dial 911. Now available from your iPhone and Android! Please provide this summary of care documentation to your next provider. Your primary care clinician is listed as Deandra Gtz. If you have any questions after today's visit, please call [phone].

## 2021-06-14 ENCOUNTER — HOSPITAL ENCOUNTER (OUTPATIENT)
Dept: LAB | Age: 71
Discharge: HOME | End: 2021-06-14
Payer: MEDICARE

## 2021-06-14 ENCOUNTER — HOSPITAL ENCOUNTER (OUTPATIENT)
Dept: GENERAL RADIOLOGY | Age: 71
Discharge: HOME | End: 2021-06-14
Payer: MEDICARE

## 2021-06-14 ENCOUNTER — OFFICE VISIT (OUTPATIENT)
Dept: CARDIOLOGY CLINIC | Age: 71
End: 2021-06-14
Payer: MEDICARE

## 2021-06-14 VITALS
OXYGEN SATURATION: 97 % | DIASTOLIC BLOOD PRESSURE: 42 MMHG | BODY MASS INDEX: 26.09 KG/M2 | HEIGHT: 64 IN | TEMPERATURE: 97.5 F | SYSTOLIC BLOOD PRESSURE: 96 MMHG | WEIGHT: 152.8 LBS | HEART RATE: 66 BPM

## 2021-06-14 DIAGNOSIS — I48.11 LONGSTANDING PERSISTENT ATRIAL FIBRILLATION (HCC): ICD-10-CM

## 2021-06-14 DIAGNOSIS — I50.22 CHRONIC SYSTOLIC CONGESTIVE HEART FAILURE (HCC): ICD-10-CM

## 2021-06-14 DIAGNOSIS — I25.118 CORONARY ARTERY DISEASE INVOLVING NATIVE CORONARY ARTERY OF NATIVE HEART WITH OTHER FORM OF ANGINA PECTORIS (HCC): Primary | ICD-10-CM

## 2021-06-14 DIAGNOSIS — I25.5 CARDIOMYOPATHY, ISCHEMIC: ICD-10-CM

## 2021-06-14 DIAGNOSIS — J44.9 CHRONIC OBSTRUCTIVE PULMONARY DISEASE, UNSPECIFIED COPD TYPE (HCC): ICD-10-CM

## 2021-06-14 DIAGNOSIS — I25.118 CORONARY ARTERY DISEASE INVOLVING NATIVE CORONARY ARTERY OF NATIVE HEART WITH OTHER FORM OF ANGINA PECTORIS (HCC): ICD-10-CM

## 2021-06-14 DIAGNOSIS — E78.00 PURE HYPERCHOLESTEROLEMIA: ICD-10-CM

## 2021-06-14 DIAGNOSIS — Z95.0 PACEMAKER: ICD-10-CM

## 2021-06-14 LAB
ANION GAP SERPL CALC-SCNC: 3 MMOL/L (ref 3–18)
APPEARANCE UR: ABNORMAL
BACTERIA URNS QL MICRO: ABNORMAL /HPF
BILIRUB UR QL: NEGATIVE
BUN SERPL-MCNC: 16 MG/DL (ref 7–18)
BUN/CREAT SERPL: 13 (ref 12–20)
CALCIUM SERPL-MCNC: 8.8 MG/DL (ref 8.5–10.1)
CHLORIDE SERPL-SCNC: 99 MMOL/L (ref 100–111)
CO2 SERPL-SCNC: 33 MMOL/L (ref 21–32)
COLOR UR: ABNORMAL
CREAT SERPL-MCNC: 1.28 MG/DL (ref 0.6–1.3)
EPITH CASTS URNS QL MICRO: ABNORMAL /LPF (ref 0–5)
ERYTHROCYTE [DISTWIDTH] IN BLOOD BY AUTOMATED COUNT: 12.8 % (ref 11.6–14.5)
GLUCOSE SERPL-MCNC: 207 MG/DL (ref 74–99)
GLUCOSE UR STRIP.AUTO-MCNC: NEGATIVE MG/DL
HCT VFR BLD AUTO: 46.3 % (ref 35–45)
HGB BLD-MCNC: 15.3 G/DL (ref 12–16)
HGB UR QL STRIP: ABNORMAL
INR PPP: 1.1 (ref 0.8–1.2)
KETONES UR QL STRIP.AUTO: NEGATIVE MG/DL
LEUKOCYTE ESTERASE UR QL STRIP.AUTO: ABNORMAL
MCH RBC QN AUTO: 30.7 PG (ref 24–34)
MCHC RBC AUTO-ENTMCNC: 33 G/DL (ref 31–37)
MCV RBC AUTO: 92.8 FL (ref 74–97)
NITRITE UR QL STRIP.AUTO: NEGATIVE
PH UR STRIP: 5 [PH] (ref 5–8)
PLATELET # BLD AUTO: 202 K/UL (ref 135–420)
PMV BLD AUTO: 9.5 FL (ref 9.2–11.8)
POTASSIUM SERPL-SCNC: 3.4 MMOL/L (ref 3.5–5.5)
PROT UR STRIP-MCNC: NEGATIVE MG/DL
PROTHROMBIN TIME: 13.8 SEC (ref 11.5–15.2)
RBC # BLD AUTO: 4.99 M/UL (ref 4.2–5.3)
RBC #/AREA URNS HPF: ABNORMAL /HPF (ref 0–5)
SODIUM SERPL-SCNC: 135 MMOL/L (ref 136–145)
SP GR UR REFRACTOMETRY: 1.02 (ref 1–1.03)
UROBILINOGEN UR QL STRIP.AUTO: 1 EU/DL (ref 0.2–1)
WBC # BLD AUTO: 7.7 K/UL (ref 4.6–13.2)
WBC URNS QL MICRO: ABNORMAL /HPF (ref 0–4)

## 2021-06-14 PROCEDURE — 71046 X-RAY EXAM CHEST 2 VIEWS: CPT

## 2021-06-14 PROCEDURE — G8400 PT W/DXA NO RESULTS DOC: HCPCS | Performed by: INTERNAL MEDICINE

## 2021-06-14 PROCEDURE — 36415 COLL VENOUS BLD VENIPUNCTURE: CPT

## 2021-06-14 PROCEDURE — 85027 COMPLETE CBC AUTOMATED: CPT

## 2021-06-14 PROCEDURE — G8432 DEP SCR NOT DOC, RNG: HCPCS | Performed by: INTERNAL MEDICINE

## 2021-06-14 PROCEDURE — 1101F PT FALLS ASSESS-DOCD LE1/YR: CPT | Performed by: INTERNAL MEDICINE

## 2021-06-14 PROCEDURE — G8754 DIAS BP LESS 90: HCPCS | Performed by: INTERNAL MEDICINE

## 2021-06-14 PROCEDURE — 99215 OFFICE O/P EST HI 40 MIN: CPT | Performed by: INTERNAL MEDICINE

## 2021-06-14 PROCEDURE — 1090F PRES/ABSN URINE INCON ASSESS: CPT | Performed by: INTERNAL MEDICINE

## 2021-06-14 PROCEDURE — G8536 NO DOC ELDER MAL SCRN: HCPCS | Performed by: INTERNAL MEDICINE

## 2021-06-14 PROCEDURE — 81001 URINALYSIS AUTO W/SCOPE: CPT

## 2021-06-14 PROCEDURE — G8419 CALC BMI OUT NRM PARAM NOF/U: HCPCS | Performed by: INTERNAL MEDICINE

## 2021-06-14 PROCEDURE — 3017F COLORECTAL CA SCREEN DOC REV: CPT | Performed by: INTERNAL MEDICINE

## 2021-06-14 PROCEDURE — 85610 PROTHROMBIN TIME: CPT

## 2021-06-14 PROCEDURE — G8752 SYS BP LESS 140: HCPCS | Performed by: INTERNAL MEDICINE

## 2021-06-14 PROCEDURE — 80048 BASIC METABOLIC PNL TOTAL CA: CPT

## 2021-06-14 PROCEDURE — G8427 DOCREV CUR MEDS BY ELIG CLIN: HCPCS | Performed by: INTERNAL MEDICINE

## 2021-06-14 RX ORDER — ATORVASTATIN CALCIUM 20 MG/1
20 TABLET, FILM COATED ORAL DAILY
Qty: 90 TABLET | Refills: 3 | Status: SHIPPED | OUTPATIENT
Start: 2021-06-14 | End: 2022-06-02 | Stop reason: DRUGHIGH

## 2021-06-14 NOTE — PROGRESS NOTES
1. Have you been to the ER, urgent care clinic since your last visit? Hospitalized since your last visit? No      2. Have you seen or consulted any other health care providers outside of the [address] [place] [address] since your last visit? Include any pap smears or colon screening. Yes When: x 1 month ago with PCP      3. Since your last visit, have you had any of the following symptoms? Yes      shortness of breath, dizziness and swelling in legs/arms. 4.  Have you had any blood work, X-rays or cardiac testing? Yes     Requested: YES     In [facility]: YES    5. Where do you normally have your labs drawn? [facility]       6. Do you need any refills today? Yes    Requested Prescriptions     Pending Prescriptions Disp Refills    atorvastatin (Lipitor) 20 mg tablet 90 Tablet 3     Sig: Take 1 Tablet by mouth daily.

## 2021-06-14 NOTE — PATIENT INSTRUCTIONS
Medications Discontinued During This Encounter Medication Reason  fentaNYL 62.5 mcg/hour pt72 Therapy Completed  atorvastatin (LIPITOR) 20 mg tablet REORDER Please do not take Xarelto today and tomorrow for the procedure on Wednesday Skip your insulin on Tuesday evening. Take all other medications as you normally do but early morning on the day of the procedure. Coronary Angiogram: About This Test 
What is a coronary angiogram? 
 
A coronary angiogram is a test to look at the large blood vessels of your heart (coronary arteries). These blood vessels feed blood, oxygen, and nutrients to your heart. Why is this test done? This test is done to check blood flow in your coronary arteries. It can show the size and location of narrowed or blocked sections of an artery. It's done for people who have coronary artery disease, also known as heart disease. The test can show how serious the disease is and how best to treat it. Or it can be done for people who have symptoms of heart disease to find out if there is a problem with the artery. How is the test done? · You will get medicine to help you relax. · A thin tube called a catheter is put into a blood vessel in your groin or arm. · You will get a shot to numb the skin where the catheter goes in. You may feel pressure when the doctor moves the catheter through your blood vessel into your heart. · Dye is put into your coronary arteries through the catheter. Your doctor can see the dye as it moves through the arteries. This lets your doctor look for areas that are narrowed or blocked. · You may feel hot or flushed for several seconds when the dye is put in. 
· If you have a narrowed or blocked artery, the doctor may do an angioplasty or a coronary stent procedure. These procedures make more room for blood to flow. How long does it take? The test will take about 30 minutes. But you need time to get ready for it and time to recover.  If you also have angioplasty and possibly a stent placed after the test, the whole procedure may take a few hours. What happens after the test? 
· You will stay in a room for at least a few hours to make sure the catheter site starts to heal. You may have a bandage or a compression device on your groin or arm to prevent bleeding. · If the catheter was placed in your groin, you may lie in bed for a few hours. If the catheter was put in your arm, you will need to keep your arm still for at least 1 hour. · You may be able to go home later the same day, or you may need to stay in the hospital overnight. You will get more instructions for what to do at home. · Drink plenty of fluids for several hours after the test. 
Follow-up care is a key part of your treatment and safety. Be sure to make and go to all appointments, and call your doctor if you are having problems. It's also a good idea to know your test results and keep a list of the medicines you take. Where can you learn more? Go to http://www.gray.com/ Enter T552 in the search box to learn more about \"Coronary Angiogram: About This Test.\" Current as of: August 31, 2020               Content Version: 12.8 © 2006-2021 [employer]. Care instructions adapted under license by [employer] (which disclaims liability or warranty for this information). If you have questions about a medical condition or this instruction, always ask your healthcare professional. [place] [address] any warranty or liability for your use of this information. Learning About Benefits From Quitting Smoking How does quitting smoking make you healthier? If you're thinking about quitting smoking, you may have a few reasons to be smoke-free. Your health may be one of them. · When you quit smoking, you lower your risks for cancer, lung disease, heart attack, stroke, blood vessel disease, and blindness from macular degeneration.  
· When you're smoke-free, you get sick less often, and you heal faster. You are less likely to get colds, flu, bronchitis, and pneumonia. · As a nonsmoker, you may find that your mood is better and you are less stressed. When and how will you feel healthier? Quitting has real health benefits that start from day 1 of being smoke-free. And the longer you stay smoke-free, the healthier you get and the better you feel. The first hours · After just 20 minutes, your blood pressure and heart rate go down. That means there's less stress on your heart and blood vessels. · Within 12 hours, the level of carbon monoxide in your blood drops back to normal. That makes room for more oxygen. With more oxygen in your body, you may notice that you have more energy than when you smoked. After 2 weeks · Your lungs start to work better. · Your risk of heart attack starts to drop. After 1 month · When your lungs are clear, you cough less and breathe deeper, so it's easier to be active. · Your sense of taste and smell return. That means you can enjoy food more than you have since you started smoking. Over the years · Over the years, your risks of heart disease, heart attack, and stroke are lower. · After 10 years, your risk of dying from lung cancer is cut by about half. And your risk for many other types of cancer is lower too. How would quitting help others in your life? When you quit smoking, you improve the health of everyone who now breathes in your smoke. · Their heart, lung, and cancer risks drop, much like yours. · They are sick less. For babies and small children, living smoke-free means they're less likely to have ear infections, pneumonia, and bronchitis. · If you're a woman who is or will be pregnant someday, quitting smoking means a healthier . · Children who are close to you are less likely to become adult smokers. Where can you learn more?  
Go to http://www.gray.com/ Enter [phone] 11 in the search box to learn more about \"Learning About Benefits From Quitting Smoking. \" Current as of: March 12, 2020               Content Version: 12.8 © 8874-9349 Healthwise, [employer]. Care instructions adapted under license by [employer] (which disclaims liability or warranty for this information). If you have questions about a medical condition or this instruction, always ask your healthcare professional. [place] [address] any warranty or liability for your use of this information.

## 2021-06-14 NOTE — PROGRESS NOTES
HISTORY OF PRESENT ILLNESS  Hailey Riley is a 66 y.o. female. F/u AFib, HTN, CHF, MR, HLD, s/p DDD    3/21 says that has involuntary movements of arm/leg or even the trunk. Happening 2-3/wk  3/18 seen after low EF, wma and stress test today  7/17 continues with dizziness, rare LOC/fall- last in 6/17 1/17 has a cold for 6 wks    Hypertension  The history is provided by the medical records. This is a chronic problem. Associated symptoms include chest pain and shortness of breath. Pertinent negatives include no abdominal pain and no headaches. Palpitations   The history is provided by the medical records (perm AF). This is a recurrent problem. The current episode started more than 1 week ago. Progression since onset: few days. The problem occurs rarely (off and on). Episode Length: permanent AF on pacer check. Associated symptoms include chest pain, irregular heartbeat, lower extremity edema, dizziness and shortness of breath. Pertinent negatives include no diaphoresis, no fever, no claudication, no orthopnea, no PND, no syncope, no abdominal pain, no nausea, no vomiting, no headaches, no weakness, no cough, no hemoptysis and no sputum production. Risk factors include diabetes mellitus, hypertension, smoking/tobacco exposure and dyslipidemia. Her past medical history is significant for DM, hypertension and atrial fibrillation. CHF  The history is provided by the medical records. This is a chronic problem. Associated symptoms include chest pain and shortness of breath. Pertinent negatives include no abdominal pain and no headaches. Cholesterol Problem  The history is provided by the medical records. This is a chronic problem. Associated symptoms include chest pain and shortness of breath. Pertinent negatives include no abdominal pain and no headaches. Cardiomyopathy  The history is provided by the medical records (ischemic). This is a chronic problem.  Associated symptoms include chest pain and shortness of breath. Pertinent negatives include no abdominal pain and no headaches. Dizziness  The history is provided by the patient. This is a new problem. The current episode started more than 1 week ago. The problem occurs every several days (3/wk; 5-10 mts; 6/17 LOC for few seconds). The problem has not changed (Likely equilibrium issue/vertigo now 5/19) since onset. Associated symptoms include chest pain and shortness of breath. Pertinent negatives include no abdominal pain and no headaches. The symptoms are aggravated by standing, walking and bending (looking/reaching up; sitting). The symptoms are relieved by rest and laying down. She has tried nothing for the symptoms. Shortness of Breath  The history is provided by the patient. This is a chronic problem. The problem occurs intermittently. The current episode started more than 1 week ago. The problem has not changed since onset. Associated symptoms include chest pain and leg swelling. Pertinent negatives include no fever, no headaches, no ear pain, no neck pain, no cough, no sputum production, no hemoptysis, no wheezing, no PND, no orthopnea, no syncope, no vomiting, no abdominal pain, no rash and no claudication. The problem's precipitants include exercise (2 blocks; 4/18 within house; 11/18 1 block; 11/19 2 blocks; 3/21 200 ft; ). Leg Swelling  The history is provided by the patient. This is a new problem. The current episode started more than 1 week ago (4/18). The problem occurs every several days. The problem has not changed since onset. Associated symptoms include chest pain and shortness of breath. Pertinent negatives include no abdominal pain and no headaches. The symptoms are relieved by medications. Chest Pain (Angina)   The history is provided by the patient. This is a recurrent problem. The current episode started more than 1 week ago. The problem occurs every several days (2/wk). The pain is associated with rest and normal activity.  The pain is present in the substernal region. The quality of the pain is described as tightness. The pain radiates to the upper back. Associated symptoms include dizziness, irregular heartbeat, lower extremity edema, palpitations and shortness of breath. Pertinent negatives include no abdominal pain, no claudication, no cough, no diaphoresis, no fever, no headaches, no hemoptysis, no nausea, no orthopnea, no PND, no sputum production, no syncope, no vomiting and no weakness. She has tried nitroglycerin (once) for the symptoms. The treatment provided significant relief. Her past medical history is significant for DM. Review of Systems   Constitutional: Negative for chills, diaphoresis, fever and weight loss. HENT: Negative for ear pain and hearing loss. Eyes: Negative for blurred vision. Respiratory: Positive for shortness of breath. Negative for cough, hemoptysis, sputum production, wheezing and stridor. Cardiovascular: Positive for chest pain, palpitations and leg swelling. Negative for orthopnea, claudication, syncope and PND. Gastrointestinal: Negative for abdominal pain, heartburn, nausea and vomiting. Musculoskeletal: Negative for myalgias and neck pain. Skin: Negative for rash. Neurological: Positive for dizziness. Negative for tingling, tremors, focal weakness, loss of consciousness, weakness and headaches. Psychiatric/Behavioral: Negative for depression and suicidal ideas. Allergies   Allergen Reactions    Lisinopril Cough     Patient denies         Past Medical History:   Diagnosis Date    Abnormal myocardial perfusion study 05/07/2010    Anterior ischemia c/w at least 1-vessel CAD. No WMA. EF 51%. Positive EKG at 78% pred max HR. Ex time 7 min 30 sec.     Acute systolic congestive heart failure ([address] 4/9/2018 4/18 new, likely she was told of low EF and MI- med Rx for now    Allergic rhinitis 1/30/2010    Atrial fibrillation ([address]     Broken ankle 08/22/2013    left    Carotid stenosis 1/30/2010    Chronic diastolic congestive heart failure ([facility] 2/16/2018 2/18 NYHA2    COPD     Essential hypertension     [facility]     History of amiodarone therapy 5/11/2015    History of cardiac cath 05/25/2010    Patent coronary arteries. LVEDP 13 mmHg. EF 65%.  History of cervical cancer 11/05    History of echocardiogram 08/21/2013    EF 50-55%. No RWMA. Gr 2 DDfx. Mild LAE. Mild MR.      Hypertension     [facility]     March/April 2018    Migraines 1/30/2010    Mitral regurgitation     Orthostatic dizziness 3/30/2016    ? autonomic dysfunction Vs amio side effect     Pacemaker 11/25/13    DDD    Pure hypercholesterolemia 1/30/2010    Rosacea 1/30/2010    Type II or unspecified type diabetes mellitus without mention of complication, not stated as uncontrolled 1/30/2010    Unspecified hypothyroidism 1/30/2010       Family History   Problem Relation Age of Onset    Hypertension Mother     Cancer Maternal Aunt         breast CA 68yo    Breast Cancer Maternal Aunt 79    Diabetes Maternal Grandmother     Cancer Maternal Aunt         uterine CA    Diabetes Daughter     Other Daughter         lupus (SLE) and coagulopathy    Breast Cancer Paternal Aunt 61    Heart Attack Sister     Heart Disease Neg Hx        Social History     Tobacco Use    Smoking status: Current Every Day Smoker     Packs/day: 0.50     Years: 42.00     Pack years: 21.00     Types: Cigarettes     Last attempt to quit: 3/30/2018     Years since quitting: 3.2    Smokeless tobacco: Never Used    Tobacco comment: 10 cigs per day   Vaping Use    Vaping Use: Never used   Substance Use Topics    Alcohol use: No     Alcohol/week: 0.0 standard drinks    Drug use: No        Current Outpatient Medications   Medication Sig    carvediloL (COREG) 12.5 mg tablet Take 1 Tab by mouth two (2) times a day.     furosemide (LASIX) 20 mg tablet Take 1 Tab by mouth daily as needed (edema).  hydroCHLOROthiazide (HYDRODIURIL) 12.5 mg tablet Take 1 Tab by mouth daily.  losartan (COZAAR) 25 mg tablet Take 1 Tab by mouth daily.  rivaroxaban (Xarelto) 20 mg tab tablet Take 1 Tab by mouth daily (with dinner).  ferrous sulfate (IRON) 325 mg (65 mg iron) tablet Take  by mouth Daily (before breakfast).  Comp Stocking,Knee,Regular,Sml misc 2 Box by Does Not Apply route daily. Use while upright  Indications: edema    nitroglycerin (NITROSTAT) 0.4 mg SL tablet 1 Tab by SubLINGual route every five (5) minutes as needed for Chest Pain for up to 3 doses.  oxyCODONE IR (ROXICODONE) 30 mg immediate release tablet Take 20 mg by mouth six (6) times daily.  omeprazole (PRILOSEC) 20 mg capsule Take 1 Cap by mouth daily.  aspirin 81 mg chewable tablet Take 1 Tab by mouth daily. Indications: myocardial infarction prevention    naloxone 4 mg/actuation spry 4 mg by Nasal route as needed for up to 2 doses. Indications: OPIOID TOXICITY    atorvastatin (LIPITOR) 20 mg tablet Take  by mouth daily.  naloxegol (MOVANTIK) 25 mg tab tablet Take 25 mg by mouth daily.  insulin lispro (HUMALOG) 100 unit/mL injection by SubCUTAneous route.  insulin glargine (LANTUS) 100 unit/mL injection by SubCUTAneous route nightly.  ipratropium-albuterol (COMBIVENT RESPIMAT)  mcg/actuation inhaler Take 1 Puff by inhalation every six (6) hours as needed for Wheezing or Shortness of Breath.  pyridoxine (VITAMIN B-6) 100 mg tablet Take 100 mg by mouth daily.  fish oil-dha-epa 1,200-144-216 mg cap Take 1 Cap by mouth daily.  levothyroxine (SYNTHROID) 125 mcg tablet Take 125 mcg by mouth Daily (before breakfast).  tiotropium (SPIRIVA WITH HANDIHALER) 18 mcg inhalation capsule Take 1 Cap by inhalation daily.  cholecalciferol, vitamin d3, (VITAMIN D) 1,000 unit tablet Take 5,000 Units by mouth daily. No current facility-administered medications for this visit.         Past Surgical History:   Procedure Laterality Date    HX AFIB ABLATION  2/8/13    Dr Bernestine Schaumann HX CAROTID ENDARTERECTOMY  11/06    right Dr. Ade Lino HX HYSTERECTOMY  5/06    HX ORTHOPAEDIC Right 07/11/2016    knee replacement    HX PACEMAKER  11/25/13    DDD    HX SVT ABLATION  2/18/2013       Visit Vitals  BP (!) 96/42 (BP 1 Location: Left upper arm, BP Patient Position: Sitting, BP Cuff Size: Large adult)   Pulse 66   Temp 97.5 °F (36.4 °C) (Temporal)   Ht 5' 4\" (1.626 m)   Wt 69.3 kg (152 lb 12.8 oz)   SpO2 97%   BMI 26.23 kg/m²       Diagnostic Studies:  I have reviewed the relevant tests done on the patient and show as follows  EKG tracings reviewed by me today. CARDIOLOGY STUDIES 3/30/2018   Pharmacological Nuclear Stress Test Result large fixed ant/apical/sept defect, 40%EF   Some recent data might be hidden   2010 cardiac cath   IMPRESSIONS:     1. NORMAL EPICARDIAL CORONARY ARTERIES OTHER THAN MINOR LUMINAL IRREGULARITIES.     2. NORMAL LEFT VENTRICULAR WALL MOTION AND EJECTION FRACTION. 3/18 [facility] Stress  Conclusion:  1. Abnormal perfusion scan. 2. Evidence of a large sized fixed defect involving LV territory in the apical and mid anterior and anteroseptal areas. This indicates previous myocardial infarction most likely. 3. This represents a high risk scan. 1/19 ECHO Interpretation Summary     · Normal cavity size. Mild concentric hypertrophy observed. There is moderately decreased systolic function. The estimated ejection fraction is 36 - 40%. Abnormal wall motion as described on the wall scoring diagram below. Global longitudinal strain is -4.90%. Abnormal left ventricular strain. There is severe (grade 3) left ventricular diastolic dysfunction. · Left atrial cavity size is severely dilated. · Right ventricular global systolic function is reduced. Pacing wire present  · Mitral valve thickening. Mild mitral valve regurgitation. · Mild tricuspid valve regurgitation.  Pulmonary arterial systolic pressure is 41.0 mmHg. Mild pulmonary hypertension. Comparison Study Information     Prior Study     When compared to the previous study from 3/23/18, there is no longer evidence of a small pericardial effusion. Ms. Surjit Wahl has a reminder for a \"due or due soon\" health maintenance. I have asked that she contact her primary care provider for follow-up on this health maintenance. Physical Exam  Constitutional:       General: She is not in acute distress. Appearance: She is well-developed. HENT:      Head: Normocephalic and atraumatic. Mouth/Throat:      Dentition: Normal dentition. Eyes:      General: No scleral icterus. Right eye: No discharge. Left eye: No discharge. Neck:      Thyroid: No thyromegaly. Vascular: Carotid bruit (b/l) present. No JVD. Cardiovascular:      Rate and Rhythm: Normal rate and regular rhythm. Pulses: Intact distal pulses. Heart sounds: S1 normal and S2 normal. Murmur heard. Scratchy early systolic murmur is present with a grade of 3/6 at the upper right sternal border. High-pitched blowing midsystolic murmur of grade 2/6  is also present at the apex. High-pitched blowing decrescendo early diastolic murmur is present with a grade of 2/4 at the upper right sternal border radiating to the apex. No friction rub. No gallop. Pulmonary:      Effort: Pulmonary effort is normal.      Breath sounds: Normal breath sounds. No wheezing or rales. Abdominal:      Palpations: Abdomen is soft. There is no mass. Tenderness: There is no abdominal tenderness. Musculoskeletal:      Cervical back: Neck supple. Lymphadenopathy:      Cervical:      Right cervical: No superficial cervical adenopathy. Left cervical: No superficial cervical adenopathy. Skin:     General: Skin is warm and dry. Findings: No rash. Neurological:      Mental Status: She is alert and oriented to person, place, and time.    Psychiatric: Behavior: Behavior normal.         ASSESSMENT and PLAN    Patient advised to stop smoking to reduce future cardiovascular events. AFib: Permanent; 3/18; 2/18; 8/17 on xarelto;   Persistent despite ablation  CAD: 3/18 large fixed LAD defect, 40%EF;   DDD to [facility] : Dual chamber [employer] pacemaker implant 11/25/13  Office Checks: 12/13 nl function; 1/15 nl function, freq but very short AHR(likely a fib); 1/16 freq long AF, nl function; 1/17; 7/17; 6/18 nl function, perm AFib; 2/19 normal function  Remote Checks : 3/14; 10/14; 4/16; 10/16; 12/17; 11/18; 1/21 nl function    11/19 CHF is fairly stable clinically. Chest pain has resolved. Will wait on cardiac cath. Blaise Vargas was not covered. Will continue low-dose losartan. [facility] is overdue. Requested patient to send us from home ASA.    3/22/2021. CHF is worsening with worsening dyspnea. Add HCTZ which will be helpful for hypertension as well as CHF. Follow-up electrolytes closely. Blood pressure is elevated. Check home chart as HCTZ 30. Pacemaker function normal.  CAD seems to be clinically stable. A. fib is persistent with dependency on the permanent pacemaker which is functioning normally. Complains of involuntary movements of arms legs and trunk at different times. Refer to neurology for an opinion. Diagnoses and all orders for this visit:    1. Coronary artery disease involving native coronary artery of native heart with other form of angina pectoris (HCC)  -     atorvastatin (Lipitor) 20 mg tablet; Take 1 Tablet by mouth daily.  -     CBC W/O DIFF; Future  -     METABOLIC PANEL, BASIC; Future  -     PROTHROMBIN TIME + INR; Future  -     URINALYSIS W/ RFLX MICROSCOPIC; Future  -     XR CHEST PA LAT; Future    2. Longstanding persistent atrial fibrillation (HCC)    3. Cardiomyopathy, ischemic    4. Chronic systolic congestive heart failure ([address]    5. Chronic obstructive pulmonary disease, unspecified COPD type ([address]    6.  Pacemaker- DDD to [facility]    7. Pure hypercholesterolemia  -     atorvastatin (Lipitor) 20 mg tablet; Take 1 Tablet by mouth daily. Pertinent laboratory and test data reviewed and discussed with patient. See patient instructions also for other medical advice given    Medications Discontinued During This Encounter   Medication Reason    fentaNYL 62.5 mcg/hour pt72 Therapy Completed    atorvastatin (LIPITOR) 20 mg tablet REORDER       Follow-up and Dispositions    · Return in about 6 weeks (around 7/26/2021), or if symptoms worsen or fail to improve, for post procedure. 6/14/2021 CAD seems to be having unstable symptoms as she used nitroglycerin once with near immediate significant relief. And describes frequent chest tightness. CCS class II-III. A. fib is stable. CHF is compensated despite not taking Lasix anymore. Continue present medications but would recommend a cardiac catheterization to evaluate coronary anatomy. Procedure discussed with patient and daughter and they agreed. Will be scheduled for Wednesday this week. The benefits and risks of cardiac catheterization have been discussed in detail with the patient and daughter present at the time. Patient understands  risk of potential cath complications including but not limited to bleeding, infection, difficulty healing the arteriotomy access site(2 chances in 100) which may require surgical repair, potential thromboembolic complications which could result in stroke, myocardial infarction, vascular injury, loss of limb or organ function and/or death( 1 chance in 1000)and potential allergic reaction to contrast dye or other medication used during the procedure. Patient is also aware of the therapeutic implications for medical management vs coronary artery bypass surgery vs percutaneous coronary intervention in treatment of coronary artery disease.  The additional risks for percutaneous coronary artery intervention include the need for emergent bypass surgery at 1 chance in 100 and for restenosis which may range from 35% for plain balloon angioplasty, 15% for bare metal stent, and 5% for drug eluting stent implants. The need for mandatory uninterrupted dual antiplatelet therapy with lifelong Aspirin combined with Plavix for up to 12 months following drug eluting stents, and minimum 1 month following bare metal stents to prevent stent thrombosis which is the equivalent of acute heart attack has been reviewed in detail. No contraindications to use of drug eluting stents has been discovered.

## 2021-06-16 ENCOUNTER — HOSPITAL ENCOUNTER (OUTPATIENT)
Age: 71
Setting detail: OUTPATIENT SURGERY
Discharge: HOME | End: 2021-06-16
Attending: INTERNAL MEDICINE | Admitting: INTERNAL MEDICINE
Payer: MEDICARE

## 2021-06-16 VITALS
HEIGHT: 64 IN | SYSTOLIC BLOOD PRESSURE: 135 MMHG | HEART RATE: 75 BPM | DIASTOLIC BLOOD PRESSURE: 69 MMHG | RESPIRATION RATE: 20 BRPM | BODY MASS INDEX: 25.61 KG/M2 | WEIGHT: 150 LBS | OXYGEN SATURATION: 97 %

## 2021-06-16 DIAGNOSIS — I50.23 ACUTE ON CHRONIC SYSTOLIC CONGESTIVE HEART FAILURE (HCC): ICD-10-CM

## 2021-06-16 DIAGNOSIS — I25.10 CAD (CORONARY ARTERY DISEASE): ICD-10-CM

## 2021-06-16 DIAGNOSIS — I25.119 CORONARY ARTERY DISEASE INVOLVING NATIVE CORONARY ARTERY OF NATIVE HEART WITH ANGINA PECTORIS (HCC): ICD-10-CM

## 2021-06-16 PROCEDURE — 99153 MOD SED SAME PHYS/QHP EA: CPT | Performed by: INTERNAL MEDICINE

## 2021-06-16 PROCEDURE — 74011250637 HC RX REV CODE- 250/637: Performed by: INTERNAL MEDICINE

## 2021-06-16 PROCEDURE — 99152 MOD SED SAME PHYS/QHP 5/>YRS: CPT | Performed by: INTERNAL MEDICINE

## 2021-06-16 PROCEDURE — 77030027845 HC BND COM RDL D-STAT TELE -B: Performed by: INTERNAL MEDICINE

## 2021-06-16 PROCEDURE — 93458 L HRT ARTERY/VENTRICLE ANGIO: CPT | Performed by: INTERNAL MEDICINE

## 2021-06-16 PROCEDURE — 74011250636 HC RX REV CODE- 250/636: Performed by: INTERNAL MEDICINE

## 2021-06-16 PROCEDURE — 74011000250 HC RX REV CODE- 250: Performed by: INTERNAL MEDICINE

## 2021-06-16 PROCEDURE — 77030013797 HC KT TRNSDUC PRSSR EDWD -A: Performed by: INTERNAL MEDICINE

## 2021-06-16 PROCEDURE — C1894 INTRO/SHEATH, NON-LASER: HCPCS | Performed by: INTERNAL MEDICINE

## 2021-06-16 PROCEDURE — 74011000636 HC RX REV CODE- 636: Performed by: INTERNAL MEDICINE

## 2021-06-16 PROCEDURE — 77030016699 HC CATH ANGI DX INFN1 CARD -A: Performed by: INTERNAL MEDICINE

## 2021-06-16 RX ORDER — HYDROCHLOROTHIAZIDE 12.5 MG/1
12.5 TABLET ORAL EVERY OTHER DAY
Qty: 30 TABLET | Refills: 3 | Status: SHIPPED
Start: 2021-06-16 | End: 2021-08-31

## 2021-06-16 RX ORDER — HYDRALAZINE HYDROCHLORIDE 20 MG/ML
10 INJECTION INTRAMUSCULAR; INTRAVENOUS
Status: DISCONTINUED | OUTPATIENT
Start: 2021-06-16 | End: 2021-06-16 | Stop reason: HOSPADM

## 2021-06-16 RX ORDER — ACETAMINOPHEN 325 MG/1
650 TABLET ORAL
Status: DISCONTINUED | OUTPATIENT
Start: 2021-06-16 | End: 2021-06-16 | Stop reason: HOSPADM

## 2021-06-16 RX ORDER — SODIUM CHLORIDE 0.9 % (FLUSH) 0.9 %
5-40 SYRINGE (ML) INJECTION AS NEEDED
Status: DISCONTINUED | OUTPATIENT
Start: 2021-06-16 | End: 2021-06-16 | Stop reason: HOSPADM

## 2021-06-16 RX ORDER — MIDAZOLAM HYDROCHLORIDE 1 MG/ML
INJECTION, SOLUTION INTRAMUSCULAR; INTRAVENOUS AS NEEDED
Status: DISCONTINUED | OUTPATIENT
Start: 2021-06-16 | End: 2021-06-16 | Stop reason: HOSPADM

## 2021-06-16 RX ORDER — GUAIFENESIN 100 MG/5ML
81 LIQUID (ML) ORAL ONCE
Status: COMPLETED | OUTPATIENT
Start: 2021-06-16 | End: 2021-06-16

## 2021-06-16 RX ORDER — SODIUM CHLORIDE 9 MG/ML
100 INJECTION, SOLUTION INTRAVENOUS CONTINUOUS
Status: DISPENSED | OUTPATIENT
Start: 2021-06-16 | End: 2021-06-16

## 2021-06-16 RX ORDER — LIDOCAINE HYDROCHLORIDE 10 MG/ML
INJECTION, SOLUTION EPIDURAL; INFILTRATION; INTRACAUDAL; PERINEURAL AS NEEDED
Status: DISCONTINUED | OUTPATIENT
Start: 2021-06-16 | End: 2021-06-16 | Stop reason: HOSPADM

## 2021-06-16 RX ORDER — NITROGLYCERIN 0.4 MG/1
0.4 TABLET SUBLINGUAL
Status: DISCONTINUED | OUTPATIENT
Start: 2021-06-16 | End: 2021-06-16 | Stop reason: HOSPADM

## 2021-06-16 RX ORDER — SODIUM CHLORIDE 0.9 % (FLUSH) 0.9 %
5-40 SYRINGE (ML) INJECTION EVERY 8 HOURS
Status: DISCONTINUED | OUTPATIENT
Start: 2021-06-16 | End: 2021-06-16 | Stop reason: HOSPADM

## 2021-06-16 RX ORDER — FENTANYL CITRATE 50 UG/ML
INJECTION, SOLUTION INTRAMUSCULAR; INTRAVENOUS AS NEEDED
Status: DISCONTINUED | OUTPATIENT
Start: 2021-06-16 | End: 2021-06-16 | Stop reason: HOSPADM

## 2021-06-16 RX ADMIN — ASPIRIN 81 MG: 81 TABLET, CHEWABLE ORAL at 10:42

## 2021-06-16 NOTE — INTERVAL H&P NOTE
Update History & Physical 
 
The Patient's History and Physical of June 14, Cardiac catheterization/possible PCI/emergent CABG was reviewed with the patient and I examined the patient. There was no change. The surgical site was confirmed by the patient and me. Plan:  The risk, benefits, expected outcome, and alternative to the recommended procedure have been discussed with the patient. Patient understands and wants to proceed with the procedure.  
 
Electronically signed by Rose Mary Magallanes MD on 6/16/2021 at 11:19 AM

## 2021-06-16 NOTE — Clinical Note
Contrast Dose Calculator:   Patient's age: 66.   Patient's sex: Female. Patient weight (kg) = 162.6. Creatinine level (mg/dL) = 1.2. Creatinine clearance (mL/min): 109. Max Contrast dose per Creatinine Cl calculator = 245.25 mL.

## 2021-06-16 NOTE — DISCHARGE INSTRUCTIONS
HEART CATHETERIZATION/ANGIOGRAPHY DISCHARGE INSTRUCTIONS    1. Check puncture site frequently for swelling or bleeding. If there is any bleeding, lie down and apply pressure over the area with a clean towel or washcloth. Notify your doctor for any redness, swelling, drainage, or oozing from the puncture site. Notify your doctor for any fever or chills. 2. If the extremity becomes cold, numb, or painful go to the Emergency Room. 3. Activity should be limited for the next 48 hours. Climb stairs as little as possible and avoid any stooping, bending, or strenuous activity for 48 hours. No heavy lifting (anything over 8 pounds) for 5 days. 4. You may resume your usual diet. Drink more fluids than usual.  5. Have a responsible person drive you home and stay with you for at least 24 hours after your heart catheterization/angiography. 6. You may remove bandage from your Right Groin in 24 hours. You may shower in 24 hours. No tub baths, hot tubs, or swimming for 1 week. Do not place any lotions, creams, powders, or ointments over puncture site for 1 week. You may place a clean band-aid over the puncture site each day for 5 days. Change daily. 7. If you take Metformin, do not take it for 48 hours. 8. Ask your nurse when to restart any blood thinners. How can you care for yourself at home? Activity  · Do not do strenuous exercise and do not lift, pull, or push anything heavy until your doctor says it is okay. This may be for a day or two. You can walk around the house and do light activity, such as cooking. · You may shower 24 to 48 hours after the procedure, if your doctor okays it. Pat the incision dry. Do not take a bath for 1 week, or until your doctor tells you it is okay. · If the catheter was placed in your groin, try not to walk up stairs for the first couple of days. · If the catheter was placed in your arm near your wrist, do not bend your wrist deeply for the first couple of days.  Be careful using your hand to get into and out of a chair or bed. · If your doctor recommends it, get more exercise. Walking is a good choice. Bit by bit, increase the amount you walk every day. Try for at least 30 minutes on most days of the week. Diet  · Drink plenty of fluids to help your body flush out the dye. If you have kidney, heart, or liver disease and have to limit fluids, talk with your doctor before you increase the amount of fluids you drink. · Keep eating a heart-healthy diet that has lots of fruits, vegetables, and whole grains. If you have not been eating this way, talk to your doctor. You also may want to talk to a dietitian. This expert can help you to learn about healthy foods and plan meals. Medicines  · Your doctor will tell you if and when you can restart your medicines. He or she will also give you instructions about taking any new medicines. · If you take blood thinners, such as warfarin (Coumadin), clopidogrel (Plavix), or aspirin, be sure to talk to your doctor. He or she will tell you if and when to start taking those medicines again. Make sure that you understand exactly what your doctor wants you to do. · Your doctor may prescribe a blood-thinning medicine like aspirin or clopidogrel (Plavix). It is very important that you take these medicines exactly as directed in order to keep the coronary artery open and reduce your risk of a heart attack. Be safe with medicines. Call your doctor if you think you are having a problem with your medicine. Care of the catheter site  · For the first 3 days, keep a bandage over the spot where the catheter was inserted. · Put ice or a cold pack on the area for 10 to 20 minutes at a time to help with soreness or swelling. Put a thin cloth between the ice and your skin. Sedation for a Medical Procedure: Care Instructions  Your Care Instructions  For a minor procedure or surgery, you will get a sedative to help you relax. This drug will make you sleepy.  It is usually given in a vein (by IV). A shot may also be used to numb the area. If you had local anesthesia, you may feel some pain and discomfort as it wears off. If you have pain, don't be afraid to say so. Pain medicine works better if you take it before the pain gets bad. Common side effects from sedation include:  · Feeling sleepy. (Your doctors and nurses will make sure you are not too sleepy to go home.)  · Nausea and vomiting. This usually does not last long. · Feeling tired. Follow-up care is a key part of your treatment and safety. Be sure to make and go to all appointments, and call your doctor if you are having problems. It's also a good idea to know your test results and keep a list of the medicines you take. How can you care for yourself at home? Activity  · Don't do anything for 24 hours that requires attention to detail. It takes time for the medicine effects to completely wear off. · For your safety, you should not drive or operate any machinery that could be dangerous until the medicine wears off and you can think clearly and react easily. · Rest when you feel tired. Getting enough sleep will help you recover. Diet  · You can eat your normal diet, unless your doctor gives you other instructions. If your stomach is upset, try clear liquids and bland, low-fat foods like plain toast or rice. · Drink plenty of fluids (unless your doctor tells you not to). · Don't drink alcohol for 24 hours. Medicines  · Be safe with medicines. Read and follow all instructions on the label. ¨ If the doctor gave you a prescription medicine for pain, take it as prescribed. ¨ If you are not taking a prescription pain medicine, ask your doctor if you can take an over-the-counter medicine. · If you think your pain medicine is making you sick to your stomach:  ¨ Take your medicine after meals (unless your doctor has told you not to). ¨ Ask your doctor for a different pain medicine.     Follow-up care is a key part of your treatment and safety. Be sure to make and go to all appointments, and call your doctor if you are having problems. It's also a good idea to know your test results and keep a list of the medicines you take. When should you call for help? Call 911 anytime you think you may need emergency care. For example, call if:  · You passed out (lost consciousness). · You have severe trouble breathing. · You have sudden chest pain and shortness of breath, or you cough up blood. · You have symptoms of a heart attack. These may include:  ¨ Chest pain or pressure, or a strange feeling in the chest.  ¨ Sweating. ¨ Shortness of breath. ¨ Nausea or vomiting. ¨ Pain, pressure, or a strange feeling in the back, neck, jaw, or upper belly, or in one or both shoulders or arms. ¨ Lightheadedness or sudden weakness. ¨ A fast or irregular heartbeat. After you call 911, the  may tel you to chew 1 adult-strength or 2 to 4 low-dose aspirin. Wait for an ambulance. Do not try to drive yourself. · You have been diagnosed with angina, and you have symptoms that do not go away with rest or are not getting better within 5 minutes after you take a dose of nitroglycerin. Call your doctor now or seek immediate medical care if:  · You are bleeding from the area where the catheter was put in your artery. · You have a fast-growing, painful lump at the catheter site. · You have signs of infection, such as:  ¨ Increased pain, swelling, warmth, or redness. ¨ Red streaks leading from the catheter site. ¨ Pus draining from the catheter site. ¨ A fever. · Your leg or arm looks blue or feels cold, numb, or tingly. These are general instructions for a healthy lifestyle:    No smoking/ No tobacco products/ Avoid exposure to second hand smoke    Surgeon General's Warning:  Quitting smoking now greatly reduces serious risk to your health.     Obesity, smoking, and sedentary lifestyle greatly increases your risk for illness    A healthy diet, regular physical exercise & weight monitoring are important for maintaining a healthy lifestyle    You may be retaining fluid if you have a history of heart failure or if you experience any of the following symptoms:  Weight gain of 3 pounds or more overnight or 5 pounds in a week, increased swelling in our hands or feet or shortness of breath while lying flat in bed. Please call your doctor as soon as you notice any of these symptoms; do not wait until your next office visit. Recognize signs and symptoms of STROKE:    F-face looks uneven    A-arms unable to move or move unevenly    S-speech slurred or non-existent    T-time-call 911 as soon as signs and symptoms begin-DO NOT go       Back to bed or wait to see if you get better-TIME IS BRAIN. Warning Signs of HEART ATTACK     Call 911 if you have these symptoms:   Chest discomfort. Most heart attacks involve discomfort in the center of the chest that lasts more than a few minutes, or that goes away and comes back. It can feel like uncomfortable pressure, squeezing, fullness, or pain.  Discomfort in other areas of the upper body. Symptoms can include pain or discomfort in one or both arms, the back, neck, jaw, or stomach.  Shortness of breath with or without chest discomfort.  Other signs may include breaking out in a cold sweat, nausea, or lightheadedness. Don't wait more than five minutes to call 911 - MINUTES MATTER! Fast action can save your life. Calling 911 is almost always the fastest way to get lifesaving treatment. Emergency Medical Services staff can begin treatment when they arrive -- up to an hour sooner than if someone gets to the hospital by car.        DISCHARGE SUMMARY from Nurse    PATIENT INSTRUCTIONS:    After general anesthesia or intravenous sedation, for 24 hours or while taking prescription Narcotics:  · Limit your activities  · Do not drive and operate hazardous machinery  · Do not make important personal or business decisions  · Do  not drink alcoholic beverages  · If you have not urinated within 8 hours after discharge, please contact your surgeon on call. Report the following to your surgeon:  · Excessive pain, swelling, redness or odor of or around the surgical area  · Temperature over 100.5  · Nausea and vomiting lasting longer than 4 hours or if unable to take medications  · Any signs of decreased circulation or nerve impairment to extremity: change in color, persistent  numbness, tingling, coldness or increase pain  · Any questions    What to do at Home:  Recommended activity: Activity as tolerated and no driving for today. If you experience any of the following symptoms pain not relieved by over the counter pain medication, please follow up with Dr. Constantino Otero office. *  Please give a list of your current medications to your Primary Care Provider. *  Please update this list whenever your medications are discontinued, doses are      changed, or new medications (including over-the-counter products) are added. *  Please carry medication information at all times in case of emergency situations. These are general instructions for a healthy lifestyle:    No smoking/ No tobacco products/ Avoid exposure to second hand smoke  Surgeon General's Warning:  Quitting smoking now greatly reduces serious risk to your health. Obesity, smoking, and sedentary lifestyle greatly increases your risk for illness    A healthy diet, regular physical exercise & weight monitoring are important for maintaining a healthy lifestyle    You may be retaining fluid if you have a history of heart failure or if you experience any of the following symptoms:  Weight gain of 3 pounds or more overnight or 5 pounds in a week, increased swelling in our hands or feet or shortness of breath while lying flat in bed. Please call your doctor as soon as you notice any of these symptoms; do not wait until your next office visit.         The discharge information has been reviewed with the patient. The patient verbalized understanding. Discharge medications reviewed with the patient and appropriate educational materials and side effects teaching were provided.   ___________________________________________________________________________________________________________________________________

## 2021-06-16 NOTE — Clinical Note
TRANSFER - OUT REPORT:     Verbal report given to: Ariela Yang. Report consisted of patient's Situation, Background, Assessment and   Recommendations(SBAR). Opportunity for questions and clarification was provided. Patient transported with a Cardiac Cath Tech / Patient Care Tech.

## 2021-06-16 NOTE — Clinical Note
Bilateral groin and right radial clipped prepped with ChloraPrep and draped. Wet prep elapsed drying time: 3 mins.

## 2021-06-16 NOTE — ROUTINE PROCESS
0940 Cath holding summary Patient escorted to cath holding from waiting area ambulatory, alert and oriented x 4, voicing no complaints. Changed into gown and placed on monitor. NPO since MN. Lab results, med rec and H&P reviewed on chart. PIV x 2 inserted without difficulty. 1050 TRANSFER - OUT REPORT: 
 
Verbal report given to Chasity Akhtar RN (name) on [facility]  being transferred to [facility] (unit) for ordered procedure Report consisted of patients Situation, Background, Assessment and  
Recommendations(SBAR). Information from the following report(s) SBAR, Kardex, Intake/Output, MAR, Recent Results and Pre Procedure Checklist was reviewed with the receiving nurse. Lines:  
Peripheral IV 06/16/21 Right Antecubital (Active) Peripheral IV 06/16/21 Left;Posterior Hand (Active) Opportunity for questions and clarification was provided. Patient transported with: 
 Registered Nurse  
 
1220 4 Fr sheath pulled from R groin. Pressure held for 20 mins, no bleeding or hematoma. Quik clot and Tegaderm dressing applied. Dressing clean, dry, and intact. Pt education given on the need to continue lying flat. 1440 Patient took off BP cuff and does not want to put it back on. Explained to patient the importance of monitoring her BP and she still does not want the BP cuff reapplied to her arm. Will readdress the situation again at a later time. 36 AVS Discharge instructions reviewed with patient and copy given to patient. All questions answered. Patient verbalized understanding to all discharge instructions. PIV removed. Procedural site within normal limits. No hematoma or bleeding noted from procedural and PIV site. No pain noted at discharge. Patient discharged with support person in stable condition. Escorted out to vehicle for transport home.

## 2021-06-24 NOTE — TELEPHONE ENCOUNTER
Requested Prescriptions     Pending Prescriptions Disp Refills    rivaroxaban (Xarelto) 20 mg tab tablet 90 Tablet 3     Sig: Take 1 Tablet by mouth daily (with dinner).

## 2021-08-31 DIAGNOSIS — I50.23 ACUTE ON CHRONIC SYSTOLIC CONGESTIVE HEART FAILURE (HCC): ICD-10-CM

## 2021-08-31 RX ORDER — HYDROCHLOROTHIAZIDE 12.5 MG/1
TABLET ORAL
Qty: 30 TABLET | Refills: 0 | Status: SHIPPED | OUTPATIENT
Start: 2021-08-31 | End: 2021-11-15 | Stop reason: SDUPTHER

## 2021-09-14 DIAGNOSIS — I42.8 NONISCHEMIC CARDIOMYOPATHY (HCC): ICD-10-CM

## 2021-09-14 DIAGNOSIS — I25.10 CORONARY ARTERY DISEASE INVOLVING NATIVE CORONARY ARTERY OF NATIVE HEART WITHOUT ANGINA PECTORIS: ICD-10-CM

## 2021-09-14 RX ORDER — CARVEDILOL 12.5 MG/1
12.5 TABLET ORAL 2 TIMES DAILY
Qty: 60 TABLET | Refills: 5 | Status: SHIPPED | OUTPATIENT
Start: 2021-09-14 | End: 2022-03-28 | Stop reason: SDUPTHER

## 2021-11-15 ENCOUNTER — OFFICE VISIT (OUTPATIENT)
Dept: CARDIOLOGY CLINIC | Age: 71
End: 2021-11-15
Payer: MEDICARE

## 2021-11-15 VITALS
HEIGHT: 64 IN | WEIGHT: 150 LBS | SYSTOLIC BLOOD PRESSURE: 127 MMHG | HEART RATE: 70 BPM | OXYGEN SATURATION: 94 % | DIASTOLIC BLOOD PRESSURE: 46 MMHG | BODY MASS INDEX: 25.61 KG/M2

## 2021-11-15 DIAGNOSIS — I50.22 CHRONIC SYSTOLIC CONGESTIVE HEART FAILURE (HCC): ICD-10-CM

## 2021-11-15 DIAGNOSIS — I48.11 LONGSTANDING PERSISTENT ATRIAL FIBRILLATION (HCC): ICD-10-CM

## 2021-11-15 DIAGNOSIS — Z98.890 HISTORY OF CEA (CAROTID ENDARTERECTOMY): ICD-10-CM

## 2021-11-15 DIAGNOSIS — I50.22 CHRONIC SYSTOLIC CONGESTIVE HEART FAILURE (HCC): Primary | ICD-10-CM

## 2021-11-15 DIAGNOSIS — F17.200 TOBACCO USE DISORDER: ICD-10-CM

## 2021-11-15 DIAGNOSIS — I42.8 NONISCHEMIC CARDIOMYOPATHY (HCC): ICD-10-CM

## 2021-11-15 DIAGNOSIS — Z95.0 PACEMAKER: ICD-10-CM

## 2021-11-15 PROCEDURE — 3017F COLORECTAL CA SCREEN DOC REV: CPT | Performed by: INTERNAL MEDICINE

## 2021-11-15 PROCEDURE — 1090F PRES/ABSN URINE INCON ASSESS: CPT | Performed by: INTERNAL MEDICINE

## 2021-11-15 PROCEDURE — G8752 SYS BP LESS 140: HCPCS | Performed by: INTERNAL MEDICINE

## 2021-11-15 PROCEDURE — G8536 NO DOC ELDER MAL SCRN: HCPCS | Performed by: INTERNAL MEDICINE

## 2021-11-15 PROCEDURE — G8400 PT W/DXA NO RESULTS DOC: HCPCS | Performed by: INTERNAL MEDICINE

## 2021-11-15 PROCEDURE — 93281 PM DEVICE PROGR EVAL MULTI: CPT | Performed by: INTERNAL MEDICINE

## 2021-11-15 PROCEDURE — G8419 CALC BMI OUT NRM PARAM NOF/U: HCPCS | Performed by: INTERNAL MEDICINE

## 2021-11-15 PROCEDURE — G8427 DOCREV CUR MEDS BY ELIG CLIN: HCPCS | Performed by: INTERNAL MEDICINE

## 2021-11-15 PROCEDURE — 99214 OFFICE O/P EST MOD 30 MIN: CPT | Performed by: INTERNAL MEDICINE

## 2021-11-15 PROCEDURE — G8432 DEP SCR NOT DOC, RNG: HCPCS | Performed by: INTERNAL MEDICINE

## 2021-11-15 PROCEDURE — G8754 DIAS BP LESS 90: HCPCS | Performed by: INTERNAL MEDICINE

## 2021-11-15 PROCEDURE — 1101F PT FALLS ASSESS-DOCD LE1/YR: CPT | Performed by: INTERNAL MEDICINE

## 2021-11-15 RX ORDER — LOSARTAN POTASSIUM 25 MG/1
12.5 TABLET ORAL DAILY
Qty: 90 TABLET | Refills: 1 | Status: SHIPPED | OUTPATIENT
Start: 2021-11-15

## 2021-11-15 RX ORDER — HYDROCHLOROTHIAZIDE 12.5 MG/1
12.5 TABLET ORAL EVERY OTHER DAY
Qty: 30 TABLET | Refills: 6 | Status: SHIPPED | OUTPATIENT
Start: 2021-11-15

## 2021-11-15 NOTE — PROGRESS NOTES
HISTORY OF PRESENT ILLNESS  Santosh Mccurdy is a 66 y.o. female. F/u AFib, HTN, CHF, CMP, MR, HLD, s/p DDD    11/21 status post cardiac cath when she was found to have normal coronaries and improved ejection fraction. 3/21 says that has involuntary movements of arm/leg or even the trunk. Happening 2-3/wk  3/18 seen after low EF, wma and stress test today  7/17 continues with dizziness, rare LOC/fall- last in 6/17 1/17 has a cold for 6 wks    Palpitations   The history is provided by the medical records (perm AF). This is a recurrent problem. The current episode started more than 1 week ago. Progression since onset: few days. The problem occurs rarely (off and on). Episode Length: permanent AF on pacer check. Associated symptoms include irregular heartbeat, lower extremity edema, dizziness and shortness of breath. Pertinent negatives include no diaphoresis, no fever, no chest pain, no claudication, no orthopnea, no PND, no syncope, no abdominal pain, no nausea, no vomiting, no headaches, no weakness, no cough, no hemoptysis and no sputum production. Risk factors include diabetes mellitus, hypertension, smoking/tobacco exposure and dyslipidemia. Her past medical history is significant for DM, hypertension and atrial fibrillation. Dizziness  The history is provided by the patient. This is a new problem. The current episode started more than 1 week ago. The problem occurs every several days (3/wk; 5-10 mts; 6/17 LOC for few seconds). The problem has not changed (Likely equilibrium issue/vertigo now 5/19) since onset. Associated symptoms include shortness of breath. Pertinent negatives include no chest pain, no abdominal pain and no headaches. The symptoms are aggravated by standing, walking and bending (looking/reaching up; sitting). The symptoms are relieved by rest and laying down. She has tried nothing for the symptoms. Shortness of Breath  The history is provided by the patient. This is a chronic problem. The problem occurs intermittently. The current episode started more than 1 week ago. The problem has not changed since onset. Associated symptoms include leg swelling. Pertinent negatives include no fever, no headaches, no ear pain, no neck pain, no cough, no sputum production, no hemoptysis, no wheezing, no PND, no orthopnea, no chest pain, no syncope, no vomiting, no abdominal pain, no rash and no claudication. The problem's precipitants include exercise (2 blocks; 4/18 within house; 11/18 1 block; 11/19 2 blocks; 3/21 200 ft; ). Leg Swelling  The history is provided by the patient. This is a new problem. The current episode started more than 1 week ago (4/18). The problem occurs every several days. The problem has not changed since onset. Associated symptoms include shortness of breath. Pertinent negatives include no chest pain, no abdominal pain and no headaches. The symptoms are relieved by medications. Chest Pain (Angina)   The history is provided by the patient. This is a recurrent problem. The current episode started more than 1 week ago. The problem has been resolved. The problem occurs every several days (2/wk). The pain is associated with rest and normal activity. The pain is present in the substernal region. The quality of the pain is described as tightness. The pain radiates to the upper back. Associated symptoms include dizziness, irregular heartbeat, lower extremity edema, palpitations (perm AFib) and shortness of breath. Pertinent negatives include no abdominal pain, no claudication, no cough, no diaphoresis, no fever, no headaches, no hemoptysis, no nausea, no orthopnea, no PND, no sputum production, no syncope, no vomiting and no weakness. She has tried nitroglycerin (once) for the symptoms. The treatment provided significant relief. Her past medical history is significant for DM. Hospital Follow Up  Associated symptoms include shortness of breath.  Pertinent negatives include no chest pain, no abdominal pain and no headaches. Review of Systems   Constitutional: Negative for chills, diaphoresis, fever and weight loss. HENT: Negative for ear pain and hearing loss. Eyes: Negative for blurred vision. Respiratory: Positive for shortness of breath. Negative for cough, hemoptysis, sputum production, wheezing and stridor. Cardiovascular: Positive for palpitations (perm AFib) and leg swelling. Negative for chest pain, orthopnea, claudication, syncope and PND. Gastrointestinal: Negative for abdominal pain, heartburn, nausea and vomiting. Musculoskeletal: Negative for myalgias and neck pain. Skin: Negative for rash. Neurological: Positive for dizziness. Negative for tingling, tremors, focal weakness, loss of consciousness, weakness and headaches. Psychiatric/Behavioral: Negative for depression and suicidal ideas. Allergies   Allergen Reactions    Lisinopril Cough     Patient denies         Past Medical History:   Diagnosis Date    Abnormal myocardial perfusion study 05/07/2010    Anterior ischemia c/w at least 1-vessel CAD. No WMA. EF 51%. Positive EKG at 78% pred max HR. Ex time 7 min 30 sec.  Acute systolic congestive heart failure ([address] 4/9/2018 4/18 new, likely she was told of low EF and MI- med Rx for now    Allergic rhinitis 1/30/2010    Atrial fibrillation ([address]     Broken ankle 08/22/2013    left    Carotid stenosis 1/30/2010    Chronic diastolic congestive heart failure ([address] 2/16/2018 2/18 NYHA2    COPD     Essential hypertension     Heart failure ([address]     History of amiodarone therapy 5/11/2015    History of cardiac cath 05/25/2010    Patent coronary arteries. LVEDP 13 mmHg. EF 65%.  History of cervical cancer 11/05    History of echocardiogram 08/21/2013    EF 50-55%. No RWMA. Gr 2 DDfx. Mild LAE.   Mild MR.      Hypertension     MI (myocardial infarction) ([address]     March/April 2018    Migraines 1/30/2010    Mitral regurgitation     Orthostatic dizziness 3/30/2016    ? autonomic dysfunction Vs amio side effect     Pacemaker 11/25/13    DDD    Pure hypercholesterolemia 1/30/2010    Rosacea 1/30/2010    Type II or unspecified type diabetes mellitus without mention of complication, not stated as uncontrolled 1/30/2010    Unspecified hypothyroidism 1/30/2010       Family History   Problem Relation Age of Onset    Hypertension Mother     Cancer Maternal Aunt         breast CA 68yo    Breast Cancer Maternal Aunt 79    Diabetes Maternal Grandmother     Cancer Maternal Aunt         uterine CA    Diabetes Daughter     Other Daughter         lupus (SLE) and coagulopathy    Breast Cancer Paternal Aunt 61    Heart Attack Sister     Heart Disease Neg Hx        Social History     Tobacco Use    Smoking status: Current Every Day Smoker     Packs/day: 0.50     Years: 42.00     Pack years: 21.00     Types: Cigarettes     Last attempt to quit: 3/30/2018     Years since quitting: 3.6    Smokeless tobacco: Never Used    Tobacco comment: 10 cigs per day   Vaping Use    Vaping Use: Never used   Substance Use Topics    Alcohol use: No     Alcohol/week: 0.0 standard drinks    Drug use: No        Current Outpatient Medications   Medication Sig    carvediloL (COREG) 12.5 mg tablet Take 1 Tablet by mouth two (2) times a day.  hydroCHLOROthiazide (HYDRODIURIL) 12.5 mg tablet Take 1 tablet by mouth once daily (Patient taking differently: Take 12.5 mg by mouth every other day.)    rivaroxaban (Xarelto) 20 mg tab tablet Take 1 Tablet by mouth daily (with dinner).  atorvastatin (Lipitor) 20 mg tablet Take 1 Tablet by mouth daily.  losartan (COZAAR) 25 mg tablet Take 1 Tab by mouth daily.  ferrous sulfate (IRON) 325 mg (65 mg iron) tablet Take  by mouth Daily (before breakfast).  Comp Stocking,Knee,Regular,Sml misc 2 Box by Does Not Apply route daily.  Use while upright  Indications: edema    nitroglycerin (NITROSTAT) 0.4 mg SL tablet 1 Tab by SubLINGual route every five (5) minutes as needed for Chest Pain for up to 3 doses.  oxyCODONE IR (ROXICODONE) 30 mg immediate release tablet Take 20 mg by mouth six (6) times daily.  omeprazole (PRILOSEC) 20 mg capsule Take 1 Cap by mouth daily.  aspirin 81 mg chewable tablet Take 1 Tab by mouth daily. Indications: myocardial infarction prevention    naloxone 4 mg/actuation spry 4 mg by Nasal route as needed for up to 2 doses. Indications: OPIOID TOXICITY    naloxegol (MOVANTIK) 25 mg tab tablet Take 25 mg by mouth daily.  insulin lispro (HUMALOG) 100 unit/mL injection by SubCUTAneous route.  insulin glargine (LANTUS) 100 unit/mL injection by SubCUTAneous route nightly.  ipratropium-albuterol (COMBIVENT RESPIMAT)  mcg/actuation inhaler Take 1 Puff by inhalation every six (6) hours as needed for Wheezing or Shortness of Breath.  pyridoxine (VITAMIN B-6) 100 mg tablet Take 100 mg by mouth daily.  fish oil-dha-epa 1,200-144-216 mg cap Take 1 Cap by mouth daily.  levothyroxine (SYNTHROID) 125 mcg tablet Take 125 mcg by mouth Daily (before breakfast).  tiotropium (SPIRIVA WITH HANDIHALER) 18 mcg inhalation capsule Take 1 Cap by inhalation daily.  cholecalciferol, vitamin d3, (VITAMIN D) 1,000 unit tablet Take 5,000 Units by mouth daily. No current facility-administered medications for this visit.         Past Surgical History:   Procedure Laterality Date    HX AFIB ABLATION  2/8/13    Dr Brigido Armstrong HX CAROTID ENDARTERECTOMY  11/06    right Dr. Maureen Beltre HX HYSTERECTOMY  5/06    HX ORTHOPAEDIC Right 07/11/2016    knee replacement    HX PACEMAKER  11/25/13    DDD    HX SVT ABLATION  2/18/2013       Visit Vitals  BP (!) 127/46 (BP 1 Location: Left upper arm, BP Patient Position: Sitting, BP Cuff Size: Adult)   Pulse 70   Ht 5' 4\" (1.626 m)   Wt 68 kg (150 lb)   SpO2 94%   BMI 25.75 kg/m²       Diagnostic Studies:  I have reviewed the relevant tests done on the patient and show as follows  EKG tracings reviewed by me today. CARDIOLOGY STUDIES 3/30/2018   Pharmacological Nuclear Stress Test Result large fixed ant/apical/sept defect, 40%EF   Some recent data might be hidden   2010 cardiac cath   IMPRESSIONS:     1. NORMAL EPICARDIAL CORONARY ARTERIES OTHER THAN MINOR LUMINAL IRREGULARITIES.     2. NORMAL LEFT VENTRICULAR WALL MOTION AND EJECTION FRACTION. 3/18 [facility] Stress  Conclusion:  1. Abnormal perfusion scan. 2. Evidence of a large sized fixed defect involving LV territory in the apical and mid anterior and anteroseptal areas. This indicates previous myocardial infarction most likely. 3. This represents a high risk scan. 1/19 ECHO Interpretation Summary     · Normal cavity size. Mild concentric hypertrophy observed. There is moderately decreased systolic function. The estimated ejection fraction is 36 - 40%. Abnormal wall motion as described on the wall scoring diagram below. Global longitudinal strain is -4.90%. Abnormal left ventricular strain. There is severe (grade 3) left ventricular diastolic dysfunction. · Left atrial cavity size is severely dilated. · Right ventricular global systolic function is reduced. Pacing wire present  · Mitral valve thickening. Mild mitral valve regurgitation. · Mild tricuspid valve regurgitation. Pulmonary arterial systolic pressure is 95.5 mmHg. Mild pulmonary hypertension. Comparison Study Information     Prior Study     When compared to the previous study from 3/23/18, there is no longer evidence of a small pericardial effusion. 06/16/21    CARDIAC PROCEDURE 06/17/2021 6/17/2021    Conclusion  · Normal epicardial coronary arteries. 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 · The flow of dye was slow in all the coronary arteries. · Procedure done via right femoral artery without any complications with 4 [place] catheters.     Risk factor modification to continue. Her EF has significantly improved and normalized since last year. Medical treatment for CHF and risk factors to continue. Diuretics can be reduced. Signed by: Bryan Macario MD on 6/17/2021  8:35 AM    Ms. Yoo has a reminder for a \"due or due soon\" health maintenance. I have asked that she contact her primary care provider for follow-up on this health maintenance. Physical Exam  Constitutional:       General: She is not in acute distress. Appearance: She is well-developed. HENT:      Head: Normocephalic and atraumatic. Mouth/Throat:      Dentition: Normal dentition. Eyes:      General: No scleral icterus. Right eye: No discharge. Left eye: No discharge. Neck:      Thyroid: No thyromegaly. Vascular: Carotid bruit (b/l) present. No JVD. Cardiovascular:      Rate and Rhythm: Normal rate and regular rhythm. Pulses: Intact distal pulses. Heart sounds: S1 normal and S2 normal. Murmur heard. Scratchy early systolic murmur is present with a grade of 3/6 at the upper right sternal border. High-pitched blowing midsystolic murmur of grade 2/6 is also present at the apex. High-pitched blowing decrescendo early diastolic murmur is present with a grade of 2/4 at the upper right sternal border radiating to the apex. No friction rub. No gallop. Pulmonary:      Effort: Pulmonary effort is normal.      Breath sounds: Normal breath sounds. No wheezing or rales. Abdominal:      Palpations: Abdomen is soft. There is no mass. Tenderness: There is no abdominal tenderness. Musculoskeletal:      Cervical back: Neck supple. Right lower leg: No edema. Left lower leg: No edema. Lymphadenopathy:      Cervical:      Right cervical: No superficial cervical adenopathy. Left cervical: No superficial cervical adenopathy. Skin:     General: Skin is warm and dry. Findings: No rash.    Neurological:      Mental Status: She is alert and oriented to person, place, and time. Psychiatric:         Behavior: Behavior normal.         ASSESSMENT and PLAN    Patient advised to stop smoking to reduce future cardiovascular events. AFib: Permanent; 3/18; 2/18; 8/17 on xarelto;   Persistent despite ablation  CAD: 3/18 large fixed LAD defect, 40%EF;   DDD to [facility] : Dual chamber [employer] pacemaker implant 11/25/13  Office Checks: 12/13 nl function; 1/15 nl function, freq but very short AHR(likely a fib); 1/16 freq long AF, nl function; 1/17; 7/17; 6/18 nl function, perm AFib; 2/19 normal function  Remote Checks : 3/14; 10/14; 4/16; 10/16; 12/17; 11/18; 1/21 nl function    11/19 CHF is fairly stable clinically. Chest pain has resolved. Will wait on cardiac cath. [facility] was not covered. Will continue low-dose losartan. [facility] is overdue. Requested patient to send us from home ASA.    3/22/2021. CHF is worsening with worsening dyspnea. Add HCTZ which will be helpful for hypertension as well as CHF. Follow-up electrolytes closely. Blood pressure is elevated. Check home chart as HCTZ 30. Pacemaker function normal.  CAD seems to be clinically stable. A. fib is persistent with dependency on the permanent pacemaker which is functioning normally. Complains of involuntary movements of arms legs and trunk at different times. Refer to neurology for an opinion. 6/14/2021 CAD seems to be having unstable symptoms as she used nitroglycerin once with near immediate significant relief. And describes frequent chest tightness. CCS class II-III. A. fib is stable. CHF is compensated despite not taking Lasix anymore. Continue present medications but would recommend a cardiac catheterization to evaluate coronary anatomy. Procedure discussed with patient and daughter and they agreed. Will be scheduled for Wednesday this week.     The benefits and risks of cardiac catheterization have been discussed in detail with the patient and daughter present at the time. Patient understands  risk of potential cath complications including but not limited to bleeding, infection, difficulty healing the arteriotomy access site(2 chances in 100) which may require surgical repair, potential thromboembolic complications which could result in stroke, myocardial infarction, vascular injury, loss of limb or organ function and/or death( 1 chance in 1000)and potential allergic reaction to contrast dye or other medication used during the procedure. Patient is also aware of the therapeutic implications for medical management vs coronary artery bypass surgery vs percutaneous coronary intervention in treatment of coronary artery disease. The additional risks for percutaneous coronary artery intervention include the need for emergent bypass surgery at 1 chance in 100 and for restenosis which may range from 35% for plain balloon angioplasty, 15% for bare metal stent, and 5% for drug eluting stent implants. The need for mandatory uninterrupted dual antiplatelet therapy with lifelong Aspirin combined with Plavix for up to 12 months following drug eluting stents, and minimum 1 month following bare metal stents to prevent stent thrombosis which is the equivalent of acute heart attack has been reviewed in detail. No contraindications to use of drug eluting stents has been discovered. Diagnoses and all orders for this visit:    1. Chronic systolic congestive heart failure ([address]  Comments:  4/18 new, likely she was told of low EF and MI- med Rx for now  Orders:  -     hydroCHLOROthiazide (HYDRODIURIL) 12.5 mg tablet; Take 1 Tablet by mouth every other day. -     losartan (COZAAR) 25 mg tablet; Take 0.5 Tablets by mouth daily. 2. Nonischemic cardiomyopathy ([address]    3. Longstanding persistent atrial fibrillation ([address]    4. Pacemaker- DDD to [facility]  -     PROGRAM EVAL (IN PERSON) IMPLANT DEVICE,PACEMAKER,MULT LEAD    5.  History of CEA (carotid endarterectomy)  - REFERRAL TO VASCULAR SURGERY    6. Tobacco use disorder    7. Chronic systolic congestive heart failure (HCC)  -     hydroCHLOROthiazide (HYDRODIURIL) 12.5 mg tablet; Take 1 Tablet by mouth every other day. -     losartan (COZAAR) 25 mg tablet; Take 0.5 Tablets by mouth daily. Pertinent laboratory and test data reviewed and discussed with patient. See patient instructions also for other medical advice given    Medications Discontinued During This Encounter   Medication Reason    furosemide (LASIX) 20 mg tablet Not A Current Medication    hydroCHLOROthiazide (HYDRODIURIL) 12.5 mg tablet REORDER    losartan (COZAAR) 25 mg tablet        Follow-up and Dispositions    · Return in about 6 months (around 5/15/2022), or if symptoms worsen or fail to improve, for post test.       11/15/2021 as CHF is well compensated NYHA class II-III which I think is mostly due to smoking and COPD  A. fib is stable with controlled rate. Continue anticoagulation. Pacer check today and is functioning normally. We will refer to vascular due to history of CVA. Tobacco cessation reinforced. She said she will try her best.  Reduce losartan to 12.5 mg a day as EF is improved and blood pressure is mildly low. It might help her dizziness.

## 2021-11-15 NOTE — PATIENT INSTRUCTIONS
Learning About the [facility] Diet  What is the Mediterranean diet? The Mediterranean diet is a style of eating rather than a diet plan. It features foods eaten in [place], [place], [place] and [place], and other countries along the [facility]. It emphasizes eating foods like fish, fruits, vegetables, beans, high-fiber breads and whole grains, nuts, and olive oil. This style of eating includes limited red meat, cheese, and sweets. Why choose the Mediterranean diet? A Mediterranean-style diet may improve heart health. It contains more fat than other heart-healthy diets. But the fats are mainly from nuts, unsaturated oils (such as fish oils and olive oil), and certain nut or seed oils (such as canola, soybean, or flaxseed oil). These fats may help protect the heart and blood vessels. How can you get started on the Mediterranean diet? Here are some things you can do to switch to a more Mediterranean way of eating. What to eat  · Eat a variety of fruits and vegetables each day, such as grapes, blueberries, tomatoes, broccoli, peppers, figs, olives, spinach, eggplant, beans, lentils, and chickpeas. · Eat a variety of whole-grain foods each day, such as oats, brown rice, and whole wheat bread, pasta, and couscous. · Eat fish at least 2 times a week. Try tuna, salmon, mackerel, lake trout, herring, or sardines. · Eat moderate amounts of low-fat dairy products, such as milk, cheese, or yogurt. · Eat moderate amounts of poultry and eggs. · Choose healthy (unsaturated) fats, such as nuts, olive oil, and certain nut or seed oils like canola, soybean, and flaxseed. · Limit unhealthy (saturated) fats, such as butter, palm oil, and coconut oil. And limit fats found in animal products, such as meat and dairy products made with whole milk. Try to eat red meat only a few times a month in very small amounts. · Limit sweets and desserts to only a few times a week.  This includes sugar-sweetened drinks like soda. The Mediterranean diet may also include red wine with your meal--1 glass each day for women and up to 2 glasses a day for men. Tips for eating at home  · Use herbs, spices, garlic, lemon zest, and citrus juice instead of salt to add flavor to foods. · Add avocado slices to your sandwich instead of montalvo. · Have fish for lunch or dinner instead of red meat. Brush the fish with olive oil, and broil or grill it. · Sprinkle your salad with seeds or nuts instead of cheese. · Cook with olive or canola oil instead of butter or oils that are high in saturated fat. · Switch from 2% milk or whole milk to 1% or fat-free milk. · Dip raw vegetables in a vinaigrette dressing or hummus instead of dips made from mayonnaise or sour cream.  · Have a piece of fruit for dessert instead of a piece of cake. Try baked apples, or have some dried fruit. Tips for eating out  · Try broiled, grilled, baked, or poached fish instead of having it fried or breaded. · Ask your  to have your meals prepared with olive oil instead of butter. · Order dishes made with marinara sauce or sauces made from olive oil. Avoid sauces made from cream or mayonnaise. · Choose whole-grain breads, whole wheat pasta and pizza crust, brown rice, beans, and lentils. · Cut back on butter or margarine on bread. Instead, you can dip your bread in a small amount of olive oil. · Ask for a side salad or grilled vegetables instead of french fries or chips. Where can you learn more? Go to http://www.guevara.com/  Enter O407 in the search box to learn more about \"Learning About the Mediterranean Diet. \"  Current as of: December 17, 2020               Content Version: 13.0  © 2000-0201 [employer]. Care instructions adapted under license by [employer] (which disclaims liability or warranty for this information).  If you have questions about a medical condition or this instruction, always ask your healthcare professional. [address] any warranty or liability for your use of this information.

## 2022-01-25 ENCOUNTER — TELEPHONE (OUTPATIENT)
Dept: VASCULAR SURGERY | Age: 72
End: 2022-01-25

## 2022-01-25 DIAGNOSIS — I73.9 PAD (PERIPHERAL ARTERY DISEASE) (HCC): Primary | ICD-10-CM

## 2022-01-25 DIAGNOSIS — I73.9 PAD (PERIPHERAL ARTERY DISEASE) (HCC): ICD-10-CM

## 2022-01-25 NOTE — PROGRESS NOTES
Order for carotid bilateral and aorta iliac duplex complete placed per verbal order from Dr. Emmy Painting. After reviewing note from Dr. Gutierrez Ware, carotid and aorta to view iliac stenting is needed .

## 2022-01-28 LAB
LEFT ARM BP: 146 MMHG
LEFT CCA DIST DIAS: 11.9 CM/S
LEFT CCA DIST SYS: 62 CM/S
LEFT CCA PROX DIAS: 8.1 CM/S
LEFT CCA PROX SYS: 58.2 CM/S
LEFT ECA DIAS: 9.1 CM/S
LEFT ECA SYS: 84.6 CM/S
LEFT ICA DIST DIAS: 22.7 CM/S
LEFT ICA DIST SYS: 90.8 CM/S
LEFT ICA MID DIAS: 48.3 CM/S
LEFT ICA MID SYS: 247.9 CM/S
LEFT ICA PROX DIAS: 56.1 CM/S
LEFT ICA PROX SYS: 260.7 CM/S
LEFT ICA/CCA SYS: 4.2
LEFT VERTEBRAL DIAS: 11.9 CM/S
LEFT VERTEBRAL SYS: 47.7 CM/S
RIGHT ARM BP: 94 MMHG
RIGHT CCA DIST DIAS: 20 CM/S
RIGHT CCA DIST SYS: 75.8 CM/S
RIGHT CCA PROX DIAS: 15.6 CM/S
RIGHT CCA PROX SYS: 67.1 CM/S
RIGHT ECA DIAS: 14.3 CM/S
RIGHT ECA SYS: 128.5 CM/S
RIGHT ICA DIST DIAS: 20.4 CM/S
RIGHT ICA DIST SYS: 101.5 CM/S
RIGHT ICA MID DIAS: 25.4 CM/S
RIGHT ICA MID SYS: 99 CM/S
RIGHT ICA PROX DIAS: 16.8 CM/S
RIGHT ICA PROX SYS: 85.5 CM/S
RIGHT ICA/CCA SYS: 1.3
RIGHT VERTEBRAL DIAS: 11.8 CM/S
RIGHT VERTEBRAL SYS: 65.9 CM/S
VAS LEFT SUBCLAVIAN PROX EDV: 9.3 CM/S
VAS LEFT SUBCLAVIAN PROX PSV: 177.7 CM/S
VAS RIGHT SUBCLAVIAN PROX EDV: 4.3 CM/S
VAS RIGHT SUBCLAVIAN PROX PSV: 61.8 CM/S

## 2022-01-31 ENCOUNTER — OFFICE VISIT (OUTPATIENT)
Dept: VASCULAR SURGERY | Age: 72
End: 2022-01-31
Payer: MEDICARE

## 2022-01-31 VITALS
SYSTOLIC BLOOD PRESSURE: 108 MMHG | WEIGHT: 150 LBS | OXYGEN SATURATION: 96 % | HEIGHT: 64 IN | HEART RATE: 85 BPM | RESPIRATION RATE: 18 BRPM | DIASTOLIC BLOOD PRESSURE: 58 MMHG | BODY MASS INDEX: 25.61 KG/M2

## 2022-01-31 DIAGNOSIS — I65.23 CAROTID STENOSIS, ASYMPTOMATIC, BILATERAL: Primary | ICD-10-CM

## 2022-01-31 PROCEDURE — G8400 PT W/DXA NO RESULTS DOC: HCPCS | Performed by: STUDENT IN AN ORGANIZED HEALTH CARE EDUCATION/TRAINING PROGRAM

## 2022-01-31 PROCEDURE — G8419 CALC BMI OUT NRM PARAM NOF/U: HCPCS | Performed by: STUDENT IN AN ORGANIZED HEALTH CARE EDUCATION/TRAINING PROGRAM

## 2022-01-31 PROCEDURE — 99214 OFFICE O/P EST MOD 30 MIN: CPT | Performed by: STUDENT IN AN ORGANIZED HEALTH CARE EDUCATION/TRAINING PROGRAM

## 2022-01-31 PROCEDURE — G8536 NO DOC ELDER MAL SCRN: HCPCS | Performed by: STUDENT IN AN ORGANIZED HEALTH CARE EDUCATION/TRAINING PROGRAM

## 2022-01-31 PROCEDURE — G8754 DIAS BP LESS 90: HCPCS | Performed by: STUDENT IN AN ORGANIZED HEALTH CARE EDUCATION/TRAINING PROGRAM

## 2022-01-31 PROCEDURE — G8427 DOCREV CUR MEDS BY ELIG CLIN: HCPCS | Performed by: STUDENT IN AN ORGANIZED HEALTH CARE EDUCATION/TRAINING PROGRAM

## 2022-01-31 PROCEDURE — 3017F COLORECTAL CA SCREEN DOC REV: CPT | Performed by: STUDENT IN AN ORGANIZED HEALTH CARE EDUCATION/TRAINING PROGRAM

## 2022-01-31 PROCEDURE — 1090F PRES/ABSN URINE INCON ASSESS: CPT | Performed by: STUDENT IN AN ORGANIZED HEALTH CARE EDUCATION/TRAINING PROGRAM

## 2022-01-31 PROCEDURE — G8510 SCR DEP NEG, NO PLAN REQD: HCPCS | Performed by: STUDENT IN AN ORGANIZED HEALTH CARE EDUCATION/TRAINING PROGRAM

## 2022-01-31 PROCEDURE — G8752 SYS BP LESS 140: HCPCS | Performed by: STUDENT IN AN ORGANIZED HEALTH CARE EDUCATION/TRAINING PROGRAM

## 2022-01-31 PROCEDURE — 1101F PT FALLS ASSESS-DOCD LE1/YR: CPT | Performed by: STUDENT IN AN ORGANIZED HEALTH CARE EDUCATION/TRAINING PROGRAM

## 2022-01-31 NOTE — PROGRESS NOTES
01/31/22        Monae Yoo        History and Physical    Carley Hashimoto is a 66 y.o. female here for evaluation for aortoiliac occlusive disease and carotid stenosis here for follow up after her studies. This is my first time meeting Ms. Yoo who has multiple issues that are actively bothering her. She feels weak all the time, has anterior and medial leg pains (sharp), epigastric pain and intermittent right hand numbness vs heaviness. Patient denies any signs of amaurosis fugax, TIA or stroke like symptoms. I am unsure if the right hand numbness/heaviness is neurologic. I reviewed the studies performed prior to the visit with the patient which included     MIKEY:  · Mild peripheral arterial disease in the bilateral lower extremities at rest with known bilateral common iliac artery stents. · The right ankle brachial index is 0.87, the left is 0.90. · Abnormal <0.60 digital brachial indices in the right foot. · Normal >0.60 digital brachial indices in the left foot. · Asymmetrical brachial pressures, left greater than right are suggestive of right subclavian occlusive disease. · When compared with previous exam dated 08/02/2019, the bilateral indices have progressed from normal to now mild at rest, the brachial pressure asymmetry is a new finding. Aorto-Iliac Duplex  · Patent bilateral common iliac arteries with stents with no evidence of intrastent stenosis. · Patent bilateral external iliac arteries with no evidence of significant stenosis. · Patent bilateral hypogastric artery origins with no evidence of significant stenosis. · Patent distal aorta no evidence of significant stenosis. · Biphasic signals present throughout the aorto-iliac system. · When compared with previous exam dated 08/02/2019, there is no significant change.     Carotid Duplex  · Patent right carotid endarterectomy with mild <50% plaquing in the right internal carotid artery without evidence of a hemodynamically significant stenosis. · Severe >70% stenosis in the left internal carotid artery by velocity criteria and a CCA/ICA ratio of 4.3. · Patent right subclavian artery with monophasic signals and turbulence to suggest a more proximal stenosis not identified on exam. There is brachial pressure asymmetry left greater than right. · Patent left subclavian artery with <50% stenosis, and biphasic signals present. · Antegrade vertebral arteries bilaterally. · When compared with previous exam dated 08/02/2019, the stenosis in the left internal carotid artery has progressed in severity from moderate to now severe. The right subclavian disease is a new finding. Physical Exam:    Visit Vitals  BP (!) 108/58 (BP 1 Location: Left upper arm, BP Patient Position: Sitting)   Pulse 85   Resp 18   Ht 5' 4\" (1.626 m)   Wt 150 lb (68 kg)   SpO2 96%   BMI 25.75 kg/m²      HEENT-PERRLA exactly movements intact, no scleral icterus, mucous membranes pink and moist  Neck-no JVD, no carotid bruits  Heart-regular rate and rhythm no murmurs, rubs or gallops  Chest-clear to auscultation bilaterally no wheezes, rhonchi or rubs  Abdomen-soft, nontender, no palpable organomegaly or masses, no palpable pulsatile masses  Extremities-no clubbing, cyanosis or edema. No wounds or gangrenous changes to the toes or feet. Skin is intact. Feet are pink warm and well-perfused bilaterally  Pulses-carotid, radial, brachial, femoral 2+ bilaterally. Bilateral doppler signals dorsalis pedis, posterior tibial       Past Medical History:   Diagnosis Date    Abnormal myocardial perfusion study 05/07/2010    Anterior ischemia c/w at least 1-vessel CAD. No WMA. EF 51%. Positive EKG at 78% pred max HR. Ex time 7 min 30 sec.     Acute systolic congestive heart failure ([address] 4/9/2018 4/18 new, likely she was told of low EF and MI- med Rx for now    Allergic rhinitis 1/30/2010    Atrial fibrillation ([address]     Broken ankle 08/22/2013 left    Carotid stenosis 1/30/2010    Chronic diastolic congestive heart failure ([facility] 2/16/2018 2/18 NYHA2    COPD     Essential hypertension     Heart failure ([facility]     History of amiodarone therapy 5/11/2015    History of cardiac cath 05/25/2010    Patent coronary arteries. LVEDP 13 mmHg. EF 65%.  History of cervical cancer 11/05    History of echocardiogram 08/21/2013    EF 50-55%. No RWMA. Gr 2 DDfx. Mild LAE. Mild MR.      Hypertension     MI (myocardial infarction) ([facility]     March/April 2018    Migraines 1/30/2010    Mitral regurgitation     Orthostatic dizziness 3/30/2016    ? autonomic dysfunction Vs amio side effect     Pacemaker 11/25/13    DDD    Pure hypercholesterolemia 1/30/2010    Rosacea 1/30/2010    Type II or unspecified type diabetes mellitus without mention of complication, not stated as uncontrolled 1/30/2010    Unspecified hypothyroidism 1/30/2010     Past Surgical History:   Procedure Laterality Date    HX AFIB ABLATION  2/8/13    Dr Ricky Magallanes HX CAROTID ENDARTERECTOMY  11/06    right Dr. Maida Freeman HX HYSTERECTOMY  5/06    HX ORTHOPAEDIC Right 07/11/2016    knee replacement    HX PACEMAKER  11/25/13    DDD    HX SVT ABLATION  2/18/2013     Patient Active Problem List   Diagnosis Code    Type II or unspecified type diabetes mellitus without mention of complication, not stated as uncontrolled E11.9    Essential hypertension, benign I10    Pure hypercholesterolemia E78.00    Unspecified hypothyroidism E03.9    Allergic rhinitis J30.9    Carotid stenosis I65.29    Rosacea L71.9    Migraines G43.909    Chronic obstructive pulmonary disease ([facility] J44.9    Edema R60.9    Leg pain M79.606    Encounter for long-term (current) use of high-risk medication Z79.899    DM (diabetes mellitus) ([facility] E11.9    Chronic pain syndrome G89.4    Peripheral neuropathy G62.9    Neuralgia and neuritis M79.2    History of depression Z86.59    Generalized OA M15.9    S/P ablation operation for arrhythmia Z98.890, Z86.79    Tobacco use disorder F17.200    Lesion of ulnar nerve G56.20    Diabetic neuropathy, painful ([facility]) E11.40    Pre-operative cardiovascular examination Z01.810    Syncope and collapse R55    Fracture of fibula with tibia, left, closed S82.202A, S82.402A    Palpitations R00.2    Irregular heart beat I49.9    Elevated INR R79.1    Pacemaker- DDD to VVIR Z95.0    H/O amiodarone therapy Z92.29    Enthesopathy of hip region M76.899    Aortoiliac occlusive disease ([facility]) I74.09    PAD (peripheral artery disease) ([facility]) I73.9    Atherosclerosis of native artery of both lower extremities with intermittent claudication ([facility]) I70.213    History of amiodarone therapy Z92.29    Pneumonia J18.9    Atrial fibrillation with RVR ([facility]) I48.91    Atrial fibrillation ([facility]) I48.91    Orthostatic dizziness R42    S/P RF ablation operation for arrhythmia Z98.890, Z86.79    Chronic diastolic congestive heart failure ([facility]) I50.32    Abnormal echocardiogram R93.1    Carotid stenosis, asymptomatic, bilateral I65.23    Coronary artery disease involving native coronary artery of native heart with angina pectoris ([facility]) I25.119    Cardiomyopathy, ischemic S07.2    Acute systolic congestive heart failure ([facility]) I50.21    Chronic systolic congestive heart failure ([facility]) I50.22    History of CEA (carotid endarterectomy) R47.766     Current Outpatient Medications   Medication Sig Dispense Refill    hydroCHLOROthiazide (HYDRODIURIL) 12.5 mg tablet Take 1 Tablet by mouth every other day. 30 Tablet 6    losartan (COZAAR) 25 mg tablet Take 0.5 Tablets by mouth daily. 90 Tablet 1    carvediloL (COREG) 12.5 mg tablet Take 1 Tablet by mouth two (2) times a day. 60 Tablet 5    rivaroxaban (Xarelto) 20 mg tab tablet Take 1 Tablet by mouth daily (with dinner). 30 Tablet 5    atorvastatin (Lipitor) 20 mg tablet Take 1 Tablet by mouth daily.  90 Tablet 3    ferrous sulfate (IRON) 325 mg (65 mg iron) tablet Take  by mouth Daily (before breakfast).  Comp Stocking,Knee,Regular,Sml misc 2 Box by Does Not Apply route daily. Use while upright  Indications: edema 2 Box 0    nitroglycerin (NITROSTAT) 0.4 mg SL tablet 1 Tab by SubLINGual route every five (5) minutes as needed for Chest Pain for up to 3 doses. 25 Tab 0    oxyCODONE IR (ROXICODONE) 30 mg immediate release tablet Take 20 mg by mouth six (6) times daily.  omeprazole (PRILOSEC) 20 mg capsule Take 1 Cap by mouth daily. 30 Cap 0    aspirin 81 mg chewable tablet Take 1 Tab by mouth daily. Indications: myocardial infarction prevention 100 Tab 0    naloxone 4 mg/actuation spry 4 mg by Nasal route as needed for up to 2 doses. Indications: OPIOID TOXICITY 1 Box 1    naloxegol (MOVANTIK) 25 mg tab tablet Take 25 mg by mouth daily.  insulin lispro (HUMALOG) 100 unit/mL injection by SubCUTAneous route.  insulin glargine (LANTUS) 100 unit/mL injection by SubCUTAneous route nightly.  ipratropium-albuterol (COMBIVENT RESPIMAT)  mcg/actuation inhaler Take 1 Puff by inhalation every six (6) hours as needed for Wheezing or Shortness of Breath. 1 Inhaler 1    pyridoxine (VITAMIN B-6) 100 mg tablet Take 100 mg by mouth daily.  fish oil-dha-epa 1,200-144-216 mg cap Take 1 Cap by mouth daily.  levothyroxine (SYNTHROID) 125 mcg tablet Take 125 mcg by mouth Daily (before breakfast).  tiotropium (SPIRIVA WITH HANDIHALER) 18 mcg inhalation capsule Take 1 Cap by inhalation daily. 30 Cap 11    cholecalciferol, vitamin d3, (VITAMIN D) 1,000 unit tablet Take 5,000 Units by mouth daily.        Allergies   Allergen Reactions    Lisinopril Cough     Patient denies       Social History     Socioeconomic History    Marital status:      Spouse name: Not on file    Number of children: Not on file    Years of education: Not on file    Highest education level: Not on file   Occupational History  Not on file   Tobacco Use    Smoking status: Current Every Day Smoker     Packs/day: 0.50     Years: 42.00     Pack years: 21.00     Types: Cigarettes     Last attempt to quit: 3/30/2018     Years since quitting: 3.8    Smokeless tobacco: Never Used    Tobacco comment: 10 cigs per day   Vaping Use    Vaping Use: Never used   Substance and Sexual Activity    Alcohol use: No     Alcohol/week: 0.0 standard drinks    Drug use: No    Sexual activity: Not on file   Other Topics Concern    Not on file   Social History Narrative    Not on file     Social Determinants of Health     Financial Resource Strain:     Difficulty of Paying Living Expenses: Not on file   Food Insecurity:     Worried About Running Out of Food in the Last Year: Not on file    Boom of Food in the Last Year: Not on file   Transportation Needs:     Lack of Transportation (Medical): Not on file    Lack of Transportation (Non-Medical):  Not on file   Physical Activity:     Days of Exercise per Week: Not on file    Minutes of Exercise per Session: Not on file   Stress:     Feeling of Stress : Not on file   Social Connections:     Frequency of Communication with Friends and Family: Not on file    Frequency of Social Gatherings with Friends and Family: Not on file    Attends Restorationist Services: Not on file    Active Member of [address] [place] [address] or Organizations: Not on file    Attends Club or Organization Meetings: Not on file    Marital Status: Not on file   Intimate Partner Violence:     Fear of Current or Ex-Partner: Not on file    Emotionally Abused: Not on file    Physically Abused: Not on file    Sexually Abused: Not on file   Housing Stability:     Unable to Pay for Housing in the Last Year: Not on file    Number of [place] in the Last Year: Not on file    Unstable Housing in the Last Year: Not on file     Family History   Problem Relation Age of Onset    Hypertension Mother     Cancer Maternal Aunt         breast CA 78yo  Breast Cancer Maternal Aunt 79    Diabetes Maternal Grandmother     Cancer Maternal Aunt         uterine CA    Diabetes Daughter     Other Daughter         lupus (SLE) and coagulopathy    Breast Cancer Paternal Aunt 61    Heart Attack Sister     Heart Disease Neg Hx        Impression and Plan:  Stormy Hale is a 66 y.o. female with carotid stenosis and aortoiliac occlusive disease    - Patient has had progression of left carotid stenosis and is maintained on ASA and Atorvastatin 20mg. Patient was unable to tolerate a higher dose and I recommend if another statin could be tried for a higher dose. Patient to discuss with Dr. Simran Arevalo  - I do not believe her left carotid stenosis is symptomatic and the elevated ratio could be secondary to lower diastolic pressure. I recommended a CTA neck and we will discuss the results   - Options included medical treatment, TCAR vs Open carotid endarterectomy. - Plan to see me after the CTA is performed.          Luma Woods MD

## 2022-01-31 NOTE — PROGRESS NOTES
1. Have you been to an emergency room or urgent care clinic since your last visit? NO    Hospitalized since your last visit? If yes, where, when, and reason for visit? NO  2. Have you seen or consulted any other health care providers outside of the [facility] since your last visit including any procedures, health maintenance items. If yes, where, when and reason for visit?  NO

## 2022-02-04 ENCOUNTER — HOSPITAL ENCOUNTER (OUTPATIENT)
Dept: CT IMAGING | Age: 72
Discharge: HOME | End: 2022-02-04
Attending: STUDENT IN AN ORGANIZED HEALTH CARE EDUCATION/TRAINING PROGRAM
Payer: MEDICARE

## 2022-02-04 LAB — CREAT UR-MCNC: 1.2 MG/DL (ref 0.6–1.3)

## 2022-02-04 PROCEDURE — 70498 CT ANGIOGRAPHY NECK: CPT

## 2022-02-04 PROCEDURE — 82565 ASSAY OF CREATININE: CPT

## 2022-02-04 PROCEDURE — 74011000636 HC RX REV CODE- 636: Performed by: STUDENT IN AN ORGANIZED HEALTH CARE EDUCATION/TRAINING PROGRAM

## 2022-02-04 RX ADMIN — IOPAMIDOL 78 ML: 755 INJECTION, SOLUTION INTRAVENOUS at 12:17

## 2022-02-07 DIAGNOSIS — I65.23 CAROTID STENOSIS, ASYMPTOMATIC, BILATERAL: ICD-10-CM

## 2022-02-09 ENCOUNTER — OFFICE VISIT (OUTPATIENT)
Dept: VASCULAR SURGERY | Age: 72
End: 2022-02-09
Payer: MEDICARE

## 2022-02-09 VITALS
HEIGHT: 64 IN | OXYGEN SATURATION: 97 % | DIASTOLIC BLOOD PRESSURE: 68 MMHG | BODY MASS INDEX: 25.59 KG/M2 | HEART RATE: 81 BPM | WEIGHT: 149.91 LBS | SYSTOLIC BLOOD PRESSURE: 120 MMHG

## 2022-02-09 DIAGNOSIS — I65.22 CAROTID STENOSIS, ASYMPTOMATIC, LEFT: Primary | ICD-10-CM

## 2022-02-09 PROCEDURE — G8419 CALC BMI OUT NRM PARAM NOF/U: HCPCS | Performed by: STUDENT IN AN ORGANIZED HEALTH CARE EDUCATION/TRAINING PROGRAM

## 2022-02-09 PROCEDURE — G8510 SCR DEP NEG, NO PLAN REQD: HCPCS | Performed by: STUDENT IN AN ORGANIZED HEALTH CARE EDUCATION/TRAINING PROGRAM

## 2022-02-09 PROCEDURE — 1090F PRES/ABSN URINE INCON ASSESS: CPT | Performed by: STUDENT IN AN ORGANIZED HEALTH CARE EDUCATION/TRAINING PROGRAM

## 2022-02-09 PROCEDURE — G8754 DIAS BP LESS 90: HCPCS | Performed by: STUDENT IN AN ORGANIZED HEALTH CARE EDUCATION/TRAINING PROGRAM

## 2022-02-09 PROCEDURE — G8427 DOCREV CUR MEDS BY ELIG CLIN: HCPCS | Performed by: STUDENT IN AN ORGANIZED HEALTH CARE EDUCATION/TRAINING PROGRAM

## 2022-02-09 PROCEDURE — G8752 SYS BP LESS 140: HCPCS | Performed by: STUDENT IN AN ORGANIZED HEALTH CARE EDUCATION/TRAINING PROGRAM

## 2022-02-09 PROCEDURE — 99213 OFFICE O/P EST LOW 20 MIN: CPT | Performed by: STUDENT IN AN ORGANIZED HEALTH CARE EDUCATION/TRAINING PROGRAM

## 2022-02-09 PROCEDURE — 3017F COLORECTAL CA SCREEN DOC REV: CPT | Performed by: STUDENT IN AN ORGANIZED HEALTH CARE EDUCATION/TRAINING PROGRAM

## 2022-02-09 PROCEDURE — G8400 PT W/DXA NO RESULTS DOC: HCPCS | Performed by: STUDENT IN AN ORGANIZED HEALTH CARE EDUCATION/TRAINING PROGRAM

## 2022-02-09 PROCEDURE — G8536 NO DOC ELDER MAL SCRN: HCPCS | Performed by: STUDENT IN AN ORGANIZED HEALTH CARE EDUCATION/TRAINING PROGRAM

## 2022-02-09 PROCEDURE — 1101F PT FALLS ASSESS-DOCD LE1/YR: CPT | Performed by: STUDENT IN AN ORGANIZED HEALTH CARE EDUCATION/TRAINING PROGRAM

## 2022-02-09 NOTE — PROGRESS NOTES
1. Have you been to an emergency room or urgent care clinic since your last visit? No    Hospitalized since your last visit? If yes, where, when, and reason for visit? No    2. Have you seen or consulted any other health care providers outside of the [facility] since your last visit including any procedures, health maintenance items. If yes, where, when and reason for visit?  No

## 2022-02-09 NOTE — PROGRESS NOTES
02/09/22        Stew Yoo        History and Physical    Inell Purchase is a 66 y.o. female here for follow up after her CTA of the neck for evaluation of carotid stenosis. I saw Ms. Yoo on 1/31/22 for PAD and carotid stenosis. There has been a progression in her carotid disease on duplex. I had discussed the patient symptoms of right arm numbness however today the patient clarified that the symptoms are in both the hands and brought on by cold. I do not believe her carotid artery stenosis is currently symptomatic. She denies any new symptoms and we will repeat carotid duplex in 6 months and I have counseled here on symptoms of stroke and if any are present she should go to the emergency room as soon as possible. Physical Exam:    Visit Vitals  /68 (BP 1 Location: Right upper arm, BP Patient Position: Sitting)   Pulse 81   Ht 5' 4\" (1.626 m)   Wt 149 lb 14.6 oz (68 kg)   SpO2 97%   BMI 25.73 kg/m²      HEENT-PERRLA exactly movements intact, no scleral icterus, mucous membranes pink and moist  Neck-no JVD, no carotid bruits  Heart-regular rate and rhythm no murmurs, rubs or gallops  Chest-clear to auscultation bilaterally no wheezes, rhonchi or rubs  Abdomen-soft, nontender, no palpable organomegaly or masses, no palpable pulsatile masses  Extremities-no clubbing, cyanosis or edema. No wounds or gangrenous changes to the toes or feet. Skin is intact. Feet are pink warm and well-perfused bilaterally  Pulses-carotid, radial, brachial, femoral 2+ bilaterally. Bilateral doppler signals dorsalis pedis, posterior tibial       Past Medical History:   Diagnosis Date    Abnormal myocardial perfusion study 05/07/2010    Anterior ischemia c/w at least 1-vessel CAD. No WMA. EF 51%. Positive EKG at 78% pred max HR. Ex time 7 min 30 sec.     Acute systolic congestive heart failure ([address] 4/9/2018 4/18 new, likely she was told of low EF and MI- med Rx for now    Allergic rhinitis 1/30/2010    Atrial fibrillation ([facility]     Broken ankle 08/22/2013    left    Carotid stenosis 1/30/2010    Chronic diastolic congestive heart failure ([facility] 2/16/2018 2/18 NYHA2    COPD     Essential hypertension     Heart failure ([facility]     History of amiodarone therapy 5/11/2015    History of cardiac cath 05/25/2010    Patent coronary arteries. LVEDP 13 mmHg. EF 65%.  History of cervical cancer 11/05    History of echocardiogram 08/21/2013    EF 50-55%. No RWMA. Gr 2 DDfx. Mild LAE. Mild MR.      Hypertension     MI (myocardial infarction) ([facility]     March/April 2018    Migraines 1/30/2010    Mitral regurgitation     Orthostatic dizziness 3/30/2016    ? autonomic dysfunction Vs amio side effect     Pacemaker 11/25/13    DDD    Pure hypercholesterolemia 1/30/2010    Rosacea 1/30/2010    Type II or unspecified type diabetes mellitus without mention of complication, not stated as uncontrolled 1/30/2010    Unspecified hypothyroidism 1/30/2010     Past Surgical History:   Procedure Laterality Date    HX AFIB ABLATION  2/8/13    Dr Christie Weber HX CAROTID ENDARTERECTOMY  11/06    right Dr. Nirmal Prather HX HYSTERECTOMY  5/06    HX ORTHOPAEDIC Right 07/11/2016    knee replacement    HX PACEMAKER  11/25/13    DDD    HX SVT ABLATION  2/18/2013     Patient Active Problem List   Diagnosis Code    Type II or unspecified type diabetes mellitus without mention of complication, not stated as uncontrolled E11.9    Essential hypertension, benign I10    Pure hypercholesterolemia E78.00    Unspecified hypothyroidism E03.9    Allergic rhinitis J30.9    Carotid stenosis I65.29    Rosacea L71.9    Migraines G43.909    Chronic obstructive pulmonary disease ([facility] J44.9    Edema R60.9    Leg pain M79.606    Encounter for long-term (current) use of high-risk medication Z79.899    DM (diabetes mellitus) ([facility] E11.9    Chronic pain syndrome G89.4    Peripheral neuropathy G62.9    Neuralgia and neuritis M79.2    History of depression Z86.59    Generalized OA M15.9    S/P ablation operation for arrhythmia Z98.890, Z86.79    Tobacco use disorder F17.200    Lesion of ulnar nerve G56.20    Diabetic neuropathy, painful ([facility]) E11.40    Pre-operative cardiovascular examination Z01.810    Syncope and collapse R55    Fracture of fibula with tibia, left, closed S82.202A, S82.402A    Palpitations R00.2    Irregular heart beat I49.9    Elevated INR R79.1    Pacemaker- DDD to VVIR Z95.0    H/O amiodarone therapy Z92.29    Enthesopathy of hip region M76.899    Aortoiliac occlusive disease ([facility]) I74.09    PAD (peripheral artery disease) ([facility]) I73.9    Atherosclerosis of native artery of both lower extremities with intermittent claudication ([facility]) I70.213    History of amiodarone therapy Z92.29    Pneumonia J18.9    Atrial fibrillation with RVR ([facility]) I48.91    Atrial fibrillation ([facility]) I48.91    Orthostatic dizziness R42    S/P RF ablation operation for arrhythmia Z98.890, Z86.79    Chronic diastolic congestive heart failure ([facility]) I50.32    Abnormal echocardiogram R93.1    Carotid stenosis, asymptomatic, bilateral I65.23    Coronary artery disease involving native coronary artery of native heart with angina pectoris ([facility]) I25.119    Cardiomyopathy, ischemic F11.1    Acute systolic congestive heart failure ([facility]) I50.21    Chronic systolic congestive heart failure ([facility]) I50.22    History of CEA (carotid endarterectomy) B71.238     Current Outpatient Medications   Medication Sig Dispense Refill    hydroCHLOROthiazide (HYDRODIURIL) 12.5 mg tablet Take 1 Tablet by mouth every other day. 30 Tablet 6    losartan (COZAAR) 25 mg tablet Take 0.5 Tablets by mouth daily. 90 Tablet 1    carvediloL (COREG) 12.5 mg tablet Take 1 Tablet by mouth two (2) times a day. 60 Tablet 5    rivaroxaban (Xarelto) 20 mg tab tablet Take 1 Tablet by mouth daily (with dinner).  30 Tablet 5    atorvastatin (Lipitor) 20 mg tablet Take 1 Tablet by mouth daily. (Patient taking differently: Take 40 mg by mouth daily.) 90 Tablet 3    ferrous sulfate (IRON) 325 mg (65 mg iron) tablet Take  by mouth Daily (before breakfast).  Comp Stocking,Knee,Regular,Sml misc 2 Box by Does Not Apply route daily. Use while upright  Indications: edema 2 Box 0    nitroglycerin (NITROSTAT) 0.4 mg SL tablet 1 Tab by SubLINGual route every five (5) minutes as needed for Chest Pain for up to 3 doses. 25 Tab 0    oxyCODONE IR (ROXICODONE) 30 mg immediate release tablet Take 20 mg by mouth six (6) times daily.  omeprazole (PRILOSEC) 20 mg capsule Take 1 Cap by mouth daily. 30 Cap 0    aspirin 81 mg chewable tablet Take 1 Tab by mouth daily. Indications: myocardial infarction prevention 100 Tab 0    naloxone 4 mg/actuation spry 4 mg by Nasal route as needed for up to 2 doses. Indications: OPIOID TOXICITY 1 Box 1    naloxegol (MOVANTIK) 25 mg tab tablet Take 25 mg by mouth daily.  insulin lispro (HUMALOG) 100 unit/mL injection by SubCUTAneous route.  insulin glargine (LANTUS) 100 unit/mL injection by SubCUTAneous route nightly. 8 units      ipratropium-albuterol (COMBIVENT RESPIMAT)  mcg/actuation inhaler Take 1 Puff by inhalation every six (6) hours as needed for Wheezing or Shortness of Breath. 1 Inhaler 1    pyridoxine (VITAMIN B-6) 100 mg tablet Take 100 mg by mouth daily.  fish oil-dha-epa 1,200-144-216 mg cap Take 1 Cap by mouth daily.  levothyroxine (SYNTHROID) 125 mcg tablet Take 125 mcg by mouth Daily (before breakfast).  tiotropium (SPIRIVA WITH HANDIHALER) 18 mcg inhalation capsule Take 1 Cap by inhalation daily. 30 Cap 11    cholecalciferol, vitamin d3, (VITAMIN D) 1,000 unit tablet Take 5,000 Units by mouth daily.        Allergies   Allergen Reactions    Lisinopril Cough     Patient denies       Social History     Socioeconomic History    Marital status:      Spouse name: Not on file    Number of children: Not on file    Years of education: Not on file    Highest education level: Not on file   Occupational History    Not on file   Tobacco Use    Smoking status: Current Every Day Smoker     Packs/day: 0.50     Years: 42.00     Pack years: 21.00     Types: Cigarettes     Last attempt to quit: 3/30/2018     Years since quitting: 3.8    Smokeless tobacco: Never Used    Tobacco comment: 10 cigs per day   Vaping Use    Vaping Use: Never used   Substance and Sexual Activity    Alcohol use: No     Alcohol/week: 0.0 standard drinks    Drug use: No    Sexual activity: Not on file   Other Topics Concern    Not on file   Social History Narrative    Not on file     Social Determinants of Health     Financial Resource Strain:     Difficulty of Paying Living Expenses: Not on file   Food Insecurity:     Worried About Running Out of Food in the Last Year: Not on file    Boom of Food in the Last Year: Not on file   Transportation Needs:     Lack of Transportation (Medical): Not on file    Lack of Transportation (Non-Medical):  Not on file   Physical Activity:     Days of Exercise per Week: Not on file    Minutes of Exercise per Session: Not on file   Stress:     Feeling of Stress : Not on file   Social Connections:     Frequency of Communication with Friends and Family: Not on file    Frequency of Social Gatherings with Friends and Family: Not on file    Attends Scientologist Services: Not on file    Active Member of Clubs or Organizations: Not on file    Attends Club or Organization Meetings: Not on file    Marital Status: Not on file   Intimate Partner Violence:     Fear of Current or Ex-Partner: Not on file    Emotionally Abused: Not on file    Physically Abused: Not on file    Sexually Abused: Not on file   Housing Stability:     Unable to Pay for Housing in the Last Year: Not on file    Number of [place] in the Last Year: Not on file    Unstable Housing in the Last Year: Not on file     Family History   Problem Relation Age of Onset    Hypertension Mother     Cancer Maternal Aunt         breast CA 68yo    Breast Cancer Maternal Aunt 79    Diabetes Maternal Grandmother     Cancer Maternal Aunt         uterine CA    Diabetes Daughter     Other Daughter         lupus (SLE) and coagulopathy    Breast Cancer Paternal Aunt 61    Heart Attack Sister     Heart Disease Neg Hx        We reviewed the plan with the patient and the patient understands.         Wandy Virk MD

## 2022-03-18 PROBLEM — I25.119 CORONARY ARTERY DISEASE INVOLVING NATIVE CORONARY ARTERY OF NATIVE HEART WITH ANGINA PECTORIS (HCC): Status: ACTIVE | Noted: 2018-03-30

## 2022-03-18 PROBLEM — I50.32 CHRONIC DIASTOLIC CONGESTIVE HEART FAILURE (HCC): Status: ACTIVE | Noted: 2018-02-16

## 2022-03-18 PROBLEM — Z86.79 S/P RF ABLATION OPERATION FOR ARRHYTHMIA: Status: ACTIVE | Noted: 2017-08-21

## 2022-03-18 PROBLEM — Z98.890 S/P RF ABLATION OPERATION FOR ARRHYTHMIA: Status: ACTIVE | Noted: 2017-08-21

## 2022-03-19 PROBLEM — I50.22 CHRONIC SYSTOLIC CONGESTIVE HEART FAILURE (HCC): Status: ACTIVE | Noted: 2018-11-05

## 2022-03-19 PROBLEM — I50.21 ACUTE SYSTOLIC CONGESTIVE HEART FAILURE (HCC): Status: ACTIVE | Noted: 2018-04-09

## 2022-03-19 PROBLEM — I65.23 CAROTID STENOSIS, ASYMPTOMATIC, BILATERAL: Status: ACTIVE | Noted: 2018-03-28

## 2022-03-19 PROBLEM — R93.1 ABNORMAL ECHOCARDIOGRAM: Status: ACTIVE | Noted: 2018-03-23

## 2022-03-19 PROBLEM — Z98.890 HISTORY OF CEA (CAROTID ENDARTERECTOMY): Status: ACTIVE | Noted: 2021-03-22

## 2022-03-20 PROBLEM — I25.5 CARDIOMYOPATHY, ISCHEMIC: Status: ACTIVE | Noted: 2018-03-30

## 2022-03-28 DIAGNOSIS — I42.8 NONISCHEMIC CARDIOMYOPATHY (HCC): ICD-10-CM

## 2022-03-28 DIAGNOSIS — I25.10 CORONARY ARTERY DISEASE INVOLVING NATIVE CORONARY ARTERY OF NATIVE HEART WITHOUT ANGINA PECTORIS: ICD-10-CM

## 2022-03-28 RX ORDER — CARVEDILOL 12.5 MG/1
12.5 TABLET ORAL 2 TIMES DAILY
Qty: 60 TABLET | Refills: 5 | Status: SHIPPED | OUTPATIENT
Start: 2022-03-28 | End: 2022-10-11 | Stop reason: SDUPTHER

## 2022-03-28 NOTE — TELEPHONE ENCOUNTER
Requested Prescriptions     Pending Prescriptions Disp Refills    carvediloL (COREG) 12.5 mg tablet 60 Tablet 5     Sig: Take 1 Tablet by mouth two (2) times a day.

## 2022-05-20 ENCOUNTER — OFFICE VISIT (OUTPATIENT)
Dept: CARDIOLOGY CLINIC | Age: 72
End: 2022-05-20
Payer: MEDICARE

## 2022-05-20 VITALS
OXYGEN SATURATION: 96 % | SYSTOLIC BLOOD PRESSURE: 113 MMHG | HEART RATE: 72 BPM | BODY MASS INDEX: 24.75 KG/M2 | DIASTOLIC BLOOD PRESSURE: 43 MMHG | HEIGHT: 64 IN | WEIGHT: 145 LBS

## 2022-05-20 DIAGNOSIS — F17.200 TOBACCO USE DISORDER: ICD-10-CM

## 2022-05-20 DIAGNOSIS — E78.00 PURE HYPERCHOLESTEROLEMIA: ICD-10-CM

## 2022-05-20 DIAGNOSIS — R04.0 BLEEDING FROM THE NOSE: ICD-10-CM

## 2022-05-20 DIAGNOSIS — I50.22 CHRONIC SYSTOLIC CONGESTIVE HEART FAILURE (HCC): ICD-10-CM

## 2022-05-20 DIAGNOSIS — I42.8 NONISCHEMIC CARDIOMYOPATHY (HCC): ICD-10-CM

## 2022-05-20 DIAGNOSIS — I48.11 LONGSTANDING PERSISTENT ATRIAL FIBRILLATION (HCC): Primary | ICD-10-CM

## 2022-05-20 DIAGNOSIS — Z95.0 PACEMAKER: ICD-10-CM

## 2022-05-20 DIAGNOSIS — Z98.890 HISTORY OF CEA (CAROTID ENDARTERECTOMY): ICD-10-CM

## 2022-05-20 PROCEDURE — G8432 DEP SCR NOT DOC, RNG: HCPCS | Performed by: INTERNAL MEDICINE

## 2022-05-20 PROCEDURE — 93000 ELECTROCARDIOGRAM COMPLETE: CPT | Performed by: INTERNAL MEDICINE

## 2022-05-20 PROCEDURE — 99214 OFFICE O/P EST MOD 30 MIN: CPT | Performed by: INTERNAL MEDICINE

## 2022-05-20 PROCEDURE — 3017F COLORECTAL CA SCREEN DOC REV: CPT | Performed by: INTERNAL MEDICINE

## 2022-05-20 PROCEDURE — G8752 SYS BP LESS 140: HCPCS | Performed by: INTERNAL MEDICINE

## 2022-05-20 PROCEDURE — G8400 PT W/DXA NO RESULTS DOC: HCPCS | Performed by: INTERNAL MEDICINE

## 2022-05-20 PROCEDURE — G8420 CALC BMI NORM PARAMETERS: HCPCS | Performed by: INTERNAL MEDICINE

## 2022-05-20 PROCEDURE — G8536 NO DOC ELDER MAL SCRN: HCPCS | Performed by: INTERNAL MEDICINE

## 2022-05-20 PROCEDURE — G8754 DIAS BP LESS 90: HCPCS | Performed by: INTERNAL MEDICINE

## 2022-05-20 PROCEDURE — 1090F PRES/ABSN URINE INCON ASSESS: CPT | Performed by: INTERNAL MEDICINE

## 2022-05-20 PROCEDURE — 1101F PT FALLS ASSESS-DOCD LE1/YR: CPT | Performed by: INTERNAL MEDICINE

## 2022-05-20 PROCEDURE — G8427 DOCREV CUR MEDS BY ELIG CLIN: HCPCS | Performed by: INTERNAL MEDICINE

## 2022-05-20 NOTE — PROGRESS NOTES
1. Have you been to the ER, urgent care clinic since your last visit? Hospitalized since your last visit?     no  2. Have you seen or consulted any other health care providers outside of the [address] [place] [address] since your last visit? Include any pap smears or colon screening. Yes Where: pcp     3. Since your last visit, have you had any of the following symptoms? shortness of breath. 4.  Have you had any blood work, X-rays or cardiac testing? No         5. Where do you normally have your labs drawn?   [facility]  6. Do you need any refills today?    no

## 2022-05-20 NOTE — PROGRESS NOTES
HISTORY OF PRESENT ILLNESS  Kaitlyn Reardon is a 67 y.o. female. F/u AFib, HTN, CHF, CMP, MR, HLD, s/p DDD    5/22 had nosebleed 2 days ago which lasted almost 12 hours but eventually stopped. 11/21 status post cardiac cath when she was found to have normal coronaries and improved ejection fraction. 3/21 says that has involuntary movements of arm/leg or even the trunk. Happening 2-3/wk  3/18 seen after low EF, wma and stress test today  7/17 continues with dizziness, rare LOC/fall- last in 6/17 1/17 has a cold for 6 wks    Palpitations   The history is provided by the medical records (perm AF). This is a recurrent problem. The current episode started more than 1 week ago. Progression since onset: few days. The problem occurs rarely (off and on). Episode Length: permanent AF on pacer check. Associated symptoms include irregular heartbeat, lower extremity edema, dizziness and shortness of breath. Pertinent negatives include no diaphoresis, no fever, no chest pain, no claudication, no orthopnea, no PND, no syncope, no abdominal pain, no nausea, no vomiting, no headaches, no weakness, no cough, no hemoptysis and no sputum production. Risk factors include diabetes mellitus, hypertension, smoking/tobacco exposure and dyslipidemia. Her past medical history is significant for DM, hypertension and atrial fibrillation. Dizziness  The history is provided by the patient. This is a new problem. The current episode started more than 1 week ago. The problem occurs every several days (3/wk; 5-10 mts; 6/17 LOC for few seconds; 5/22 vertigo). The problem has been gradually improving (Likely equilibrium issue/vertigo now 5/19). Associated symptoms include shortness of breath. Pertinent negatives include no chest pain, no abdominal pain and no headaches. The symptoms are aggravated by standing, walking and bending (looking/reaching up; sitting). The symptoms are relieved by rest and laying down.  She has tried nothing for the symptoms. Shortness of Breath  The history is provided by the patient. This is a chronic problem. The problem occurs intermittently. The current episode started more than 1 week ago. The problem has not changed since onset. Associated symptoms include leg swelling. Pertinent negatives include no fever, no headaches, no ear pain, no neck pain, no cough, no sputum production, no hemoptysis, no wheezing, no PND, no orthopnea, no chest pain, no syncope, no vomiting, no abdominal pain, no rash and no claudication. The problem's precipitants include exercise (2 blocks; 4/18 within house; 11/18 1 block; 11/19 2 blocks; 3/21 200 ft; ). Leg Swelling  The history is provided by the patient. This is a new problem. The current episode started more than 1 week ago (4/18). The problem occurs every several days. The problem has not changed since onset. Associated symptoms include shortness of breath. Pertinent negatives include no chest pain, no abdominal pain and no headaches. The symptoms are relieved by medications. Follow-up  Associated symptoms include shortness of breath. Pertinent negatives include no chest pain, no abdominal pain and no headaches. Review of Systems   Constitutional: Negative for chills, diaphoresis, fever and weight loss. HENT: Negative for ear pain and hearing loss. Eyes: Negative for blurred vision. Respiratory: Positive for shortness of breath. Negative for cough, hemoptysis, sputum production, wheezing and stridor. Cardiovascular: Positive for palpitations (perm AFib) and leg swelling. Negative for chest pain, orthopnea, claudication, syncope and PND. Gastrointestinal: Negative for abdominal pain, heartburn, nausea and vomiting. Musculoskeletal: Negative for myalgias and neck pain. Skin: Negative for rash. Neurological: Positive for dizziness. Negative for tingling, tremors, focal weakness, loss of consciousness, weakness and headaches.    Psychiatric/Behavioral: Negative for depression and suicidal ideas. Allergies   Allergen Reactions    Lisinopril Cough     Patient denies         Past Medical History:   Diagnosis Date    Abnormal myocardial perfusion study 05/07/2010    Anterior ischemia c/w at least 1-vessel CAD. No WMA. EF 51%. Positive EKG at 78% pred max HR. Ex time 7 min 30 sec.  Acute systolic congestive heart failure ([address] 4/9/2018 4/18 new, likely she was told of low EF and MI- med Rx for now    Allergic rhinitis 1/30/2010    Atrial fibrillation ([address]     Broken ankle 08/22/2013    left    Carotid stenosis 1/30/2010    Chronic diastolic congestive heart failure ([address] 2/16/2018 2/18 NYHA2    COPD     Essential hypertension     Heart failure ([address]     History of amiodarone therapy 5/11/2015    History of cardiac cath 05/25/2010    Patent coronary arteries. LVEDP 13 mmHg. EF 65%.  History of cervical cancer 11/05    History of echocardiogram 08/21/2013    EF 50-55%. No RWMA. Gr 2 DDfx. Mild LAE. Mild MR.      Hypertension     MI (myocardial infarction) ([address]     March/April 2018    Migraines 1/30/2010    Mitral regurgitation     Orthostatic dizziness 3/30/2016    ? autonomic dysfunction Vs amio side effect     Pacemaker 11/25/13    DDD    Pure hypercholesterolemia 1/30/2010    Rosacea 1/30/2010    Type II or unspecified type diabetes mellitus without mention of complication, not stated as uncontrolled 1/30/2010    Unspecified hypothyroidism 1/30/2010       Family History   Problem Relation Age of Onset    Hypertension Mother     Cancer Maternal Aunt         breast CA 66yo    Breast Cancer Maternal Aunt 79    Diabetes Maternal Grandmother     Cancer Maternal Aunt         uterine CA    Diabetes Daughter     Other Daughter         lupus (SLE) and coagulopathy    Breast Cancer Paternal Aunt 61    Heart Attack Sister     Heart Disease Neg Hx        Social History     Tobacco Use    Smoking status: Current Every Day Smoker Packs/day: 0.50     Years: 42.00     Pack years: 21.00     Types: Cigarettes     Last attempt to quit: 3/30/2018     Years since quittin.1    Smokeless tobacco: Never Used    Tobacco comment: 10 cigs per day   Vaping Use    Vaping Use: Never used   Substance Use Topics    Alcohol use: No     Alcohol/week: 0.0 standard drinks    Drug use: No        Current Outpatient Medications   Medication Sig    carvediloL (COREG) 12.5 mg tablet Take 1 Tablet by mouth two (2) times a day.  hydroCHLOROthiazide (HYDRODIURIL) 12.5 mg tablet Take 1 Tablet by mouth every other day.  losartan (COZAAR) 25 mg tablet Take 0.5 Tablets by mouth daily.  rivaroxaban (Xarelto) 20 mg tab tablet Take 1 Tablet by mouth daily (with dinner).  atorvastatin (Lipitor) 20 mg tablet Take 1 Tablet by mouth daily. (Patient taking differently: Take 40 mg by mouth daily.)    ferrous sulfate (IRON) 325 mg (65 mg iron) tablet Take  by mouth Daily (before breakfast).  Comp Stocking,Knee,Regular,Sml misc 2 Box by Does Not Apply route daily. Use while upright  Indications: edema    nitroglycerin (NITROSTAT) 0.4 mg SL tablet 1 Tab by SubLINGual route every five (5) minutes as needed for Chest Pain for up to 3 doses.  oxyCODONE IR (ROXICODONE) 30 mg immediate release tablet Take 20 mg by mouth six (6) times daily.  omeprazole (PRILOSEC) 20 mg capsule Take 1 Cap by mouth daily.  aspirin 81 mg chewable tablet Take 1 Tab by mouth daily. Indications: myocardial infarction prevention    naloxone 4 mg/actuation spry 4 mg by Nasal route as needed for up to 2 doses. Indications: OPIOID TOXICITY    naloxegol (MOVANTIK) 25 mg tab tablet Take 25 mg by mouth daily.  insulin lispro (HUMALOG) 100 unit/mL injection by SubCUTAneous route.  insulin glargine (LANTUS) 100 unit/mL injection by SubCUTAneous route nightly.  8 units    ipratropium-albuterol (COMBIVENT RESPIMAT)  mcg/actuation inhaler Take 1 Puff by inhalation every six (6) hours as needed for Wheezing or Shortness of Breath.  pyridoxine (VITAMIN B-6) 100 mg tablet Take 100 mg by mouth daily.  fish oil-dha-epa 1,200-144-216 mg cap Take 1 Cap by mouth daily.  levothyroxine (SYNTHROID) 125 mcg tablet Take 125 mcg by mouth Daily (before breakfast).  tiotropium (SPIRIVA WITH HANDIHALER) 18 mcg inhalation capsule Take 1 Cap by inhalation daily.  cholecalciferol, vitamin d3, (VITAMIN D) 1,000 unit tablet Take 5,000 Units by mouth daily. No current facility-administered medications for this visit. Past Surgical History:   Procedure Laterality Date    HX AFIB ABLATION  2/8/13    Dr Genevieve Dominguez HX CAROTID ENDARTERECTOMY  11/06    right Dr. Rona Cross HX HYSTERECTOMY  5/06    HX ORTHOPAEDIC Right 07/11/2016    knee replacement    HX PACEMAKER  11/25/13    DDD    HX SVT ABLATION  2/18/2013       Visit Vitals  BP (!) 113/43   Pulse 72   Ht 5' 4\" (1.626 m)   Wt 65.8 kg (145 lb)   SpO2 96%   BMI 24.89 kg/m²       Diagnostic Studies:  I have reviewed the relevant tests done on the patient and show as follows  EKG tracings reviewed by me today. CARDIOLOGY STUDIES 3/30/2018   Pharmacological Nuclear Stress Test Result large fixed ant/apical/sept defect, 40%EF   Some recent data might be hidden   2010 cardiac cath   IMPRESSIONS:     1. NORMAL EPICARDIAL CORONARY ARTERIES OTHER THAN MINOR LUMINAL IRREGULARITIES.     2. NORMAL LEFT VENTRICULAR WALL MOTION AND EJECTION FRACTION. 3/18 [facility]c Stress  Conclusion:  1. Abnormal perfusion scan. 2. Evidence of a large sized fixed defect involving LV territory in the apical and mid anterior and anteroseptal areas. This indicates previous myocardial infarction most likely. 3. This represents a high risk scan. 1/19 ECHO Interpretation Summary     · Normal cavity size. Mild concentric hypertrophy observed. There is moderately decreased systolic function. The estimated ejection fraction is 36 - 40%.  Abnormal wall motion as described on the wall scoring diagram below. Global longitudinal strain is -4.90%. Abnormal left ventricular strain. There is severe (grade 3) left ventricular diastolic dysfunction. · Left atrial cavity size is severely dilated. · Right ventricular global systolic function is reduced. Pacing wire present  · Mitral valve thickening. Mild mitral valve regurgitation. · Mild tricuspid valve regurgitation. Pulmonary arterial systolic pressure is 37.6 mmHg. Mild pulmonary hypertension. Comparison Study Information     Prior Study     When compared to the previous study from 3/23/18, there is no longer evidence of a small pericardial effusion. 06/16/21    CARDIAC PROCEDURE 06/17/2021 6/17/2021    Conclusion  · Normal epicardial coronary arteries. 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 · The flow of dye was slow in all the coronary arteries. · Procedure done via right femoral artery without any complications with 4 Croatian catheters. Risk factor modification to continue. Her EF has significantly improved and normalized since last year. Medical treatment for CHF and risk factors to continue. Diuretics can be reduced. Signed by: Brian Guevara MD on 6/17/2021  8:35 AM    Ms. Yoo has a reminder for a \"due or due soon\" health maintenance. I have asked that she contact her primary care provider for follow-up on this health maintenance. Physical Exam  Constitutional:       General: She is not in acute distress. Appearance: She is well-developed. HENT:      Head: Normocephalic and atraumatic. Mouth/Throat:      Dentition: Normal dentition. Eyes:      General: No scleral icterus. Right eye: No discharge. Left eye: No discharge. Neck:      Thyroid: No thyromegaly. Vascular: Carotid bruit (b/l) present. No JVD.    Cardiovascular:      Rate and Rhythm: Normal rate and regular rhythm. Pulses: Intact distal pulses. Heart sounds: S1 normal and S2 normal. Murmur heard. Scratchy early systolic murmur is present with a grade of 3/6 at the upper right sternal border. High-pitched blowing midsystolic murmur of grade 2/6 is also present at the apex. High-pitched blowing decrescendo early diastolic murmur is present with a grade of 2/4 at the upper right sternal border radiating to the apex. No friction rub. No gallop. Pulmonary:      Effort: Pulmonary effort is normal.      Breath sounds: Normal breath sounds. No wheezing or rales. Abdominal:      Palpations: Abdomen is soft. There is no mass. Tenderness: There is no abdominal tenderness. Musculoskeletal:      Cervical back: Neck supple. Right lower leg: No edema. Left lower leg: No edema. Lymphadenopathy:      Cervical:      Right cervical: No superficial cervical adenopathy. Left cervical: No superficial cervical adenopathy. Skin:     General: Skin is warm and dry. Findings: No rash. Neurological:      Mental Status: She is alert and oriented to person, place, and time. Psychiatric:         Behavior: Behavior normal.         ASSESSMENT and PLAN    Patient advised to stop smoking to reduce future cardiovascular events. AFib: Permanent; 3/18; 2/18; 8/17 on xarelto;   Persistent despite ablation  CAD: 3/18 large fixed LAD defect, 40%EF;   DDD to [facility] : Dual chamber [employer] pacemaker implant 11/25/13  Office Checks: 12/13 nl function; 1/15 nl function, freq but very short AHR(likely a fib); 1/16 freq long AF, nl function; 1/17; 7/17; 6/18 nl function, perm AFib; 2/19 normal function  Remote Checks : 3/14; 10/14; 4/16; 10/16; 12/17; 11/18; 1/21 nl function    11/19 CHF is fairly stable clinically. Chest pain has resolved. Will wait on cardiac cath. Megan Rice was not covered. Will continue low-dose losartan. [facility] is overdue.   Requested patient to send us from home ASAP.    3/22/2021. CHF is worsening with worsening dyspnea. Add HCTZ which will be helpful for hypertension as well as CHF. Follow-up electrolytes closely. Blood pressure is elevated. Check home chart as HCTZ 30. Pacemaker function normal.  CAD seems to be clinically stable. A. fib is persistent with dependency on the permanent pacemaker which is functioning normally. Complains of involuntary movements of arms legs and trunk at different times. Refer to neurology for an opinion. 6/14/2021 CAD seems to be having unstable symptoms as she used nitroglycerin once with near immediate significant relief. And describes frequent chest tightness. CCS class II-III. A. fib is stable. CHF is compensated despite not taking Lasix anymore. Continue present medications but would recommend a cardiac catheterization to evaluate coronary anatomy. Procedure discussed with patient and daughter and they agreed. Will be scheduled for Wednesday this week. The benefits and risks of cardiac catheterization have been discussed in detail with the patient and daughter present at the time. Patient understands  risk of potential cath complications including but not limited to bleeding, infection, difficulty healing the arteriotomy access site(2 chances in 100) which may require surgical repair, potential thromboembolic complications which could result in stroke, myocardial infarction, vascular injury, loss of limb or organ function and/or death( 1 chance in 1000)and potential allergic reaction to contrast dye or other medication used during the procedure. Patient is also aware of the therapeutic implications for medical management vs coronary artery bypass surgery vs percutaneous coronary intervention in treatment of coronary artery disease.  The additional risks for percutaneous coronary artery intervention include the need for emergent bypass surgery at 1 chance in 100 and for restenosis which may range from 35% for plain balloon angioplasty, 15% for bare metal stent, and 5% for drug eluting stent implants. The need for mandatory uninterrupted dual antiplatelet therapy with lifelong Aspirin combined with Plavix for up to 12 months following drug eluting stents, and minimum 1 month following bare metal stents to prevent stent thrombosis which is the equivalent of acute heart attack has been reviewed in detail. No contraindications to use of drug eluting stents has been discovered. 11/15/2021 as CHF is well compensated NYHA class II-III which I think is mostly due to smoking and COPD  A. fib is stable with controlled rate. Continue anticoagulation. Pacer check today and is functioning normally. We will refer to vascular due to history of CVA. Tobacco cessation reinforced. She said she will try her best.  Reduce losartan to 12.5 mg a day as EF is improved and blood pressure is mildly low. It might help her dizziness. Diagnoses and all orders for this visit:    1. Longstanding persistent atrial fibrillation (HCC)  -     AMB POC EKG ROUTINE W/ 12 LEADS, INTER & REP    2. Nonischemic cardiomyopathy ([address]    3. Chronic systolic congestive heart failure (HCC)  -     METABOLIC PANEL, BASIC; Future  -     CBC W/O DIFF; Future  -     LIPID PANEL; Future  -     HEPATIC FUNCTION PANEL; Future    4. Pacemaker- DDD to [facility]    5. Tobacco use disorder    6. History of CEA (carotid endarterectomy)    7. Bleeding from the nose  -     CBC W/O DIFF; Future    8. Pure hypercholesterolemia  -     LIPID PANEL; Future  -     HEPATIC FUNCTION PANEL; Future        Pertinent laboratory and test data reviewed and discussed with patient. See patient instructions also for other medical advice given    There are no discontinued medications. Follow-up and Dispositions    · Return in about 6 months (around 11/20/2022), or if symptoms worsen or fail to improve, for post test, with pacer/ICD check, get carelink asa.        5/20/2022 CHF is compensated NYHA class II but she does not walk too much. A. fib is stable. Continue anticoagulation. She had bleeding from the nose recently and will call her ENT physician to check it up. May need cautery of from blood vessel in the nose. Check a renal function and may need to adjust the dose of Xarelto also. Tobacco cessation reinforced and she understands. Labs as ordered.

## 2022-05-20 NOTE — PATIENT INSTRUCTIONS
There are no discontinued medications. Learning About the [facility] [place] Diet  What is the Mediterranean diet? The Mediterranean diet is a style of eating rather than a diet plan. It features foods eaten in [place], [place], [place] and [place], and other countries along the [facility]. It emphasizes eating foods like fish, fruits, vegetables, beans, high-fiber breads and whole grains, nuts, and olive oil. This style of eating includes limited red meat, cheese, and sweets. Why choose the Mediterranean diet? A Mediterranean-style diet may improve heart health. It contains more fat than other heart-healthy diets. But the fats are mainly from nuts, unsaturated oils (such as fish oils and olive oil), and certain nut or seed oils (such as canola, soybean, or flaxseed oil). These fats may help protect the heart and blood vessels. How can you get started on the Mediterranean diet? Here are some things you can do to switch to a more Mediterranean way of eating. What to eat  · Eat a variety of fruits and vegetables each day, such as grapes, blueberries, tomatoes, broccoli, peppers, figs, olives, spinach, eggplant, beans, lentils, and chickpeas. · Eat a variety of whole-grain foods each day, such as oats, brown rice, and whole wheat bread, pasta, and couscous. · Eat fish at least 2 times a week. Try tuna, salmon, mackerel, lake trout, herring, or sardines. · Eat moderate amounts of low-fat dairy products, such as milk, cheese, or yogurt. · Eat moderate amounts of poultry and eggs. · Choose healthy (unsaturated) fats, such as nuts, olive oil, and certain nut or seed oils like canola, soybean, and flaxseed. · Limit unhealthy (saturated) fats, such as butter, palm oil, and coconut oil. And limit fats found in animal products, such as meat and dairy products made with whole milk. Try to eat red meat only a few times a month in very small amounts.   · Limit sweets and desserts to only a few times a week. This includes sugar-sweetened drinks like soda. The Mediterranean diet may also include red wine with your meal--1 glass each day for women and up to 2 glasses a day for men. Tips for eating at home  · Use herbs, spices, garlic, lemon zest, and citrus juice instead of salt to add flavor to foods. · Add avocado slices to your sandwich instead of montalvo. · Have fish for lunch or dinner instead of red meat. Brush the fish with olive oil, and broil or grill it. · Sprinkle your salad with seeds or nuts instead of cheese. · Cook with olive or canola oil instead of butter or oils that are high in saturated fat. · Switch from 2% milk or whole milk to 1% or fat-free milk. · Dip raw vegetables in a vinaigrette dressing or hummus instead of dips made from mayonnaise or sour cream.  · Have a piece of fruit for dessert instead of a piece of cake. Try baked apples, or have some dried fruit. Tips for eating out  · Try broiled, grilled, baked, or poached fish instead of having it fried or breaded. · Ask your  to have your meals prepared with olive oil instead of butter. · Order dishes made with marinara sauce or sauces made from olive oil. Avoid sauces made from cream or mayonnaise. · Choose whole-grain breads, whole wheat pasta and pizza crust, brown rice, beans, and lentils. · Cut back on butter or margarine on bread. Instead, you can dip your bread in a small amount of olive oil. · Ask for a side salad or grilled vegetables instead of french fries or chips. Where can you learn more? Go to http://www.guevara.com/  Enter O407 in the search box to learn more about \"Learning About the Mediterranean Diet. \"  Current as of: September 8, 2021               Content Version: 13.2  © 9772-7616 [employer], Incorporated. Care instructions adapted under license by [employer] (which disclaims liability or warranty for this information).  If you have questions about a medical condition or this instruction, always ask your healthcare professional. [place] [address] any warranty or liability for your use of this information.

## 2022-06-02 DIAGNOSIS — E78.00 PURE HYPERCHOLESTEROLEMIA: Primary | ICD-10-CM

## 2022-06-02 LAB
ALBUMIN SERPL-MCNC: 3.9 G/DL (ref 3.7–4.7)
ALP SERPL-CCNC: 74 IU/L (ref 44–121)
ALT SERPL-CCNC: 9 IU/L (ref 0–32)
AST SERPL-CCNC: 17 IU/L (ref 0–40)
BILIRUB DIRECT SERPL-MCNC: 0.15 MG/DL (ref 0–0.4)
BILIRUB SERPL-MCNC: 0.6 MG/DL (ref 0–1.2)
BUN SERPL-MCNC: 14 MG/DL (ref 8–27)
BUN/CREAT SERPL: 13 (ref 12–28)
CALCIUM SERPL-MCNC: 8.9 MG/DL (ref 8.7–10.3)
CHLORIDE SERPL-SCNC: 99 MMOL/L (ref 96–106)
CHOLEST SERPL-MCNC: 158 MG/DL (ref 100–199)
CO2 SERPL-SCNC: 26 MMOL/L (ref 20–29)
CREAT SERPL-MCNC: 1.08 MG/DL (ref 0.57–1)
EGFR: 54 ML/MIN/1.73
ERYTHROCYTE [DISTWIDTH] IN BLOOD BY AUTOMATED COUNT: 12.2 % (ref 11.7–15.4)
GLUCOSE SERPL-MCNC: 74 MG/DL (ref 65–99)
HCT VFR BLD AUTO: 40.6 % (ref 34–46.6)
HDLC SERPL-MCNC: 48 MG/DL
HGB BLD-MCNC: 13.5 G/DL (ref 11.1–15.9)
LDLC SERPL CALC-MCNC: 93 MG/DL (ref 0–99)
MCH RBC QN AUTO: 32 PG (ref 26.6–33)
MCHC RBC AUTO-ENTMCNC: 33.3 G/DL (ref 31.5–35.7)
MCV RBC AUTO: 96 FL (ref 79–97)
PLATELET # BLD AUTO: 198 X10E3/UL (ref 150–450)
POTASSIUM SERPL-SCNC: 3.9 MMOL/L (ref 3.5–5.2)
PROT SERPL-MCNC: 6.9 G/DL (ref 6–8.5)
RBC # BLD AUTO: 4.22 X10E6/UL (ref 3.77–5.28)
SODIUM SERPL-SCNC: 142 MMOL/L (ref 134–144)
TRIGL SERPL-MCNC: 93 MG/DL (ref 0–149)
VLDLC SERPL CALC-MCNC: 17 MG/DL (ref 5–40)
WBC # BLD AUTO: 6 X10E3/UL (ref 3.4–10.8)

## 2022-06-02 RX ORDER — EZETIMIBE 10 MG/1
10 TABLET ORAL DAILY
Qty: 30 TABLET | Refills: 3 | Status: SHIPPED | OUTPATIENT
Start: 2022-06-02 | End: 2022-10-11 | Stop reason: SDUPTHER

## 2022-06-02 RX ORDER — ATORVASTATIN CALCIUM 40 MG/1
40 TABLET, FILM COATED ORAL DAILY
Qty: 90 TABLET | Refills: 3 | Status: SHIPPED | OUTPATIENT
Start: 2022-06-02

## 2022-06-02 NOTE — TELEPHONE ENCOUNTER
Requested Prescriptions     Pending Prescriptions Disp Refills    atorvastatin (LIPITOR) 40 mg tablet 90 Tablet 3     Sig: Take 1 Tablet by mouth daily. Signed Prescriptions Disp Refills    ezetimibe (Zetia) 10 mg tablet 30 Tablet 3     Sig: Take 1 Tablet by mouth daily. Authorizing Provider: Genaro Hardy with pt daughter regarding new medication instructions, advised to get repeat labs in 6 weeks and to maintain strict diet. No questions or concerns at this time.

## 2022-06-02 NOTE — TELEPHONE ENCOUNTER
I agree with increasing Lipitor to 40. Also add Zetia 10 mg a day. I sent the prescription. Check lipids and LFTs in 6 weeks which I ordered. Tell patient to be strict with diet.

## 2022-06-02 NOTE — TELEPHONE ENCOUNTER
Spoke with pt daughter, confirmed pt has been compliant with cholesterol medication. Stated her vascular doctor recommended to increase Lipitor to 40 mg due to 70-80% carotid artery blockage. Is currently taking  Carvedilol 12.5 mg BID, hydrochlorothiazide 12.5 mg daily, losartan 25 mg 0.5 tablet daily, Xarelto 20 mg daily, aspirin 81 mg daily, and Lipitor 20 mg daily.

## 2022-06-02 NOTE — TELEPHONE ENCOUNTER
----- Message from Xiomara Dow MD sent at 6/2/2022  9:02 AM EDT -----  Please contact patient and do the following asap    Check if compliant with cholesterol meds  Get the names and doses of meds that patient takes and show me asap  Please reinforce diet and exercise.

## 2022-08-09 DIAGNOSIS — I65.22 CAROTID STENOSIS, ASYMPTOMATIC, LEFT: ICD-10-CM

## 2022-10-11 DIAGNOSIS — E78.00 PURE HYPERCHOLESTEROLEMIA: ICD-10-CM

## 2022-10-11 DIAGNOSIS — I42.8 NONISCHEMIC CARDIOMYOPATHY (HCC): ICD-10-CM

## 2022-10-11 DIAGNOSIS — I25.10 CORONARY ARTERY DISEASE INVOLVING NATIVE CORONARY ARTERY OF NATIVE HEART WITHOUT ANGINA PECTORIS: ICD-10-CM

## 2022-10-11 RX ORDER — CARVEDILOL 12.5 MG/1
12.5 TABLET ORAL 2 TIMES DAILY
Qty: 60 TABLET | Refills: 5 | Status: SHIPPED | OUTPATIENT
Start: 2022-10-11

## 2022-10-11 RX ORDER — EZETIMIBE 10 MG/1
10 TABLET ORAL DAILY
Qty: 30 TABLET | Refills: 5 | Status: SHIPPED | OUTPATIENT
Start: 2022-10-11

## 2022-11-01 LAB
ALBUMIN SERPL-MCNC: 4.1 G/DL (ref 3.7–4.7)
ALP SERPL-CCNC: 100 IU/L (ref 44–121)
ALT SERPL-CCNC: 18 IU/L (ref 0–32)
AST SERPL-CCNC: 21 IU/L (ref 0–40)
BILIRUB DIRECT SERPL-MCNC: 0.18 MG/DL (ref 0–0.4)
BILIRUB SERPL-MCNC: 0.6 MG/DL (ref 0–1.2)
CHOLEST SERPL-MCNC: 95 MG/DL (ref 100–199)
HDLC SERPL-MCNC: 42 MG/DL
LDLC SERPL CALC-MCNC: 38 MG/DL (ref 0–99)
PROT SERPL-MCNC: 7.3 G/DL (ref 6–8.5)
TRIGL SERPL-MCNC: 69 MG/DL (ref 0–149)
VLDLC SERPL CALC-MCNC: 15 MG/DL (ref 5–40)

## 2022-11-03 ENCOUNTER — OFFICE VISIT (OUTPATIENT)
Dept: CARDIOLOGY CLINIC | Age: 72
End: 2022-11-03
Payer: MEDICARE

## 2022-11-03 VITALS
WEIGHT: 150 LBS | HEIGHT: 64 IN | SYSTOLIC BLOOD PRESSURE: 133 MMHG | OXYGEN SATURATION: 92 % | BODY MASS INDEX: 25.61 KG/M2 | DIASTOLIC BLOOD PRESSURE: 70 MMHG | HEART RATE: 84 BPM

## 2022-11-03 DIAGNOSIS — I50.22 CHRONIC SYSTOLIC CONGESTIVE HEART FAILURE (HCC): ICD-10-CM

## 2022-11-03 DIAGNOSIS — I42.8 NONISCHEMIC CARDIOMYOPATHY (HCC): Primary | ICD-10-CM

## 2022-11-03 DIAGNOSIS — Z95.0 PACEMAKER: ICD-10-CM

## 2022-11-03 DIAGNOSIS — E78.00 PURE HYPERCHOLESTEROLEMIA: ICD-10-CM

## 2022-11-03 DIAGNOSIS — F17.200 TOBACCO USE DISORDER: ICD-10-CM

## 2022-11-03 PROCEDURE — 99214 OFFICE O/P EST MOD 30 MIN: CPT | Performed by: INTERNAL MEDICINE

## 2022-11-03 PROCEDURE — G8754 DIAS BP LESS 90: HCPCS | Performed by: INTERNAL MEDICINE

## 2022-11-03 PROCEDURE — 3017F COLORECTAL CA SCREEN DOC REV: CPT | Performed by: INTERNAL MEDICINE

## 2022-11-03 PROCEDURE — G8752 SYS BP LESS 140: HCPCS | Performed by: INTERNAL MEDICINE

## 2022-11-03 PROCEDURE — G8536 NO DOC ELDER MAL SCRN: HCPCS | Performed by: INTERNAL MEDICINE

## 2022-11-03 PROCEDURE — G8427 DOCREV CUR MEDS BY ELIG CLIN: HCPCS | Performed by: INTERNAL MEDICINE

## 2022-11-03 PROCEDURE — G8400 PT W/DXA NO RESULTS DOC: HCPCS | Performed by: INTERNAL MEDICINE

## 2022-11-03 PROCEDURE — 3078F DIAST BP <80 MM HG: CPT | Performed by: INTERNAL MEDICINE

## 2022-11-03 PROCEDURE — 1090F PRES/ABSN URINE INCON ASSESS: CPT | Performed by: INTERNAL MEDICINE

## 2022-11-03 PROCEDURE — 1101F PT FALLS ASSESS-DOCD LE1/YR: CPT | Performed by: INTERNAL MEDICINE

## 2022-11-03 PROCEDURE — G8432 DEP SCR NOT DOC, RNG: HCPCS | Performed by: INTERNAL MEDICINE

## 2022-11-03 PROCEDURE — 1123F ACP DISCUSS/DSCN MKR DOCD: CPT | Performed by: INTERNAL MEDICINE

## 2022-11-03 PROCEDURE — 3074F SYST BP LT 130 MM HG: CPT | Performed by: INTERNAL MEDICINE

## 2022-11-03 PROCEDURE — G8417 CALC BMI ABV UP PARAM F/U: HCPCS | Performed by: INTERNAL MEDICINE

## 2022-11-03 NOTE — PATIENT INSTRUCTIONS
There are no discontinued medications. Avoiding Triggers With Heart Failure: Care Instructions  Your Care Instructions     Triggers are anything that make your heart failure flare up. A flare-up is also called \"sudden heart failure\" or \"acute heart failure. \" When you have a flare-up, fluid builds up in your lungs, and you have problems breathing. You might need to go to the hospital. By watching for changes in your condition and avoiding triggers, you can prevent heart failure flare-ups. Follow-up care is a key part of your treatment and safety. Be sure to make and go to all appointments, and call your doctor if you are having problems. It's also a good idea to know your test results and keep a list of the medicines you take. How can you care for yourself at home? Watch for changes in your weight and condition  Weigh yourself without clothing at the same time each day. Record your weight. Call your doctor if you have sudden weight gain, such as more than 2 to 3 pounds in a day or 5 pounds in a week. (Your doctor may suggest a different range of weight gain.) A sudden weight gain may mean that your heart failure is getting worse. Keep a daily record of your symptoms. Write down any changes in how you feel, such as new shortness of breath, cough, or problems eating. Also record if your ankles are more swollen than usual and if you feel more tired than usual. Note anything that you ate or did that could have triggered these changes. Limit sodium  Sodium causes your body to hold on to extra water. This may cause your heart failure symptoms to get worse. People get most of their sodium from processed foods. Fast food and restaurant meals also tend to be very high in sodium. Your doctor may suggest that you limit sodium. Your doctor can tell you how much sodium is right for you. This includes limiting sodium in cooked and packaged foods. Read food labels on cans and food packages.  They tell you how much sodium you get in one serving. Check the serving size. If you eat more than one serving, you are getting more sodium. Be aware that sodium can come in forms other than salt, including monosodium glutamate (MSG), sodium citrate, and sodium bicarbonate (baking soda). MSG is often added to Asian food. You can sometimes ask for food without MSG or salt. Slowly reducing salt will help you adjust to the taste. Take the salt shaker off the table. Flavor your food with garlic, lemon juice, onion, vinegar, herbs, and spices instead of salt. Do not use soy sauce, steak sauce, onion salt, garlic salt, mustard, or ketchup on your food, unless it is labeled \"low-sodium\" or \"low-salt. \"  Make your own salad dressings, sauces, and ketchup without adding salt. Use fresh or frozen ingredients, instead of canned ones, whenever you can. Choose low-sodium canned goods. Eat less processed food and food from restaurants, including fast food. Exercise as directed  Moderate, regular exercise is very good for your heart. It improves your blood flow and helps control your weight. But too much exercise can stress your heart and cause a heart failure flare-up. Check with your doctor before you start an exercise program.  Walking is an easy way to get exercise. Start out slowly. Gradually increase the length and pace of your walk. Swimming, riding a bike, and using a treadmill are also good forms of exercise. When you exercise, watch for signs that your heart is working too hard. You are pushing yourself too hard if you cannot talk while you are exercising. If you become short of breath or dizzy or have chest pain, stop, sit down, and rest.  Do not exercise when you do not feel well. Take medicines correctly  Take your medicines exactly as prescribed. Call your doctor if you think you are having a problem with your medicine. Make a list of all the medicines you take.  Include those prescribed to you by other doctors and any over-the-counter medicines, vitamins, or supplements you take. Take this list with you when you go to any doctor. Take your medicines at the same time every day. It may help you to post a list of all the medicines you take every day and what time of day you take them. Make taking your medicine as simple as you can. Plan times to take your medicines when you are doing other things, such as eating a meal or getting ready for bed. This will make it easier to remember to take your medicines. Get organized. Use helpful tools, such as daily or weekly pill containers. When should you call for help? Call 911  if you have symptoms of sudden heart failure such as: You have severe trouble breathing. You cough up pink, foamy mucus. You have a new irregular or rapid heartbeat. Call your doctor now or seek immediate medical care if:    You have new or increased shortness of breath. You are dizzy or lightheaded, or you feel like you may faint. You have sudden weight gain, such as more than 2 to 3 pounds in a day or 5 pounds in a week. (Your doctor may suggest a different range of weight gain.)     You have increased swelling in your legs, ankles, or feet. You are suddenly so tired or weak that you cannot do your usual activities. Watch closely for changes in your health, and be sure to contact your doctor if you develop new symptoms. Where can you learn more? Go to http://www.gray.com/  Enter V089 in the search box to learn more about \"Avoiding Triggers With Heart Failure: Care Instructions. \"  Current as of: January 10, 2022               Content Version: 13.4  © 2006-2022 [employer]. Care instructions adapted under license by [employer] (which disclaims liability or warranty for this information).  If you have questions about a medical condition or this instruction, always ask your healthcare professional. Veronika Christianson disclaims any warranty or liability for your use of this information.

## 2022-11-03 NOTE — PROGRESS NOTES
1. Have you been to the ER, urgent care clinic since your last visit? Hospitalized since your last visit?     no  2. Have you seen or consulted any other health care providers outside of the [address] [place] [address] since your last visit? Include any pap smears or colon screening. No     3. Since your last visit, have you had any of the following symptoms? shortness of breath. Explain:    4. Have you had any blood work, X-rays or cardiac testing? No         5. Where do you normally have your labs drawn? 6. Do you need any refills today?    no

## 2022-11-03 NOTE — PROGRESS NOTES
HISTORY OF PRESENT ILLNESS  Eric Mejia is a 67 y.o. female. F/u AFib, HTN, CHF, CMP, MR, HLD, s/p DDD    5/22 had nosebleed 2 days ago which lasted almost 12 hours but eventually stopped. 11/21 status post cardiac cath when she was found to have normal coronaries and improved ejection fraction. 3/21 says that has involuntary movements of arm/leg or even the trunk. Happening 2-3/wk  3/18 seen after low EF, wma and stress test today  7/17 continues with dizziness, rare LOC/fall- last in 6/17 1/17 has a cold for 6 wks    Follow-up  Associated symptoms include shortness of breath. Pertinent negatives include no chest pain, no abdominal pain and no headaches. Palpitations   The history is provided by the Medical records (perm AF). This is a recurrent problem. The current episode started more than 1 week ago. Progression since onset: few days. The problem occurs rarely (off and on). On average, each episode lasts 2 minutes (permanent AF on pacer check). Associated symptoms include irregular heartbeat, lower extremity edema, dizziness and shortness of breath. Pertinent negatives include no diaphoresis, no fever, no chest pain, no claudication, no orthopnea, no PND, no syncope, no abdominal pain, no nausea, no vomiting, no headaches, no weakness, no cough, no hemoptysis and no sputum production. Risk factors include diabetes mellitus, hypertension, smoking/tobacco exposure and dyslipidemia. Her past medical history is significant for DM, hypertension and atrial fibrillation. Dizziness  The history is provided by the Patient. This is a new problem. The current episode started more than 1 week ago. The problem occurs every several days (3/wk; 5-10 mts; 6/17 LOC for few seconds; 5/22 vertigo). The problem has been gradually improving (Likely equilibrium issue/vertigo now 5/19). Associated symptoms include shortness of breath. Pertinent negatives include no chest pain, no abdominal pain and no headaches.  The symptoms are aggravated by standing, walking and bending (looking/reaching up; sitting). The symptoms are relieved by rest and laying down. She has tried nothing for the symptoms. Shortness of Breath  The history is provided by the Patient. This is a chronic problem. The problem occurs intermittently. The current episode started more than 1 week ago. The problem has not changed since onset. Associated symptoms include leg swelling. Pertinent negatives include no fever, no headaches, no ear pain, no neck pain, no cough, no sputum production, no hemoptysis, no wheezing, no PND, no orthopnea, no chest pain, no syncope, no vomiting, no abdominal pain, no rash and no claudication. The problem's precipitants include exercise (2 blocks; 4/18 within house; 11/18 1 block; 11/19 2 blocks; 3/21 200 ft; ). Leg Swelling  The history is provided by the Patient. This is a new problem. The current episode started more than 1 week ago (4/18). The problem occurs every several days. The problem has not changed since onset. Associated symptoms include shortness of breath. Pertinent negatives include no chest pain, no abdominal pain and no headaches. The symptoms are relieved by medications. Review of Systems   Constitutional:  Negative for chills, diaphoresis, fever and weight loss. HENT:  Negative for ear pain and hearing loss. Eyes:  Negative for blurred vision. Respiratory:  Positive for shortness of breath. Negative for cough, hemoptysis, sputum production, wheezing and stridor. Cardiovascular:  Positive for palpitations (perm AFib) and leg swelling. Negative for chest pain, orthopnea, claudication, syncope and PND. Gastrointestinal:  Negative for abdominal pain, heartburn, nausea and vomiting. Musculoskeletal:  Negative for myalgias and neck pain. Skin:  Negative for rash. Neurological:  Positive for dizziness. Negative for tingling, tremors, focal weakness, loss of consciousness, weakness and headaches. Psychiatric/Behavioral:  Negative for depression and suicidal ideas. Allergies   Allergen Reactions    Lisinopril Cough     Patient denies         Past Medical History:   Diagnosis Date    Abnormal myocardial perfusion study 05/07/2010    Anterior ischemia c/w at least 1-vessel CAD. No WMA. EF 51%. Positive EKG at 78% pred max HR. Ex time 7 min 30 sec.  Acute systolic congestive heart failure ([address] 4/9/2018 4/18 new, likely she was told of low EF and MI- med Rx for now    Allergic rhinitis 1/30/2010    Atrial fibrillation ([address]     Broken ankle 08/22/2013    left    Carotid stenosis 1/30/2010    Chronic diastolic congestive heart failure ([address] 2/16/2018 2/18 NYHA2    COPD     Essential hypertension     [facility]     History of amiodarone therapy 5/11/2015    History of cardiac cath 05/25/2010    Patent coronary arteries. LVEDP 13 mmHg. EF 65%.  History of cervical cancer 11/05    History of echocardiogram 08/21/2013    EF 50-55%. No RWMA. Gr 2 DDfx. Mild LAE. Mild MR.      Hypertension     MI (myocardial infarction) ([address]     March/April 2018    Migraines 1/30/2010    Mitral regurgitation     Orthostatic dizziness 3/30/2016    ? autonomic dysfunction Vs amio side effect     Pacemaker 11/25/13    DDD    Pure hypercholesterolemia 1/30/2010    Rosacea 1/30/2010    Type II or unspecified type diabetes mellitus without mention of complication, not stated as uncontrolled 1/30/2010    Unspecified hypothyroidism 1/30/2010       Family History   Problem Relation Age of Onset    Hypertension Mother     Cancer Maternal Aunt         breast CA 66yo    Breast Cancer Maternal Aunt 79    Diabetes Maternal Grandmother     Cancer Maternal Aunt         uterine CA    Diabetes Daughter     Other Daughter         lupus (SLE) and coagulopathy    Breast Cancer Paternal Aunt 61    Heart Attack Sister     Heart Disease Neg Hx        Social History     Tobacco Use    Smoking status: Every Day     Packs/day: 0.50     Years: 42.00     Pack years: 21.00     Types: Cigarettes     Last attempt to quit: 3/30/2018     Years since quittin.6    Smokeless tobacco: Never    Tobacco comments:     10 cigs per day   Vaping Use    Vaping Use: Never used   Substance Use Topics    Alcohol use: No     Alcohol/week: 0.0 standard drinks    Drug use: No        Current Outpatient Medications   Medication Sig    carvediloL (COREG) 12.5 mg tablet Take 1 Tablet by mouth two (2) times a day.  ezetimibe (Zetia) 10 mg tablet Take 1 Tablet by mouth daily.  rivaroxaban (Xarelto) 20 mg tab tablet Take 1 Tablet by mouth daily (with dinner).  atorvastatin (LIPITOR) 40 mg tablet Take 1 Tablet by mouth daily.  hydroCHLOROthiazide (HYDRODIURIL) 12.5 mg tablet Take 1 Tablet by mouth every other day.  losartan (COZAAR) 25 mg tablet Take 0.5 Tablets by mouth daily.  ferrous sulfate 325 mg (65 mg iron) tablet Take  by mouth Daily (before breakfast).  Comp Stocking,Knee,Regular,Sml misc 2 Box by Does Not Apply route daily. Use while upright  Indications: edema    nitroglycerin (NITROSTAT) 0.4 mg SL tablet 1 Tab by SubLINGual route every five (5) minutes as needed for Chest Pain for up to 3 doses.  oxyCODONE IR (ROXICODONE) 30 mg immediate release tablet Take 20 mg by mouth six (6) times daily.  omeprazole (PRILOSEC) 20 mg capsule Take 1 Cap by mouth daily.  aspirin 81 mg chewable tablet Take 1 Tab by mouth daily. Indications: myocardial infarction prevention    naloxone 4 mg/actuation spry 4 mg by Nasal route as needed for up to 2 doses. Indications: OPIOID TOXICITY    naloxegol (MOVANTIK) 25 mg tab tablet Take 25 mg by mouth daily.  insulin lispro (HUMALOG) 100 unit/mL injection by SubCUTAneous route.  insulin glargine (LANTUS) 100 unit/mL injection by SubCUTAneous route nightly.  8 units    ipratropium-albuterol (COMBIVENT RESPIMAT)  mcg/actuation inhaler Take 1 Puff by inhalation every six (6) hours as needed for Wheezing or Shortness of Breath.  pyridoxine, vitamin B6, (VITAMIN B-6) 100 mg tablet Take 100 mg by mouth daily.  fish oil-dha-epa 1,200-144-216 mg cap Take 1 Cap by mouth daily.  levothyroxine (SYNTHROID) 125 mcg tablet Take 125 mcg by mouth Daily (before breakfast).  tiotropium (SPIRIVA WITH HANDIHALER) 18 mcg inhalation capsule Take 1 Cap by inhalation daily.  cholecalciferol (VITAMIN D3) (1000 Units /25 mcg) tablet Take 5,000 Units by mouth daily. No current facility-administered medications for this visit. Past Surgical History:   Procedure Laterality Date    HX AFIB ABLATION  2/8/13    Dr She Renee HX CAROTID ENDARTERECTOMY  11/06    right Dr. Tr Rainey HX HYSTERECTOMY  5/06    HX ORTHOPAEDIC Right 07/11/2016    knee replacement    HX PACEMAKER  11/25/13    DDD    HX SVT ABLATION  2/18/2013       Visit Vitals  /70   Pulse 84   Ht 5' 4\" (1.626 m)   Wt 68 kg (150 lb)   SpO2 92%   BMI 25.75 kg/m²       Diagnostic Studies:  I have reviewed the relevant tests done on the patient and show as follows  EKG tracings reviewed by me today. CARDIOLOGY STUDIES 3/30/2018   Pharmacological Nuclear Stress Test Result large fixed ant/apical/sept defect, 40%EF   Some recent data might be hidden   2010 cardiac cath   IMPRESSIONS:     1. NORMAL EPICARDIAL CORONARY ARTERIES OTHER THAN MINOR LUMINAL IRREGULARITIES. 2. NORMAL LEFT VENTRICULAR WALL MOTION AND EJECTION FRACTION. 3/18 [facility] Stress  Conclusion:  Abnormal perfusion scan. Evidence of a large sized fixed defect involving LV territory in the apical and mid anterior and anteroseptal areas. This indicates previous myocardial infarction most likely. This represents a high risk scan. 1/19 ECHO Interpretation Summary     Normal cavity size. Mild concentric hypertrophy observed. There is moderately decreased systolic function.  The estimated ejection fraction is 36 - 40%. Abnormal wall motion as described on the wall scoring diagram below. Global longitudinal strain is -4.90%. Abnormal left ventricular strain. There is severe (grade 3) left ventricular diastolic dysfunction. Left atrial cavity size is severely dilated. Right ventricular global systolic function is reduced. Pacing wire present  Mitral valve thickening. Mild mitral valve regurgitation. Mild tricuspid valve regurgitation. Pulmonary arterial systolic pressure is 48.6 mmHg. Mild pulmonary hypertension. Comparison Study Information     Prior Study     When compared to the previous study from 3/23/18, there is no longer evidence of a small pericardial effusion. 06/16/21    CARDIAC PROCEDURE 06/17/2021 6/17/2021    Conclusion  · Normal epicardial coronary arteries. 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 · The flow of dye was slow in all the coronary arteries. · Procedure done via right femoral artery without any complications with 4 [place] catheters. Risk factor modification to continue. Her EF has significantly improved and normalized since last year. Medical treatment for CHF and risk factors to continue. Diuretics can be reduced. Signed by: Cesar Godinez MD on 6/17/2021  8:35 AM    Ms. Yoo has a reminder for a \"due or due soon\" health maintenance. I have asked that she contact her primary care provider for follow-up on this health maintenance. Physical Exam  Constitutional:       General: She is not in acute distress. Appearance: She is well-developed. HENT:      Head: Normocephalic and atraumatic. Mouth/Throat:      Dentition: Normal dentition. Eyes:      General: No scleral icterus. Right eye: No discharge. Left eye: No discharge. Neck:      Thyroid: No thyromegaly. Vascular: Carotid bruit (b/l) present. No JVD.    Cardiovascular:      Rate and Rhythm: Normal rate and regular rhythm. Pulses: Intact distal pulses. Heart sounds: S1 normal and S2 normal. Murmur heard. Scratchy early systolic murmur is present with a grade of 3/6 at the upper right sternal border. High-pitched blowing midsystolic murmur of grade 2/6 is also present at the apex. High-pitched blowing decrescendo early diastolic murmur is present with a grade of 2/4 at the upper right sternal border radiating to the apex. No friction rub. No gallop. Pulmonary:      Effort: Pulmonary effort is normal.      Breath sounds: Normal breath sounds. No wheezing or rales. Abdominal:      Palpations: Abdomen is soft. There is no mass. Tenderness: There is no abdominal tenderness. Musculoskeletal:      Cervical back: Neck supple. Right lower leg: No edema. Left lower leg: No edema. Lymphadenopathy:      Cervical:      Right cervical: No superficial cervical adenopathy. Left cervical: No superficial cervical adenopathy. Skin:     General: Skin is warm and dry. Findings: No rash. Neurological:      Mental Status: She is alert and oriented to person, place, and time. Psychiatric:         Behavior: Behavior normal.   04/08/21    ECHO ADULT COMPLETE 04/08/2021 4/8/2021    Interpretation Summary  · LV: Estimated LVEF is 50 - 55%. Visually measured ejection fraction. Normal cavity size. Mild concentric hypertrophy. Low normal systolic function. Wall motion: normal. Inconclusive left ventricular diastolic function. · Pericardium: Trivial pericardial effusion adjacent to right atrium. · RV: Pacing wire present  · PA: Pulmonary arterial systolic pressure is 27 mmHg. · LA: Severely dilated left atrium. Left Atrium volume index is 60 mL/m2. Signed by: Bret Chávez MD on 4/8/2021  3:30 PM        ASSESSMENT and PLAN    Patient advised to stop smoking to reduce future cardiovascular events.      Latest Reference Range & Units 4/2/21 10:07 6/1/22 10:41 10/31/22 12:11 Triglyceride 0 - 149 mg/dL 158 (H) 93 69   Cholesterol, total 100 - 199 mg/dL 109 158 95 (L)   HDL Cholesterol >39 mg/dL 47 48 42   LDL, calculated 0 - 99 mg/dL 36 93 38   VLDL, calculated 5 - 40 mg/dL 26 17 15   (H): Data is abnormally high  (L): Data is abnormally low    AFib: Permanent; 3/18; 2/18; 8/17 on xarelto;   Persistent despite ablation  CAD: 3/18 large fixed LAD defect, 40%EF;   DDD to [facility] : Dual chamber [employer] pacemaker implant 11/25/13  Office Checks: 12/13 nl function; 1/15 nl function, freq but very short AHR(likely a fib); 1/16 freq long AF, nl function; 1/17; 7/17; 6/18 nl function, perm AFib; 2/19 normal function  Remote Checks : 3/14; 10/14; 4/16; 10/16; 12/17; 11/18; 1/21; 5/22 nl function    11/19 CHF is fairly stable clinically. Chest pain has resolved. Will wait on cardiac cath. [facility] Goodson was not covered. Will continue low-dose losartan. [facility] is overdue. Requested patient to send us from home ASA.    3/22/2021. CHF is worsening with worsening dyspnea. Add HCTZ which will be helpful for hypertension as well as CHF. Follow-up electrolytes closely. Blood pressure is elevated. Check home chart as HCTZ 30. Pacemaker function normal.  CAD seems to be clinically stable. A. fib is persistent with dependency on the permanent pacemaker which is functioning normally. Complains of involuntary movements of arms legs and trunk at different times. Refer to neurology for an opinion. 6/14/2021 CAD seems to be having unstable symptoms as she used nitroglycerin once with near immediate significant relief. And describes frequent chest tightness. CCS class II-III. A. fib is stable. CHF is compensated despite not taking Lasix anymore. Continue present medications but would recommend a cardiac catheterization to evaluate coronary anatomy. Procedure discussed with patient and daughter and they agreed. Will be scheduled for Wednesday this week.     The benefits and risks of cardiac catheterization have been discussed in detail with the patient and daughter present at the time. Patient understands  risk of potential cath complications including but not limited to bleeding, infection, difficulty healing the arteriotomy access site(2 chances in 100) which may require surgical repair, potential thromboembolic complications which could result in stroke, myocardial infarction, vascular injury, loss of limb or organ function and/or death( 1 chance in 1000)and potential allergic reaction to contrast dye or other medication used during the procedure. Patient is also aware of the therapeutic implications for medical management vs coronary artery bypass surgery vs percutaneous coronary intervention in treatment of coronary artery disease. The additional risks for percutaneous coronary artery intervention include the need for emergent bypass surgery at 1 chance in 100 and for restenosis which may range from 35% for plain balloon angioplasty, 15% for bare metal stent, and 5% for drug eluting stent implants. The need for mandatory uninterrupted dual antiplatelet therapy with lifelong Aspirin combined with Plavix for up to 12 months following drug eluting stents, and minimum 1 month following bare metal stents to prevent stent thrombosis which is the equivalent of acute heart attack has been reviewed in detail. No contraindications to use of drug eluting stents has been discovered. 11/15/2021 as CHF is well compensated NYHA class II-III which I think is mostly due to smoking and COPD  A. fib is stable with controlled rate. Continue anticoagulation. Pacer check today and is functioning normally. We will refer to vascular due to history of CVA. Tobacco cessation reinforced. She said she will try her best.  Reduce losartan to 12.5 mg a day as EF is improved and blood pressure is mildly low. It might help her dizziness. 5/20/2022 CHF is compensated NYHA class II but she does not walk too much.   A. fib is stable. Continue anticoagulation. She had bleeding from the nose recently and will call her ENT physician to check it up. May need cautery of from blood vessel in the nose. Check a renal function and may need to adjust the dose of Xarelto also. Tobacco cessation reinforced and she understands. Labs as ordered. Diagnoses and all orders for this visit:    1. Nonischemic cardiomyopathy ([address]  -     ECHO ADULT COMPLETE; Future    2. Chronic systolic congestive heart failure (HCC)  -     ECHO ADULT COMPLETE; Future    3. Pure hypercholesterolemia    4. Tobacco use disorder    5. Pacemaker- DDD to [facility]        Pertinent laboratory and test data reviewed and discussed with patient. See patient instructions also for other medical advice given    There are no discontinued medications. Follow-up and Dispositions    Return in about 6 months (around 5/3/2023), or if symptoms worsen or fail to improve, for post test, get [facility] asap. 11/3/2022 CHF is compensated NYHA class II. Continues to smoke and smoking cessation reinforced. Pacemaker function was normal by [facility] in 5/22 and she will send it again hopefully today  Lipids are well controlled  Healthy lifestyle discussed and she is trying to make some changes.

## 2022-11-14 ENCOUNTER — OFFICE VISIT (OUTPATIENT)
Dept: CARDIOLOGY CLINIC | Age: 72
End: 2022-11-14
Payer: MEDICARE

## 2022-11-14 DIAGNOSIS — Z95.0 PACEMAKER: Primary | ICD-10-CM

## 2022-11-14 PROCEDURE — 93281 PM DEVICE PROGR EVAL MULTI: CPT | Performed by: INTERNAL MEDICINE

## 2022-12-22 DIAGNOSIS — I50.22 CHRONIC SYSTOLIC CONGESTIVE HEART FAILURE (HCC): ICD-10-CM

## 2022-12-22 NOTE — TELEPHONE ENCOUNTER
Requested Prescriptions     Pending Prescriptions Disp Refills    losartan (COZAAR) 25 mg tablet 90 Tablet 1     Sig: Take 0.5 Tablets by mouth daily.

## 2022-12-23 RX ORDER — LOSARTAN POTASSIUM 25 MG/1
12.5 TABLET ORAL DAILY
Qty: 90 TABLET | Refills: 1 | Status: SHIPPED | OUTPATIENT
Start: 2022-12-23

## 2023-02-20 RX ORDER — RIVAROXABAN 20 MG/1
TABLET, FILM COATED ORAL
Qty: 90 TABLET | Refills: 0 | Status: SHIPPED | OUTPATIENT
Start: 2023-02-20

## 2023-03-07 RX ORDER — HYDROCHLOROTHIAZIDE 12.5 MG/1
TABLET ORAL
Qty: 30 TABLET | Refills: 5 | Status: SHIPPED | OUTPATIENT
Start: 2023-03-07

## 2023-03-07 NOTE — TELEPHONE ENCOUNTER
Requested Prescriptions     Pending Prescriptions Disp Refills    hydroCHLOROthiazide (HYDRODIURIL) 12.5 MG tablet [Pharmacy Med Name: hydroCHLOROthiazide 12.5 MG Oral Tablet] 30 tablet 5     Sig: TAKE 1 TABLET BY MOUTH EVERY OTHER DAY

## 2023-04-19 RX ORDER — CARVEDILOL 12.5 MG/1
12.5 TABLET ORAL 2 TIMES DAILY
Qty: 180 TABLET | Refills: 1 | Status: SHIPPED | OUTPATIENT
Start: 2023-04-19

## 2023-04-19 RX ORDER — EZETIMIBE 10 MG/1
10 TABLET ORAL DAILY
Qty: 90 TABLET | Refills: 1 | Status: SHIPPED | OUTPATIENT
Start: 2023-04-19

## 2023-05-19 ENCOUNTER — OFFICE VISIT (OUTPATIENT)
Age: 73
End: 2023-05-19
Payer: MEDICARE

## 2023-05-19 VITALS
HEIGHT: 64 IN | BODY MASS INDEX: 25.44 KG/M2 | SYSTOLIC BLOOD PRESSURE: 118 MMHG | WEIGHT: 149 LBS | DIASTOLIC BLOOD PRESSURE: 58 MMHG | HEART RATE: 84 BPM | OXYGEN SATURATION: 93 %

## 2023-05-19 DIAGNOSIS — I34.0 NONRHEUMATIC MITRAL VALVE REGURGITATION: ICD-10-CM

## 2023-05-19 DIAGNOSIS — Z71.6 TOBACCO ABUSE COUNSELING: ICD-10-CM

## 2023-05-19 DIAGNOSIS — I48.11 LONGSTANDING PERSISTENT ATRIAL FIBRILLATION (HCC): ICD-10-CM

## 2023-05-19 DIAGNOSIS — E78.00 PURE HYPERCHOLESTEROLEMIA, UNSPECIFIED: ICD-10-CM

## 2023-05-19 DIAGNOSIS — R06.02 SOB (SHORTNESS OF BREATH) ON EXERTION: ICD-10-CM

## 2023-05-19 DIAGNOSIS — I42.8 NONISCHEMIC CARDIOMYOPATHY (HCC): Primary | ICD-10-CM

## 2023-05-19 DIAGNOSIS — F17.210 CIGARETTE NICOTINE DEPENDENCE WITHOUT COMPLICATION: ICD-10-CM

## 2023-05-19 DIAGNOSIS — I50.22 CHRONIC SYSTOLIC (CONGESTIVE) HEART FAILURE (HCC): ICD-10-CM

## 2023-05-19 DIAGNOSIS — Z95.0 PRESENCE OF CARDIAC PACEMAKER: ICD-10-CM

## 2023-05-19 PROCEDURE — 3017F COLORECTAL CA SCREEN DOC REV: CPT | Performed by: INTERNAL MEDICINE

## 2023-05-19 PROCEDURE — G8427 DOCREV CUR MEDS BY ELIG CLIN: HCPCS | Performed by: INTERNAL MEDICINE

## 2023-05-19 PROCEDURE — G8419 CALC BMI OUT NRM PARAM NOF/U: HCPCS | Performed by: INTERNAL MEDICINE

## 2023-05-19 PROCEDURE — 3074F SYST BP LT 130 MM HG: CPT | Performed by: INTERNAL MEDICINE

## 2023-05-19 PROCEDURE — 3078F DIAST BP <80 MM HG: CPT | Performed by: INTERNAL MEDICINE

## 2023-05-19 PROCEDURE — 1090F PRES/ABSN URINE INCON ASSESS: CPT | Performed by: INTERNAL MEDICINE

## 2023-05-19 PROCEDURE — 4004F PT TOBACCO SCREEN RCVD TLK: CPT | Performed by: INTERNAL MEDICINE

## 2023-05-19 PROCEDURE — G8400 PT W/DXA NO RESULTS DOC: HCPCS | Performed by: INTERNAL MEDICINE

## 2023-05-19 PROCEDURE — 99214 OFFICE O/P EST MOD 30 MIN: CPT | Performed by: INTERNAL MEDICINE

## 2023-05-19 PROCEDURE — 1123F ACP DISCUSS/DSCN MKR DOCD: CPT | Performed by: INTERNAL MEDICINE

## 2023-05-19 ASSESSMENT — ENCOUNTER SYMPTOMS
SHORTNESS OF BREATH: 1
EYES NEGATIVE: 1
GASTROINTESTINAL NEGATIVE: 1

## 2023-05-31 ENCOUNTER — PROCEDURE VISIT (OUTPATIENT)
Age: 73
End: 2023-05-31

## 2023-05-31 DIAGNOSIS — Z95.0 PRESENCE OF CARDIAC PACEMAKER: Primary | ICD-10-CM

## 2023-05-31 DIAGNOSIS — I50.22 CHRONIC SYSTOLIC (CONGESTIVE) HEART FAILURE (HCC): ICD-10-CM

## 2023-06-01 NOTE — RESULT ENCOUNTER NOTE
VVI pacemaker:    Normal battery life  Normal lead impedance  Mildly elevated but stable capture threshold for ventricular lead  Acceptable programmed parameters  Tachyarrhythmia: None seen  100% V pacing    Normal pacer function    Recommendation: Continue follow-up of pacemaker every 3 months

## 2023-06-22 RX ORDER — ATORVASTATIN CALCIUM 40 MG/1
40 TABLET, FILM COATED ORAL DAILY
Qty: 90 TABLET | Refills: 1 | Status: SHIPPED | OUTPATIENT
Start: 2023-06-22

## 2023-07-26 NOTE — H&P (VIEW-ONLY)
HISTORY OF PRESENT ILLNESS Amee Moseley is a 66 y.o. female. F/u AFib, HTN, CHF, MR, HLD, s/p DDD 
 
3/21 says that has involuntary movements of arm/leg or even the trunk. Happening 2-3/wk 3/18 seen after low EF, wma and stress test today 7/17 continues with dizziness, rare LOC/fall- last in 6/17 1/17 has a cold for 6 wks Hypertension The history is provided by the medical records. This is a chronic problem. Associated symptoms include chest pain and shortness of breath. Pertinent negatives include no abdominal pain and no headaches. Palpitations The history is provided by the medical records (perm AF). This is a recurrent problem. The current episode started more than 1 week ago. Progression since onset: few days. The problem occurs rarely (off and on). Episode Length: permanent AF on pacer check. Associated symptoms include chest pain, irregular heartbeat, lower extremity edema, dizziness and shortness of breath. Pertinent negatives include no diaphoresis, no fever, no claudication, no orthopnea, no PND, no syncope, no abdominal pain, no nausea, no vomiting, no headaches, no weakness, no cough, no hemoptysis and no sputum production. Risk factors include diabetes mellitus, hypertension, smoking/tobacco exposure and dyslipidemia. Her past medical history is significant for DM, hypertension and atrial fibrillation. CHF The history is provided by the medical records. This is a chronic problem. Associated symptoms include chest pain and shortness of breath. Pertinent negatives include no abdominal pain and no headaches. Cholesterol Problem The history is provided by the medical records. This is a chronic problem. Associated symptoms include chest pain and shortness of breath. Pertinent negatives include no abdominal pain and no headaches. Cardiomyopathy The history is provided by the medical records (ischemic). This is a chronic problem.  Associated symptoms include chest pain and shortness of breath. Pertinent negatives include no abdominal pain and no headaches. Dizziness The history is provided by the patient. This is a new problem. The current episode started more than 1 week ago. The problem occurs every several days (3/wk; 5-10 mts; 6/17 LOC for few seconds). The problem has not changed (Likely equilibrium issue/vertigo now 5/19) since onset. Associated symptoms include chest pain and shortness of breath. Pertinent negatives include no abdominal pain and no headaches. The symptoms are aggravated by standing, walking and bending (looking/reaching up; sitting). The symptoms are relieved by rest and laying down. She has tried nothing for the symptoms. Shortness of Breath The history is provided by the patient. This is a chronic problem. The problem occurs intermittently. The current episode started more than 1 week ago. The problem has not changed since onset. Associated symptoms include chest pain and leg swelling. Pertinent negatives include no fever, no headaches, no ear pain, no neck pain, no cough, no sputum production, no hemoptysis, no wheezing, no PND, no orthopnea, no syncope, no vomiting, no abdominal pain, no rash and no claudication. The problem's precipitants include exercise (2 blocks; 4/18 within house; 11/18 1 block; 11/19 2 blocks; 3/21 200 ft; ). Leg Swelling The history is provided by the patient. This is a new problem. The current episode started more than 1 week ago (4/18). The problem occurs every several days. The problem has not changed since onset. Associated symptoms include chest pain and shortness of breath. Pertinent negatives include no abdominal pain and no headaches. The symptoms are relieved by medications. Chest Pain (Angina) The history is provided by the patient. This is a recurrent problem. The current episode started more than 1 week ago. The problem occurs every several days (2/wk). The pain is associated with rest and normal activity.  The pain is present in the substernal region. The quality of the pain is described as tightness. The pain radiates to the upper back. Associated symptoms include dizziness, irregular heartbeat, lower extremity edema, palpitations and shortness of breath. Pertinent negatives include no abdominal pain, no claudication, no cough, no diaphoresis, no fever, no headaches, no hemoptysis, no nausea, no orthopnea, no PND, no sputum production, no syncope, no vomiting and no weakness. She has tried nitroglycerin (once) for the symptoms. The treatment provided significant relief. Her past medical history is significant for DM. Review of Systems Constitutional: Negative for chills, diaphoresis, fever and weight loss. HENT: Negative for ear pain and hearing loss. Eyes: Negative for blurred vision. Respiratory: Positive for shortness of breath. Negative for cough, hemoptysis, sputum production, wheezing and stridor. Cardiovascular: Positive for chest pain, palpitations and leg swelling. Negative for orthopnea, claudication, syncope and PND. Gastrointestinal: Negative for abdominal pain, heartburn, nausea and vomiting. Musculoskeletal: Negative for myalgias and neck pain. Skin: Negative for rash. Neurological: Positive for dizziness. Negative for tingling, tremors, focal weakness, loss of consciousness, weakness and headaches. Psychiatric/Behavioral: Negative for depression and suicidal ideas. Allergies Allergen Reactions  Lisinopril Cough Patient denies Past Medical History:  
Diagnosis Date  Abnormal myocardial perfusion study 05/07/2010 Anterior ischemia c/w at least 1-vessel CAD. No WMA. EF 51%. Positive EKG at 78% pred max HR. Ex time 7 min 30 sec.  Acute systolic congestive heart failure ([address] 4/9/2018 4/18 new, likely she was told of low EF and MI- med Rx for now  Allergic rhinitis 1/30/2010  Atrial fibrillation ([address]  Broken ankle 08/22/2013  
 left  Carotid stenosis 1/30/2010  Chronic diastolic congestive heart failure [facility] 2/16/2018 2/18 [id number]  COPD  Essential hypertension  Heart failure ([facility]  History of amiodarone therapy 5/11/2015  History of cardiac cath 05/25/2010 Patent coronary arteries. LVEDP 13 mmHg. EF 65%.  History of cervical cancer 11/05  
 History of echocardiogram 08/21/2013 EF 50-55%. No RWMA. Gr 2 DDfx. Mild LAE. Mild MR.    
 Hypertension  MI (myocardial infarction) ([facility] March/April 2018  Migraines 1/30/2010  Mitral regurgitation  Orthostatic dizziness 3/30/2016  
 ? autonomic dysfunction Vs amio side effect  Pacemaker 11/25/13 DDD  Pure hypercholesterolemia 1/30/2010  Rosacea 1/30/2010  Type II or unspecified type diabetes mellitus without mention of complication, not stated as uncontrolled 1/30/2010  Unspecified hypothyroidism 1/30/2010 Family History Problem Relation Age of Onset  Hypertension Mother  Cancer Maternal Aunt   
     breast CA 76yo  Breast Cancer Maternal Aunt 79  
 Diabetes Maternal Grandmother  Cancer Maternal Aunt   
     uterine CA  Diabetes Daughter  Other Daughter   
     lupus (SLE) and coagulopathy  Breast Cancer Paternal Aunt 61  
 Heart Attack Sister  Heart Disease Neg Hx Social History Tobacco Use  Smoking status: Current Every Day Smoker Packs/day: 0.50 Years: 42.00 Pack years: 21.00 Types: Cigarettes Last attempt to quit: 3/30/2018 Years since quitting: 3.2  Smokeless tobacco: Never Used  Tobacco comment: 10 cigs per day Vaping Use  Vaping Use: Never used Substance Use Topics  Alcohol use: No  
  Alcohol/week: 0.0 standard drinks  Drug use: No  
  
 
Current Outpatient Medications Medication Sig  carvediloL (COREG) 12.5 mg tablet Take 1 Tab by mouth two (2) times a day.   
 furosemide (LASIX) 20 mg tablet Take 1 Tab by mouth daily as needed (edema).  hydroCHLOROthiazide (HYDRODIURIL) 12.5 mg tablet Take 1 Tab by mouth daily.  losartan (COZAAR) 25 mg tablet Take 1 Tab by mouth daily.  rivaroxaban (Xarelto) 20 mg tab tablet Take 1 Tab by mouth daily (with dinner).  ferrous sulfate (IRON) 325 mg (65 mg iron) tablet Take  by mouth Daily (before breakfast).  Comp Stocking,Knee,Regular,Sml misc 2 Box by Does Not Apply route daily. Use while upright  Indications: edema  nitroglycerin (NITROSTAT) 0.4 mg SL tablet 1 Tab by SubLINGual route every five (5) minutes as needed for Chest Pain for up to 3 doses.  oxyCODONE IR (ROXICODONE) 30 mg immediate release tablet Take 20 mg by mouth six (6) times daily.  omeprazole (PRILOSEC) 20 mg capsule Take 1 Cap by mouth daily.  aspirin 81 mg chewable tablet Take 1 Tab by mouth daily. Indications: myocardial infarction prevention  naloxone 4 mg/actuation spry 4 mg by Nasal route as needed for up to 2 doses. Indications: OPIOID TOXICITY  atorvastatin (LIPITOR) 20 mg tablet Take  by mouth daily.  naloxegol (MOVANTIK) 25 mg tab tablet Take 25 mg by mouth daily.  insulin lispro (HUMALOG) 100 unit/mL injection by SubCUTAneous route.  insulin glargine (LANTUS) 100 unit/mL injection by SubCUTAneous route nightly.  ipratropium-albuterol (COMBIVENT RESPIMAT)  mcg/actuation inhaler Take 1 Puff by inhalation every six (6) hours as needed for Wheezing or Shortness of Breath.  pyridoxine (VITAMIN B-6) 100 mg tablet Take 100 mg by mouth daily.  fish oil-dha-epa 1,200-144-216 mg cap Take 1 Cap by mouth daily.  levothyroxine (SYNTHROID) 125 mcg tablet Take 125 mcg by mouth Daily (before breakfast).  tiotropium (SPIRIVA WITH HANDIHALER) 18 mcg inhalation capsule Take 1 Cap by inhalation daily.  cholecalciferol, vitamin d3, (VITAMIN D) 1,000 unit tablet Take 5,000 Units by mouth daily. No current facility-administered medications for this visit.   
  
 
Past Surgical History:  
Procedure Laterality Date  HX AFIB ABLATION  2/8/13 Dr Bonita Elise  HX CAROTID ENDARTERECTOMY  11/06 right Dr. Oneil Cardona  HX HYSTERECTOMY  5/06  HX ORTHOPAEDIC Right 07/11/2016  
 knee replacement  HX PACEMAKER  11/25/13 DDD  HX SVT ABLATION  2/18/2013 Visit Vitals BP (!) 96/42 (BP 1 Location: Left upper arm, BP Patient Position: Sitting, BP Cuff Size: Large adult) Pulse 66 Temp 97.5 °F (36.4 °C) (Temporal) Ht 5' 4\" (1.626 m) Wt 69.3 kg (152 lb 12.8 oz) SpO2 97% BMI 26.23 kg/m² Diagnostic Studies: 
I have reviewed the relevant tests done on the patient and show as follows EKG tracings reviewed by me today. CARDIOLOGY STUDIES 3/30/2018 Pharmacological Nuclear Stress Test Result large fixed ant/apical/sept defect, 40%EF Some recent data might be hidden 2010 cardiac cath IMPRESSIONS:  
  1. NORMAL EPICARDIAL CORONARY ARTERIES OTHER THAN MINOR LUMINAL IRREGULARITIES.  
  2. NORMAL LEFT VENTRICULAR WALL MOTION AND EJECTION FRACTION. 3/18 [facility] Stress Conclusion: 1. Abnormal perfusion scan. 2. Evidence of a large sized fixed defect involving LV territory in the apical and mid anterior and anteroseptal areas. This indicates previous myocardial infarction most likely. 3. This represents a high risk scan. 1/19 ECHO Interpretation Summary · Normal cavity size. Mild concentric hypertrophy observed. There is moderately decreased systolic function. The estimated ejection fraction is 36 - 40%. Abnormal wall motion as described on the wall scoring diagram below. Global longitudinal strain is -4.90%. Abnormal left ventricular strain. There is severe (grade 3) left ventricular diastolic dysfunction. · Left atrial cavity size is severely dilated. · Right ventricular global systolic function is reduced. Pacing wire present · Mitral valve thickening. Mild mitral valve regurgitation. · Mild tricuspid valve regurgitation.  Pulmonary arterial systolic pressure is 41.0 mmHg. Mild pulmonary hypertension. Comparison Study Information Prior Study When compared to the previous study from 3/23/18, there is no longer evidence of a small pericardial effusion. Ms. Tip Arguelles has a reminder for a \"due or due soon\" health maintenance. I have asked that she contact her primary care provider for follow-up on this health maintenance. Physical Exam 
Constitutional:   
   General: She is not in acute distress. Appearance: She is well-developed. HENT:  
   Head: Normocephalic and atraumatic. Mouth/Throat:  
   Dentition: Normal dentition. Eyes:  
   General: No scleral icterus. Right eye: No discharge. Left eye: No discharge. Neck:  
   Thyroid: No thyromegaly. Vascular: Carotid bruit (b/l) present. No JVD. Cardiovascular:  
   Rate and Rhythm: Normal rate and regular rhythm. Pulses: Intact distal pulses. Heart sounds: S1 normal and S2 normal. Murmur heard. Scratchy early systolic murmur is present with a grade of 3/6 at the upper right sternal border. High-pitched blowing midsystolic murmur of grade 2/6  is also present at the apex. High-pitched blowing decrescendo early diastolic murmur is present with a grade of 2/4 at the upper right sternal border radiating to the apex. No friction rub. No gallop. Pulmonary:  
   Effort: Pulmonary effort is normal.  
   Breath sounds: Normal breath sounds. No wheezing or rales. Abdominal:  
   Palpations: Abdomen is soft. There is no mass. Tenderness: There is no abdominal tenderness. Musculoskeletal:  
   Cervical back: Neck supple. Lymphadenopathy:  
   Cervical:  
   Right cervical: No superficial cervical adenopathy. Left cervical: No superficial cervical adenopathy. Skin: 
   General: Skin is warm and dry. Findings: No rash. Neurological:  
   Mental Status: She is alert and oriented to person, place, and time.   
Psychiatric:     
 Behavior: Behavior normal.  
 
 
 
ASSESSMENT and PLAN Patient advised to stop smoking to reduce future cardiovascular events. AFib: Permanent; 3/18; 2/18; 8/17 on xarelto;  
Persistent despite ablation CAD: 3/18 large fixed LAD defect, 40%EF;  
DDD to [facility] : Dual chamber [employer] pacemaker implant 11/25/13 Office Checks: 12/13 nl function; 1/15 nl function, freq but very short AHR(likely a fib); 1/16 freq long AF, nl function; 1/17; 7/17; 6/18 nl function, perm AFib; 2/19 normal function Remote Checks : 3/14; 10/14; 4/16; 10/16; 12/17; 11/18; 1/21 nl function 11/19 CHF is fairly stable clinically. Chest pain has resolved. Will wait on cardiac cath. [facility] [employer] [facility] was not covered. Will continue low-dose losartan. [facility] is overdue. Requested patient to send us from home [facility]. 
 
3/22/2021. CHF is worsening with worsening dyspnea. Add HCTZ which will be helpful for hypertension as well as CHF. Follow-up electrolytes closely. Blood pressure is elevated. Check home chart as HCTZ 30. Pacemaker function normal. 
CAD seems to be clinically stable. A. fib is persistent with dependency on the permanent pacemaker which is functioning normally. Complains of involuntary movements of arms legs and trunk at different times. Refer to neurology for an opinion. Diagnoses and all orders for this visit: 1. Coronary artery disease involving native coronary artery of native heart with other form of angina pectoris (HCC) 
-     atorvastatin (Lipitor) 20 mg tablet; Take 1 Tablet by mouth daily. 
-     CBC W/O DIFF; Future -     METABOLIC PANEL, BASIC; Future 
-     PROTHROMBIN TIME + INR; Future -     URINALYSIS W/ RFLX MICROSCOPIC; Future -     XR CHEST PA LAT; Future 2. Longstanding persistent atrial fibrillation ([address] 3. Cardiomyopathy, ischemic 4. Chronic systolic congestive heart failure ([address] 5. Chronic obstructive pulmonary disease, unspecified COPD type ([address] 6.  Pacemaker- DDD to [facility] 
 
7. Pure hypercholesterolemia 
-     atorvastatin (Lipitor) 20 mg tablet; Take 1 Tablet by mouth daily. Pertinent laboratory and test data reviewed and discussed with patient. See patient instructions also for other medical advice given Medications Discontinued During This Encounter Medication Reason  fentaNYL 62.5 mcg/hour pt72 Therapy Completed  atorvastatin (LIPITOR) 20 mg tablet REORDER Follow-up and Dispositions · Return in about 6 weeks (around 7/26/2021), or if symptoms worsen or fail to improve, for post procedure. 6/14/2021 CAD seems to be having unstable symptoms as she used nitroglycerin once with near immediate significant relief. And describes frequent chest tightness. CCS class II-III. A. fib is stable. CHF is compensated despite not taking Lasix anymore. Continue present medications but would recommend a cardiac catheterization to evaluate coronary anatomy. Procedure discussed with patient and daughter and they agreed. Will be scheduled for Wednesday this week. The benefits and risks of cardiac catheterization have been discussed in detail with the patient and daughter present at the time. Patient understands  risk of potential cath complications including but not limited to bleeding, infection, difficulty healing the arteriotomy access site(2 chances in 100) which may require surgical repair, potential thromboembolic complications which could result in stroke, myocardial infarction, vascular injury, loss of limb or organ function and/or death( 1 chance in 1000)and potential allergic reaction to contrast dye or other medication used during the procedure. Patient is also aware of the therapeutic implications for medical management vs coronary artery bypass surgery vs percutaneous coronary intervention in treatment of coronary artery disease.  The additional risks for percutaneous coronary artery intervention include the need for emergent bypass surgery at 1 chance in 100 and for restenosis which may range from 35% for plain balloon angioplasty, 15% for bare metal stent, and 5% for drug eluting stent implants. The need for mandatory uninterrupted dual antiplatelet therapy with lifelong Aspirin combined with Plavix for up to 12 months following drug eluting stents, and minimum 1 month following bare metal stents to prevent stent thrombosis which is the equivalent of acute heart attack has been reviewed in detail. No contraindications to use of drug eluting stents has been discovered. Yes.

## 2023-07-28 RX ORDER — CARVEDILOL 12.5 MG/1
TABLET ORAL
Qty: 180 TABLET | Refills: 3 | Status: SHIPPED | OUTPATIENT
Start: 2023-07-28

## 2023-07-28 RX ORDER — EZETIMIBE 10 MG/1
TABLET ORAL
Qty: 90 TABLET | Refills: 3 | Status: SHIPPED | OUTPATIENT
Start: 2023-07-28

## 2023-10-27 NOTE — TELEPHONE ENCOUNTER
From cardiac standpoint she does not need to take aspirin along with Xarelto. Please check with her why she takes both. Vinicio Meng advise her to take aspirin in addition to Xarelto. Check if she has had any blood clots in the body in the last 1 year.

## 2023-11-28 ENCOUNTER — OFFICE VISIT (OUTPATIENT)
Age: 73
End: 2023-11-28
Payer: MEDICARE

## 2023-11-28 VITALS
SYSTOLIC BLOOD PRESSURE: 138 MMHG | BODY MASS INDEX: 23.56 KG/M2 | HEART RATE: 68 BPM | WEIGHT: 137.28 LBS | DIASTOLIC BLOOD PRESSURE: 70 MMHG | OXYGEN SATURATION: 95 %

## 2023-11-28 DIAGNOSIS — Z95.0 PRESENCE OF CARDIAC PACEMAKER: ICD-10-CM

## 2023-11-28 DIAGNOSIS — E78.00 PURE HYPERCHOLESTEROLEMIA: ICD-10-CM

## 2023-11-28 DIAGNOSIS — Z79.4 TYPE 2 DIABETES MELLITUS WITH OTHER SPECIFIED COMPLICATION, WITH LONG-TERM CURRENT USE OF INSULIN (HCC): ICD-10-CM

## 2023-11-28 DIAGNOSIS — E11.69 TYPE 2 DIABETES MELLITUS WITH OTHER SPECIFIED COMPLICATION, WITH LONG-TERM CURRENT USE OF INSULIN (HCC): ICD-10-CM

## 2023-11-28 DIAGNOSIS — I48.11 LONGSTANDING PERSISTENT ATRIAL FIBRILLATION (HCC): ICD-10-CM

## 2023-11-28 DIAGNOSIS — I34.0 NONRHEUMATIC MITRAL VALVE REGURGITATION: ICD-10-CM

## 2023-11-28 DIAGNOSIS — Z71.6 TOBACCO ABUSE COUNSELING: ICD-10-CM

## 2023-11-28 DIAGNOSIS — I50.22 CHRONIC SYSTOLIC CONGESTIVE HEART FAILURE (HCC): Primary | ICD-10-CM

## 2023-11-28 PROCEDURE — 1123F ACP DISCUSS/DSCN MKR DOCD: CPT | Performed by: INTERNAL MEDICINE

## 2023-11-28 PROCEDURE — G8484 FLU IMMUNIZE NO ADMIN: HCPCS | Performed by: INTERNAL MEDICINE

## 2023-11-28 PROCEDURE — G8427 DOCREV CUR MEDS BY ELIG CLIN: HCPCS | Performed by: INTERNAL MEDICINE

## 2023-11-28 PROCEDURE — 1090F PRES/ABSN URINE INCON ASSESS: CPT | Performed by: INTERNAL MEDICINE

## 2023-11-28 PROCEDURE — G8420 CALC BMI NORM PARAMETERS: HCPCS | Performed by: INTERNAL MEDICINE

## 2023-11-28 PROCEDURE — G8400 PT W/DXA NO RESULTS DOC: HCPCS | Performed by: INTERNAL MEDICINE

## 2023-11-28 PROCEDURE — 2022F DILAT RTA XM EVC RTNOPTHY: CPT | Performed by: INTERNAL MEDICINE

## 2023-11-28 PROCEDURE — 99214 OFFICE O/P EST MOD 30 MIN: CPT | Performed by: INTERNAL MEDICINE

## 2023-11-28 PROCEDURE — 3075F SYST BP GE 130 - 139MM HG: CPT | Performed by: INTERNAL MEDICINE

## 2023-11-28 PROCEDURE — 3017F COLORECTAL CA SCREEN DOC REV: CPT | Performed by: INTERNAL MEDICINE

## 2023-11-28 PROCEDURE — 3046F HEMOGLOBIN A1C LEVEL >9.0%: CPT | Performed by: INTERNAL MEDICINE

## 2023-11-28 PROCEDURE — 3078F DIAST BP <80 MM HG: CPT | Performed by: INTERNAL MEDICINE

## 2023-11-28 PROCEDURE — 4004F PT TOBACCO SCREEN RCVD TLK: CPT | Performed by: INTERNAL MEDICINE

## 2023-11-28 RX ORDER — ATORVASTATIN CALCIUM 40 MG/1
40 TABLET, FILM COATED ORAL DAILY
Qty: 90 TABLET | Refills: 3 | Status: SHIPPED | OUTPATIENT
Start: 2023-11-28

## 2023-11-28 ASSESSMENT — ENCOUNTER SYMPTOMS
SHORTNESS OF BREATH: 1
EYES NEGATIVE: 1
GASTROINTESTINAL NEGATIVE: 1

## 2023-11-28 NOTE — PROGRESS NOTES
1. Have you been to the ER, urgent care clinic since your last visit? Hospitalized since your last visit? NO    2. Where do you normally have your labs drawn? [facility]    3. Do you need any refills today? NO    4. Which local pharmacy do you use (enter pharmacy)? [facility]    5. Which mail order pharmacy do you use (enter pharmacy)? NO     6. Are you here for surgical clearance and if so who will be doing your     procedure/surgery (care team)?    NO
the nose. Check a renal function and may need to adjust the dose of Xarelto also. Tobacco cessation reinforced and she understands. Labs as ordered. 11/3/2022 CHF is compensated NYHA class II. Continues to smoke and smoking cessation reinforced. Pacemaker function was normal by [facility] in 5/22 and she will send it again hopefully today  Lipids are well controlled  Healthy lifestyle discussed and she is trying to make some changes. 5/19/2023 CHF is clinically compensated without much fluid overload. NYHA class III. I wonder if there is significant COPD causing dyspnea. Get a chest x-ray and pulmonary functions. We will discontinue losartan and watch her dizziness. If no change, will consider Entresto. Urged her to stop smoking and she understands importance. Lipids are well controlled. Quintin Lima was seen today for follow-up. Diagnoses and all orders for this visit:    Chronic systolic congestive heart failure ([address])  -     Basic Metabolic Panel; Future    Presence of cardiac pacemaker    Tobacco abuse counseling    Longstanding persistent atrial fibrillation (HCC)    Nonrheumatic mitral valve regurgitation    Pure hypercholesterolemia  -     atorvastatin (LIPITOR) 40 MG tablet; Take 1 tablet by mouth daily  -     Hepatic Function Panel; Future  -     Lipid Panel; Future    Type 2 diabetes mellitus with other specified complication, with long-term current use of insulin (HCC)  -     Hemoglobin A1C; Future          See patient instructions also for other medical advice given    Medications Discontinued During This Encounter   Medication Reason    atorvastatin (LIPITOR) 40 MG tablet        Follow-up and Dispositions    Return in about 6 months (around 5/28/2024), or if symptoms worsen or fail to improve, for post test/procedure.            Return to ER if any significant CP not relieved by s/l NTG, severe SOB, severe palpitations, loss of consciousness    11/28/2023  Plan for medicines : See heart

## 2023-12-07 ENCOUNTER — TELEPHONE (OUTPATIENT)
Age: 73
End: 2023-12-07

## 2023-12-07 LAB
ALBUMIN SERPL-MCNC: 3.9 G/DL (ref 3.8–4.8)
ALP SERPL-CCNC: 80 IU/L (ref 44–121)
ALT SERPL-CCNC: 9 IU/L (ref 0–32)
AST SERPL-CCNC: 17 IU/L (ref 0–40)
BILIRUB DIRECT SERPL-MCNC: 0.16 MG/DL (ref 0–0.4)
BILIRUB SERPL-MCNC: 0.3 MG/DL (ref 0–1.2)
BUN SERPL-MCNC: 16 MG/DL (ref 8–27)
BUN/CREAT SERPL: 15 (ref 12–28)
CALCIUM SERPL-MCNC: 8.9 MG/DL (ref 8.7–10.3)
CHLORIDE SERPL-SCNC: 97 MMOL/L (ref 96–106)
CHOLEST SERPL-MCNC: 82 MG/DL (ref 100–199)
CO2 SERPL-SCNC: 31 MMOL/L (ref 20–29)
CREAT SERPL-MCNC: 1.04 MG/DL (ref 0.57–1)
EGFRCR SERPLBLD CKD-EPI 2021: 56 ML/MIN/1.73
GLUCOSE SERPL-MCNC: 61 MG/DL (ref 70–99)
HDLC SERPL-MCNC: 41 MG/DL
LDLC SERPL CALC-MCNC: 26 MG/DL (ref 0–99)
POTASSIUM SERPL-SCNC: 4.3 MMOL/L (ref 3.5–5.2)
PROT SERPL-MCNC: 7 G/DL (ref 6–8.5)
SODIUM SERPL-SCNC: 139 MMOL/L (ref 134–144)
SPECIMEN STATUS REPORT: NORMAL
TRIGL SERPL-MCNC: 64 MG/DL (ref 0–149)
VLDLC SERPL CALC-MCNC: 15 MG/DL (ref 5–40)

## 2023-12-07 NOTE — TELEPHONE ENCOUNTER
----- Message from Galileo Kurtz MD sent at 12/7/2023  8:49 AM EST -----  Please contact patient and do the following asap  Blood sugar mildly low. Please tell patient to talk to her PCP to possibly reduce/adjust diabetes medications if needed.

## 2023-12-07 NOTE — TELEPHONE ENCOUNTER
Spoke to patient regarding lab/test per Dr. Loulou Solo. Lab reviewed. Please contact patient and do the following asap. Blood sugar mildly low. Please tell patient to talk to her PCP to possibly reduce/adjust diabetes medications if needed. She voices understanding and acceptance of this advice and will call back if any further questions or concerns.

## 2023-12-11 ENCOUNTER — NURSE ONLY (OUTPATIENT)
Age: 73
End: 2023-12-11
Payer: MEDICARE

## 2023-12-11 DIAGNOSIS — Z95.0 PRESENCE OF CARDIAC PACEMAKER: Primary | ICD-10-CM

## 2023-12-11 PROCEDURE — 93281 PM DEVICE PROGR EVAL MULTI: CPT | Performed by: INTERNAL MEDICINE

## 2024-02-27 RX ORDER — CARVEDILOL 12.5 MG/1
12.5 TABLET ORAL 2 TIMES DAILY
Qty: 180 TABLET | Refills: 3 | Status: SHIPPED | OUTPATIENT
Start: 2024-02-27

## 2024-02-27 NOTE — TELEPHONE ENCOUNTER
Requested Prescriptions     Pending Prescriptions Disp Refills    carvedilol (COREG) 12.5 MG tablet 180 tablet 3     Sig: Take 1 tablet by mouth 2 times daily

## 2024-03-19 NOTE — TELEPHONE ENCOUNTER
Requested Prescriptions     Pending Prescriptions Disp Refills    rivaroxaban (XARELTO) 20 MG TABS tablet 30 tablet 3     Sig: Take 1 tablet by mouth daily

## 2024-03-25 RX ORDER — HYDROCHLOROTHIAZIDE 12.5 MG/1
TABLET ORAL
Qty: 30 TABLET | Refills: 5 | Status: SHIPPED | OUTPATIENT
Start: 2024-03-25

## 2024-05-29 ENCOUNTER — OFFICE VISIT (OUTPATIENT)
Age: 74
End: 2024-05-29
Payer: MEDICARE

## 2024-05-29 VITALS
WEIGHT: 137 LBS | BODY MASS INDEX: 23.52 KG/M2 | HEART RATE: 79 BPM | OXYGEN SATURATION: 92 % | SYSTOLIC BLOOD PRESSURE: 115 MMHG | DIASTOLIC BLOOD PRESSURE: 55 MMHG

## 2024-05-29 DIAGNOSIS — I48.21 PERMANENT ATRIAL FIBRILLATION (HCC): ICD-10-CM

## 2024-05-29 DIAGNOSIS — Z95.0 PRESENCE OF CARDIAC PACEMAKER: ICD-10-CM

## 2024-05-29 DIAGNOSIS — I34.0 NONRHEUMATIC MITRAL VALVE REGURGITATION: ICD-10-CM

## 2024-05-29 DIAGNOSIS — I10 ESSENTIAL HYPERTENSION, BENIGN: ICD-10-CM

## 2024-05-29 DIAGNOSIS — I50.42 CHRONIC COMBINED SYSTOLIC AND DIASTOLIC CONGESTIVE HEART FAILURE (HCC): Primary | ICD-10-CM

## 2024-05-29 PROCEDURE — 3078F DIAST BP <80 MM HG: CPT | Performed by: INTERNAL MEDICINE

## 2024-05-29 PROCEDURE — 3074F SYST BP LT 130 MM HG: CPT | Performed by: INTERNAL MEDICINE

## 2024-05-29 PROCEDURE — 1123F ACP DISCUSS/DSCN MKR DOCD: CPT | Performed by: INTERNAL MEDICINE

## 2024-05-29 PROCEDURE — 1090F PRES/ABSN URINE INCON ASSESS: CPT | Performed by: INTERNAL MEDICINE

## 2024-05-29 PROCEDURE — G8420 CALC BMI NORM PARAMETERS: HCPCS | Performed by: INTERNAL MEDICINE

## 2024-05-29 PROCEDURE — 99214 OFFICE O/P EST MOD 30 MIN: CPT | Performed by: INTERNAL MEDICINE

## 2024-05-29 PROCEDURE — G8427 DOCREV CUR MEDS BY ELIG CLIN: HCPCS | Performed by: INTERNAL MEDICINE

## 2024-05-29 PROCEDURE — G8400 PT W/DXA NO RESULTS DOC: HCPCS | Performed by: INTERNAL MEDICINE

## 2024-05-29 PROCEDURE — 3017F COLORECTAL CA SCREEN DOC REV: CPT | Performed by: INTERNAL MEDICINE

## 2024-05-29 PROCEDURE — 4004F PT TOBACCO SCREEN RCVD TLK: CPT | Performed by: INTERNAL MEDICINE

## 2024-05-29 ASSESSMENT — ENCOUNTER SYMPTOMS
SHORTNESS OF BREATH: 1
GASTROINTESTINAL NEGATIVE: 1
EYES NEGATIVE: 1

## 2024-05-29 NOTE — PROGRESS NOTES
1. Have you been to the ER, urgent care clinic since your last visit?  Hospitalized since your last visit? NO    2.  Where do you normally have your labs drawn?  [facility]    3. Do you need any refills today?  No    4. Which local pharmacy do you use (enter pharmacy)?   [facility]    5. Which mail order pharmacy do you use (enter pharmacy)?   No     6. Are you here for surgical clearance and if so who will be doing your     procedure/surgery (care team)?   No       
[employer] pacemaker implant 11/25/13  Office Checks: 12/13 nl function; 1/15 nl function, freq but very short AHR(likely a fib); 1/16 freq long AF, nl function; 1/17; 7/17; 6/18 nl function, perm AFib; 2/19; 12/23 normal function  Remote Checks : 3/14; 10/14; 4/16; 10/16; 12/17; 11/18; 1/21; 5/22 nl function    11/19 CHF is fairly stable clinically.  Chest pain has resolved.  Will wait on cardiac cath.  Entresto was not covered.  Will continue low-dose losartan.  [facility] is overdue.  Requested patient to send us from home ASA.    3/22/2021.  CHF is worsening with worsening dyspnea.  Add HCTZ which will be helpful for hypertension as well as CHF.  Follow-up electrolytes closely.  Blood pressure is elevated.  Check home chart as HCTZ 30.  Pacemaker function normal.  CAD seems to be clinically stable.  A. fib is persistent with dependency on the permanent pacemaker which is functioning normally.  Complains of involuntary movements of arms legs and trunk at different times.  Refer to neurology for an opinion.    6/14/2021 CAD seems to be having unstable symptoms as she used nitroglycerin once with near immediate significant relief.  And describes frequent chest tightness.  CCS class II-III.  A. fib is stable.  CHF is compensated despite not taking Lasix anymore.  Continue present medications but would recommend a cardiac catheterization to evaluate coronary anatomy.  Procedure discussed with patient and daughter and they agreed.  Will be scheduled for Wednesday this week.    11/15/2021 as CHF is well compensated NYHA class II-III which I think is mostly due to smoking and COPD  A. fib is stable with controlled rate.  Continue anticoagulation.  Pacer check today and is functioning normally.  We will refer to vascular due to history of CVA.  Tobacco cessation reinforced.  She said she will try her best.  Reduce losartan to 12.5 mg a day as EF is improved and blood pressure is mildly low.  It might help her

## 2024-06-11 ENCOUNTER — ANTI-COAG VISIT (OUTPATIENT)
Age: 74
End: 2024-06-11

## 2024-06-11 LAB
BUN SERPL-MCNC: 17 MG/DL (ref 8–27)
BUN/CREAT SERPL: 20 (ref 12–28)
CALCIUM SERPL-MCNC: 8.5 MG/DL (ref 8.7–10.3)
CHLORIDE SERPL-SCNC: 98 MMOL/L (ref 96–106)
CO2 SERPL-SCNC: 22 MMOL/L (ref 20–29)
CREAT SERPL-MCNC: 0.87 MG/DL (ref 0.57–1)
EGFRCR SERPLBLD CKD-EPI 2021: 69 ML/MIN/1.73
GLUCOSE SERPL-MCNC: 315 MG/DL (ref 70–99)
POTASSIUM SERPL-SCNC: 4 MMOL/L (ref 3.5–5.2)
SODIUM SERPL-SCNC: 133 MMOL/L (ref 134–144)
SPECIMEN STATUS REPORT: NORMAL

## 2024-06-19 ENCOUNTER — TELEPHONE (OUTPATIENT)
Age: 74
End: 2024-06-19

## 2024-06-19 NOTE — TELEPHONE ENCOUNTER
Luke Everett MD    Please contact patient and do the following asap    Fax to PCP for increased glucose    Will fax results to [facility]  Dr. Denise DUNLAP# [phone]

## 2024-06-24 ENCOUNTER — OFFICE VISIT (OUTPATIENT)
Age: 74
End: 2024-06-24
Payer: MEDICARE

## 2024-06-24 DIAGNOSIS — Z95.0 PRESENCE OF CARDIAC PACEMAKER: Primary | ICD-10-CM

## 2024-06-24 PROCEDURE — 93281 PM DEVICE PROGR EVAL MULTI: CPT | Performed by: INTERNAL MEDICINE

## 2024-07-26 ENCOUNTER — APPOINTMENT (OUTPATIENT)
Facility: HOSPITAL | Age: 74
DRG: 069 | End: 2024-07-26
Payer: MEDICARE

## 2024-07-26 ENCOUNTER — HOSPITAL ENCOUNTER (INPATIENT)
Facility: HOSPITAL | Age: 74
LOS: 2 days | Discharge: HOME | DRG: 069 | End: 2024-07-28
Attending: EMERGENCY MEDICINE | Admitting: INTERNAL MEDICINE
Payer: MEDICARE

## 2024-07-26 DIAGNOSIS — I63.9 CEREBROVASCULAR ACCIDENT (CVA), UNSPECIFIED MECHANISM (HCC): ICD-10-CM

## 2024-07-26 DIAGNOSIS — R09.89 SUSPECTED CEREBROVASCULAR ACCIDENT (CVA): ICD-10-CM

## 2024-07-26 DIAGNOSIS — R53.1 ACUTE LEFT-SIDED WEAKNESS: ICD-10-CM

## 2024-07-26 DIAGNOSIS — G45.9 TIA (TRANSIENT ISCHEMIC ATTACK): Primary | ICD-10-CM

## 2024-07-26 DIAGNOSIS — Z95.0 PACEMAKER: ICD-10-CM

## 2024-07-26 PROBLEM — E03.9 HYPOTHYROID: Status: ACTIVE | Noted: 2024-07-26

## 2024-07-26 PROBLEM — I63.311 CEREBROVASCULAR ACCIDENT (CVA) DUE TO THROMBOSIS OF RIGHT MIDDLE CEREBRAL ARTERY (HCC): Status: ACTIVE | Noted: 2024-07-26

## 2024-07-26 LAB
ALBUMIN SERPL-MCNC: 3.3 G/DL (ref 3.4–5)
ALBUMIN/GLOB SERPL: 0.8 (ref 0.8–1.7)
ALP SERPL-CCNC: 107 U/L (ref 45–117)
ALT SERPL-CCNC: 40 U/L (ref 13–56)
ANION GAP SERPL CALC-SCNC: 5 MMOL/L (ref 3–18)
AST SERPL-CCNC: 30 U/L (ref 10–38)
BASOPHILS # BLD: 0.1 K/UL (ref 0–0.1)
BASOPHILS NFR BLD: 1 % (ref 0–2)
BILIRUB SERPL-MCNC: 0.7 MG/DL (ref 0.2–1)
BUN SERPL-MCNC: 25 MG/DL (ref 7–18)
BUN/CREAT SERPL: 25 (ref 12–20)
CALCIUM SERPL-MCNC: 9 MG/DL (ref 8.5–10.1)
CHLORIDE SERPL-SCNC: 98 MMOL/L (ref 100–111)
CO2 SERPL-SCNC: 32 MMOL/L (ref 21–32)
CREAT SERPL-MCNC: 1 MG/DL (ref 0.6–1.3)
DIFFERENTIAL METHOD BLD: ABNORMAL
EKG ATRIAL RATE: 375 BPM
EKG DIAGNOSIS: NORMAL
EKG Q-T INTERVAL: 466 MS
EKG QRS DURATION: 162 MS
EKG QTC CALCULATION (BAZETT): 469 MS
EKG R AXIS: -86 DEGREES
EKG T AXIS: 84 DEGREES
EKG VENTRICULAR RATE: 61 BPM
EOSINOPHIL # BLD: 0.1 K/UL (ref 0–0.4)
EOSINOPHIL NFR BLD: 2 % (ref 0–5)
ERYTHROCYTE [DISTWIDTH] IN BLOOD BY AUTOMATED COUNT: 12.6 % (ref 11.6–14.5)
GLOBULIN SER CALC-MCNC: 4.1 G/DL (ref 2–4)
GLUCOSE BLD STRIP.AUTO-MCNC: 243 MG/DL (ref 70–110)
GLUCOSE BLD STRIP.AUTO-MCNC: 266 MG/DL (ref 70–110)
GLUCOSE SERPL-MCNC: 110 MG/DL (ref 74–99)
HCT VFR BLD AUTO: 45.7 % (ref 35–45)
HGB BLD-MCNC: 15.5 G/DL (ref 12–16)
IMM GRANULOCYTES # BLD AUTO: 0 K/UL (ref 0–0.04)
IMM GRANULOCYTES NFR BLD AUTO: 0 % (ref 0–0.5)
INR PPP: 1.3 (ref 0.9–1.1)
LYMPHOCYTES # BLD: 2.5 K/UL (ref 0.9–3.6)
LYMPHOCYTES NFR BLD: 31 % (ref 21–52)
MCH RBC QN AUTO: 32 PG (ref 24–34)
MCHC RBC AUTO-ENTMCNC: 33.9 G/DL (ref 31–37)
MCV RBC AUTO: 94.2 FL (ref 78–100)
MONOCYTES # BLD: 0.8 K/UL (ref 0.05–1.2)
MONOCYTES NFR BLD: 10 % (ref 3–10)
NEUTS SEG # BLD: 4.4 K/UL (ref 1.8–8)
NEUTS SEG NFR BLD: 56 % (ref 40–73)
NRBC # BLD: 0 K/UL (ref 0–0.01)
NRBC BLD-RTO: 0 PER 100 WBC
PLATELET # BLD AUTO: 207 K/UL (ref 135–420)
PMV BLD AUTO: 10.2 FL (ref 9.2–11.8)
POTASSIUM SERPL-SCNC: 4 MMOL/L (ref 3.5–5.5)
PROT SERPL-MCNC: 7.4 G/DL (ref 6.4–8.2)
PROTHROMBIN TIME: 16.3 SEC (ref 11.9–14.9)
RBC # BLD AUTO: 4.85 M/UL (ref 4.2–5.3)
SODIUM SERPL-SCNC: 135 MMOL/L (ref 136–145)
TROPONIN I SERPL HS-MCNC: 10 NG/L (ref 0–54)
WBC # BLD AUTO: 7.9 K/UL (ref 4.6–13.2)

## 2024-07-26 PROCEDURE — 93005 ELECTROCARDIOGRAM TRACING: CPT | Performed by: EMERGENCY MEDICINE

## 2024-07-26 PROCEDURE — 99223 1ST HOSP IP/OBS HIGH 75: CPT | Performed by: PSYCHIATRY & NEUROLOGY

## 2024-07-26 PROCEDURE — 1100000003 HC PRIVATE W/ TELEMETRY

## 2024-07-26 PROCEDURE — 99223 1ST HOSP IP/OBS HIGH 75: CPT | Performed by: PHYSICIAN ASSISTANT

## 2024-07-26 PROCEDURE — 70498 CT ANGIOGRAPHY NECK: CPT

## 2024-07-26 PROCEDURE — 94761 N-INVAS EAR/PLS OXIMETRY MLT: CPT

## 2024-07-26 PROCEDURE — 85610 PROTHROMBIN TIME: CPT

## 2024-07-26 PROCEDURE — 84484 ASSAY OF TROPONIN QUANT: CPT

## 2024-07-26 PROCEDURE — 6360000004 HC RX CONTRAST MEDICATION: Performed by: EMERGENCY MEDICINE

## 2024-07-26 PROCEDURE — 94640 AIRWAY INHALATION TREATMENT: CPT

## 2024-07-26 PROCEDURE — 82962 GLUCOSE BLOOD TEST: CPT

## 2024-07-26 PROCEDURE — 70450 CT HEAD/BRAIN W/O DYE: CPT

## 2024-07-26 PROCEDURE — 99285 EMERGENCY DEPT VISIT HI MDM: CPT

## 2024-07-26 PROCEDURE — 80053 COMPREHEN METABOLIC PANEL: CPT

## 2024-07-26 PROCEDURE — 6370000000 HC RX 637 (ALT 250 FOR IP): Performed by: PHYSICIAN ASSISTANT

## 2024-07-26 PROCEDURE — 74019 RADEX ABDOMEN 2 VIEWS: CPT

## 2024-07-26 PROCEDURE — 6370000000 HC RX 637 (ALT 250 FOR IP)

## 2024-07-26 PROCEDURE — 6360000002 HC RX W HCPCS: Performed by: PHYSICIAN ASSISTANT

## 2024-07-26 PROCEDURE — 85025 COMPLETE CBC W/AUTO DIFF WBC: CPT

## 2024-07-26 PROCEDURE — 93010 ELECTROCARDIOGRAM REPORT: CPT | Performed by: INTERNAL MEDICINE

## 2024-07-26 RX ORDER — LABETALOL HYDROCHLORIDE 5 MG/ML
10 INJECTION, SOLUTION INTRAVENOUS EVERY 10 MIN PRN
Status: DISCONTINUED | OUTPATIENT
Start: 2024-07-26 | End: 2024-07-28 | Stop reason: HOSPADM

## 2024-07-26 RX ORDER — MECLIZINE HYDROCHLORIDE 25 MG/1
25 TABLET ORAL 3 TIMES DAILY PRN
COMMUNITY
Start: 2024-06-25

## 2024-07-26 RX ORDER — FLUTICASONE FUROATE, UMECLIDINIUM BROMIDE AND VILANTEROL TRIFENATATE 200; 62.5; 25 UG/1; UG/1; UG/1
POWDER RESPIRATORY (INHALATION)
COMMUNITY
Start: 2024-07-25

## 2024-07-26 RX ORDER — OXYCODONE HYDROCHLORIDE 10 MG/1
20 TABLET ORAL EVERY 6 HOURS PRN
Status: DISCONTINUED | OUTPATIENT
Start: 2024-07-26 | End: 2024-07-26

## 2024-07-26 RX ORDER — INSULIN LISPRO 100 [IU]/ML
0-4 INJECTION, SOLUTION INTRAVENOUS; SUBCUTANEOUS NIGHTLY
Status: DISCONTINUED | OUTPATIENT
Start: 2024-07-26 | End: 2024-07-28 | Stop reason: HOSPADM

## 2024-07-26 RX ORDER — ATORVASTATIN CALCIUM 40 MG/1
80 TABLET, FILM COATED ORAL NIGHTLY
Status: DISCONTINUED | OUTPATIENT
Start: 2024-07-26 | End: 2024-07-28 | Stop reason: HOSPADM

## 2024-07-26 RX ORDER — INSULIN DETEMIR 100 [IU]/ML
INJECTION, SOLUTION SUBCUTANEOUS
COMMUNITY
Start: 2024-06-25

## 2024-07-26 RX ORDER — ARFORMOTEROL TARTRATE 15 UG/2ML
15 SOLUTION RESPIRATORY (INHALATION)
Status: DISCONTINUED | OUTPATIENT
Start: 2024-07-26 | End: 2024-07-28 | Stop reason: HOSPADM

## 2024-07-26 RX ORDER — DEXTROSE MONOHYDRATE 100 MG/ML
INJECTION, SOLUTION INTRAVENOUS CONTINUOUS PRN
Status: DISCONTINUED | OUTPATIENT
Start: 2024-07-26 | End: 2024-07-28 | Stop reason: HOSPADM

## 2024-07-26 RX ORDER — BUDESONIDE 0.5 MG/2ML
1 INHALANT ORAL
Status: DISCONTINUED | OUTPATIENT
Start: 2024-07-26 | End: 2024-07-28 | Stop reason: HOSPADM

## 2024-07-26 RX ORDER — OXYCODONE HYDROCHLORIDE 10 MG/1
30 TABLET ORAL EVERY 6 HOURS PRN
Status: DISCONTINUED | OUTPATIENT
Start: 2024-07-26 | End: 2024-07-26

## 2024-07-26 RX ORDER — ASPIRIN 81 MG/1
81 TABLET, CHEWABLE ORAL
Status: COMPLETED | OUTPATIENT
Start: 2024-07-26 | End: 2024-07-26

## 2024-07-26 RX ORDER — LEVOTHYROXINE SODIUM 112 UG/1
CAPSULE ORAL
COMMUNITY
Start: 2024-07-22

## 2024-07-26 RX ORDER — INSULIN LISPRO 100 [IU]/ML
0-4 INJECTION, SOLUTION INTRAVENOUS; SUBCUTANEOUS
Status: DISCONTINUED | OUTPATIENT
Start: 2024-07-26 | End: 2024-07-28 | Stop reason: HOSPADM

## 2024-07-26 RX ORDER — ASPIRIN 325 MG
325 TABLET ORAL ONCE
Status: DISCONTINUED | OUTPATIENT
Start: 2024-07-26 | End: 2024-07-26

## 2024-07-26 RX ORDER — EZETIMIBE 10 MG/1
10 TABLET ORAL DAILY
Status: DISCONTINUED | OUTPATIENT
Start: 2024-07-27 | End: 2024-07-28 | Stop reason: HOSPADM

## 2024-07-26 RX ORDER — ONDANSETRON 4 MG/1
4 TABLET, ORALLY DISINTEGRATING ORAL EVERY 8 HOURS PRN
Status: DISCONTINUED | OUTPATIENT
Start: 2024-07-26 | End: 2024-07-28 | Stop reason: HOSPADM

## 2024-07-26 RX ORDER — ASPIRIN 81 MG/1
81 TABLET, CHEWABLE ORAL DAILY
Status: DISCONTINUED | OUTPATIENT
Start: 2024-07-27 | End: 2024-07-28 | Stop reason: HOSPADM

## 2024-07-26 RX ORDER — ONDANSETRON 2 MG/ML
4 INJECTION INTRAMUSCULAR; INTRAVENOUS EVERY 6 HOURS PRN
Status: DISCONTINUED | OUTPATIENT
Start: 2024-07-26 | End: 2024-07-28 | Stop reason: HOSPADM

## 2024-07-26 RX ORDER — OXYCODONE HYDROCHLORIDE 15 MG/1
30 TABLET ORAL EVERY 4 HOURS
Status: DISCONTINUED | OUTPATIENT
Start: 2024-07-26 | End: 2024-07-28 | Stop reason: HOSPADM

## 2024-07-26 RX ORDER — POLYETHYLENE GLYCOL 3350 17 G/17G
17 POWDER, FOR SOLUTION ORAL DAILY PRN
Status: DISCONTINUED | OUTPATIENT
Start: 2024-07-26 | End: 2024-07-28 | Stop reason: HOSPADM

## 2024-07-26 RX ORDER — LEVOTHYROXINE SODIUM 112 UG/1
112 TABLET ORAL DAILY
Status: DISCONTINUED | OUTPATIENT
Start: 2024-07-27 | End: 2024-07-28 | Stop reason: HOSPADM

## 2024-07-26 RX ADMIN — BUDESONIDE 1000 MCG: 0.5 INHALANT RESPIRATORY (INHALATION) at 21:22

## 2024-07-26 RX ADMIN — IOPAMIDOL 80 ML: 755 INJECTION, SOLUTION INTRAVENOUS at 13:59

## 2024-07-26 RX ADMIN — INSULIN LISPRO 1 UNITS: 100 INJECTION, SOLUTION INTRAVENOUS; SUBCUTANEOUS at 17:37

## 2024-07-26 RX ADMIN — IPRATROPIUM BROMIDE 0.5 MG: 0.5 SOLUTION RESPIRATORY (INHALATION) at 21:22

## 2024-07-26 RX ADMIN — ARFORMOTEROL TARTRATE 15 MCG: 15 SOLUTION RESPIRATORY (INHALATION) at 21:23

## 2024-07-26 RX ADMIN — ASPIRIN 81 MG: 81 TABLET, CHEWABLE ORAL at 15:16

## 2024-07-26 RX ADMIN — IPRATROPIUM BROMIDE 0.5 MG: 0.5 SOLUTION RESPIRATORY (INHALATION) at 16:42

## 2024-07-26 RX ADMIN — OXYCODONE HYDROCHLORIDE 30 MG: 15 TABLET ORAL at 20:19

## 2024-07-26 RX ADMIN — ATORVASTATIN CALCIUM 80 MG: 40 TABLET, FILM COATED ORAL at 20:20

## 2024-07-26 RX ADMIN — OXYCODONE HYDROCHLORIDE 30 MG: 10 TABLET ORAL at 15:54

## 2024-07-26 ASSESSMENT — PAIN DESCRIPTION - LOCATION
LOCATION: LEG
LOCATION: BACK;LEG;WRIST

## 2024-07-26 ASSESSMENT — PAIN SCALES - GENERAL
PAINLEVEL_OUTOF10: 0
PAINLEVEL_OUTOF10: 6
PAINLEVEL_OUTOF10: 7

## 2024-07-26 ASSESSMENT — PAIN DESCRIPTION - ORIENTATION
ORIENTATION: RIGHT;LEFT
ORIENTATION: LEFT;RIGHT

## 2024-07-26 ASSESSMENT — PAIN - FUNCTIONAL ASSESSMENT
PAIN_FUNCTIONAL_ASSESSMENT: NONE - DENIES PAIN
PAIN_FUNCTIONAL_ASSESSMENT: PREVENTS OR INTERFERES SOME ACTIVE ACTIVITIES AND ADLS

## 2024-07-26 ASSESSMENT — LIFESTYLE VARIABLES
HOW OFTEN DO YOU HAVE A DRINK CONTAINING ALCOHOL: NEVER
HOW MANY STANDARD DRINKS CONTAINING ALCOHOL DO YOU HAVE ON A TYPICAL DAY: PATIENT DOES NOT DRINK

## 2024-07-26 ASSESSMENT — PAIN DESCRIPTION - DESCRIPTORS
DESCRIPTORS: ACHING
DESCRIPTORS: ACHING

## 2024-07-26 NOTE — ED PROVIDER NOTES
rivaroxaban 20 MG Tabs tablet  Commonly known as: Xarelto  Take 1 tablet by mouth daily     sacubitril-valsartan 24-26 MG per tablet  Commonly known as: ENTRESTO  Take 1 tablet by mouth 2 times daily     tiotropium 18 MCG inhalation capsule  Commonly known as: SPIRIVA     vitamin D 25 MCG (1000 UT) Tabs tablet  Commonly known as: CHOLECALCIFEROL              Disposition: Admission to hospital medicine    Patient condition at time of disposition: Stable    Dragon Disclaimer     Please note that this dictation was completed with HyprKey, the computer voice recognition software.  Quite often unanticipated grammatical, syntax, homophones, and other interpretive errors are inadvertently transcribed by the computer software.  Please disregard these errors.  Please excuse any errors that have escaped final proofreading.      Dimple Gómez MD  Emergency Medicine PGY-2  [facility]  July 26, 2024  3:04 PM    Patient seen with Attending, Dr. Crowell

## 2024-07-26 NOTE — ED NOTES
Dr. Hackett advised of possible MRI contraindication related to pt's pacemaker and additional device in pelvis. Per family, pt has  \"kissing cousins\" device which pt reports is a type of stent which was placed while at [facility].

## 2024-07-26 NOTE — H&P
History and Physical          Subjective     HPI: Macrina Escalona is a 74 y.o. female with a PMHx of HFrEF, HTN, Afib on xarelto, chronic pain, pacemaker present, hypothyroidism, IDDM, PAD who presented to the ED with c/o acute onset of LUE/LLE weakness associated with left sided facial droop and numbness that started about 40 PTA. Symptoms are still present but much improved now. She denies any prior similar sx. No recent illness. She denies fever, chills, headache, speech changes, vision changes, cp, sob, cough, abd pain, nvd. She has not missed any doses of xarelto.     In the ED, VSS. Labs with no acute abnormalities. CTH with progression of chronic microvascular ischemic white matter disease, new but chronic appears deep cerebral lacunar infarcts. CTA head/neck with extensive calcific plaque at the bilateral cervical carotid bifurcations with estimated severe proximal LICA stenosis and moderate distal RCCA stenosis, no LVO.    PMHx:  Past Medical History:   Diagnosis Date    Abnormal myocardial perfusion study 05/07/2010    Anterior ischemia c/w at least 1-vessel CAD.  No WMA.  EF 51%.  Positive EKG at 78% pred max HR.  Ex time 7 min 30 sec.    Acute systolic congestive heart failure (HCC) 4/9/2018 4/18 new, likely she was told of low EF and MI- med Rx for now    Allergic rhinitis 1/30/2010    Atrial fibrillation (HCC)     Broken ankle 08/22/2013    left    Carotid stenosis 1/30/2010    Chronic diastolic congestive heart failure (HCC) 2/16/2018 2/18 NYHA2    Essential hypertension     Heart failure (HCC)     History of amiodarone therapy 5/11/2015    History of cardiac cath 05/25/2010    Patent coronary arteries.  LVEDP 13 mmHg.  EF 65%.      History of cervical cancer 11/05    History of echocardiogram 08/21/2013    EF 50-55%.  No RWMA.  Gr 2 DDfx.  Mild LAE.  Mild MR.      Hypertension     MI (myocardial infarction) (HCC)     March/April 2018    Migraines 1/30/2010    Mitral regurgitation

## 2024-07-26 NOTE — ED TRIAGE NOTES
Pt arrived via EMS from home, reporting L face and arm shaking, lasting approximately 10 mins, then resolving and pt had L sided face and arm droop and decreased sensation,   + slurred speech  Pt has hx of \"mini strokes' without deficits.   Per EMS, BG 91

## 2024-07-26 NOTE — ED NOTES
TRANSFER - OUT REPORT:    Verbal report given to BRITTANY Mar on Macrina Escalona  being transferred to 4 S; 402 for routine progression of patient care       Report consisted of patient's Situation, Background, Assessment and   Recommendations(SBAR).     Information from the following report(s) Nurse Handoff Report, ED Encounter Summary, Adult Overview, MAR, and Neuro Assessment was reviewed with the receiving nurse.    [place] Assessment:    Presents to emergency department  because of falls (Syncope, seizure, or loss of consciousness): No  Age > 70: Yes  Altered Mental Status, Intoxication with alcohol or substance confusion (Disorientation, impaired judgment, poor safety awaremess, or inability to follow instructions): No  Impaired Mobility: Ambulates or transfers with assistive devices or assistance; Unable to ambulate or transer.: Yes  Nursing Judgement: Yes          Lines:       Opportunity for questions and clarification was provided.      Patient transported with:  Monitor and Registered Nurse

## 2024-07-26 NOTE — CONSULTS
caregiver  6-Interpreting results and/or communicating results to the patient/family/caregiver  7-Care coordination  8-Ordering medications, tests, or procedures  9-Referring and communicating with other health care professionals  10-Documentation in EHR.     Signed By: Jose Kraft MD. PhD, MS, FAASM

## 2024-07-27 ENCOUNTER — APPOINTMENT (OUTPATIENT)
Facility: HOSPITAL | Age: 74
DRG: 069 | End: 2024-07-27
Payer: MEDICARE

## 2024-07-27 ENCOUNTER — APPOINTMENT (OUTPATIENT)
Facility: HOSPITAL | Age: 74
DRG: 069 | End: 2024-07-27
Attending: SURGERY
Payer: MEDICARE

## 2024-07-27 LAB
ANION GAP SERPL CALC-SCNC: 7 MMOL/L (ref 3–18)
BUN SERPL-MCNC: 22 MG/DL (ref 7–18)
BUN/CREAT SERPL: 25 (ref 12–20)
CALCIUM SERPL-MCNC: 8.8 MG/DL (ref 8.5–10.1)
CHLORIDE SERPL-SCNC: 99 MMOL/L (ref 100–111)
CHOLEST SERPL-MCNC: 65 MG/DL
CO2 SERPL-SCNC: 29 MMOL/L (ref 21–32)
CREAT SERPL-MCNC: 0.89 MG/DL (ref 0.6–1.3)
ECHO AO ROOT DIAM: 2.8 CM
ECHO AO ROOT INDEX: 1.67 CM/M2
ECHO AV AREA PEAK VELOCITY: 1.9 CM2
ECHO AV AREA VTI: 2 CM2
ECHO AV AREA/BSA PEAK VELOCITY: 1.1 CM2/M2
ECHO AV AREA/BSA VTI: 1.2 CM2/M2
ECHO AV MEAN GRADIENT: 2 MMHG
ECHO AV MEAN VELOCITY: 0.6 M/S
ECHO AV PEAK GRADIENT: 4 MMHG
ECHO AV PEAK VELOCITY: 1 M/S
ECHO AV VELOCITY RATIO: 0.6
ECHO AV VTI: 22.6 CM
ECHO BSA: 1.69 M2
ECHO LA DIAMETER INDEX: 2.62 CM/M2
ECHO LA DIAMETER: 4.4 CM
ECHO LA TO AORTIC ROOT RATIO: 1.57
ECHO LA VOL A-L A4C: 59 ML (ref 22–52)
ECHO LA VOL MOD A4C: 53 ML (ref 22–52)
ECHO LA VOLUME INDEX A-L A4C: 35 ML/M2 (ref 16–34)
ECHO LA VOLUME INDEX MOD A4C: 32 ML/M2 (ref 16–34)
ECHO LV E' LATERAL VELOCITY: 7 CM/S
ECHO LV E' SEPTAL VELOCITY: 6 CM/S
ECHO LV FRACTIONAL SHORTENING: 20 % (ref 28–44)
ECHO LV INTERNAL DIMENSION DIASTOLE INDEX: 2.62 CM/M2
ECHO LV INTERNAL DIMENSION DIASTOLIC: 4.4 CM (ref 3.9–5.3)
ECHO LV INTERNAL DIMENSION SYSTOLIC INDEX: 2.08 CM/M2
ECHO LV INTERNAL DIMENSION SYSTOLIC: 3.5 CM
ECHO LV IVSD: 1.5 CM (ref 0.6–0.9)
ECHO LV MASS 2D: 227.5 G (ref 67–162)
ECHO LV MASS INDEX 2D: 135.4 G/M2 (ref 43–95)
ECHO LV POSTERIOR WALL DIASTOLIC: 1.2 CM (ref 0.6–0.9)
ECHO LV RELATIVE WALL THICKNESS RATIO: 0.55
ECHO LVOT AREA: 3.1 CM2
ECHO LVOT AV VTI INDEX: 0.67
ECHO LVOT DIAM: 2 CM
ECHO LVOT MEAN GRADIENT: 1 MMHG
ECHO LVOT PEAK GRADIENT: 1 MMHG
ECHO LVOT PEAK VELOCITY: 0.6 M/S
ECHO LVOT STROKE VOLUME INDEX: 28.4 ML/M2
ECHO LVOT SV: 47.7 ML
ECHO LVOT VTI: 15.2 CM
ECHO MV REGURGITANT PEAK GRADIENT: 41 MMHG
ECHO MV REGURGITANT PEAK VELOCITY: 3.2 M/S
ECHO MV REGURGITANT VTIA: 110.7 CM
ECHO PV AREA CONTINUITY EQ VELOCITY: 1.9 CM2
ECHO PV MAX VELOCITY: 0.9 M/S
ECHO PV MEAN GRADIENT: 1 MMHG
ECHO PV MEAN VELOCITY: 0.6 M/S
ECHO PV PEAK GRADIENT: 3 MMHG
ECHO QP:QS RATIO: 0.75
ECHO RA VOLUME: 37 ML
ECHO RA VOLUME: 39 ML
ECHO RV FREE WALL PEAK S': 9 CM/S
ECHO RV TAPSE: 1 CM (ref 1.7–?)
ECHO RVOT AREA: 3.5 CM2
ECHO RVOT DIAMETER: 2.1 CM
ECHO RVOT MEAN GRADIENT: 0 MMHG
ECHO RVOT PEAK GRADIENT: 1 MMHG
ECHO RVOT PEAK VELOCITY: 0.5 M/S
ECHO RVOT STROKE VOLUME: 35.7 ML
ECHO RVOT VTI: 10.3 CM
ECHO TV REGURGITANT MAX VELOCITY: 2.78 M/S
ECHO TV REGURGITANT PEAK GRADIENT: 31 MMHG
ERYTHROCYTE [DISTWIDTH] IN BLOOD BY AUTOMATED COUNT: 12.6 % (ref 11.6–14.5)
EST. AVERAGE GLUCOSE BLD GHB EST-MCNC: 232 MG/DL
GLUCOSE BLD STRIP.AUTO-MCNC: 202 MG/DL (ref 70–110)
GLUCOSE BLD STRIP.AUTO-MCNC: 226 MG/DL (ref 70–110)
GLUCOSE BLD STRIP.AUTO-MCNC: 229 MG/DL (ref 70–110)
GLUCOSE BLD STRIP.AUTO-MCNC: 240 MG/DL (ref 70–110)
GLUCOSE SERPL-MCNC: 243 MG/DL (ref 74–99)
HBA1C MFR BLD: 9.7 % (ref 4.2–5.6)
HCT VFR BLD AUTO: 42.5 % (ref 35–45)
HDLC SERPL-MCNC: 38 MG/DL (ref 40–60)
HDLC SERPL: 1.7 (ref 0–5)
HGB BLD-MCNC: 14.4 G/DL (ref 12–16)
LDLC SERPL CALC-MCNC: 17.8 MG/DL (ref 0–100)
LIPID PANEL: ABNORMAL
MCH RBC QN AUTO: 31.7 PG (ref 24–34)
MCHC RBC AUTO-ENTMCNC: 33.9 G/DL (ref 31–37)
MCV RBC AUTO: 93.6 FL (ref 78–100)
NRBC # BLD: 0 K/UL (ref 0–0.01)
NRBC BLD-RTO: 0 PER 100 WBC
PLATELET # BLD AUTO: 188 K/UL (ref 135–420)
PMV BLD AUTO: 10.2 FL (ref 9.2–11.8)
POTASSIUM SERPL-SCNC: 3.8 MMOL/L (ref 3.5–5.5)
RBC # BLD AUTO: 4.54 M/UL (ref 4.2–5.3)
SODIUM SERPL-SCNC: 135 MMOL/L (ref 136–145)
TRIGL SERPL-MCNC: 46 MG/DL
TSH SERPL DL<=0.05 MIU/L-ACNC: 1.36 UIU/ML (ref 0.36–3.74)
VLDLC SERPL CALC-MCNC: 9.2 MG/DL
WBC # BLD AUTO: 6.9 K/UL (ref 4.6–13.2)

## 2024-07-27 PROCEDURE — 80061 LIPID PANEL: CPT

## 2024-07-27 PROCEDURE — 93306 TTE W/DOPPLER COMPLETE: CPT

## 2024-07-27 PROCEDURE — 6370000000 HC RX 637 (ALT 250 FOR IP): Performed by: PHYSICIAN ASSISTANT

## 2024-07-27 PROCEDURE — 92526 ORAL FUNCTION THERAPY: CPT

## 2024-07-27 PROCEDURE — 6360000002 HC RX W HCPCS: Performed by: PHYSICIAN ASSISTANT

## 2024-07-27 PROCEDURE — 83036 HEMOGLOBIN GLYCOSYLATED A1C: CPT

## 2024-07-27 PROCEDURE — 84443 ASSAY THYROID STIM HORMONE: CPT

## 2024-07-27 PROCEDURE — 94761 N-INVAS EAR/PLS OXIMETRY MLT: CPT

## 2024-07-27 PROCEDURE — 93880 EXTRACRANIAL BILAT STUDY: CPT

## 2024-07-27 PROCEDURE — 82962 GLUCOSE BLOOD TEST: CPT

## 2024-07-27 PROCEDURE — 36415 COLL VENOUS BLD VENIPUNCTURE: CPT

## 2024-07-27 PROCEDURE — 1100000003 HC PRIVATE W/ TELEMETRY

## 2024-07-27 PROCEDURE — 70450 CT HEAD/BRAIN W/O DYE: CPT

## 2024-07-27 PROCEDURE — 97530 THERAPEUTIC ACTIVITIES: CPT

## 2024-07-27 PROCEDURE — 93306 TTE W/DOPPLER COMPLETE: CPT | Performed by: INTERNAL MEDICINE

## 2024-07-27 PROCEDURE — 85027 COMPLETE CBC AUTOMATED: CPT

## 2024-07-27 PROCEDURE — 94640 AIRWAY INHALATION TREATMENT: CPT

## 2024-07-27 PROCEDURE — 99233 SBSQ HOSP IP/OBS HIGH 50: CPT | Performed by: PSYCHIATRY & NEUROLOGY

## 2024-07-27 PROCEDURE — 80048 BASIC METABOLIC PNL TOTAL CA: CPT

## 2024-07-27 PROCEDURE — 97166 OT EVAL MOD COMPLEX 45 MIN: CPT

## 2024-07-27 PROCEDURE — 99233 SBSQ HOSP IP/OBS HIGH 50: CPT | Performed by: PHYSICIAN ASSISTANT

## 2024-07-27 PROCEDURE — 92610 EVALUATE SWALLOWING FUNCTION: CPT

## 2024-07-27 RX ORDER — INSULIN GLARGINE 100 [IU]/ML
10 INJECTION, SOLUTION SUBCUTANEOUS DAILY
Status: DISCONTINUED | OUTPATIENT
Start: 2024-07-27 | End: 2024-07-28 | Stop reason: HOSPADM

## 2024-07-27 RX ORDER — ACETAMINOPHEN 325 MG/1
650 TABLET ORAL EVERY 4 HOURS PRN
Status: DISCONTINUED | OUTPATIENT
Start: 2024-07-27 | End: 2024-07-28 | Stop reason: HOSPADM

## 2024-07-27 RX ADMIN — OXYCODONE HYDROCHLORIDE 30 MG: 15 TABLET ORAL at 08:24

## 2024-07-27 RX ADMIN — BUDESONIDE 1000 MCG: 0.5 INHALANT RESPIRATORY (INHALATION) at 07:20

## 2024-07-27 RX ADMIN — BUDESONIDE 1000 MCG: 0.5 INHALANT RESPIRATORY (INHALATION) at 19:09

## 2024-07-27 RX ADMIN — RIVAROXABAN 20 MG: 20 TABLET, FILM COATED ORAL at 18:36

## 2024-07-27 RX ADMIN — ARFORMOTEROL TARTRATE 15 MCG: 15 SOLUTION RESPIRATORY (INHALATION) at 19:10

## 2024-07-27 RX ADMIN — OXYCODONE HYDROCHLORIDE 30 MG: 15 TABLET ORAL at 11:50

## 2024-07-27 RX ADMIN — INSULIN LISPRO 1 UNITS: 100 INJECTION, SOLUTION INTRAVENOUS; SUBCUTANEOUS at 08:25

## 2024-07-27 RX ADMIN — OXYCODONE HYDROCHLORIDE 30 MG: 15 TABLET ORAL at 03:43

## 2024-07-27 RX ADMIN — INSULIN LISPRO 1 UNITS: 100 INJECTION, SOLUTION INTRAVENOUS; SUBCUTANEOUS at 11:50

## 2024-07-27 RX ADMIN — IPRATROPIUM BROMIDE 0.5 MG: 0.5 SOLUTION RESPIRATORY (INHALATION) at 19:10

## 2024-07-27 RX ADMIN — ATORVASTATIN CALCIUM 80 MG: 40 TABLET, FILM COATED ORAL at 19:54

## 2024-07-27 RX ADMIN — OXYCODONE HYDROCHLORIDE 30 MG: 15 TABLET ORAL at 19:54

## 2024-07-27 RX ADMIN — INSULIN LISPRO 1 UNITS: 100 INJECTION, SOLUTION INTRAVENOUS; SUBCUTANEOUS at 17:05

## 2024-07-27 RX ADMIN — ASPIRIN 81 MG CHEWABLE TABLET 81 MG: 81 TABLET CHEWABLE at 08:24

## 2024-07-27 RX ADMIN — LEVOTHYROXINE SODIUM 112 MCG: 112 TABLET ORAL at 06:32

## 2024-07-27 RX ADMIN — OXYCODONE HYDROCHLORIDE 30 MG: 15 TABLET ORAL at 16:24

## 2024-07-27 RX ADMIN — EZETIMIBE 10 MG: 10 TABLET ORAL at 08:24

## 2024-07-27 RX ADMIN — IPRATROPIUM BROMIDE 0.5 MG: 0.5 SOLUTION RESPIRATORY (INHALATION) at 07:20

## 2024-07-27 RX ADMIN — OXYCODONE HYDROCHLORIDE 30 MG: 15 TABLET ORAL at 23:40

## 2024-07-27 RX ADMIN — ARFORMOTEROL TARTRATE 15 MCG: 15 SOLUTION RESPIRATORY (INHALATION) at 07:20

## 2024-07-27 RX ADMIN — INSULIN GLARGINE 10 UNITS: 100 INJECTION, SOLUTION SUBCUTANEOUS at 11:51

## 2024-07-27 RX ADMIN — OXYCODONE HYDROCHLORIDE 30 MG: 15 TABLET ORAL at 00:14

## 2024-07-27 RX ADMIN — ACETAMINOPHEN 325MG 650 MG: 325 TABLET ORAL at 11:50

## 2024-07-27 ASSESSMENT — PAIN SCALES - GENERAL
PAINLEVEL_OUTOF10: 7
PAINLEVEL_OUTOF10: 2
PAINLEVEL_OUTOF10: 0
PAINLEVEL_OUTOF10: 6
PAINLEVEL_OUTOF10: 6
PAINLEVEL_OUTOF10: 0
PAINLEVEL_OUTOF10: 6
PAINLEVEL_OUTOF10: 7
PAINLEVEL_OUTOF10: 2
PAINLEVEL_OUTOF10: 0
PAINLEVEL_OUTOF10: 7
PAINLEVEL_OUTOF10: 4
PAINLEVEL_OUTOF10: 8

## 2024-07-27 ASSESSMENT — PAIN DESCRIPTION - ORIENTATION
ORIENTATION: RIGHT;LEFT
ORIENTATION: LEFT
ORIENTATION: LEFT
ORIENTATION: RIGHT;LEFT
ORIENTATION: RIGHT;LEFT
ORIENTATION: LEFT
ORIENTATION: RIGHT;LEFT

## 2024-07-27 ASSESSMENT — PAIN DESCRIPTION - LOCATION
LOCATION: LEG
LOCATION: NECK
LOCATION: LEG
LOCATION: NECK
LOCATION: NECK

## 2024-07-27 ASSESSMENT — PAIN DESCRIPTION - DESCRIPTORS
DESCRIPTORS: ACHING

## 2024-07-27 ASSESSMENT — PAIN - FUNCTIONAL ASSESSMENT

## 2024-07-27 NOTE — CARE COORDINATION
07/27/24 0934   Service Assessment   Patient Orientation Alert and Oriented;Person;Place;Situation;Self   Cognition Alert   History Provided By Patient   Primary Caregiver Self   Accompanied By/Relationship Patient grandson was at bedside   Support Systems Family Members   Patient's Healthcare Decision Maker is: Named in Scanned ACP Document   PCP Verified by CM Yes   Last Visit to PCP Within last 3 months   Prior Functional Level Assistance with the following:;Bathing;Toileting;Cooking;Housework;Shopping;Mobility   Current Functional Level Assistance with the following:;Bathing;Toileting;Cooking;Housework;Shopping;Mobility   Can patient return to prior living arrangement Yes   Would you like for me to discuss the discharge plan with any other family members/significant others, and if so, who? Yes   Financial Resources Medicare   Community Resources None   CM/SW Referral Other (see comment)  (Discharge Planning)   Social/Functional History   Lives With Family   Type of Home House   Home Layout One level   Home Access Stairs to enter with rails   Entrance Stairs - Number of Steps 3   Entrance Stairs - Rails Right   Bathroom Shower/Tub Tub/Shower unit   Bathroom Toilet Standard   Bathroom Equipment Grab bars in shower   Bathroom Accessibility Accessible   Home Equipment Walker - Standard;Cane   Receives Help From Family   ADL Assistance Needs assistance   Bath Dependent/Total   Dressing Stand by assistance   Grooming Stand by assistance   Feeding Stand by assistance   Toileting Needs assistance   Homemaking Assistance Needs assistance   Meal Prep Moderate   Laundry Moderate   Vacuuming Moderate   Cleaning Moderate   Gardening Moderate   Yard Work Moderate   Driving Total   Shopping Total   Homemaking Responsibilities No   Ambulation Assistance Independent   Transfer Assistance Independent   Active  No   Patient's  Info Family   Mode of Transportation Family   Education Not Applicable   Occupation

## 2024-07-28 VITALS
OXYGEN SATURATION: 96 % | DIASTOLIC BLOOD PRESSURE: 64 MMHG | TEMPERATURE: 97.9 F | HEART RATE: 69 BPM | WEIGHT: 139.99 LBS | HEIGHT: 64 IN | BODY MASS INDEX: 23.9 KG/M2 | SYSTOLIC BLOOD PRESSURE: 114 MMHG | RESPIRATION RATE: 18 BRPM

## 2024-07-28 LAB
ANION GAP SERPL CALC-SCNC: 5 MMOL/L (ref 3–18)
BUN SERPL-MCNC: 20 MG/DL (ref 7–18)
BUN/CREAT SERPL: 22 (ref 12–20)
CALCIUM SERPL-MCNC: 8.7 MG/DL (ref 8.5–10.1)
CHLORIDE SERPL-SCNC: 99 MMOL/L (ref 100–111)
CO2 SERPL-SCNC: 32 MMOL/L (ref 21–32)
CREAT SERPL-MCNC: 0.89 MG/DL (ref 0.6–1.3)
ERYTHROCYTE [DISTWIDTH] IN BLOOD BY AUTOMATED COUNT: 12.6 % (ref 11.6–14.5)
GLUCOSE BLD STRIP.AUTO-MCNC: 163 MG/DL (ref 70–110)
GLUCOSE BLD STRIP.AUTO-MCNC: 369 MG/DL (ref 70–110)
GLUCOSE BLD STRIP.AUTO-MCNC: 373 MG/DL (ref 70–110)
GLUCOSE SERPL-MCNC: 211 MG/DL (ref 74–99)
HCT VFR BLD AUTO: 42.3 % (ref 35–45)
HGB BLD-MCNC: 14.3 G/DL (ref 12–16)
MCH RBC QN AUTO: 32.4 PG (ref 24–34)
MCHC RBC AUTO-ENTMCNC: 33.8 G/DL (ref 31–37)
MCV RBC AUTO: 95.9 FL (ref 78–100)
NRBC # BLD: 0 K/UL (ref 0–0.01)
NRBC BLD-RTO: 0 PER 100 WBC
PLATELET # BLD AUTO: 180 K/UL (ref 135–420)
PMV BLD AUTO: 10.3 FL (ref 9.2–11.8)
POTASSIUM SERPL-SCNC: 4.3 MMOL/L (ref 3.5–5.5)
RBC # BLD AUTO: 4.41 M/UL (ref 4.2–5.3)
SODIUM SERPL-SCNC: 136 MMOL/L (ref 136–145)
WBC # BLD AUTO: 6.6 K/UL (ref 4.6–13.2)

## 2024-07-28 PROCEDURE — 94640 AIRWAY INHALATION TREATMENT: CPT

## 2024-07-28 PROCEDURE — 85027 COMPLETE CBC AUTOMATED: CPT

## 2024-07-28 PROCEDURE — 94761 N-INVAS EAR/PLS OXIMETRY MLT: CPT

## 2024-07-28 PROCEDURE — 6370000000 HC RX 637 (ALT 250 FOR IP): Performed by: PHYSICIAN ASSISTANT

## 2024-07-28 PROCEDURE — 97162 PT EVAL MOD COMPLEX 30 MIN: CPT

## 2024-07-28 PROCEDURE — 97116 GAIT TRAINING THERAPY: CPT

## 2024-07-28 PROCEDURE — 82962 GLUCOSE BLOOD TEST: CPT

## 2024-07-28 PROCEDURE — 99239 HOSP IP/OBS DSCHRG MGMT >30: CPT | Performed by: PHYSICIAN ASSISTANT

## 2024-07-28 PROCEDURE — 6360000002 HC RX W HCPCS: Performed by: PHYSICIAN ASSISTANT

## 2024-07-28 PROCEDURE — 36415 COLL VENOUS BLD VENIPUNCTURE: CPT

## 2024-07-28 PROCEDURE — 80048 BASIC METABOLIC PNL TOTAL CA: CPT

## 2024-07-28 RX ORDER — CARVEDILOL 3.12 MG/1
3.12 TABLET ORAL 2 TIMES DAILY
Qty: 60 TABLET | Refills: 3 | Status: SHIPPED | OUTPATIENT
Start: 2024-07-28

## 2024-07-28 RX ORDER — ASPIRIN 81 MG/1
81 TABLET, CHEWABLE ORAL DAILY
Qty: 30 TABLET | Refills: 3 | Status: SHIPPED | OUTPATIENT
Start: 2024-07-29 | End: 2024-07-29 | Stop reason: ALTCHOICE

## 2024-07-28 RX ADMIN — OXYCODONE HYDROCHLORIDE 30 MG: 15 TABLET ORAL at 08:26

## 2024-07-28 RX ADMIN — BUDESONIDE 1000 MCG: 0.5 INHALANT RESPIRATORY (INHALATION) at 10:14

## 2024-07-28 RX ADMIN — EZETIMIBE 10 MG: 10 TABLET ORAL at 08:26

## 2024-07-28 RX ADMIN — IPRATROPIUM BROMIDE 0.5 MG: 0.5 SOLUTION RESPIRATORY (INHALATION) at 10:13

## 2024-07-28 RX ADMIN — ARFORMOTEROL TARTRATE 15 MCG: 15 SOLUTION RESPIRATORY (INHALATION) at 10:14

## 2024-07-28 RX ADMIN — ASPIRIN 81 MG CHEWABLE TABLET 81 MG: 81 TABLET CHEWABLE at 08:26

## 2024-07-28 RX ADMIN — OXYCODONE HYDROCHLORIDE 30 MG: 15 TABLET ORAL at 03:59

## 2024-07-28 RX ADMIN — OXYCODONE HYDROCHLORIDE 30 MG: 15 TABLET ORAL at 12:14

## 2024-07-28 RX ADMIN — INSULIN LISPRO 4 UNITS: 100 INJECTION, SOLUTION INTRAVENOUS; SUBCUTANEOUS at 12:14

## 2024-07-28 RX ADMIN — INSULIN GLARGINE 10 UNITS: 100 INJECTION, SOLUTION SUBCUTANEOUS at 08:26

## 2024-07-28 RX ADMIN — RIVAROXABAN 20 MG: 20 TABLET, FILM COATED ORAL at 08:26

## 2024-07-28 RX ADMIN — LEVOTHYROXINE SODIUM 112 MCG: 112 TABLET ORAL at 05:25

## 2024-07-28 ASSESSMENT — PAIN SCALES - GENERAL
PAINLEVEL_OUTOF10: 6
PAINLEVEL_OUTOF10: 7
PAINLEVEL_OUTOF10: 0
PAINLEVEL_OUTOF10: 5
PAINLEVEL_OUTOF10: 2
PAINLEVEL_OUTOF10: 3
PAINLEVEL_OUTOF10: 0

## 2024-07-28 ASSESSMENT — PAIN DESCRIPTION - DESCRIPTORS
DESCRIPTORS: ACHING

## 2024-07-28 ASSESSMENT — PAIN DESCRIPTION - LOCATION
LOCATION: NECK
LOCATION: NECK
LOCATION: LEG

## 2024-07-28 ASSESSMENT — PAIN - FUNCTIONAL ASSESSMENT
PAIN_FUNCTIONAL_ASSESSMENT: ACTIVITIES ARE NOT PREVENTED
PAIN_FUNCTIONAL_ASSESSMENT: PREVENTS OR INTERFERES SOME ACTIVE ACTIVITIES AND ADLS
PAIN_FUNCTIONAL_ASSESSMENT: ACTIVITIES ARE NOT PREVENTED

## 2024-07-28 ASSESSMENT — PAIN DESCRIPTION - ORIENTATION
ORIENTATION: RIGHT;LEFT
ORIENTATION: RIGHT

## 2024-07-28 NOTE — DISCHARGE SUMMARY
failure (HCC)      hydroCHLOROthiazide 12.5 MG tablet TAKE 1 TABLET BY MOUTH EVERY OTHER DAY  Qty: 30 tablet, Refills: 5      rivaroxaban (XARELTO) 20 MG TABS tablet Take 1 tablet by mouth daily  Qty: 30 tablet, Refills: 3      atorvastatin (LIPITOR) 40 MG tablet Take 1 tablet by mouth daily  Qty: 90 tablet, Refills: 3    Associated Diagnoses: Pure hypercholesterolemia      ezetimibe (ZETIA) 10 MG tablet Take 1 tablet by mouth once daily  Qty: 90 tablet, Refills: 3      vitamin D (CHOLECALCIFEROL) 25 MCG (1000 UT) TABS tablet Take 5 tablets by mouth daily      ferrous sulfate (IRON 325) 325 (65 Fe) MG tablet Take by mouth every morning (before breakfast)      albuterol-ipratropium (COMBIVENT RESPIMAT)  MCG/ACT AERS inhaler Inhale 1 puff into the lungs every 6 hours as needed      naloxegol (MOVANTIK) 25 MG TABS tablet Take 1 tablet by mouth daily      naloxone 4 MG/0.1ML LIQD nasal spray 1 spray by Nasal route as needed      nitroGLYCERIN (NITROSTAT) 0.4 MG SL tablet Place 1 tablet under the tongue      oxyCODONE (OXY-IR) 30 MG immediate release tablet Take 1 tablet by mouth every 4 hours.           STOP taking these medications       insulin glargine (LANTUS) 100 UNIT/ML injection vial Comments:   Reason for Stopping:         insulin lispro (HUMALOG) 100 UNIT/ML SOLN injection vial Comments:   Reason for Stopping:         levothyroxine (SYNTHROID) 125 MCG tablet Comments:   Reason for Stopping:         omeprazole (PRILOSEC) 20 MG delayed release capsule Comments:   Reason for Stopping:         pyridoxine (B-6) 100 MG tablet Comments:   Reason for Stopping:         tiotropium (SPIRIVA) 18 MCG inhalation capsule Comments:   Reason for Stopping:                Activity: activity as tolerated    Diet: diabetic diet    Wound Care: none needed    Follow-up:   Follow-up Information       Follow up With Specialties Details Why Contact Info    Denise Hunter Jr., MD Primary care  Schedule an appointment as soon as

## 2024-07-28 NOTE — CARE COORDINATION
Discharge order and home health orders noted for today. Orders received. No needs identified at this time.   Pt has bee accepted by [facility] Clinicals send via cc link. Daughter to transport home.  Case management remains available as needed.    Opal RICE RN  Case Management

## 2024-07-28 NOTE — PROGRESS NOTES
[facility] Hospitalist Group  Progress Note    Patient: Macrina Escalona Age: 74 y.o. : 1950 MR#: [id number] SSN: [id number]  Date: 2024         Assessment/Plan:     Suspected CVA: no definite acute findings on initial CT.   - repeat head CT at 1400. Pacemaker is not MRI compatible  - echo with bubble study  - cardiac monitoring  - ASA/statin  - neurology consulted, appreciate assistance  - permissive HTN <220/120; PRN labetalol  - ST/PT/OT     PAD: previous bilateral common iliac artery kissing stents, previous right carotid endarterectomy. CTA head/neck with Extensive calcific plaque at the bilateral cervical carotid bifurcations with estimated severe proximal LICA stenosis and moderate distal RCCA stenosis  - asa, statin  - vascular consulted, appreciate assistance  - carotid duplex results pending     DM2 with hyperglycemia: A1c 9.7%  - SSI, monitor achs  - lantus added      Chronic HFpEF  HTN  - hold anti-hypertensive medications  - monitor I&Os, daily weights     Afib   - xarelto on hold until repeat CT performed today as CVA may be large and at risk for hemorrhagic conversion based on symptoms per neurology     CAD  Pacemaker present  Chronic pain  Hypothyroidism: TSH 1.36  COPD without exacerbation  HLD  - home medications      DVT Prophylaxis:  []Lovenox  []Hep SQ  []SCDs  []Coumadin   []On Heparin gtt []PO anticoagulant    Anticipated discharge: 1-2 days    Subjective:     Pt s/e @ bedside. No major events overnight. Pt states she feels much better today and sx are improved. Family concerned she is unsteady on her feet. Denies CP, SOB, cough, abd pain, nvd, headache, dysuria.      Objective:   VS: BP 95/60   Pulse 64   Temp 97.8 °F (36.6 °C) (Oral)   Resp 18   Ht 1.626 m (5' 4\")   Wt 63.5 kg (140 lb)   SpO2 92%   BMI 24.03 kg/m²    Tmax/24hrs: Temp (24hrs), Av.9 °F (36.6 °C), Min:97.6 °F (36.4 °C), Max:98 °F (36.7 °C)    Intake/Output Summary (Last 24 
0951: PT orders received and chart reviewed. Off floor. Will follow up.   
Chart reviewed  CTA neck shows critical stenosis left carotid, at least moderate stenosis on the right  Appears patient had left-sided weakness  Has risk factors for stroke including critical carotid stenosis and atrial fibrillation  Will review neurology consult, likely will need carotid intervention for symptomatic disease  Symptoms on the left are confusing regarding left-sided carotid stenosis  Obtain carotid ultrasound  
INTERIM UPDATE - 1431 EST on 7/26/2024    [facility] ER Resident Physician calls requesting admission for a 74-year-old female who presents with complaint of Left Face and Left  Arm Shaking.  Patient has a history of Transient Ischemic Accident (a.k.a. TIA) with Patient denying a history of Infarctions.  Patient also has a history of Migraines.    [facility] ER Resident Physician reports that Patient was found to have Left Facial Droop, Left-Sided Weakness, and Left Facial Reduced Sensation, which was present on examination.  [facility] ER Resident Physician report CT Head w/o Contrast is negative for acute process, but does show \"Small well-defined hypodense defects in the left thalamus and bilateral caudate nuclei which are new since previous but most likely represent old lacunar infarcts.\" CTA Head & Neck w/o Contrast preliminary read shows no Large Vessel Occlusion (a.k.a. LVO).    Tele-Neurology was consulted.    Notably, Patient has a history of Atrial Fibrillation on Rivaroxaban (a.k.a. Xarelto) with which Patient is reportedly compliant.    Patient received Aspirin and passed swallow screen.    [facility] ER Resident reports that Patient is hemodynamically stable, requires no ancillary oxygen, and exhibits no arrhythmia.    Plan:  Will admit Patient to [facility] Telemetry Unit for management of Acute Left Facial and Left Sided Weakness with concern for suspected Cerebrovascular Accident (a.k.a. CVA)/Transient Ischemic Accident (a.k.a. TIA) versus Recrudescence versus Complicated Migraine.  
MRI screening form needs to be filled out and faxed to [phone] BEFORE MRI can be scheduled. If unable to obtain information from the patient, MPO[facility] needs to be contacted. If patient is claustrophobic or will need pain meds, please have ordered in advance in order to facilitate exam.  
PHYSICAL THERAPY EVALUATION/DISCHARGE    Patient: Macrina Escalona (74 y.o. female)  Date: 7/28/2024  Primary Diagnosis: TIA (transient ischemic attack) [G45.9]  Pacemaker [Z95.0]  Acute left-sided weakness [R53.1]  Suspected cerebrovascular accident (CVA) [R09.89]  Cerebrovascular accident (CVA), unspecified mechanism (HCC) [I63.9]  Precautions: Fall Risk  PLOF: Independent/mod I with rollator   ASSESSMENT AND RECOMMENDATIONS:  Seated EOB. Daughter and grandson at bedside. Supervision for sit to stand. Amb 150ft with personal rollator and steady gait. Completed 5 steps with supervision and one handrail reciprocally. Amb 75ft with no rollator and supervision. Returned to seated EOB. Educated on need for RN assistance with mobility; verbalized understanding. Call bell in reach.     Patient does not require further skilled physical therapy intervention at this level of care.    Further Equipment Recommendations for Discharge: rollator    AM-PAC Inpatient Mobility Raw Score : 18    This [facility] score should be considered in conjunction with interdisciplinary team recommendations to determine the most appropriate discharge setting. Patient's social support, diagnosis, medical stability, and prior level of function should also be taken into consideration.    Current research shows that an AM-PAC score of 18 (14 without stairs) or greater is associated with a discharge to the patient's home setting.     SUBJECTIVE:   Patient stated “I don't ask, I tell.”    OBJECTIVE DATA SUMMARY:     Past Medical History:   Diagnosis Date    Abnormal myocardial perfusion study 05/07/2010    Anterior ischemia c/w at least 1-vessel CAD.  No WMA.  EF 51%.  Positive EKG at 78% pred max HR.  Ex time 7 min 30 sec.    Acute systolic congestive heart failure (HCC) 4/9/2018 4/18 new, likely she was told of low EF and MI- med Rx for now    Allergic rhinitis 1/30/2010    Atrial fibrillation (HCC)     Broken ankle 08/22/2013    left    Carotid 
Patient and family had discharge teaching at bedside and verbally confirmed understanding of the instructions presented. Patient mobilized off unit via wheelchair, daughter in possession of her personal effects and grandson present. Patient in jovial mood at time of departure. Discharge paperwork given.   
Patients grandson has left her beside for a few minutes. Patient refused to have bed alarm activated by both RN's Zaid and Carole. Reason for bed alarm explained and patient is vehemently refusing the use of the device. Call light placed in the patients lap at this time and rails elevated x 3 for safety. Patient verbally expressed that she understands the need to call for assistance and demonstrated effectively how to use the call light.   
increased (42-48 mL/m2). LA Vol Index A/L is 46 mL/m2.    Pulmonary Arteries: Mild pulmonary hypertension present. The estimated PASP is 45 mmHg.    Signed by: Luke Everett MD on 5/17/2023  6:04 PM    Impression  Macrina Escalona is a 74 y.o. female whose history and physical are consistent with acute right MCA infarct.       Plan:  Repeat head CT tomorrow, as her pacer is not MRI compatible  Hold xarelto now, put patient on aspirin and follow head CT  PT/OT/ST  Vascular team for asymptomatic left ICA high degree stenosis treatment in the future  Echo and US carotid, A1C and LDL and stroke workup  No smoking  Keep /80, LDL 70, A1C 5.6 from 9.7, pulse 80    Update  CT Result (most recent):  CT HEAD WO CONTRAST 07/27/2024  Impression  No significant interval change compared to the head CT from 7/26/2024.    Rx  Resume anticoagulant    Total time 50 minutes taking care of patient in following activities:  1-Preparing to see the patient (review of tests, prior medical visits)  2-Obtaining and/or reviewing separately medically appropriate obtained history  3-Performing the medically appropriate exam and/or evaluation  4-Clinical documentation in the EHR or other health record  5-Counseling and educating the patient or caregiver  6-Interpreting results and/or communicating results to the patient/family/caregiver  7-Care coordination  8-Ordering medications, tests, or procedures  9-Referring and communicating with other health care professionals  10-Documentation in EHR.   Signed By: Jose Kraft MD. PhD. MS. FAASM

## 2024-07-28 NOTE — PLAN OF CARE
Problem: Discharge Planning  Goal: Discharge to home or other facility with appropriate resources  7/27/2024 1014 by Carole Barron RN  Outcome: Progressing  7/26/2024 2204 by Kandice Glover RN  Outcome: Progressing     Problem: Safety - Adult  Goal: Free from fall injury  7/27/2024 1014 by Carole Barron RN  Outcome: Progressing  7/26/2024 2204 by Kandice Glover RN  Outcome: Progressing     Problem: Pain  Goal: Verbalizes/displays adequate comfort level or baseline comfort level  7/27/2024 1014 by Carole Barron RN  Outcome: Progressing  7/26/2024 2204 by Kandice Glover RN  Outcome: Progressing     Problem: Neurosensory - Adult  Goal: Achieves stable or improved neurological status  7/27/2024 1014 by Carole Barron RN  Outcome: Progressing  7/26/2024 2204 by Kandice Glover RN  Outcome: Progressing  Goal: Achieves maximal functionality and self care  7/27/2024 1014 by Carole Barron RN  Outcome: Progressing  7/26/2024 2204 by Kandice Glover RN  Outcome: Progressing     Problem: Cardiovascular - Adult  Goal: Maintains optimal cardiac output and hemodynamic stability  7/27/2024 1014 by Carole Barron RN  Outcome: Progressing  7/26/2024 2204 by Kandice Glover RN  Outcome: Progressing     Problem: Skin/Tissue Integrity - Adult  Goal: Skin integrity remains intact  7/27/2024 1014 by Carole Barron RN  Outcome: Progressing  7/26/2024 2204 by Kandice Glover RN  Outcome: Progressing     Problem: Metabolic/Fluid and Electrolytes - Adult  Goal: Electrolytes maintained within normal limits  7/27/2024 1014 by Carole Barron RN  Outcome: Progressing  Flowsheets (Taken 7/27/2024 0800)  Electrolytes maintained within normal limits: Monitor labs and assess patient for signs and symptoms of electrolyte imbalances  7/26/2024 2204 by Kandice Glover RN  Outcome: Progressing     
  Problem: Discharge Planning  Goal: Discharge to home or other facility with appropriate resources  7/28/2024 1253 by Carole Barron RN  Outcome: Adequate for Discharge  7/28/2024 0928 by Carole Barron RN  Outcome: Progressing     Problem: Safety - Adult  Goal: Free from fall injury  7/28/2024 1253 by Carole Barron RN  Outcome: Adequate for Discharge  7/28/2024 0928 by Carole Barron RN  Outcome: Progressing     Problem: Pain  Goal: Verbalizes/displays adequate comfort level or baseline comfort level  7/28/2024 1253 by Carole Barron RN  Outcome: Adequate for Discharge  7/28/2024 0928 by Carole Barron RN  Outcome: Progressing     Problem: Neurosensory - Adult  Goal: Achieves stable or improved neurological status  7/28/2024 1253 by Carole Barron RN  Outcome: Adequate for Discharge  7/28/2024 0928 by Carole Barron RN  Outcome: Progressing  Goal: Achieves maximal functionality and self care  7/28/2024 1253 by Carole Barron RN  Outcome: Adequate for Discharge  7/28/2024 0928 by Carole Barron RN  Outcome: Progressing     Problem: Cardiovascular - Adult  Goal: Maintains optimal cardiac output and hemodynamic stability  7/28/2024 1253 by Carole Barron RN  Outcome: Adequate for Discharge  7/28/2024 0928 by Carole Barron RN  Outcome: Progressing     Problem: Skin/Tissue Integrity - Adult  Goal: Skin integrity remains intact  7/28/2024 1253 by Carole Barron RN  Outcome: Adequate for Discharge  7/28/2024 0928 by Carole Barron RN  Outcome: Progressing     Problem: Metabolic/Fluid and Electrolytes - Adult  Goal: Electrolytes maintained within normal limits  7/28/2024 1253 by Carole Barron RN  Outcome: Adequate for Discharge  7/28/2024 0928 by Carole Barron RN  Outcome: Progressing     
  Problem: Discharge Planning  Goal: Discharge to home or other facility with appropriate resources  Outcome: Progressing     Problem: Safety - Adult  Goal: Free from fall injury  Outcome: Progressing     Problem: Pain  Goal: Verbalizes/displays adequate comfort level or baseline comfort level  Outcome: Progressing     Problem: Neurosensory - Adult  Goal: Achieves stable or improved neurological status  Outcome: Progressing  Goal: Achieves maximal functionality and self care  Outcome: Progressing     Problem: Cardiovascular - Adult  Goal: Maintains optimal cardiac output and hemodynamic stability  Outcome: Progressing     Problem: Skin/Tissue Integrity - Adult  Goal: Skin integrity remains intact  Outcome: Progressing     Problem: Metabolic/Fluid and Electrolytes - Adult  Goal: Electrolytes maintained within normal limits  Outcome: Progressing     
  Problem: Discharge Planning  Goal: Discharge to home or other facility with appropriate resources  Outcome: Progressing     Problem: Safety - Adult  Goal: Free from fall injury  Outcome: Progressing     Problem: Pain  Goal: Verbalizes/displays adequate comfort level or baseline comfort level  Outcome: Progressing     Problem: Neurosensory - Adult  Goal: Achieves stable or improved neurological status  Outcome: Progressing  Goal: Achieves maximal functionality and self care  Outcome: Progressing     Problem: Cardiovascular - Adult  Goal: Maintains optimal cardiac output and hemodynamic stability  Outcome: Progressing     Problem: Skin/Tissue Integrity - Adult  Goal: Skin integrity remains intact  Outcome: Progressing     Problem: Metabolic/Fluid and Electrolytes - Adult  Goal: Electrolytes maintained within normal limits  Outcome: Progressing     
Occupational Therapy Goals:  Initiated 7/27/2024 to be met within 7-10 days.    1.  Patient will demonstrate use of LUE as an independent assist during her self care routine  2.  Patient will perform sensory stim functional activity cycle in preparation for efficient functional use of LUE during her self care routine  3.  Patient will maneuver on and off BSC      PLOF: this patient lives with her family and was independent with her self care, she required assistance for functional mobility required to get in and out of shower and for stairs    OCCUPATIONAL THERAPY EVALUATION    Patient: Macrina Escalona (74 y.o. female)  Date: 7/27/2024  Primary Diagnosis: TIA (transient ischemic attack) [G45.9]  Pacemaker [Z95.0]  Acute left-sided weakness [R53.1]  Suspected cerebrovascular accident (CVA) [R09.89]  Cerebrovascular accident (CVA), unspecified mechanism (HCC) [I63.9]       Precautions: Fall Risk    ASSESSMENT : this patient presents with decreased sensation in her LUE and decreased standing balance. This limits her independence and her safety at this time. Following sensory stim/functional activity cycle, she increased to being able to use her LUE with minimal difficulty as opposed to moderate difficulty prior to this. Following training, her daughter reports an understanding and that she will follow through with instructed program thus far. 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 Feel she will need additional training to address this patient's neurologic deficits. This patient is easily agitated      DEFICITS/IMPAIRMENTS:  Performance deficits / Impairments: Decreased functional mobility ;Decreased sensation    Patient will benefit from skilled intervention to address the above impairments.  Patient's rehabilitation potential/Prognosis: Fair.  Factors which may influence rehabilitation potential include:   []             None noted  []             
Problem: SLP Adult - Impaired Swallowing  Goal: By Discharge: Advance to least restrictive diet without signs or symptoms of aspiration for planned discharge setting.  See evaluation for individualized goals.  Description: Patient will:  1. Tolerate PO trials with 0 s/s overt distress in 4/5 trials  2. Utilize compensatory swallow strategies/maneuvers (decrease bite/sip, size/rate, alt. liq/sol) with min cues in 4/5 trials  3. Perform oral-motor/laryngeal exercises to increase oropharyngeal swallow function with min cues  4. Complete an objective swallow study (i.e., MBSS) to assess swallow integrity, r/o aspiration, and determine of safest LRD, min A as indicated/ordered by MD     Rec:     Regular diet with thin liquids  Aspiration precautions  HOB >45 during po intake, remain >30 for 30-45 minutes after po   Small bites/sips; alternate liquid/solid with slow feeding rate   Oral care TID  Meds per pt preference  MBS to further assess oropharyngeal swallow fxn   Outcome: Progressing  SPEECH LANGUAGE PATHOLOGY BEDSIDE SWALLOW EVALUATION/TREATMENT    Patient: Macrina Escalona (74 y.o. female)  Date: 7/27/2024  Primary Diagnosis: TIA (transient ischemic attack) [G45.9]  Pacemaker [Z95.0]  Acute left-sided weakness [R53.1]  Suspected cerebrovascular accident (CVA) [R09.89]  Cerebrovascular accident (CVA), unspecified mechanism (HCC) [I63.9]       Precautions: Aspiration  PLOF: As per H&P  ASSESSMENT:  Based on the objective data described below, the pt presents with  oropharyngeal swallow function that appears WFL, but requires further evaluation. Pt seen today for a bedside swallow evaluation. Pt denies a hx of swallowing difficulties. Endorses a regular diet and thin liquids at home. OME found to be unremarkable. Pt edentulous w/ a clean and moist oral cavity.    2 delayed coughs and 1 delayed throat clear w/ audible phlegm clearing between trials of thin and puree.  No other overt s/sx aspiration observed w/ 
intact  7/28/2024 1309 by Carole Barron, BRITTANY  Outcome: Adequate for Discharge  7/28/2024 1253 by Carole Barron, BRITTANY  Outcome: Adequate for Discharge  7/28/2024 0928 by Carole Barron, RN  Outcome: Progressing     Problem: Metabolic/Fluid and Electrolytes - Adult  Goal: Electrolytes maintained within normal limits  7/28/2024 1309 by Carole Barron, BRITTANY  Outcome: Adequate for Discharge  7/28/2024 1253 by Carole Barron, BRITTANY  Outcome: Adequate for Discharge  7/28/2024 0928 by Carole Barron, RN  Outcome: Progressing

## 2024-07-31 LAB
ECHO BSA: 1.69 M2
VAS LEFT CCA DIST EDV: 5.4 CM/S
VAS LEFT CCA DIST PSV: 48.3 CM/S
VAS LEFT CCA MID EDV: 10.9 CM/S
VAS LEFT CCA MID PSV: 53.83 CM/S
VAS LEFT CCA PROX EDV: 7.6 CM/S
VAS LEFT CCA PROX PSV: 60.4 CM/S
VAS LEFT ECA EDV: 11.57 CM/S
VAS LEFT ECA PSV: 98.8 CM/S
VAS LEFT ICA DIST EDV: 15.5 CM/S
VAS LEFT ICA DIST PSV: 78.5 CM/S
VAS LEFT ICA MID EDV: 15.6 CM/S
VAS LEFT ICA MID PSV: 98.6 CM/S
VAS LEFT ICA PROX EDV: 66.8 CM/S
VAS LEFT ICA PROX PSV: 348.7 CM/S
VAS LEFT ICA/CCA PSV: 7.22 NO UNITS
VAS LEFT SUBCLAVIAN PROX EDV: 0 CM/S
VAS LEFT SUBCLAVIAN PROX PSV: 103.2 CM/S
VAS LEFT VERTEBRAL EDV: 8.37 CM/S
VAS LEFT VERTEBRAL PSV: 45 CM/S
VAS RIGHT CCA DIST EDV: 6.5 CM/S
VAS RIGHT CCA DIST PSV: 59.2 CM/S
VAS RIGHT CCA MID EDV: 7.76 CM/S
VAS RIGHT CCA MID PSV: 40.91 CM/S
VAS RIGHT CCA PROX EDV: 9 CM/S
VAS RIGHT CCA PROX PSV: 57.3 CM/S
VAS RIGHT ECA EDV: 22.92 CM/S
VAS RIGHT ECA PSV: 228.8 CM/S
VAS RIGHT ICA DIST EDV: 12.8 CM/S
VAS RIGHT ICA DIST PSV: 73.6 CM/S
VAS RIGHT ICA MID EDV: 14.4 CM/S
VAS RIGHT ICA MID PSV: 98.4 CM/S
VAS RIGHT ICA PROX EDV: 39 CM/S
VAS RIGHT ICA PROX PSV: 361.4 CM/S
VAS RIGHT ICA/CCA PSV: 6.1 NO UNITS
VAS RIGHT SUBCLAVIAN PROX EDV: 0 CM/S
VAS RIGHT SUBCLAVIAN PROX PSV: 56 CM/S
VAS RIGHT VERTEBRAL EDV: 6.48 CM/S
VAS RIGHT VERTEBRAL PSV: 58.2 CM/S

## 2024-07-31 PROCEDURE — 93880 EXTRACRANIAL BILAT STUDY: CPT | Performed by: SURGERY

## 2024-09-03 RX ORDER — CARVEDILOL 3.12 MG/1
3.12 TABLET ORAL 2 TIMES DAILY
Qty: 60 TABLET | Refills: 3 | Status: SHIPPED | OUTPATIENT
Start: 2024-09-03

## 2024-10-21 RX ORDER — EZETIMIBE 10 MG/1
TABLET ORAL
Qty: 90 TABLET | Refills: 3 | Status: SHIPPED | OUTPATIENT
Start: 2024-10-21

## 2024-10-25 RX ORDER — FUROSEMIDE 20 MG/1
20 TABLET ORAL DAILY
COMMUNITY

## 2024-10-25 RX ORDER — CLOPIDOGREL BISULFATE 75 MG/1
1 TABLET ORAL DAILY
COMMUNITY

## 2024-10-25 ASSESSMENT — PAIN - FUNCTIONAL ASSESSMENT: PAIN_FUNCTIONAL_ASSESSMENT: 0-10

## 2024-10-28 ENCOUNTER — HOSPITAL ENCOUNTER (OUTPATIENT)
Facility: HOSPITAL | Age: 74
Discharge: HOME | End: 2024-10-31
Payer: MEDICARE

## 2024-10-28 ENCOUNTER — TRANSCRIBE ORDERS (OUTPATIENT)
Facility: HOSPITAL | Age: 74
End: 2024-10-28

## 2024-10-28 DIAGNOSIS — I65.29 OCCLUSION OF CAROTID ARTERY, UNSPECIFIED LATERALITY: ICD-10-CM

## 2024-10-28 DIAGNOSIS — I65.29 OCCLUSION OF CAROTID ARTERY, UNSPECIFIED LATERALITY: Primary | ICD-10-CM

## 2024-10-28 LAB
ANION GAP SERPL CALC-SCNC: 4 MMOL/L (ref 3–18)
BASOPHILS # BLD: 0 K/UL (ref 0–0.1)
BASOPHILS NFR BLD: 0 % (ref 0–2)
BUN SERPL-MCNC: 22 MG/DL (ref 7–18)
BUN/CREAT SERPL: 23 (ref 12–20)
CALCIUM SERPL-MCNC: 8.9 MG/DL (ref 8.5–10.1)
CHLORIDE SERPL-SCNC: 96 MMOL/L (ref 100–111)
CO2 SERPL-SCNC: 35 MMOL/L (ref 21–32)
CREAT SERPL-MCNC: 0.97 MG/DL (ref 0.6–1.3)
DIFFERENTIAL METHOD BLD: ABNORMAL
EKG ATRIAL RATE: 396 BPM
EKG DIAGNOSIS: NORMAL
EKG Q-T INTERVAL: 426 MS
EKG QRS DURATION: 152 MS
EKG QTC CALCULATION (BAZETT): 488 MS
EKG R AXIS: -81 DEGREES
EKG T AXIS: 111 DEGREES
EKG VENTRICULAR RATE: 79 BPM
EOSINOPHIL # BLD: 0.2 K/UL (ref 0–0.4)
EOSINOPHIL NFR BLD: 2 % (ref 0–5)
ERYTHROCYTE [DISTWIDTH] IN BLOOD BY AUTOMATED COUNT: 12.1 % (ref 11.6–14.5)
GLUCOSE SERPL-MCNC: 220 MG/DL (ref 74–99)
HCT VFR BLD AUTO: 42.1 % (ref 35–45)
HGB BLD-MCNC: 14 G/DL (ref 12–16)
IMM GRANULOCYTES # BLD AUTO: 0 K/UL (ref 0–0.04)
IMM GRANULOCYTES NFR BLD AUTO: 0 % (ref 0–0.5)
LYMPHOCYTES # BLD: 1.6 K/UL (ref 0.9–3.6)
LYMPHOCYTES NFR BLD: 16 % (ref 21–52)
MCH RBC QN AUTO: 32.6 PG (ref 24–34)
MCHC RBC AUTO-ENTMCNC: 33.3 G/DL (ref 31–37)
MCV RBC AUTO: 97.9 FL (ref 78–100)
MONOCYTES # BLD: 0.9 K/UL (ref 0.05–1.2)
MONOCYTES NFR BLD: 10 % (ref 3–10)
NEUTS SEG # BLD: 6.9 K/UL (ref 1.8–8)
NEUTS SEG NFR BLD: 72 % (ref 40–73)
NRBC # BLD: 0 K/UL (ref 0–0.01)
NRBC BLD-RTO: 0 PER 100 WBC
PLATELET # BLD AUTO: 198 K/UL (ref 135–420)
PMV BLD AUTO: 10.1 FL (ref 9.2–11.8)
POTASSIUM SERPL-SCNC: 4.4 MMOL/L (ref 3.5–5.5)
RBC # BLD AUTO: 4.3 M/UL (ref 4.2–5.3)
SODIUM SERPL-SCNC: 135 MMOL/L (ref 136–145)
WBC # BLD AUTO: 9.7 K/UL (ref 4.6–13.2)

## 2024-10-28 PROCEDURE — 80048 BASIC METABOLIC PNL TOTAL CA: CPT

## 2024-10-28 PROCEDURE — 85025 COMPLETE CBC W/AUTO DIFF WBC: CPT

## 2024-10-28 PROCEDURE — 71046 X-RAY EXAM CHEST 2 VIEWS: CPT

## 2024-10-28 PROCEDURE — 36415 COLL VENOUS BLD VENIPUNCTURE: CPT

## 2024-10-28 PROCEDURE — 93005 ELECTROCARDIOGRAM TRACING: CPT

## 2024-11-01 ENCOUNTER — ANESTHESIA EVENT (OUTPATIENT)
Dept: CARDIOTHORACIC SURGERY | Facility: HOSPITAL | Age: 74
DRG: 035 | End: 2024-11-01
Payer: MEDICARE

## 2024-11-04 ENCOUNTER — HOSPITAL ENCOUNTER (INPATIENT)
Facility: HOSPITAL | Age: 74
LOS: 1 days | Discharge: HOME | DRG: 035 | End: 2024-11-05
Attending: SURGERY | Admitting: SURGERY
Payer: MEDICARE

## 2024-11-04 ENCOUNTER — ANESTHESIA (OUTPATIENT)
Dept: CARDIOTHORACIC SURGERY | Facility: HOSPITAL | Age: 74
DRG: 035 | End: 2024-11-04
Payer: MEDICARE

## 2024-11-04 ENCOUNTER — APPOINTMENT (OUTPATIENT)
Facility: HOSPITAL | Age: 74
DRG: 035 | End: 2024-11-04
Attending: SURGERY
Payer: MEDICARE

## 2024-11-04 PROBLEM — I65.22 SYMPTOMATIC STENOSIS OF LEFT CAROTID ARTERY: Status: ACTIVE | Noted: 2024-11-04

## 2024-11-04 LAB
ACT BLD: 317 SECS (ref 79–138)
ANION GAP SERPL CALC-SCNC: 4 MMOL/L (ref 3–18)
APPEARANCE UR: CLEAR
BACTERIA URNS QL MICRO: ABNORMAL /HPF
BILIRUB UR QL: NEGATIVE
BUN SERPL-MCNC: 23 MG/DL (ref 7–18)
BUN/CREAT SERPL: 21 (ref 12–20)
CALCIUM SERPL-MCNC: 8.7 MG/DL (ref 8.5–10.1)
CHLORIDE SERPL-SCNC: 101 MMOL/L (ref 100–111)
CO2 SERPL-SCNC: 30 MMOL/L (ref 21–32)
COLOR UR: YELLOW
CREAT SERPL-MCNC: 1.12 MG/DL (ref 0.6–1.3)
EPITH CASTS URNS QL MICRO: ABNORMAL /LPF (ref 0–5)
EST. AVERAGE GLUCOSE BLD GHB EST-MCNC: 235 MG/DL
GLUCOSE BLD STRIP.AUTO-MCNC: 271 MG/DL (ref 70–110)
GLUCOSE BLD STRIP.AUTO-MCNC: 71 MG/DL (ref 70–110)
GLUCOSE SERPL-MCNC: 266 MG/DL (ref 74–99)
GLUCOSE UR STRIP.AUTO-MCNC: NEGATIVE MG/DL
HBA1C MFR BLD: 9.8 % (ref 4.2–5.6)
HGB UR QL STRIP: NEGATIVE
KETONES UR QL STRIP.AUTO: NEGATIVE MG/DL
LEUKOCYTE ESTERASE UR QL STRIP.AUTO: ABNORMAL
NITRITE UR QL STRIP.AUTO: NEGATIVE
PH UR STRIP: 7.5 (ref 5–8)
POTASSIUM SERPL-SCNC: 3.9 MMOL/L (ref 3.5–5.5)
PROT UR STRIP-MCNC: NEGATIVE MG/DL
RBC #/AREA URNS HPF: ABNORMAL /HPF (ref 0–5)
SODIUM SERPL-SCNC: 135 MMOL/L (ref 136–145)
SP GR UR REFRACTOMETRY: 1.01 (ref 1–1.03)
UROBILINOGEN UR QL STRIP.AUTO: 0.2 EU/DL (ref 0.2–1)
WBC URNS QL MICRO: ABNORMAL /HPF (ref 0–5)

## 2024-11-04 PROCEDURE — C1769 GUIDE WIRE: HCPCS | Performed by: SURGERY

## 2024-11-04 PROCEDURE — 2709999900 HC NON-CHARGEABLE SUPPLY: Performed by: SURGERY

## 2024-11-04 PROCEDURE — 037J3DZ DILATION OF LEFT COMMON CAROTID ARTERY WITH INTRALUMINAL DEVICE, PERCUTANEOUS APPROACH: ICD-10-PCS | Performed by: SURGERY

## 2024-11-04 PROCEDURE — 81001 URINALYSIS AUTO W/SCOPE: CPT

## 2024-11-04 PROCEDURE — 94640 AIRWAY INHALATION TREATMENT: CPT

## 2024-11-04 PROCEDURE — 94664 DEMO&/EVAL PT USE INHALER: CPT

## 2024-11-04 PROCEDURE — 2000000000 HC ICU R&B

## 2024-11-04 PROCEDURE — 6360000002 HC RX W HCPCS: Performed by: SURGERY

## 2024-11-04 PROCEDURE — C1894 INTRO/SHEATH, NON-LASER: HCPCS | Performed by: SURGERY

## 2024-11-04 PROCEDURE — 87086 URINE CULTURE/COLONY COUNT: CPT

## 2024-11-04 PROCEDURE — 3700000000 HC ANESTHESIA ATTENDED CARE: Performed by: SURGERY

## 2024-11-04 PROCEDURE — B3191ZZ FLUOROSCOPY OF RIGHT EXTERNAL CAROTID ARTERY USING LOW OSMOLAR CONTRAST: ICD-10-PCS | Performed by: SURGERY

## 2024-11-04 PROCEDURE — C1876 STENT, NON-COA/NON-COV W/DEL: HCPCS | Performed by: SURGERY

## 2024-11-04 PROCEDURE — B3141ZZ FLUOROSCOPY OF LEFT COMMON CAROTID ARTERY USING LOW OSMOLAR CONTRAST: ICD-10-PCS | Performed by: SURGERY

## 2024-11-04 PROCEDURE — 3600000002 HC SURGERY LEVEL 2 BASE: Performed by: SURGERY

## 2024-11-04 PROCEDURE — 6370000000 HC RX 637 (ALT 250 FOR IP): Performed by: SURGERY

## 2024-11-04 PROCEDURE — B3161ZZ FLUOROSCOPY OF RIGHT INTERNAL CAROTID ARTERY USING LOW OSMOLAR CONTRAST: ICD-10-PCS | Performed by: SURGERY

## 2024-11-04 PROCEDURE — 94761 N-INVAS EAR/PLS OXIMETRY MLT: CPT

## 2024-11-04 PROCEDURE — 3700000001 HC ADD 15 MINUTES (ANESTHESIA): Performed by: SURGERY

## 2024-11-04 PROCEDURE — 2500000003 HC RX 250 WO HCPCS: Performed by: SURGERY

## 2024-11-04 PROCEDURE — 2580000003 HC RX 258: Performed by: SURGERY

## 2024-11-04 PROCEDURE — C1760 CLOSURE DEV, VASC: HCPCS | Performed by: SURGERY

## 2024-11-04 PROCEDURE — 83036 HEMOGLOBIN GLYCOSYLATED A1C: CPT

## 2024-11-04 PROCEDURE — 82962 GLUCOSE BLOOD TEST: CPT

## 2024-11-04 PROCEDURE — 85347 COAGULATION TIME ACTIVATED: CPT

## 2024-11-04 PROCEDURE — 2580000003 HC RX 258: Performed by: ANESTHESIOLOGY

## 2024-11-04 PROCEDURE — 6360000002 HC RX W HCPCS: Performed by: ANESTHESIOLOGY

## 2024-11-04 PROCEDURE — 85576 BLOOD PLATELET AGGREGATION: CPT

## 2024-11-04 PROCEDURE — 36415 COLL VENOUS BLD VENIPUNCTURE: CPT

## 2024-11-04 PROCEDURE — 6360000004 HC RX CONTRAST MEDICATION: Performed by: SURGERY

## 2024-11-04 PROCEDURE — 80048 BASIC METABOLIC PNL TOTAL CA: CPT

## 2024-11-04 PROCEDURE — C1884 EMBOLIZATION PROTECT SYST: HCPCS | Performed by: SURGERY

## 2024-11-04 PROCEDURE — 3600000012 HC SURGERY LEVEL 2 ADDTL 15MIN: Performed by: SURGERY

## 2024-11-04 PROCEDURE — C1725 CATH, TRANSLUMIN NON-LASER: HCPCS | Performed by: SURGERY

## 2024-11-04 PROCEDURE — 6370000000 HC RX 637 (ALT 250 FOR IP)

## 2024-11-04 PROCEDURE — 2700000000 HC OXYGEN THERAPY PER DAY

## 2024-11-04 DEVICE — EMBOSHIELD NAV6 EMBOLIC PROTECTION SYSTEM 7.2 MM X 190 CM
Type: IMPLANTABLE DEVICE | Status: FUNCTIONAL
Brand: EMBOSHIELD  NAV6

## 2024-11-04 DEVICE — XACT CAROTID STENT SYSTEM 8.0 MM X 40 MM TAPERED
Type: IMPLANTABLE DEVICE | Site: CAROTID | Status: FUNCTIONAL
Brand: XACT

## 2024-11-04 RX ORDER — SODIUM CHLORIDE 0.9 % (FLUSH) 0.9 %
5-40 SYRINGE (ML) INJECTION PRN
Status: DISCONTINUED | OUTPATIENT
Start: 2024-11-04 | End: 2024-11-05 | Stop reason: HOSPADM

## 2024-11-04 RX ORDER — OXYCODONE HYDROCHLORIDE 5 MG/1
5 TABLET ORAL EVERY 4 HOURS PRN
Status: DISCONTINUED | OUTPATIENT
Start: 2024-11-04 | End: 2024-11-05 | Stop reason: HOSPADM

## 2024-11-04 RX ORDER — OXYCODONE HYDROCHLORIDE 5 MG/1
30 TABLET ORAL EVERY 4 HOURS
Status: DISCONTINUED | OUTPATIENT
Start: 2024-11-04 | End: 2024-11-05 | Stop reason: HOSPADM

## 2024-11-04 RX ORDER — DEXTROSE MONOHYDRATE AND SODIUM CHLORIDE 5; .45 G/100ML; G/100ML
INJECTION, SOLUTION INTRAVENOUS CONTINUOUS
Status: DISCONTINUED | OUTPATIENT
Start: 2024-11-04 | End: 2024-11-05 | Stop reason: HOSPADM

## 2024-11-04 RX ORDER — LEVOTHYROXINE SODIUM 112 UG/1
112 TABLET ORAL DAILY
Status: DISCONTINUED | OUTPATIENT
Start: 2024-11-05 | End: 2024-11-05 | Stop reason: HOSPADM

## 2024-11-04 RX ORDER — ONDANSETRON 4 MG/1
4 TABLET, ORALLY DISINTEGRATING ORAL EVERY 8 HOURS PRN
Status: DISCONTINUED | OUTPATIENT
Start: 2024-11-04 | End: 2024-11-05 | Stop reason: HOSPADM

## 2024-11-04 RX ORDER — HEPARIN SODIUM 200 [USP'U]/100ML
INJECTION, SOLUTION INTRAVENOUS
Status: DISPENSED
Start: 2024-11-04 | End: 2024-11-04

## 2024-11-04 RX ORDER — HYDRALAZINE HYDROCHLORIDE 20 MG/ML
10 INJECTION INTRAMUSCULAR; INTRAVENOUS EVERY 4 HOURS PRN
Status: DISCONTINUED | OUTPATIENT
Start: 2024-11-04 | End: 2024-11-05 | Stop reason: HOSPADM

## 2024-11-04 RX ORDER — OXYCODONE HYDROCHLORIDE 5 MG/1
10 TABLET ORAL EVERY 4 HOURS PRN
Status: DISCONTINUED | OUTPATIENT
Start: 2024-11-04 | End: 2024-11-05 | Stop reason: HOSPADM

## 2024-11-04 RX ORDER — IPRATROPIUM BROMIDE AND ALBUTEROL SULFATE 2.5; .5 MG/3ML; MG/3ML
1 SOLUTION RESPIRATORY (INHALATION) EVERY 6 HOURS PRN
Status: DISCONTINUED | OUTPATIENT
Start: 2024-11-04 | End: 2024-11-05 | Stop reason: HOSPADM

## 2024-11-04 RX ORDER — FENTANYL CITRATE 50 UG/ML
INJECTION, SOLUTION INTRAMUSCULAR; INTRAVENOUS
Status: DISCONTINUED | OUTPATIENT
Start: 2024-11-04 | End: 2024-11-04 | Stop reason: SDUPTHER

## 2024-11-04 RX ORDER — CEFAZOLIN SODIUM 1 G/3ML
INJECTION, POWDER, FOR SOLUTION INTRAMUSCULAR; INTRAVENOUS
Status: DISCONTINUED | OUTPATIENT
Start: 2024-11-04 | End: 2024-11-04 | Stop reason: SDUPTHER

## 2024-11-04 RX ORDER — ARFORMOTEROL TARTRATE 15 UG/2ML
15 SOLUTION RESPIRATORY (INHALATION)
Status: DISCONTINUED | OUTPATIENT
Start: 2024-11-04 | End: 2024-11-05 | Stop reason: HOSPADM

## 2024-11-04 RX ORDER — CLOPIDOGREL BISULFATE 75 MG/1
75 TABLET ORAL DAILY
Status: DISCONTINUED | OUTPATIENT
Start: 2024-11-05 | End: 2024-11-04 | Stop reason: SDUPTHER

## 2024-11-04 RX ORDER — BUDESONIDE 1 MG/2ML
1 INHALANT ORAL
Status: DISCONTINUED | OUTPATIENT
Start: 2024-11-04 | End: 2024-11-04

## 2024-11-04 RX ORDER — IODIXANOL 320 MG/ML
INJECTION, SOLUTION INTRAVASCULAR
Status: DISPENSED
Start: 2024-11-04 | End: 2024-11-04

## 2024-11-04 RX ORDER — ONDANSETRON 2 MG/ML
4 INJECTION INTRAMUSCULAR; INTRAVENOUS EVERY 6 HOURS PRN
Status: DISCONTINUED | OUTPATIENT
Start: 2024-11-04 | End: 2024-11-05 | Stop reason: HOSPADM

## 2024-11-04 RX ORDER — EZETIMIBE 10 MG/1
10 TABLET ORAL DAILY
Status: DISCONTINUED | OUTPATIENT
Start: 2024-11-04 | End: 2024-11-05 | Stop reason: HOSPADM

## 2024-11-04 RX ORDER — HEPARIN SODIUM 200 [USP'U]/100ML
INJECTION, SOLUTION INTRAVENOUS CONTINUOUS PRN
Status: COMPLETED | OUTPATIENT
Start: 2024-11-04 | End: 2024-11-04

## 2024-11-04 RX ORDER — CARVEDILOL 3.12 MG/1
3.12 TABLET ORAL 2 TIMES DAILY
Status: DISCONTINUED | OUTPATIENT
Start: 2024-11-04 | End: 2024-11-05 | Stop reason: HOSPADM

## 2024-11-04 RX ORDER — DEXTROSE MONOHYDRATE 100 MG/ML
INJECTION, SOLUTION INTRAVENOUS CONTINUOUS PRN
Status: DISCONTINUED | OUTPATIENT
Start: 2024-11-04 | End: 2024-11-04 | Stop reason: HOSPADM

## 2024-11-04 RX ORDER — SODIUM CHLORIDE, SODIUM LACTATE, POTASSIUM CHLORIDE, CALCIUM CHLORIDE 600; 310; 30; 20 MG/100ML; MG/100ML; MG/100ML; MG/100ML
INJECTION, SOLUTION INTRAVENOUS
Status: DISCONTINUED | OUTPATIENT
Start: 2024-11-04 | End: 2024-11-04 | Stop reason: SDUPTHER

## 2024-11-04 RX ORDER — FAMOTIDINE 20 MG/1
20 TABLET, FILM COATED ORAL ONCE
Status: DISCONTINUED | OUTPATIENT
Start: 2024-11-04 | End: 2024-11-04 | Stop reason: HOSPADM

## 2024-11-04 RX ORDER — GLYCOPYRROLATE 0.2 MG/ML
INJECTION INTRAMUSCULAR; INTRAVENOUS
Status: DISCONTINUED | OUTPATIENT
Start: 2024-11-04 | End: 2024-11-04 | Stop reason: SDUPTHER

## 2024-11-04 RX ORDER — FUROSEMIDE 20 MG/1
20 TABLET ORAL DAILY
Status: DISCONTINUED | OUTPATIENT
Start: 2024-11-04 | End: 2024-11-05 | Stop reason: HOSPADM

## 2024-11-04 RX ORDER — IODIXANOL 320 MG/ML
INJECTION, SOLUTION INTRAVASCULAR PRN
Status: DISCONTINUED | OUTPATIENT
Start: 2024-11-04 | End: 2024-11-04 | Stop reason: HOSPADM

## 2024-11-04 RX ORDER — SODIUM CHLORIDE, SODIUM LACTATE, POTASSIUM CHLORIDE, CALCIUM CHLORIDE 600; 310; 30; 20 MG/100ML; MG/100ML; MG/100ML; MG/100ML
INJECTION, SOLUTION INTRAVENOUS CONTINUOUS
Status: DISCONTINUED | OUTPATIENT
Start: 2024-11-04 | End: 2024-11-04

## 2024-11-04 RX ORDER — 0.9 % SODIUM CHLORIDE 0.9 %
500 INTRAVENOUS SOLUTION INTRAVENOUS ONCE
Status: COMPLETED | OUTPATIENT
Start: 2024-11-04 | End: 2024-11-04

## 2024-11-04 RX ORDER — HYDRALAZINE HYDROCHLORIDE 20 MG/ML
10 INJECTION INTRAMUSCULAR; INTRAVENOUS ONCE
Status: COMPLETED | OUTPATIENT
Start: 2024-11-04 | End: 2024-11-04

## 2024-11-04 RX ORDER — INSULIN LISPRO 100 [IU]/ML
0-15 INJECTION, SOLUTION INTRAVENOUS; SUBCUTANEOUS ONCE
Status: DISCONTINUED | OUTPATIENT
Start: 2024-11-04 | End: 2024-11-04 | Stop reason: HOSPADM

## 2024-11-04 RX ORDER — CLOPIDOGREL BISULFATE 75 MG/1
75 TABLET ORAL DAILY
Status: DISCONTINUED | OUTPATIENT
Start: 2024-11-04 | End: 2024-11-05 | Stop reason: HOSPADM

## 2024-11-04 RX ORDER — SODIUM CHLORIDE 0.9 % (FLUSH) 0.9 %
5-40 SYRINGE (ML) INJECTION EVERY 12 HOURS SCHEDULED
Status: DISCONTINUED | OUTPATIENT
Start: 2024-11-04 | End: 2024-11-05 | Stop reason: HOSPADM

## 2024-11-04 RX ORDER — SODIUM CHLORIDE, SODIUM LACTATE, POTASSIUM CHLORIDE, CALCIUM CHLORIDE 600; 310; 30; 20 MG/100ML; MG/100ML; MG/100ML; MG/100ML
INJECTION, SOLUTION INTRAVENOUS CONTINUOUS
Status: DISCONTINUED | OUTPATIENT
Start: 2024-11-04 | End: 2024-11-04 | Stop reason: HOSPADM

## 2024-11-04 RX ORDER — ARFORMOTEROL TARTRATE 15 UG/2ML
15 SOLUTION RESPIRATORY (INHALATION)
Status: DISCONTINUED | OUTPATIENT
Start: 2024-11-04 | End: 2024-11-04

## 2024-11-04 RX ORDER — BUDESONIDE 1 MG/2ML
1 INHALANT ORAL
Status: DISCONTINUED | OUTPATIENT
Start: 2024-11-04 | End: 2024-11-05 | Stop reason: HOSPADM

## 2024-11-04 RX ORDER — FERROUS SULFATE 325(65) MG
325 TABLET ORAL
Status: DISCONTINUED | OUTPATIENT
Start: 2024-11-05 | End: 2024-11-05 | Stop reason: HOSPADM

## 2024-11-04 RX ORDER — LIDOCAINE HYDROCHLORIDE 10 MG/ML
INJECTION, SOLUTION EPIDURAL; INFILTRATION; INTRACAUDAL; PERINEURAL PRN
Status: DISCONTINUED | OUTPATIENT
Start: 2024-11-04 | End: 2024-11-04 | Stop reason: HOSPADM

## 2024-11-04 RX ORDER — FAMOTIDINE 20 MG/1
20 TABLET, FILM COATED ORAL ONCE
Status: COMPLETED | OUTPATIENT
Start: 2024-11-04 | End: 2024-11-04

## 2024-11-04 RX ORDER — HEPARIN SODIUM 1000 [USP'U]/ML
INJECTION, SOLUTION INTRAVENOUS; SUBCUTANEOUS
Status: DISCONTINUED | OUTPATIENT
Start: 2024-11-04 | End: 2024-11-04 | Stop reason: SDUPTHER

## 2024-11-04 RX ORDER — HYDROCHLOROTHIAZIDE 25 MG/1
12.5 TABLET ORAL EVERY OTHER DAY
Status: DISCONTINUED | OUTPATIENT
Start: 2024-11-04 | End: 2024-11-05 | Stop reason: HOSPADM

## 2024-11-04 RX ORDER — SODIUM CHLORIDE, SODIUM LACTATE, POTASSIUM CHLORIDE, CALCIUM CHLORIDE 600; 310; 30; 20 MG/100ML; MG/100ML; MG/100ML; MG/100ML
INJECTION, SOLUTION INTRAVENOUS CONTINUOUS
Status: DISCONTINUED | OUTPATIENT
Start: 2024-11-04 | End: 2024-11-05 | Stop reason: HOSPADM

## 2024-11-04 RX ORDER — ATORVASTATIN CALCIUM 40 MG/1
40 TABLET, FILM COATED ORAL DAILY
Status: DISCONTINUED | OUTPATIENT
Start: 2024-11-04 | End: 2024-11-05 | Stop reason: HOSPADM

## 2024-11-04 RX ORDER — SODIUM CHLORIDE 9 MG/ML
INJECTION, SOLUTION INTRAVENOUS PRN
Status: DISCONTINUED | OUTPATIENT
Start: 2024-11-04 | End: 2024-11-05 | Stop reason: HOSPADM

## 2024-11-04 RX ORDER — CEFAZOLIN SODIUM 1 G/3ML
INJECTION, POWDER, FOR SOLUTION INTRAMUSCULAR; INTRAVENOUS
Status: COMPLETED
Start: 2024-11-04 | End: 2024-11-04

## 2024-11-04 RX ORDER — INSULIN LISPRO 100 [IU]/ML
0-16 INJECTION, SOLUTION INTRAVENOUS; SUBCUTANEOUS ONCE
Status: DISCONTINUED | OUTPATIENT
Start: 2024-11-04 | End: 2024-11-05 | Stop reason: HOSPADM

## 2024-11-04 RX ORDER — SODIUM CHLORIDE 0.9 % (FLUSH) 0.9 %
5-40 SYRINGE (ML) INJECTION PRN
Status: DISCONTINUED | OUTPATIENT
Start: 2024-11-04 | End: 2024-11-04 | Stop reason: HOSPADM

## 2024-11-04 RX ORDER — LIDOCAINE HYDROCHLORIDE 10 MG/ML
1 INJECTION, SOLUTION EPIDURAL; INFILTRATION; INTRACAUDAL; PERINEURAL
Status: DISCONTINUED | OUTPATIENT
Start: 2024-11-04 | End: 2024-11-04 | Stop reason: HOSPADM

## 2024-11-04 RX ORDER — INSULIN LISPRO 100 [IU]/ML
INJECTION, SOLUTION INTRAVENOUS; SUBCUTANEOUS
Status: COMPLETED
Start: 2024-11-04 | End: 2024-11-04

## 2024-11-04 RX ORDER — MECLIZINE HCL 12.5 MG 12.5 MG/1
25 TABLET ORAL 3 TIMES DAILY PRN
Status: DISCONTINUED | OUTPATIENT
Start: 2024-11-04 | End: 2024-11-05 | Stop reason: HOSPADM

## 2024-11-04 RX ADMIN — SODIUM CHLORIDE, PRESERVATIVE FREE 20 ML: 5 INJECTION INTRAVENOUS at 09:44

## 2024-11-04 RX ADMIN — SACUBITRIL AND VALSARTAN 1 TABLET: 24; 26 TABLET, FILM COATED ORAL at 20:18

## 2024-11-04 RX ADMIN — HYDRALAZINE HYDROCHLORIDE 10 MG: 20 INJECTION, SOLUTION INTRAMUSCULAR; INTRAVENOUS at 21:17

## 2024-11-04 RX ADMIN — SODIUM CHLORIDE, SODIUM LACTATE, POTASSIUM CHLORIDE, AND CALCIUM CHLORIDE: 600; 310; 30; 20 INJECTION, SOLUTION INTRAVENOUS at 07:53

## 2024-11-04 RX ADMIN — CLOPIDOGREL BISULFATE 75 MG: 75 TABLET ORAL at 11:54

## 2024-11-04 RX ADMIN — HEPARIN SODIUM 9000 UNITS: 1000 INJECTION, SOLUTION INTRAVENOUS; SUBCUTANEOUS at 08:24

## 2024-11-04 RX ADMIN — FAMOTIDINE 20 MG: 20 TABLET, FILM COATED ORAL at 06:00

## 2024-11-04 RX ADMIN — CARVEDILOL 3.12 MG: 3.12 TABLET, FILM COATED ORAL at 20:18

## 2024-11-04 RX ADMIN — DEXTROSE AND SODIUM CHLORIDE: 5; 450 INJECTION, SOLUTION INTRAVENOUS at 12:33

## 2024-11-04 RX ADMIN — IPRATROPIUM BROMIDE 0.5 MG: 0.5 SOLUTION RESPIRATORY (INHALATION) at 15:34

## 2024-11-04 RX ADMIN — INSULIN LISPRO 9 UNITS: 100 INJECTION, SOLUTION INTRAVENOUS; SUBCUTANEOUS at 07:32

## 2024-11-04 RX ADMIN — OXYCODONE HYDROCHLORIDE 30 MG: 15 TABLET ORAL at 14:27

## 2024-11-04 RX ADMIN — HYDRALAZINE HYDROCHLORIDE 10 MG: 20 INJECTION, SOLUTION INTRAMUSCULAR; INTRAVENOUS at 19:38

## 2024-11-04 RX ADMIN — OXYCODONE HYDROCHLORIDE 30 MG: 15 TABLET ORAL at 20:18

## 2024-11-04 RX ADMIN — OXYCODONE HYDROCHLORIDE 30 MG: 15 TABLET ORAL at 09:42

## 2024-11-04 RX ADMIN — NALOXEGOL OXALATE 25 MG: 12.5 TABLET, FILM COATED ORAL at 12:34

## 2024-11-04 RX ADMIN — HYDROMORPHONE HYDROCHLORIDE 0.5 MG: 1 INJECTION, SOLUTION INTRAMUSCULAR; INTRAVENOUS; SUBCUTANEOUS at 11:54

## 2024-11-04 RX ADMIN — SODIUM CHLORIDE, PRESERVATIVE FREE 10 ML: 5 INJECTION INTRAVENOUS at 20:21

## 2024-11-04 RX ADMIN — BUDESONIDE 1 MG: 1 SUSPENSION RESPIRATORY (INHALATION) at 20:44

## 2024-11-04 RX ADMIN — FENTANYL CITRATE 100 MCG: 50 INJECTION INTRAMUSCULAR; INTRAVENOUS at 08:33

## 2024-11-04 RX ADMIN — SACUBITRIL AND VALSARTAN 1 TABLET: 24; 26 TABLET, FILM COATED ORAL at 11:53

## 2024-11-04 RX ADMIN — GLYCOPYRROLATE 0.2 MG: 0.2 INJECTION, SOLUTION INTRAMUSCULAR; INTRAVENOUS at 08:17

## 2024-11-04 RX ADMIN — SODIUM CHLORIDE, POTASSIUM CHLORIDE, SODIUM LACTATE AND CALCIUM CHLORIDE: 600; 310; 30; 20 INJECTION, SOLUTION INTRAVENOUS at 07:32

## 2024-11-04 RX ADMIN — SODIUM CHLORIDE 500 ML: 9 INJECTION, SOLUTION INTRAVENOUS at 22:40

## 2024-11-04 RX ADMIN — FUROSEMIDE 20 MG: 20 TABLET ORAL at 11:54

## 2024-11-04 RX ADMIN — ARFORMOTEROL TARTRATE 15 MCG: 15 SOLUTION RESPIRATORY (INHALATION) at 20:43

## 2024-11-04 RX ADMIN — HYDROMORPHONE HYDROCHLORIDE 0.5 MG: 1 INJECTION, SOLUTION INTRAMUSCULAR; INTRAVENOUS; SUBCUTANEOUS at 21:13

## 2024-11-04 RX ADMIN — ATORVASTATIN CALCIUM 40 MG: 40 TABLET, FILM COATED ORAL at 11:54

## 2024-11-04 RX ADMIN — EZETIMIBE 10 MG: 10 TABLET ORAL at 11:54

## 2024-11-04 RX ADMIN — CEFAZOLIN 2 G: 330 INJECTION, POWDER, FOR SOLUTION INTRAMUSCULAR; INTRAVENOUS at 08:17

## 2024-11-04 RX ADMIN — IPRATROPIUM BROMIDE 0.5 MG: 0.5 SOLUTION RESPIRATORY (INHALATION) at 20:43

## 2024-11-04 RX ADMIN — HYDROCHLOROTHIAZIDE 12.5 MG: 25 TABLET ORAL at 12:34

## 2024-11-04 ASSESSMENT — PAIN SCALES - GENERAL
PAINLEVEL_OUTOF10: 7
PAINLEVEL_OUTOF10: 5
PAINLEVEL_OUTOF10: 10
PAINLEVEL_OUTOF10: 8
PAINLEVEL_OUTOF10: 6
PAINLEVEL_OUTOF10: 6
PAINLEVEL_OUTOF10: 4

## 2024-11-04 ASSESSMENT — PAIN - FUNCTIONAL ASSESSMENT
PAIN_FUNCTIONAL_ASSESSMENT: ACTIVITIES ARE NOT PREVENTED
PAIN_FUNCTIONAL_ASSESSMENT: ACTIVITIES ARE NOT PREVENTED
PAIN_FUNCTIONAL_ASSESSMENT: PREVENTS OR INTERFERES SOME ACTIVE ACTIVITIES AND ADLS

## 2024-11-04 ASSESSMENT — PAIN DESCRIPTION - DESCRIPTORS
DESCRIPTORS: ACHING;DISCOMFORT
DESCRIPTORS: ACHING;DISCOMFORT;DULL
DESCRIPTORS: ACHING;DULL;DISCOMFORT
DESCRIPTORS: ACHING
DESCRIPTORS: ACHING;THROBBING
DESCRIPTORS: ACHING

## 2024-11-04 ASSESSMENT — PAIN DESCRIPTION - LOCATION
LOCATION: BACK
LOCATION: BACK
LOCATION: INCISION;GENERALIZED

## 2024-11-04 ASSESSMENT — PAIN DESCRIPTION - ORIENTATION
ORIENTATION: MID
ORIENTATION: RIGHT;LEFT
ORIENTATION: RIGHT;LEFT
ORIENTATION: MID
ORIENTATION: RIGHT;LEFT
ORIENTATION: RIGHT;LEFT;LOWER

## 2024-11-04 ASSESSMENT — PAIN DESCRIPTION - DIRECTION
RADIATING_TOWARDS: DENIES
RADIATING_TOWARDS: DENIES

## 2024-11-04 ASSESSMENT — PAIN DESCRIPTION - PAIN TYPE
TYPE: CHRONIC PAIN
TYPE: CHRONIC PAIN

## 2024-11-04 ASSESSMENT — PAIN DESCRIPTION - FREQUENCY
FREQUENCY: CONTINUOUS
FREQUENCY: CONTINUOUS

## 2024-11-04 ASSESSMENT — PAIN DESCRIPTION - ONSET
ONSET: ON-GOING
ONSET: ON-GOING

## 2024-11-04 NOTE — OP NOTE
Operative Note      Patient: Macrina Escalona  YOB: 1950  MRN: [id number]    Date of Procedure: 11/4/2024    Pre-Op Diagnosis Codes:      * Left carotid stenosis [I65.22]    Post-Op Diagnosis: Symptomatic left carotid stenosis       Procedure(s):  LEFT CAROTID STENT PLACEMENT  Ultrasound of right femoral artery with interpretation    Surgeon(s):  Porter Stanford MD    Assistant:   Surgical Assistant: Lily Caldwell    Anesthesia: Regional    Estimated Blood Loss (mL): Minimal    Complications: None    Specimens:   * No specimens in log *    Implants:  Implant Name Type Inv. Item Serial No.  Lot No. LRB No. Used Action   FILTER EMB L190CM DIA7.2MM PROTCT NAV6 EMBOSHIELD - [id number] Vascular filters FILTER EMB L190CM DIA7.2MM PROTCT NAV6 EMBOSHIELD  [employer] [id number] Left 1 Implanted   STENT CAR L40MM UNCONSTRAINED DIA8-6MM DEL SYS L136CM - [id number] Carotid stents STENT CAR L40MM UNCONSTRAINED DIA8-6MM DEL SYS L136CM  [employer] [id number] Left 1 Implanted         Drains: * No LDAs found *    Findings:  Infection Present At Time Of Surgery (PATOS) (choose all levels that have infection present):  No infection present  Other Findings: Aortic arch: Type III morphology with typical configuration.  Diffusely calcific but no dissection or aneurysm.  The origin of the brachiocephalic artery is patent and calcific.  The right subclavian is absent, 100% occluded.  The left common carotid artery is 100% patent origin is calcific but patent.  The left subclavian artery is calcific but patent.  Right carotid: The common carotid artery is 100% patent.  The external carotid artery is 100% patent.  The carotid bulb has a critical 90% stenosis that includes the origin of the internal carotid artery as well.  This is a mixed density plaque with calcium and atheroma.  The internal carotid artery beyond this is patent including the anterior cerebral and middle cerebral artery.    Detailed

## 2024-11-04 NOTE — PROGRESS NOTES
Advance Care Planning   Healthcare Decision Maker:    Primary Decision Maker: Taylor Escalona Surgery Update Only - Child - [phone]    Today we documented Decision Maker(s) consistent with ACP documents on file.       Spiritual Health History and Assessment/Progress Note  [facility]    (P) Spiritual/Emotional Needs, Advance Care Planning,  ,  ,      Name: Macrina Escalona MRN: [id number]    Age: 74 y.o.     Sex: female   Language: English   Confucianism: Orthodoxy   Symptomatic stenosis of left carotid artery     Date: 11/4/2024            Total Time Calculated: (P) 7 min              Spiritual Assessment began in [facility] 2 CV INTNSV CARE        Referral/Consult From: (P) Multi-disciplinary team   Encounter Overview/Reason: (P) Spiritual/Emotional Needs, Advance Care Planning  Service Provided For: (P) Patient    Jenny, Belief, Meaning:   Patient has beliefs or practices that help with coping during difficult times  Family/Friends No family/friends present      Importance and Influence:  Patient has spiritual/personal beliefs that influence decisions regarding their health  Family/Friends No family/friends present    Community:  Patient feels well-supported. Support system includes: Children  Family/Friends No family/friends present    Assessment and Plan of Care:     Patient Interventions include: Affirmed coping skills/support systems  Family/Friends Interventions include: No family/friends present    Patient Plan of Care: Spiritual Care available upon further referral  Family/Friends Plan of Care: No family/friends present    Electronically signed by XENIA Pineda on 11/4/2024 at 2:58 PM

## 2024-11-04 NOTE — PERIOP NOTE
Patient /Family /Designee has been informed that [facility] is not responsible for patient belongings per policy and the signed [facility] Patient Agreement document.  Personal items should be sent home or checked in with security.  Patient /Family /Designee selected the following action:                            [x]  Send personal items home with a family member or friend                                                 []  Check in personal items with security, excluding clothing                            []  Maintain personal items at the bedside, against recommendation                                 by Elver Elmore [facility]                                  All personal items given to Taylor Escalona (daughter)  
  17. TWO VISITORS will be allowed in the waiting area during your surgery.  Exceptions may be made for surgical admissions, per nursing unit guidelines      Special Instructions:      Bring a list of CURRENT medications.  Follow instructions from the office regarding Blood Thinners and/or Insulin. Plavix/Xarelto  Follow instructions from the office regarding medications to take the morning of surgery.   Bring inhaler.    Your Pacemaker/AICD needs to be interrogated within 6 months of your surgery. If not interrogated within this timeframe, your procedure will be canceled.     If you have a history of recreational drug use, you may be required to submit a urine sample for drug testing the day of your procedure, as some recreational drugs can interact with anesthetics and increase your surgical risk.    On day of surgery if you are running late, unable to make procedure time, or sick, please call the Pre-op department at [phone]    These surgical instructions were reviewed with Taylor Escalona (BRADEN) for Macrina Escalona during the PAT phone call.

## 2024-11-04 NOTE — H&P
Surgery History and Physical    Subjective:      Macrina Escalona is a 74 y.o.  female who presents with symptomatic left carotid stenosis.  Patient has high risk factors for the OR and anesthesia including history of stroke, current tobacco use, congestive heart failure cardiomyopathy ischemic, atrial fibrillation on long-term anticoagulation, pacemaker dependent, and coronary artery disease.  She very likely will need bilateral carotid interventions.  She may have had TIAs regarding both sides.  She is currently on Plavix and Xarelto, Xarelto has been held for today's procedure.  She has a history of a right carotid endarterectomy by Dr. Hay many years ago    Patient Active Problem List    Diagnosis Date Noted    Suspected cerebrovascular accident (CVA) 07/26/2024    Hypothyroid 07/26/2024    Cerebrovascular accident (CVA) due to thrombosis of right middle cerebral artery (HCC) 07/26/2024    Nonrheumatic mitral valve regurgitation 11/28/2023    Tobacco abuse counseling 11/28/2023    History of CEA (carotid endarterectomy) 03/22/2021    Chronic systolic congestive heart failure (Former[facility]) 11/05/2018    Acute systolic congestive heart failure (Formerly [facility]) 04/09/2018    Coronary artery disease involving native coronary artery of native heart with angina pectoris (Formerly [facility]) 03/30/2018    Cardiomyopathy, ischemic 03/30/2018    Carotid stenosis, asymptomatic, bilateral 03/28/2018    Abnormal echocardiogram 03/23/2018    Chronic diastolic congestive heart failure ([facility]) 02/16/2018    S/P RF ablation operation for arrhythmia 08/21/2017    Orthostatic dizziness 03/30/2016    Atrial fibrillation ([facility]) 11/24/2015    Atrial fibrillation with RVR ([facility]) 09/29/2015    Pneumonia 09/29/2015    Aortoiliac occlusive disease ([facility]) 05/11/2015    PAD (peripheral artery disease) ([facility]) 05/11/2015    History of amiodarone therapy 05/11/2015    Atherosclerosis of native artery of both lower extremities with intermittent claudication (Formerly [facility])

## 2024-11-04 NOTE — ANESTHESIA POSTPROCEDURE EVALUATION
Department of Anesthesiology  Postprocedure Note    Patient: Macrina Escaolna  MRN: [id number]  YOB: 1950  Date of evaluation: 11/4/2024    Procedure Summary       Date: 11/04/24 Room / Location: [facility] 02 / [facility] CARDIAC SURGERY    Anesthesia Start: 0751 Anesthesia Stop: 0924    Procedure: LEFT CAROTID STENT PLACEMENT (Left: Groin) Diagnosis:       Left carotid stenosis      (Left carotid stenosis [I65.22])    Surgeons: Porter Stanford MD Responsible Provider: Saul Lafleur MD    Anesthesia Type: MAC ASA Status: 4            Anesthesia Type: No value filed.    Enrique Phase I: Enrique Score: 10    Enrique Phase II:      Anesthesia Post Evaluation    Patient location during evaluation: ICU  Patient participation: complete - patient participated  Level of consciousness: awake  Airway patency: patent  Nausea & Vomiting: no vomiting and no nausea  Cardiovascular status: hemodynamically stable  Respiratory status: acceptable  Hydration status: euvolemic  Pain management: adequate    No notable events documented.

## 2024-11-04 NOTE — ANESTHESIA PRE PROCEDURE
Department of Anesthesiology  Preprocedure Note       Name:  Macrina Escalona   Age:  74 y.o.  :  1950                                          MRN:  [id number]         Date:  2024      Surgeon: Surgeon(s):  Porter Stanford MD    Procedure: Procedure(s):  LEFT CAROTID STENT PLACEMENT    Medications prior to admission:   Prior to Admission medications    Medication Sig Start Date End Date Taking? Authorizing Provider   clopidogrel (PLAVIX) 75 MG tablet Take 1 tablet by mouth daily   Yes Provider, MD Irene   furosemide (LASIX) 20 MG tablet Take 1 tablet by mouth daily   Yes Provider, MD Irene   NONFORMULARY daily Lion[employer] [place] Supplement   Yes Provider, MD Irene   ezetimibe (ZETIA) 10 MG tablet Take 1 tablet by mouth once daily 10/21/24  Yes Jennie Rodriguez APRN - NP   carvedilol (COREG) 3.125 MG tablet Take 1 tablet by mouth 2 times daily 9/3/24  Yes Jennie Rodriguez APRN - NP   TRELEGY ELLIPTA 200-62.5-25 MCG/ACT AEPB inhaler Inhale 1 puff into the lungs daily 24  Yes Provider, MD Irene   meclizine (ANTIVERT) 25 MG tablet Take 1 tablet by mouth 3 times daily as needed 24  Yes Provider, MD Irene   Levothyroxine Sodium 112 MCG CAPS daily 24  Yes Provider, MD Irene   LEVEMIR 100 UNIT/ML injection vial 7 units only on 11/3/24 6/25/24  Yes ProviderIrene MD   sacubitril-valsartan (ENTRESTO) 24-26 MG per tablet Take 1 tablet by mouth 2 times daily 24  Yes Luke Everett MD   hydroCHLOROthiazide 12.5 MG tablet TAKE 1 TABLET BY MOUTH EVERY OTHER DAY 3/25/24  Yes Luke Everett MD   vitamin D (CHOLECALCIFEROL) 25 MCG (1000 UT) TABS tablet Take 5 tablets by mouth daily   Yes Automatic Reconciliation, Ar   albuterol-ipratropium (COMBIVENT RESPIMAT)  MCG/ACT AERS inhaler Inhale 1 puff into the lungs every 6 hours as needed 10/1/15  Yes Automatic Reconciliation, Ar   nitroGLYCERIN (NITROSTAT) 0.4 MG SL tablet Place 1 tablet under the tongue

## 2024-11-05 VITALS
WEIGHT: 147.8 LBS | OXYGEN SATURATION: 91 % | TEMPERATURE: 97.5 F | SYSTOLIC BLOOD PRESSURE: 125 MMHG | HEART RATE: 85 BPM | DIASTOLIC BLOOD PRESSURE: 62 MMHG | BODY MASS INDEX: 25.23 KG/M2 | RESPIRATION RATE: 20 BRPM | HEIGHT: 64 IN

## 2024-11-05 LAB
ANION GAP SERPL CALC-SCNC: 4 MMOL/L (ref 3–18)
BUN SERPL-MCNC: 18 MG/DL (ref 7–18)
BUN/CREAT SERPL: 20 (ref 12–20)
CALCIUM SERPL-MCNC: 8.6 MG/DL (ref 8.5–10.1)
CHLORIDE SERPL-SCNC: 101 MMOL/L (ref 100–111)
CO2 SERPL-SCNC: 29 MMOL/L (ref 21–32)
CREAT SERPL-MCNC: 0.9 MG/DL (ref 0.6–1.3)
ERYTHROCYTE [DISTWIDTH] IN BLOOD BY AUTOMATED COUNT: 12.2 % (ref 11.6–14.5)
GLUCOSE SERPL-MCNC: 278 MG/DL (ref 74–99)
HCT VFR BLD AUTO: 39.2 % (ref 35–45)
HGB BLD-MCNC: 13.7 G/DL (ref 12–16)
MCH RBC QN AUTO: 32.3 PG (ref 24–34)
MCHC RBC AUTO-ENTMCNC: 34.9 G/DL (ref 31–37)
MCV RBC AUTO: 92.5 FL (ref 78–100)
NRBC # BLD: 0 K/UL (ref 0–0.01)
NRBC BLD-RTO: 0 PER 100 WBC
PLATELET # BLD AUTO: 234 K/UL (ref 135–420)
PLT FUNCTION P2Y12: 144 PRU (ref 182–335)
PMV BLD AUTO: 9.8 FL (ref 9.2–11.8)
POTASSIUM SERPL-SCNC: 3.5 MMOL/L (ref 3.5–5.5)
RBC # BLD AUTO: 4.24 M/UL (ref 4.2–5.3)
SODIUM SERPL-SCNC: 134 MMOL/L (ref 136–145)
WBC # BLD AUTO: 8.6 K/UL (ref 4.6–13.2)

## 2024-11-05 PROCEDURE — 2580000003 HC RX 258: Performed by: SURGERY

## 2024-11-05 PROCEDURE — 80048 BASIC METABOLIC PNL TOTAL CA: CPT

## 2024-11-05 PROCEDURE — 36415 COLL VENOUS BLD VENIPUNCTURE: CPT

## 2024-11-05 PROCEDURE — 51702 INSERT TEMP BLADDER CATH: CPT

## 2024-11-05 PROCEDURE — 6360000002 HC RX W HCPCS: Performed by: SURGERY

## 2024-11-05 PROCEDURE — 6370000000 HC RX 637 (ALT 250 FOR IP): Performed by: SURGERY

## 2024-11-05 PROCEDURE — 94640 AIRWAY INHALATION TREATMENT: CPT

## 2024-11-05 PROCEDURE — 85027 COMPLETE CBC AUTOMATED: CPT

## 2024-11-05 PROCEDURE — 94761 N-INVAS EAR/PLS OXIMETRY MLT: CPT

## 2024-11-05 RX ADMIN — CARVEDILOL 3.12 MG: 3.12 TABLET, FILM COATED ORAL at 08:47

## 2024-11-05 RX ADMIN — FERROUS SULFATE TAB 325 MG (65 MG ELEMENTAL FE) 325 MG: 325 (65 FE) TAB at 08:46

## 2024-11-05 RX ADMIN — DEXTROSE AND SODIUM CHLORIDE: 5; 450 INJECTION, SOLUTION INTRAVENOUS at 04:09

## 2024-11-05 RX ADMIN — SACUBITRIL AND VALSARTAN 1 TABLET: 24; 26 TABLET, FILM COATED ORAL at 08:46

## 2024-11-05 RX ADMIN — OXYCODONE HYDROCHLORIDE 30 MG: 15 TABLET ORAL at 00:15

## 2024-11-05 RX ADMIN — NALOXEGOL OXALATE 25 MG: 12.5 TABLET, FILM COATED ORAL at 08:47

## 2024-11-05 RX ADMIN — IPRATROPIUM BROMIDE 0.5 MG: 0.5 SOLUTION RESPIRATORY (INHALATION) at 08:35

## 2024-11-05 RX ADMIN — HYDROMORPHONE HYDROCHLORIDE 0.5 MG: 1 INJECTION, SOLUTION INTRAMUSCULAR; INTRAVENOUS; SUBCUTANEOUS at 03:56

## 2024-11-05 RX ADMIN — LEVOTHYROXINE SODIUM 112 MCG: 112 TABLET ORAL at 07:49

## 2024-11-05 RX ADMIN — OXYCODONE HYDROCHLORIDE 30 MG: 15 TABLET ORAL at 08:46

## 2024-11-05 RX ADMIN — SODIUM CHLORIDE, PRESERVATIVE FREE 10 ML: 5 INJECTION INTRAVENOUS at 08:48

## 2024-11-05 RX ADMIN — BUDESONIDE 1 MG: 1 SUSPENSION RESPIRATORY (INHALATION) at 08:35

## 2024-11-05 RX ADMIN — FUROSEMIDE 20 MG: 20 TABLET ORAL at 08:47

## 2024-11-05 RX ADMIN — EZETIMIBE 10 MG: 10 TABLET ORAL at 08:46

## 2024-11-05 RX ADMIN — CLOPIDOGREL BISULFATE 75 MG: 75 TABLET ORAL at 08:46

## 2024-11-05 RX ADMIN — OXYCODONE HYDROCHLORIDE 30 MG: 15 TABLET ORAL at 04:18

## 2024-11-05 RX ADMIN — ATORVASTATIN CALCIUM 40 MG: 40 TABLET, FILM COATED ORAL at 08:47

## 2024-11-05 RX ADMIN — ARFORMOTEROL TARTRATE 15 MCG: 15 SOLUTION RESPIRATORY (INHALATION) at 08:35

## 2024-11-05 ASSESSMENT — PAIN DESCRIPTION - FREQUENCY
FREQUENCY: CONTINUOUS

## 2024-11-05 ASSESSMENT — PAIN DESCRIPTION - LOCATION
LOCATION: BACK
LOCATION_2: LEG
LOCATION: BACK
LOCATION: BACK

## 2024-11-05 ASSESSMENT — PAIN DESCRIPTION - ONSET
ONSET: ON-GOING
ONSET: AWAKENED FROM SLEEP
ONSET: ON-GOING

## 2024-11-05 ASSESSMENT — PAIN DESCRIPTION - PAIN TYPE
TYPE: CHRONIC PAIN
TYPE: CHRONIC PAIN
TYPE: CHRONIC PAIN;ACUTE PAIN

## 2024-11-05 ASSESSMENT — PAIN DESCRIPTION - ORIENTATION
ORIENTATION_2: RIGHT;LEFT
ORIENTATION: LOWER

## 2024-11-05 ASSESSMENT — PAIN DESCRIPTION - DESCRIPTORS
DESCRIPTORS: ACHING;DISCOMFORT;DULL
DESCRIPTORS: DULL;ACHING
DESCRIPTORS_2: PINS AND NEEDLES;SPASM

## 2024-11-05 ASSESSMENT — PAIN - FUNCTIONAL ASSESSMENT
PAIN_FUNCTIONAL_ASSESSMENT_SITE2: ACTIVITIES ARE NOT PREVENTED
PAIN_FUNCTIONAL_ASSESSMENT: ACTIVITIES ARE NOT PREVENTED

## 2024-11-05 ASSESSMENT — PAIN DESCRIPTION - INTENSITY: RATING_2: 7

## 2024-11-05 ASSESSMENT — PAIN SCALES - GENERAL
PAINLEVEL_OUTOF10: 0
PAINLEVEL_OUTOF10: 5
PAINLEVEL_OUTOF10: 0
PAINLEVEL_OUTOF10: 6
PAINLEVEL_OUTOF10: 7
PAINLEVEL_OUTOF10: 7

## 2024-11-05 ASSESSMENT — PAIN DESCRIPTION - DIRECTION
RADIATING_TOWARDS: DENIES

## 2024-11-05 NOTE — DISCHARGE SUMMARY
Physician Discharge Summary     Patient ID:  Macrina Escalona  [id number]  74 y.o.  1950    Admit date: 11/4/2024    Discharge date: 11/5/2024      Admitting Physician: Porter Stanford MD     Discharge Physician: Porter Stanford MD     Admission Diagnoses: Left carotid stenosis [I65.22]  Symptomatic stenosis of left carotid artery [I65.22]    Discharge Diagnoses: There are no discharge diagnoses documented for the most recent discharge.    Procedures for this admission: Procedure(s):  LEFT CAROTID STENT PLACEMENT    Discharged Condition: stable    Hospital Course: admitted and had left carotid stent procedure under local with no complications. Post op care uneventful. No hematoma. No neurochanges. Responder to plavix    Consults: none    Significant Diagnostic Studies: angiography: left carotid    Treatments: surgery: left carotid stent    Discharge Exam: LUNG: clear to auscultation bilaterally  HEART: S1, S2 normal  ABDOMEN:  no change  Right groin with no hematoma    Disposition: home    Patient Instructions:   Current Discharge Medication List        CONTINUE these medications which have NOT CHANGED    Details   clopidogrel (PLAVIX) 75 MG tablet Take 1 tablet by mouth daily      furosemide (LASIX) 20 MG tablet Take 1 tablet by mouth daily      NONFORMULARY daily Lion's Jay Supplement      ezetimibe (ZETIA) 10 MG tablet Take 1 tablet by mouth once daily  Qty: 90 tablet, Refills: 3      carvedilol (COREG) 3.125 MG tablet Take 1 tablet by mouth 2 times daily  Qty: 60 tablet, Refills: 3      TRELEGY ELLIPTA 200-62.5-25 MCG/ACT AEPB inhaler Inhale 1 puff into the lungs daily      meclizine (ANTIVERT) 25 MG tablet Take 1 tablet by mouth 3 times daily as needed      Levothyroxine Sodium 112 MCG CAPS daily      LEVEMIR 100 UNIT/ML injection vial 7 units only on 11/3/24      sacubitril-valsartan (ENTRESTO) 24-26 MG per tablet Take 1 tablet by mouth 2 times daily  Qty: 60 tablet, Refills: 5    Associated Diagnoses:

## 2024-11-05 NOTE — PLAN OF CARE
Problem: Pain  Goal: Verbalizes/displays adequate comfort level or baseline comfort level  Outcome: Progressing  Flowsheets (Taken 11/4/2024 2354)  Verbalizes/displays adequate comfort level or baseline comfort level:   Encourage patient to monitor pain and request assistance   Assess pain using appropriate pain scale   Administer analgesics based on type and severity of pain and evaluate response   Implement non-pharmacological measures as appropriate and evaluate response  Note: Interventions include repositioning and heat pack to assist with chronic pain managment     Problem: Respiratory - Adult  Goal: Achieves optimal ventilation and oxygenation  Outcome: Progressing  Flowsheets (Taken 11/4/2024 2354)  Achieves optimal ventilation and oxygenation:   Assess for changes in respiratory status   Assess for changes in mentation and behavior   Position to facilitate oxygenation and minimize respiratory effort   Assess and instruct to report shortness of breath or any respiratory difficulty   Respiratory therapy support as indicated  Note: RT assisting with breathing treatments, pt tolerating well 96% 2L via nasal cannula      Problem: Cardiovascular - Adult  Goal: Maintains optimal cardiac output and hemodynamic stability  Outcome: Progressing  Flowsheets (Taken 11/4/2024 2354)  Maintains optimal cardiac output and hemodynamic stability:   Monitor blood pressure and heart rate   Monitor urine output and notify Licensed Independent Practitioner for values outside of normal range   Administer fluid and/or volume expanders as ordered  Note: MD updated for episodes of hyper/hypotension, pt is without changes in mentation at this time     Problem: Cardiovascular - Adult  Goal: Absence of cardiac dysrhythmias or at baseline  Outcome: Progressing  Flowsheets (Taken 11/4/2024 2354)  Absence of cardiac dysrhythmias or at baseline: Monitor cardiac rate and rhythm  Note: AICD in place, chronic a-flutter with HR 60bpm, no 
Licensed Independent Practitioner for values outside of normal range   Administer fluid and/or volume expanders as ordered  11/4/2024 2354 by Tonia Reynolds RN  Outcome: Progressing  Flowsheets (Taken 11/4/2024 2354)  Maintains optimal cardiac output and hemodynamic stability:   Monitor blood pressure and heart rate   Monitor urine output and notify Licensed Independent Practitioner for values outside of normal range   Administer fluid and/or volume expanders as ordered  Note: MD updated for episodes of hyper/hypotension, pt is without changes in mentation at this time  Goal: Absence of cardiac dysrhythmias or at baseline  11/5/2024 1142 by Geno Hopper RN  Outcome: Completed  Flowsheets (Taken 11/4/2024 2354 by Tonia Reynolds RN)  Absence of cardiac dysrhythmias or at baseline: Monitor cardiac rate and rhythm  11/4/2024 2354 by Tonia Reynolds RN  Outcome: Progressing  Flowsheets (Taken 11/4/2024 2354)  Absence of cardiac dysrhythmias or at baseline: Monitor cardiac rate and rhythm  Note: AICD in place, chronic a-flutter with HR 60bpm, no acute arrhthymias noted      Problem: Genitourinary - Adult  Goal: Absence of urinary retention  11/5/2024 1142 by Geno Hopper RN  Outcome: Completed  Flowsheets (Taken 11/4/2024 2354 by Tonia Reynolds RN)  Absence of urinary retention:   Assess patient’s ability to void and empty bladder   Monitor intake/output and perform bladder scan as needed   Place urinary catheter per Licensed Independent Practitioner order if needed  11/4/2024 2354 by Tonia Reynolds RN  Outcome: Progressing  Flowsheets (Taken 11/4/2024 2354)  Absence of urinary retention:   Assess patient’s ability to void and empty bladder   Monitor intake/output and perform bladder scan as needed   Place urinary catheter per Licensed Independent Practitioner order if needed  Note: [place] placed for urine retention and pain with urination, draining and is patent at this time, urine analysis and culture sent per

## 2024-11-05 NOTE — PROGRESS NOTES
0700: Bedside and Verbal shift change report given to BRITTANY Lao (oncoming nurse) by BRITTANY Randall (offgoing nurse). Report included the following information Nurse Handoff Report, MAR, and Cardiac Rhythm V-paced with AICD .     0715: Per Dr Armenta, phoned lab to inquire about P2Y12 lab. Was told by Carolyn in the lab that she called [place] lab and they stated they would fax results within the hour. Will call back if not received by then.    0745: Informed Dr Armenta of P2Y12 results of 144. Order received to remove reed and discontinue fluids.    0810: Reed removed.    0822: Phoned daughter Taylor to inform her that patient will be discharged today and she stated she will be here soon.     1130: Discharge instructions provided to patient. Follow up appointment scheduled and date/time provided to patient. IV's removed. Patient dressed with assistance of family at bedside. Patient urinated 300 cc since reed removal. Patient had a bowel movement. Medications reviewed. Groin site clean dry and intact with no swelling or hematoma or bleeding.       Medication List        CONTINUE taking these medications      albuterol-ipratropium  MCG/ACT Aers inhaler  Commonly known as: COMBIVENT RESPIMAT     atorvastatin 40 MG tablet  Commonly known as: LIPITOR  Take 1 tablet by mouth daily     carvedilol 3.125 MG tablet  Commonly known as: COREG  Take 1 tablet by mouth 2 times daily     clopidogrel 75 MG tablet  Commonly known as: PLAVIX     ezetimibe 10 MG tablet  Commonly known as: ZETIA  Take 1 tablet by mouth once daily     ferrous sulfate 325 (65 Fe) MG tablet  Commonly known as: IRON 325     furosemide 20 MG tablet  Commonly known as: LASIX     hydroCHLOROthiazide 12.5 MG tablet  TAKE 1 TABLET BY MOUTH EVERY OTHER DAY     Levemir 100 UNIT/ML injection vial  Generic drug: insulin detemir     Levothyroxine Sodium 112 MCG Caps     meclizine 25 MG tablet  Commonly known as: ANTIVERT     naloxegol 25 MG Tabs

## 2024-11-06 LAB
BACTERIA SPEC CULT: NORMAL
SERVICE CMNT-IMP: NORMAL

## 2024-11-06 NOTE — CARE COORDINATION
Late entry    Patient was discharge left before Case management visit. Per BRITTANY Lora patient has no needs.    Discharge order noted for today. Orders received. No other  needs identified at this time. Case management remains available as needed.     Tayla Cano BSW   Case Management

## 2024-12-10 DIAGNOSIS — E78.00 PURE HYPERCHOLESTEROLEMIA: ICD-10-CM

## 2024-12-10 RX ORDER — ATORVASTATIN CALCIUM 40 MG/1
40 TABLET, FILM COATED ORAL DAILY
Qty: 90 TABLET | Refills: 0 | Status: SHIPPED | OUTPATIENT
Start: 2024-12-10

## 2024-12-21 DIAGNOSIS — I50.42 CHRONIC COMBINED SYSTOLIC AND DIASTOLIC CONGESTIVE HEART FAILURE (HCC): ICD-10-CM

## 2024-12-23 RX ORDER — SACUBITRIL AND VALSARTAN 24; 26 MG/1; MG/1
1 TABLET, FILM COATED ORAL 2 TIMES DAILY
Qty: 60 TABLET | Refills: 11 | Status: SHIPPED | OUTPATIENT
Start: 2024-12-23

## 2025-01-17 ENCOUNTER — HOSPITAL ENCOUNTER (OUTPATIENT)
Facility: HOSPITAL | Age: 75
Discharge: HOME | End: 2025-01-19
Payer: MEDICARE

## 2025-01-17 DIAGNOSIS — R97.0 CARCINOEMBRYONIC ANTIGEN (CEA) ELEVATION: ICD-10-CM

## 2025-01-17 DIAGNOSIS — R09.89 LEFT CAROTID BRUIT: ICD-10-CM

## 2025-01-17 LAB
VAS LEFT ARTERIAL PROX ANASTOMOSIS EDV: 9.5 CM/S
VAS LEFT ARTERIAL PROX ANASTOMOSIS PSV: 55.6 CM/S
VAS LEFT CCA MID EDV: 9.45 CM/S
VAS LEFT CCA MID PSV: 65.51 CM/S
VAS LEFT CCA PROX EDV: 7.2 CM/S
VAS LEFT CCA PROX PSV: 54.6 CM/S
VAS LEFT DIST OUTFLOW EDV: 27 CM/S
VAS LEFT DIST OUTFLOW PSV: 145.4 CM/S
VAS LEFT ECA EDV: 3.74 CM/S
VAS LEFT ECA PSV: 108.2 CM/S
VAS LEFT GRAFT 1: NORMAL
VAS LEFT ICA DIST EDV: 12.7 CM/S
VAS LEFT ICA DIST PSV: 83 CM/S
VAS LEFT ICA/CCA PSV: 1.27 NO UNITS
VAS LEFT INFLOW ARTERY EDV: 8.4 CM/S
VAS LEFT INFLOW ARTERY PSV: 51.2 CM/S
VAS LEFT MID OUTFLOW EDV: 27 CM/S
VAS LEFT MID OUTFLOW PSV: 164 CM/S
VAS LEFT OUTFLOW VESSEL EDV: 15.3 CM/S
VAS LEFT OUTFLOW VESSEL PSV: 89.7 CM/S
VAS LEFT PROX OUTFLOW EDV: 8.4 CM/S
VAS LEFT PROX OUTFLOW PSV: 61.1 CM/S
VAS LEFT SUBCLAVIAN PROX EDV: 0 CM/S
VAS LEFT SUBCLAVIAN PROX PSV: 102 CM/S
VAS LEFT VENOUS DIST ANASTOMOSIS EDV: 22.3 CM/S
VAS LEFT VENOUS DIST ANASTOMOSIS PSV: 166.4 CM/S
VAS LEFT VERTEBRAL EDV: 6.34 CM/S
VAS LEFT VERTEBRAL PSV: 51.6 CM/S
VAS RIGHT BULB EDV: 13.5 CM/S
VAS RIGHT BULB PSV: 131.5 CM/S
VAS RIGHT CCA DIST EDV: 7.6 CM/S
VAS RIGHT CCA DIST PSV: 49.1 CM/S
VAS RIGHT CCA MID EDV: 11.52 CM/S
VAS RIGHT CCA MID PSV: 45.17 CM/S
VAS RIGHT CCA PROX EDV: 12.8 CM/S
VAS RIGHT CCA PROX PSV: 47.8 CM/S
VAS RIGHT ECA EDV: 12.36 CM/S
VAS RIGHT ECA PSV: 208.4 CM/S
VAS RIGHT ICA DIST EDV: 13.2 CM/S
VAS RIGHT ICA DIST PSV: 90.3 CM/S
VAS RIGHT ICA MID EDV: 11.6 CM/S
VAS RIGHT ICA MID PSV: 80.3 CM/S
VAS RIGHT ICA PROX EDV: 19.1 CM/S
VAS RIGHT ICA PROX PSV: 219.5 CM/S
VAS RIGHT ICA/CCA PSV: 4.5 NO UNITS
VAS RIGHT SUBCLAVIAN PROX EDV: 0 CM/S
VAS RIGHT SUBCLAVIAN PROX PSV: 42.2 CM/S
VAS RIGHT VERTEBRAL EDV: 5.71 CM/S
VAS RIGHT VERTEBRAL PSV: 56.9 CM/S

## 2025-01-17 PROCEDURE — 93880 EXTRACRANIAL BILAT STUDY: CPT

## 2025-01-21 PROCEDURE — 93880 EXTRACRANIAL BILAT STUDY: CPT | Performed by: SURGERY

## 2025-03-07 DIAGNOSIS — E78.00 PURE HYPERCHOLESTEROLEMIA: ICD-10-CM

## 2025-03-07 RX ORDER — ATORVASTATIN CALCIUM 40 MG/1
40 TABLET, FILM COATED ORAL DAILY
Qty: 90 TABLET | Refills: 0 | Status: SHIPPED | OUTPATIENT
Start: 2025-03-07

## 2025-03-23 ENCOUNTER — TRANSCRIBE ORDERS (OUTPATIENT)
Facility: HOSPITAL | Age: 75
End: 2025-03-23

## 2025-03-23 DIAGNOSIS — I70.213 ATHEROSCLEROSIS OF NATIVE ARTERY OF BOTH LOWER EXTREMITIES WITH INTERMITTENT CLAUDICATION: Primary | ICD-10-CM

## 2025-04-14 RX ORDER — HYDROCHLOROTHIAZIDE 12.5 MG/1
12.5 TABLET ORAL EVERY OTHER DAY
Qty: 30 TABLET | Refills: 0 | OUTPATIENT
Start: 2025-04-14

## 2025-04-25 ENCOUNTER — TRANSCRIBE ORDERS (OUTPATIENT)
Facility: HOSPITAL | Age: 75
End: 2025-04-25

## 2025-04-25 DIAGNOSIS — I65.23 OCCLUSION AND STENOSIS OF BILATERAL CAROTID ARTERIES: Primary | ICD-10-CM

## 2025-05-09 ENCOUNTER — HOSPITAL ENCOUNTER (OUTPATIENT)
Age: 75
Discharge: HOME | End: 2025-05-12
Payer: MEDICARE

## 2025-05-09 DIAGNOSIS — I65.23 OCCLUSION AND STENOSIS OF BILATERAL CAROTID ARTERIES: ICD-10-CM

## 2025-05-09 LAB — CREAT UR-MCNC: 1 MG/DL (ref 0.6–1.3)

## 2025-05-09 PROCEDURE — 6360000004 HC RX CONTRAST MEDICATION: Performed by: SURGERY

## 2025-05-09 PROCEDURE — 70496 CT ANGIOGRAPHY HEAD: CPT

## 2025-05-09 PROCEDURE — 82565 ASSAY OF CREATININE: CPT

## 2025-05-09 RX ORDER — IOPAMIDOL 755 MG/ML
80 INJECTION, SOLUTION INTRAVASCULAR
Status: COMPLETED | OUTPATIENT
Start: 2025-05-09 | End: 2025-05-09

## 2025-05-09 RX ADMIN — IOPAMIDOL 80 ML: 755 INJECTION, SOLUTION INTRAVENOUS at 13:35

## 2025-05-23 ENCOUNTER — HOSPITAL ENCOUNTER (OUTPATIENT)
Age: 75
Discharge: HOME | End: 2025-05-26
Payer: MEDICARE

## 2025-05-23 DIAGNOSIS — I70.213 ATHEROSCLEROSIS OF NATIVE ARTERIES OF EXTREMITIES WITH INTERMITTENT CLAUDICATION, BILATERAL LEGS: ICD-10-CM

## 2025-05-23 PROCEDURE — 6360000004 HC RX CONTRAST MEDICATION: Performed by: SURGERY

## 2025-05-23 PROCEDURE — 75635 CT ANGIO ABDOMINAL ARTERIES: CPT

## 2025-05-23 RX ORDER — IOPAMIDOL 755 MG/ML
100 INJECTION, SOLUTION INTRAVASCULAR
Status: COMPLETED | OUTPATIENT
Start: 2025-05-23 | End: 2025-05-23

## 2025-05-23 RX ADMIN — IOPAMIDOL 100 ML: 755 INJECTION, SOLUTION INTRAVENOUS at 13:51

## 2025-05-30 RX ORDER — RIVAROXABAN 20 MG/1
20 TABLET, FILM COATED ORAL DAILY
Qty: 30 TABLET | Refills: 0 | OUTPATIENT
Start: 2025-05-30

## 2025-06-03 RX ORDER — LOSARTAN POTASSIUM 25 MG/1
12.5 TABLET ORAL DAILY
COMMUNITY

## 2025-06-03 RX ORDER — LORATADINE 10 MG/1
10 TABLET ORAL DAILY
COMMUNITY

## 2025-06-03 RX ORDER — FISH OIL/DHA/EPA 1200-144MG
1 CAPSULE ORAL DAILY
COMMUNITY

## 2025-06-03 RX ORDER — INSULIN GLARGINE 100 [IU]/ML
15 INJECTION, SOLUTION SUBCUTANEOUS NIGHTLY
COMMUNITY
Start: 2025-04-01

## 2025-06-03 RX ORDER — PREGABALIN 100 MG/1
100 CAPSULE ORAL 2 TIMES DAILY
COMMUNITY

## 2025-06-03 NOTE — PERIOP NOTE
Instructions for your surgery at [facility]      Today's Date:  6/3/2025      Patient's Name:  Macrina Escalona           Surgery Date:  06/09/2025              Please enter the main entrance of the hospital and check-in at the front security desk located in the lobby. They will direct you to the area to report for your surgery.     Do NOT eat or drink anything, including candy, gum, or ice chips after midnight prior to your surgery, unless you have specific instructions from your surgeon or anesthesia provider to do so.  Brush your teeth before coming to the hospital. You may swish with water, but do not swallow.  No smoking/Vaping/E-Cigarettes 24 hours prior to the day of surgery.  No alcohol 24 hours prior to the day of surgery.  No recreational drugs for one week prior to the day of surgery.  Bring Photo ID, Insurance information, and Co-pay if required on day of surgery.  Bring in pertinent legal documents, such as, Medical Power of , DNR, Advance Directive, etc.  Leave all valuables, including money/purse, weapons at home.  Remove all jewelry, including ALL body piercings, nail polish, acrylic nails, and makeup (including mascara); no lotions, powders, deodorant, or perfume/cologne/after shave on the skin.  Follow instruction for Hibiclens washes and CHG wipes from surgeon's office.   Glasses and dentures may be worn to the hospital. They must be removed prior to surgery. Please bring case/container for glasses or dentures.   Contact lenses should not be worn on day of surgery.   Call your doctor's office if symptoms of a cold or illness develop within 24-48 hours prior to your surgery.  Call your doctor's office if you have any questions concerning insurance or co-pays.  15. AN ADULT (relative or friend 18 years or older) MUST DRIVE YOU HOME AFTER YOUR SURGERY.  16. Please make arrangements for a responsible adult (18 years or older) to be with you for 24 hours after your

## 2025-06-03 NOTE — PROGRESS NOTES
estimated ejection fraction is 36 - 40%. Abnormal wall motion as described on the wall scoring diagram below. Global longitudinal strain is -4.90%. Abnormal left ventricular strain. There is severe (grade 3) left ventricular diastolic dysfunction.  Left atrial cavity size is severely dilated.  Right ventricular global systolic function is reduced. Pacing wire present  Mitral valve thickening. Mild mitral valve regurgitation.  Mild tricuspid valve regurgitation. Pulmonary arterial systolic pressure is 41.0 mmHg. Mild pulmonary hypertension.      Comparison Study Information     Prior Study     When compared to the previous study from 3/23/18, there is no longer evidence of a small pericardial effusion.     06/16/21    CARDIAC PROCEDURE 06/17/2021 6/17/2021    Conclusion  · Normal epicardial coronary arteries.  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  · The flow of dye was slow in all the coronary arteries.  · Procedure done via right femoral artery without any complications with 4 Sami catheters.    Risk factor modification to continue.  Her EF has significantly improved and normalized since last year.  Medical treatment for CHF and risk factors to continue.  Diuretics can be reduced.    Signed by: Luke Everett MD on 6/17/2021  8:35 AM      04/08/21    ECHO ADULT COMPLETE 04/08/2021 4/8/2021    Interpretation Summary  · LV: Estimated LVEF is 50 - 55%. Visually measured ejection fraction. Normal cavity size. Mild concentric hypertrophy. Low normal systolic function. Wall motion: normal. Inconclusive left ventricular diastolic function.  · Pericardium: Trivial pericardial effusion adjacent to right atrium.  · RV: Pacing wire present  · PA: Pulmonary arterial systolic pressure is 27 mmHg.  · LA: Severely dilated left atrium. Left Atrium volume index is 60 mL/m2.    Signed by: David Agosto MD on 4/8/2021  3:30

## 2025-06-05 DIAGNOSIS — E78.00 PURE HYPERCHOLESTEROLEMIA: ICD-10-CM

## 2025-06-05 RX ORDER — ATORVASTATIN CALCIUM 40 MG/1
40 TABLET, FILM COATED ORAL DAILY
Qty: 90 TABLET | Refills: 0 | Status: SHIPPED | OUTPATIENT
Start: 2025-06-05

## 2025-06-09 ENCOUNTER — HOSPITAL ENCOUNTER (OUTPATIENT)
Facility: HOSPITAL | Age: 75
Discharge: HOME | End: 2025-06-12
Payer: MEDICARE

## 2025-06-09 ENCOUNTER — TRANSCRIBE ORDERS (OUTPATIENT)
Facility: HOSPITAL | Age: 75
End: 2025-06-09

## 2025-06-09 DIAGNOSIS — I65.23 BILATERAL CAROTID ARTERY STENOSIS: ICD-10-CM

## 2025-06-09 DIAGNOSIS — I65.23 BILATERAL CAROTID ARTERY STENOSIS: Primary | ICD-10-CM

## 2025-06-09 LAB
ANION GAP SERPL CALC-SCNC: 11 MMOL/L (ref 3–18)
BASOPHILS # BLD: 0.05 K/UL (ref 0–0.1)
BASOPHILS NFR BLD: 0.6 % (ref 0–2)
BUN SERPL-MCNC: 24 MG/DL (ref 6–23)
BUN/CREAT SERPL: 21 (ref 12–20)
CALCIUM SERPL-MCNC: 8.7 MG/DL (ref 8.5–10.1)
CHLORIDE SERPL-SCNC: 96 MMOL/L (ref 98–107)
CO2 SERPL-SCNC: 29 MMOL/L (ref 21–32)
CREAT SERPL-MCNC: 1.15 MG/DL (ref 0.6–1.3)
DIFFERENTIAL METHOD BLD: ABNORMAL
EKG ATRIAL RATE: 71 BPM
EKG DIAGNOSIS: NORMAL
EKG Q-T INTERVAL: 474 MS
EKG QRS DURATION: 156 MS
EKG QTC CALCULATION (BAZETT): 496 MS
EKG R AXIS: -81 DEGREES
EKG T AXIS: 99 DEGREES
EKG VENTRICULAR RATE: 66 BPM
EOSINOPHIL # BLD: 0.14 K/UL (ref 0–0.4)
EOSINOPHIL NFR BLD: 1.6 % (ref 0–5)
ERYTHROCYTE [DISTWIDTH] IN BLOOD BY AUTOMATED COUNT: 12.6 % (ref 11.6–14.5)
GLUCOSE SERPL-MCNC: 282 MG/DL (ref 74–108)
HCT VFR BLD AUTO: 41.5 % (ref 35–45)
HGB BLD-MCNC: 13.5 G/DL (ref 12–16)
IMM GRANULOCYTES # BLD AUTO: 0.03 K/UL (ref 0–0.04)
IMM GRANULOCYTES NFR BLD AUTO: 0.3 % (ref 0–0.5)
LYMPHOCYTES # BLD: 1.71 K/UL (ref 0.9–3.6)
LYMPHOCYTES NFR BLD: 19.2 % (ref 21–52)
MCH RBC QN AUTO: 31.1 PG (ref 24–34)
MCHC RBC AUTO-ENTMCNC: 32.5 G/DL (ref 31–37)
MCV RBC AUTO: 95.6 FL (ref 78–100)
MONOCYTES # BLD: 0.79 K/UL (ref 0.05–1.2)
MONOCYTES NFR BLD: 8.9 % (ref 3–10)
NEUTS SEG # BLD: 6.18 K/UL (ref 1.8–8)
NEUTS SEG NFR BLD: 69.4 % (ref 40–73)
NRBC # BLD: 0 K/UL (ref 0–0.01)
NRBC BLD-RTO: 0 PER 100 WBC
PLATELET # BLD AUTO: 210 K/UL (ref 135–420)
PMV BLD AUTO: 10.4 FL (ref 9.2–11.8)
POTASSIUM SERPL-SCNC: 3.9 MMOL/L (ref 3.5–5.5)
RBC # BLD AUTO: 4.34 M/UL (ref 4.2–5.3)
SODIUM SERPL-SCNC: 136 MMOL/L (ref 136–145)
WBC # BLD AUTO: 8.9 K/UL (ref 4.6–13.2)

## 2025-06-09 PROCEDURE — 80048 BASIC METABOLIC PNL TOTAL CA: CPT

## 2025-06-09 PROCEDURE — 36415 COLL VENOUS BLD VENIPUNCTURE: CPT

## 2025-06-09 PROCEDURE — 93005 ELECTROCARDIOGRAM TRACING: CPT

## 2025-06-09 PROCEDURE — 71046 X-RAY EXAM CHEST 2 VIEWS: CPT

## 2025-06-09 PROCEDURE — 93010 ELECTROCARDIOGRAM REPORT: CPT | Performed by: INTERNAL MEDICINE

## 2025-06-09 PROCEDURE — 85025 COMPLETE CBC W/AUTO DIFF WBC: CPT

## 2025-06-13 ENCOUNTER — OFFICE VISIT (OUTPATIENT)
Age: 75
End: 2025-06-13
Payer: MEDICARE

## 2025-06-13 VITALS
HEART RATE: 103 BPM | BODY MASS INDEX: 25.78 KG/M2 | OXYGEN SATURATION: 96 % | SYSTOLIC BLOOD PRESSURE: 125 MMHG | WEIGHT: 151 LBS | DIASTOLIC BLOOD PRESSURE: 73 MMHG | HEIGHT: 64 IN

## 2025-06-13 DIAGNOSIS — E78.5 HYPERLIPIDEMIA, UNSPECIFIED HYPERLIPIDEMIA TYPE: ICD-10-CM

## 2025-06-13 DIAGNOSIS — Z01.810 PREOPERATIVE CARDIOVASCULAR EXAMINATION: ICD-10-CM

## 2025-06-13 DIAGNOSIS — I10 ESSENTIAL HYPERTENSION, BENIGN: ICD-10-CM

## 2025-06-13 DIAGNOSIS — I48.21 PERMANENT ATRIAL FIBRILLATION (HCC): ICD-10-CM

## 2025-06-13 DIAGNOSIS — Z95.0 PRESENCE OF CARDIAC PACEMAKER: Primary | ICD-10-CM

## 2025-06-13 DIAGNOSIS — I50.22 CHRONIC SYSTOLIC CONGESTIVE HEART FAILURE (HCC): ICD-10-CM

## 2025-06-13 PROCEDURE — 3078F DIAST BP <80 MM HG: CPT | Performed by: INTERNAL MEDICINE

## 2025-06-13 PROCEDURE — 1123F ACP DISCUSS/DSCN MKR DOCD: CPT | Performed by: INTERNAL MEDICINE

## 2025-06-13 PROCEDURE — 1160F RVW MEDS BY RX/DR IN RCRD: CPT | Performed by: INTERNAL MEDICINE

## 2025-06-13 PROCEDURE — 1159F MED LIST DOCD IN RCRD: CPT | Performed by: INTERNAL MEDICINE

## 2025-06-13 PROCEDURE — 3074F SYST BP LT 130 MM HG: CPT | Performed by: INTERNAL MEDICINE

## 2025-06-13 PROCEDURE — 1125F AMNT PAIN NOTED PAIN PRSNT: CPT | Performed by: INTERNAL MEDICINE

## 2025-06-13 PROCEDURE — 99214 OFFICE O/P EST MOD 30 MIN: CPT | Performed by: INTERNAL MEDICINE

## 2025-06-13 RX ORDER — EZETIMIBE 10 MG/1
10 TABLET ORAL DAILY
Qty: 30 TABLET | Refills: 3 | Status: SHIPPED | OUTPATIENT
Start: 2025-06-13

## 2025-06-22 ENCOUNTER — ANESTHESIA EVENT (OUTPATIENT)
Dept: CARDIOTHORACIC SURGERY | Facility: HOSPITAL | Age: 75
DRG: 253 | End: 2025-06-22
Payer: MEDICARE

## 2025-06-23 ENCOUNTER — ANESTHESIA (OUTPATIENT)
Dept: CARDIOTHORACIC SURGERY | Facility: HOSPITAL | Age: 75
DRG: 253 | End: 2025-06-23
Payer: MEDICARE

## 2025-06-23 ENCOUNTER — HOSPITAL ENCOUNTER (INPATIENT)
Facility: HOSPITAL | Age: 75
LOS: 1 days | Discharge: HOME | DRG: 253 | End: 2025-06-24
Attending: SURGERY | Admitting: SURGERY
Payer: MEDICARE

## 2025-06-23 ENCOUNTER — APPOINTMENT (OUTPATIENT)
Facility: HOSPITAL | Age: 75
DRG: 253 | End: 2025-06-23
Attending: SURGERY
Payer: MEDICARE

## 2025-06-23 PROBLEM — I65.23 BILATERAL CAROTID ARTERY STENOSIS: Status: ACTIVE | Noted: 2025-06-23

## 2025-06-23 LAB
ACT BLD: 297 SECS (ref 79–138)
GLUCOSE BLD STRIP.AUTO-MCNC: 139 MG/DL (ref 70–110)
GLUCOSE BLD STRIP.AUTO-MCNC: 172 MG/DL (ref 70–110)
GLUCOSE BLD STRIP.AUTO-MCNC: 253 MG/DL (ref 70–110)

## 2025-06-23 PROCEDURE — B3101ZZ FLUOROSCOPY OF THORACIC AORTA USING LOW OSMOLAR CONTRAST: ICD-10-PCS | Performed by: SURGERY

## 2025-06-23 PROCEDURE — 6370000000 HC RX 637 (ALT 250 FOR IP): Performed by: NURSE ANESTHETIST, CERTIFIED REGISTERED

## 2025-06-23 PROCEDURE — 3700000001 HC ADD 15 MINUTES (ANESTHESIA): Performed by: SURGERY

## 2025-06-23 PROCEDURE — B3151ZZ FLUOROSCOPY OF BILATERAL COMMON CAROTID ARTERIES USING LOW OSMOLAR CONTRAST: ICD-10-PCS | Performed by: SURGERY

## 2025-06-23 PROCEDURE — 6360000002 HC RX W HCPCS: Performed by: SURGERY

## 2025-06-23 PROCEDURE — B31C1ZZ FLUOROSCOPY OF BILATERAL EXTERNAL CAROTID ARTERIES USING LOW OSMOLAR CONTRAST: ICD-10-PCS | Performed by: SURGERY

## 2025-06-23 PROCEDURE — 2580000003 HC RX 258: Performed by: NURSE ANESTHETIST, CERTIFIED REGISTERED

## 2025-06-23 PROCEDURE — 6360000002 HC RX W HCPCS

## 2025-06-23 PROCEDURE — C1769 GUIDE WIRE: HCPCS | Performed by: SURGERY

## 2025-06-23 PROCEDURE — 2709999900 HC NON-CHARGEABLE SUPPLY: Performed by: SURGERY

## 2025-06-23 PROCEDURE — B3111ZZ FLUOROSCOPY OF RIGHT BRACHIOCEPHALIC-SUBCLAVIAN ARTERY USING LOW OSMOLAR CONTRAST: ICD-10-PCS | Performed by: SURGERY

## 2025-06-23 PROCEDURE — 82962 GLUCOSE BLOOD TEST: CPT

## 2025-06-23 PROCEDURE — 6360000002 HC RX W HCPCS: Performed by: NURSE ANESTHETIST, CERTIFIED REGISTERED

## 2025-06-23 PROCEDURE — 6360000004 HC RX CONTRAST MEDICATION: Performed by: SURGERY

## 2025-06-23 PROCEDURE — 3600000015 HC SURGERY LEVEL 5 ADDTL 15MIN: Performed by: SURGERY

## 2025-06-23 PROCEDURE — C1725 CATH, TRANSLUMIN NON-LASER: HCPCS | Performed by: SURGERY

## 2025-06-23 PROCEDURE — 3700000000 HC ANESTHESIA ATTENDED CARE: Performed by: SURGERY

## 2025-06-23 PROCEDURE — 85576 BLOOD PLATELET AGGREGATION: CPT

## 2025-06-23 PROCEDURE — C1760 CLOSURE DEV, VASC: HCPCS | Performed by: SURGERY

## 2025-06-23 PROCEDURE — B3181ZZ FLUOROSCOPY OF BILATERAL INTERNAL CAROTID ARTERIES USING LOW OSMOLAR CONTRAST: ICD-10-PCS | Performed by: SURGERY

## 2025-06-23 PROCEDURE — 94761 N-INVAS EAR/PLS OXIMETRY MLT: CPT

## 2025-06-23 PROCEDURE — 3600000005 HC SURGERY LEVEL 5 BASE: Performed by: SURGERY

## 2025-06-23 PROCEDURE — 36415 COLL VENOUS BLD VENIPUNCTURE: CPT

## 2025-06-23 PROCEDURE — C1876 STENT, NON-COA/NON-COV W/DEL: HCPCS | Performed by: SURGERY

## 2025-06-23 PROCEDURE — 2500000003 HC RX 250 WO HCPCS: Performed by: SURGERY

## 2025-06-23 PROCEDURE — 2580000003 HC RX 258: Performed by: SURGERY

## 2025-06-23 PROCEDURE — B3121ZZ FLUOROSCOPY OF LEFT SUBCLAVIAN ARTERY USING LOW OSMOLAR CONTRAST: ICD-10-PCS | Performed by: SURGERY

## 2025-06-23 PROCEDURE — C1894 INTRO/SHEATH, NON-LASER: HCPCS | Performed by: SURGERY

## 2025-06-23 PROCEDURE — 85347 COAGULATION TIME ACTIVATED: CPT

## 2025-06-23 PROCEDURE — 94640 AIRWAY INHALATION TREATMENT: CPT

## 2025-06-23 PROCEDURE — C1884 EMBOLIZATION PROTECT SYST: HCPCS | Performed by: SURGERY

## 2025-06-23 PROCEDURE — 6360000002 HC RX W HCPCS: Performed by: STUDENT IN AN ORGANIZED HEALTH CARE EDUCATION/TRAINING PROGRAM

## 2025-06-23 PROCEDURE — 037L3ZZ DILATION OF LEFT INTERNAL CAROTID ARTERY, PERCUTANEOUS APPROACH: ICD-10-PCS | Performed by: SURGERY

## 2025-06-23 PROCEDURE — 6370000000 HC RX 637 (ALT 250 FOR IP): Performed by: SURGERY

## 2025-06-23 PROCEDURE — 2000000000 HC ICU R&B

## 2025-06-23 DEVICE — EMBOSHIELD NAV6 EMBOLIC PROTECTION SYSTEM 7.2 MM X 190 CM
Type: IMPLANTABLE DEVICE | Site: CAROTID | Status: FUNCTIONAL
Brand: EMBOSHIELD  NAV6

## 2025-06-23 DEVICE — XACT CAROTID STENT SYSTEM 8.0 MM X 30 MM STRAIGHT
Type: IMPLANTABLE DEVICE | Site: GROIN | Status: FUNCTIONAL
Brand: XACT

## 2025-06-23 DEVICE — ANGIO-SEAL VIP VASCULAR CLOSURE DEVICE
Type: IMPLANTABLE DEVICE | Site: CAROTID | Status: FUNCTIONAL
Brand: ANGIO-SEAL

## 2025-06-23 RX ORDER — SODIUM CHLORIDE 9 MG/ML
INJECTION, SOLUTION INTRAVENOUS PRN
Status: DISCONTINUED | OUTPATIENT
Start: 2025-06-23 | End: 2025-06-23 | Stop reason: HOSPADM

## 2025-06-23 RX ORDER — SENNOSIDES 8.6 MG
325 CAPSULE ORAL DAILY
Status: DISCONTINUED | OUTPATIENT
Start: 2025-06-23 | End: 2025-06-23

## 2025-06-23 RX ORDER — SODIUM CHLORIDE 0.9 % (FLUSH) 0.9 %
5-40 SYRINGE (ML) INJECTION EVERY 12 HOURS SCHEDULED
Status: DISCONTINUED | OUTPATIENT
Start: 2025-06-23 | End: 2025-06-24 | Stop reason: HOSPADM

## 2025-06-23 RX ORDER — BUDESONIDE 0.5 MG/2ML
1 INHALANT ORAL
Status: DISCONTINUED | OUTPATIENT
Start: 2025-06-23 | End: 2025-06-24 | Stop reason: HOSPADM

## 2025-06-23 RX ORDER — OXYCODONE HYDROCHLORIDE 5 MG/1
5 TABLET ORAL EVERY 4 HOURS PRN
Status: DISCONTINUED | OUTPATIENT
Start: 2025-06-23 | End: 2025-06-24

## 2025-06-23 RX ORDER — HEPARIN SODIUM 1000 [USP'U]/ML
INJECTION, SOLUTION INTRAVENOUS; SUBCUTANEOUS
Status: DISCONTINUED
Start: 2025-06-23 | End: 2025-06-23

## 2025-06-23 RX ORDER — SODIUM CHLORIDE 0.9 % (FLUSH) 0.9 %
5-40 SYRINGE (ML) INJECTION PRN
Status: DISCONTINUED | OUTPATIENT
Start: 2025-06-23 | End: 2025-06-24 | Stop reason: HOSPADM

## 2025-06-23 RX ORDER — INSULIN LISPRO 100 [IU]/ML
0-4 INJECTION, SOLUTION INTRAVENOUS; SUBCUTANEOUS
Status: DISCONTINUED | OUTPATIENT
Start: 2025-06-23 | End: 2025-06-24 | Stop reason: HOSPADM

## 2025-06-23 RX ORDER — KETOROLAC TROMETHAMINE 15 MG/ML
15 INJECTION, SOLUTION INTRAMUSCULAR; INTRAVENOUS ONCE
Status: COMPLETED | OUTPATIENT
Start: 2025-06-23 | End: 2025-06-23

## 2025-06-23 RX ORDER — IODIXANOL 320 MG/ML
INJECTION, SOLUTION INTRAVASCULAR
Status: DISCONTINUED
Start: 2025-06-23 | End: 2025-06-23

## 2025-06-23 RX ORDER — ONDANSETRON 4 MG/1
4 TABLET, ORALLY DISINTEGRATING ORAL EVERY 8 HOURS PRN
Status: DISCONTINUED | OUTPATIENT
Start: 2025-06-23 | End: 2025-06-24 | Stop reason: HOSPADM

## 2025-06-23 RX ORDER — FAMOTIDINE 20 MG/1
20 TABLET, FILM COATED ORAL ONCE
Status: COMPLETED | OUTPATIENT
Start: 2025-06-23 | End: 2025-06-23

## 2025-06-23 RX ORDER — HYDRALAZINE HYDROCHLORIDE 20 MG/ML
INJECTION INTRAMUSCULAR; INTRAVENOUS
Status: DISCONTINUED | OUTPATIENT
Start: 2025-06-23 | End: 2025-06-23 | Stop reason: SDUPTHER

## 2025-06-23 RX ORDER — SODIUM CHLORIDE 0.9 % (FLUSH) 0.9 %
5-40 SYRINGE (ML) INJECTION EVERY 12 HOURS SCHEDULED
Status: DISCONTINUED | OUTPATIENT
Start: 2025-06-23 | End: 2025-06-23 | Stop reason: HOSPADM

## 2025-06-23 RX ORDER — OXYCODONE HYDROCHLORIDE 5 MG/1
10 TABLET ORAL EVERY 4 HOURS PRN
Status: DISCONTINUED | OUTPATIENT
Start: 2025-06-23 | End: 2025-06-24

## 2025-06-23 RX ORDER — LIDOCAINE HYDROCHLORIDE 10 MG/ML
INJECTION, SOLUTION EPIDURAL; INFILTRATION; INTRACAUDAL; PERINEURAL PRN
Status: DISCONTINUED | OUTPATIENT
Start: 2025-06-23 | End: 2025-06-23 | Stop reason: ALTCHOICE

## 2025-06-23 RX ORDER — LEVOTHYROXINE SODIUM 112 UG/1
112 TABLET ORAL DAILY
Status: DISCONTINUED | OUTPATIENT
Start: 2025-06-23 | End: 2025-06-24 | Stop reason: HOSPADM

## 2025-06-23 RX ORDER — SODIUM CHLORIDE 0.9 % (FLUSH) 0.9 %
5-40 SYRINGE (ML) INJECTION PRN
Status: DISCONTINUED | OUTPATIENT
Start: 2025-06-23 | End: 2025-06-23 | Stop reason: HOSPADM

## 2025-06-23 RX ORDER — SODIUM CHLORIDE 9 MG/ML
INJECTION, SOLUTION INTRAVENOUS PRN
Status: DISCONTINUED | OUTPATIENT
Start: 2025-06-23 | End: 2025-06-24 | Stop reason: HOSPADM

## 2025-06-23 RX ORDER — DEXTROSE MONOHYDRATE 100 MG/ML
INJECTION, SOLUTION INTRAVENOUS CONTINUOUS PRN
Status: DISCONTINUED | OUTPATIENT
Start: 2025-06-23 | End: 2025-06-23 | Stop reason: HOSPADM

## 2025-06-23 RX ORDER — PHENYLEPHRINE HCL IN 0.9% NACL 50MG/250ML
10-300 PLASTIC BAG, INJECTION (ML) INTRAVENOUS CONTINUOUS
Status: DISCONTINUED | OUTPATIENT
Start: 2025-06-23 | End: 2025-06-24 | Stop reason: HOSPADM

## 2025-06-23 RX ORDER — CLOPIDOGREL BISULFATE 75 MG/1
75 TABLET ORAL DAILY
Status: DISCONTINUED | OUTPATIENT
Start: 2025-06-24 | End: 2025-06-24 | Stop reason: HOSPADM

## 2025-06-23 RX ORDER — HYDROCHLOROTHIAZIDE 12.5 MG/1
12.5 CAPSULE ORAL EVERY OTHER DAY
Status: DISCONTINUED | OUTPATIENT
Start: 2025-06-24 | End: 2025-06-24 | Stop reason: HOSPADM

## 2025-06-23 RX ORDER — IODIXANOL 320 MG/ML
INJECTION, SOLUTION INTRAVASCULAR PRN
Status: DISCONTINUED | OUTPATIENT
Start: 2025-06-23 | End: 2025-06-23 | Stop reason: ALTCHOICE

## 2025-06-23 RX ORDER — MECLIZINE HCL 12.5 MG 12.5 MG/1
25 TABLET ORAL 3 TIMES DAILY PRN
Status: DISCONTINUED | OUTPATIENT
Start: 2025-06-23 | End: 2025-06-24 | Stop reason: HOSPADM

## 2025-06-23 RX ORDER — SODIUM CHLORIDE 9 MG/ML
INJECTION, SOLUTION INTRAVENOUS CONTINUOUS
Status: DISCONTINUED | OUTPATIENT
Start: 2025-06-23 | End: 2025-06-24 | Stop reason: HOSPADM

## 2025-06-23 RX ORDER — HYDRALAZINE HYDROCHLORIDE 20 MG/ML
10 INJECTION INTRAMUSCULAR; INTRAVENOUS EVERY 4 HOURS PRN
Status: DISCONTINUED | OUTPATIENT
Start: 2025-06-23 | End: 2025-06-24 | Stop reason: HOSPADM

## 2025-06-23 RX ORDER — ARFORMOTEROL TARTRATE 15 UG/2ML
15 SOLUTION RESPIRATORY (INHALATION)
Status: DISCONTINUED | OUTPATIENT
Start: 2025-06-23 | End: 2025-06-24 | Stop reason: HOSPADM

## 2025-06-23 RX ORDER — CARVEDILOL 3.12 MG/1
3.12 TABLET ORAL 2 TIMES DAILY
Status: DISCONTINUED | OUTPATIENT
Start: 2025-06-23 | End: 2025-06-24 | Stop reason: HOSPADM

## 2025-06-23 RX ORDER — LOSARTAN POTASSIUM 25 MG/1
12.5 TABLET ORAL DAILY
Status: DISCONTINUED | OUTPATIENT
Start: 2025-06-23 | End: 2025-06-24 | Stop reason: HOSPADM

## 2025-06-23 RX ORDER — EZETIMIBE 10 MG/1
10 TABLET ORAL DAILY
Status: DISCONTINUED | OUTPATIENT
Start: 2025-06-23 | End: 2025-06-24 | Stop reason: HOSPADM

## 2025-06-23 RX ORDER — SODIUM CHLORIDE 9 MG/ML
INJECTION, SOLUTION INTRAVENOUS
Status: DISCONTINUED
Start: 2025-06-23 | End: 2025-06-23

## 2025-06-23 RX ORDER — FUROSEMIDE 20 MG/1
20 TABLET ORAL SEE ADMIN INSTRUCTIONS
Status: DISCONTINUED | OUTPATIENT
Start: 2025-06-23 | End: 2025-06-23

## 2025-06-23 RX ORDER — ONDANSETRON 2 MG/ML
4 INJECTION INTRAMUSCULAR; INTRAVENOUS EVERY 6 HOURS PRN
Status: DISCONTINUED | OUTPATIENT
Start: 2025-06-23 | End: 2025-06-24 | Stop reason: HOSPADM

## 2025-06-23 RX ORDER — CLOPIDOGREL BISULFATE 75 MG/1
75 TABLET ORAL DAILY
Status: DISCONTINUED | OUTPATIENT
Start: 2025-06-23 | End: 2025-06-23 | Stop reason: SDUPTHER

## 2025-06-23 RX ORDER — FISH OIL/DHA/EPA 1200-144MG
1 CAPSULE ORAL DAILY
Status: DISCONTINUED | OUTPATIENT
Start: 2025-06-23 | End: 2025-06-23

## 2025-06-23 RX ORDER — NITROGLYCERIN 0.4 MG/1
0.4 TABLET SUBLINGUAL PRN
Status: DISCONTINUED | OUTPATIENT
Start: 2025-06-23 | End: 2025-06-24 | Stop reason: HOSPADM

## 2025-06-23 RX ORDER — HEPARIN SODIUM 1000 [USP'U]/ML
INJECTION, SOLUTION INTRAVENOUS; SUBCUTANEOUS
Status: COMPLETED
Start: 2025-06-23 | End: 2025-06-23

## 2025-06-23 RX ORDER — FENTANYL CITRATE 50 UG/ML
INJECTION, SOLUTION INTRAMUSCULAR; INTRAVENOUS
Status: DISCONTINUED | OUTPATIENT
Start: 2025-06-23 | End: 2025-06-23 | Stop reason: SDUPTHER

## 2025-06-23 RX ORDER — ATORVASTATIN CALCIUM 40 MG/1
40 TABLET, FILM COATED ORAL DAILY
Status: DISCONTINUED | OUTPATIENT
Start: 2025-06-23 | End: 2025-06-24 | Stop reason: HOSPADM

## 2025-06-23 RX ORDER — HEPARIN SODIUM 200 [USP'U]/100ML
INJECTION, SOLUTION INTRAVENOUS CONTINUOUS PRN
Status: COMPLETED | OUTPATIENT
Start: 2025-06-23 | End: 2025-06-23

## 2025-06-23 RX ORDER — SODIUM CHLORIDE, SODIUM LACTATE, POTASSIUM CHLORIDE, CALCIUM CHLORIDE 600; 310; 30; 20 MG/100ML; MG/100ML; MG/100ML; MG/100ML
INJECTION, SOLUTION INTRAVENOUS CONTINUOUS
Status: DISCONTINUED | OUTPATIENT
Start: 2025-06-23 | End: 2025-06-23 | Stop reason: HOSPADM

## 2025-06-23 RX ORDER — HEPARIN SODIUM 1000 [USP'U]/ML
INJECTION, SOLUTION INTRAVENOUS; SUBCUTANEOUS
Status: DISCONTINUED | OUTPATIENT
Start: 2025-06-23 | End: 2025-06-23 | Stop reason: SDUPTHER

## 2025-06-23 RX ORDER — FERROUS SULFATE 325(65) MG
325 TABLET ORAL
Status: DISCONTINUED | OUTPATIENT
Start: 2025-06-24 | End: 2025-06-24 | Stop reason: HOSPADM

## 2025-06-23 RX ORDER — INSULIN GLARGINE 100 [IU]/ML
15 INJECTION, SOLUTION SUBCUTANEOUS NIGHTLY
Status: DISCONTINUED | OUTPATIENT
Start: 2025-06-23 | End: 2025-06-24 | Stop reason: HOSPADM

## 2025-06-23 RX ORDER — PROTAMINE SULFATE 10 MG/ML
INJECTION, SOLUTION INTRAVENOUS
Status: DISCONTINUED | OUTPATIENT
Start: 2025-06-23 | End: 2025-06-23 | Stop reason: SDUPTHER

## 2025-06-23 RX ORDER — DEXTROSE MONOHYDRATE AND SODIUM CHLORIDE 5; .45 G/100ML; G/100ML
INJECTION, SOLUTION INTRAVENOUS CONTINUOUS
Status: DISCONTINUED | OUTPATIENT
Start: 2025-06-23 | End: 2025-06-23

## 2025-06-23 RX ORDER — PHENYLEPHRINE HCL IN 0.9% NACL 50MG/250ML
PLASTIC BAG, INJECTION (ML) INTRAVENOUS
Status: COMPLETED
Start: 2025-06-23 | End: 2025-06-23

## 2025-06-23 RX ORDER — INSULIN LISPRO 100 [IU]/ML
0-12 INJECTION, SOLUTION INTRAVENOUS; SUBCUTANEOUS ONCE
Status: DISCONTINUED | OUTPATIENT
Start: 2025-06-23 | End: 2025-06-23 | Stop reason: HOSPADM

## 2025-06-23 RX ADMIN — Medication 50 MCG/MIN: at 17:13

## 2025-06-23 RX ADMIN — EZETIMIBE 10 MG: 10 TABLET ORAL at 16:48

## 2025-06-23 RX ADMIN — FENTANYL CITRATE 25 MCG: 50 INJECTION INTRAMUSCULAR; INTRAVENOUS at 13:28

## 2025-06-23 RX ADMIN — FAMOTIDINE 20 MG: 20 TABLET, FILM COATED ORAL at 11:43

## 2025-06-23 RX ADMIN — OXYCODONE HYDROCHLORIDE 10 MG: 5 TABLET ORAL at 22:39

## 2025-06-23 RX ADMIN — ARFORMOTEROL TARTRATE 15 MCG: 15 SOLUTION RESPIRATORY (INHALATION) at 19:49

## 2025-06-23 RX ADMIN — DEXTROSE AND SODIUM CHLORIDE: 5; 450 INJECTION, SOLUTION INTRAVENOUS at 15:29

## 2025-06-23 RX ADMIN — HYDRALAZINE HYDROCHLORIDE 5 MG: 20 INJECTION INTRAMUSCULAR; INTRAVENOUS at 14:01

## 2025-06-23 RX ADMIN — ATORVASTATIN CALCIUM 40 MG: 40 TABLET, FILM COATED ORAL at 16:48

## 2025-06-23 RX ADMIN — FENTANYL CITRATE 25 MCG: 50 INJECTION INTRAMUSCULAR; INTRAVENOUS at 13:05

## 2025-06-23 RX ADMIN — LEVOTHYROXINE SODIUM 112 MCG: 112 TABLET ORAL at 16:48

## 2025-06-23 RX ADMIN — INSULIN GLARGINE 15 UNITS: 100 INJECTION, SOLUTION SUBCUTANEOUS at 21:14

## 2025-06-23 RX ADMIN — OXYCODONE HYDROCHLORIDE 10 MG: 5 TABLET ORAL at 18:30

## 2025-06-23 RX ADMIN — BUDESONIDE 1000 MCG: 0.5 INHALANT RESPIRATORY (INHALATION) at 19:50

## 2025-06-23 RX ADMIN — PROTAMINE SULFATE 25 MG: 10 INJECTION, SOLUTION INTRAVENOUS at 14:31

## 2025-06-23 RX ADMIN — IPRATROPIUM BROMIDE 0.5 MG: 0.5 SOLUTION RESPIRATORY (INHALATION) at 19:49

## 2025-06-23 RX ADMIN — FENTANYL CITRATE 25 MCG: 50 INJECTION INTRAMUSCULAR; INTRAVENOUS at 14:29

## 2025-06-23 RX ADMIN — FENTANYL CITRATE 25 MCG: 50 INJECTION INTRAMUSCULAR; INTRAVENOUS at 13:09

## 2025-06-23 RX ADMIN — WATER 2000 MG: 1 INJECTION INTRAMUSCULAR; INTRAVENOUS; SUBCUTANEOUS at 13:05

## 2025-06-23 RX ADMIN — SODIUM CHLORIDE, SODIUM LACTATE, POTASSIUM CHLORIDE, AND CALCIUM CHLORIDE: .6; .31; .03; .02 INJECTION, SOLUTION INTRAVENOUS at 11:44

## 2025-06-23 RX ADMIN — KETOROLAC TROMETHAMINE 15 MG: 15 INJECTION, SOLUTION INTRAMUSCULAR; INTRAVENOUS at 16:48

## 2025-06-23 RX ADMIN — HYDRALAZINE HYDROCHLORIDE 5 MG: 20 INJECTION INTRAMUSCULAR; INTRAVENOUS at 14:15

## 2025-06-23 RX ADMIN — FENTANYL CITRATE 25 MCG: 50 INJECTION INTRAMUSCULAR; INTRAVENOUS at 14:40

## 2025-06-23 RX ADMIN — SODIUM CHLORIDE: 0.9 INJECTION, SOLUTION INTRAVENOUS at 18:08

## 2025-06-23 RX ADMIN — FENTANYL CITRATE 25 MCG: 50 INJECTION INTRAMUSCULAR; INTRAVENOUS at 13:41

## 2025-06-23 RX ADMIN — FENTANYL CITRATE 25 MCG: 50 INJECTION INTRAMUSCULAR; INTRAVENOUS at 13:36

## 2025-06-23 RX ADMIN — HEPARIN SODIUM 7000 UNITS: 1000 INJECTION INTRAVENOUS; SUBCUTANEOUS at 13:51

## 2025-06-23 ASSESSMENT — PAIN SCALES - GENERAL
PAINLEVEL_OUTOF10: 5
PAINLEVEL_OUTOF10: 7
PAINLEVEL_OUTOF10: 6
PAINLEVEL_OUTOF10: 6

## 2025-06-23 ASSESSMENT — LIFESTYLE VARIABLES: SMOKING_STATUS: 1

## 2025-06-23 ASSESSMENT — PAIN DESCRIPTION - DESCRIPTORS
DESCRIPTORS: ACHING
DESCRIPTORS: ACHING

## 2025-06-23 ASSESSMENT — PAIN - FUNCTIONAL ASSESSMENT: PAIN_FUNCTIONAL_ASSESSMENT: 0-10

## 2025-06-23 ASSESSMENT — PAIN DESCRIPTION - ORIENTATION
ORIENTATION: RIGHT;LEFT
ORIENTATION: RIGHT;LEFT

## 2025-06-23 ASSESSMENT — PAIN DESCRIPTION - LOCATION
LOCATION: GENERALIZED
LOCATION: BACK;LEG
LOCATION: BACK;LEG

## 2025-06-23 ASSESSMENT — COPD QUESTIONNAIRES: CAT_SEVERITY: MODERATE

## 2025-06-23 NOTE — ANESTHESIA POSTPROCEDURE EVALUATION
Department of Anesthesiology  Postprocedure Note    Patient: Macrina Escalona  MRN: [id number]  YOB: 1950  Date of evaluation: 6/23/2025    Procedure Summary       Date: 06/23/25 Room / Location: [facility] CV 02 / [facility] CARDIAC SURGERY    Anesthesia Start: 1303 Anesthesia Stop: 1515    Procedure: Balloon angioplasty and stent of the left internal carotid artery with distal protection. Aortic arch angiogram with interpretation. Placement of catheter to left carotid artery, left carotid angiogram including cerebral runoff with interpretation. Placement of catheter to right common carotid artery, right carotid angiogram with interpretation. Placement of catheter to right subclavian artery, right subclavian artery angiogram with interpretation including vertebral angiogram. (Right: Groin) Diagnosis:       Bilateral carotid artery stenosis      (Bilateral carotid artery stenosis [I65.23])    Surgeons: Porter Stanford MD Responsible Provider: Erika Holt MD    Anesthesia Type: MAC ASA Status: 3            Anesthesia Type: MAC    Enrique Phase I: Enrique Score: 10    Enrique Phase II:      Anesthesia Post Evaluation    Patient location during evaluation: bedside  Patient participation: complete - patient participated  Airway patency: patent  Cardiovascular status: hemodynamically stable  Respiratory status: acceptable  Hydration status: stable    No notable events documented.

## 2025-06-23 NOTE — OP NOTE
subclavian: Is patent to the vertebral artery which is patent and continuous, patent to the circumflex where then it becomes 100% occluded until reconstitution by collaterals at the axillary artery and the arm with runoff to the brachial artery.  Right carotid: The common carotid artery is patent and continuous.  The bifurcation is atherosclerotic but patent without any focal stenosis.  The external is patent as well.  The internal carotid artery appears patent and continuous.  Left carotid: The common carotid artery is fully patent.  The external carotid artery is patent.  There is a tapered stent starting in the common carotid artery that is patent.  At the internal carotid artery becomes 80% narrowed with in-stent stenosis.  The runoff to the internal carotid artery is nicely patent.    Detailed Description of Procedure:   Patient was placed on the table in a supine position the groins were prepped draped usual standard fashion appropriate antibiotics were given.  An ultrasound was performed of the right femoral artery it was noted to be only mildly calcific pulsatile and patent.  The picture was recorded for the chart.  1% lidocaine was used for local and under direct visualization of punctured the artery at 12:00 in position placed a wire to a 6 [place] sheath.  Brought the wire and a flush catheter up to the aortic arch and performed arch angiogram.  Next I selected the left carotid and performed an angiogram.  I gave 7000 of heparin at this point.  This given ACT greater than 270.  I placed a stiff wire to the distal common carotid artery and then brought the shuttle sheath 6 [place] up to the distal common carotid artery.  Once the ACT was ready across the area of the stenosis with the filter wire deployed a filter higher in position.  Deployed an 8 x 8 x 30 carotid stent and postdilated it with a 620 balloon throughout the entirety of the stent.  Follow-up angiogram shows a fully deployed stent with no

## 2025-06-23 NOTE — H&P
March/April 2018    Migraines 1/30/2010    Mitral regurgitation     Orthostatic dizziness 3/30/2016    ?autonomic dysfunction Vs amio side effect     Pacemaker 11/25/2013    DDD    Pure hypercholesterolemia 1/30/2010    Rosacea 1/30/2010    Type II or unspecified type diabetes mellitus without mention of complication, not stated as uncontrolled 2010    IDDM    Unspecified hypothyroidism 1/30/2010      Past Surgical History:   Procedure Laterality Date    ABLATION OF DYSRHYTHMIC FOCUS  2/8/13    Dr Lynch    ABLATION OF DYSRHYTHMIC FOCUS  2/18/2013    CAROTID ENDARTERECTOMY Right 2006    Dr. Hay    CAROTID STENT PLACEMENT Left 11/04/2024    LEFT CAROTID STENT PLACEMENT performed by Porter Stanford MD at [facility] CARDIAC SURGERY    HYSTERECTOMY (CERVIX STATUS UNKNOWN)  5/06    PACEMAKER  11/25/13    DDD    TOTAL KNEE ARTHROPLASTY Right 2016      Social History     Tobacco Use    Smoking status: Every Day     Current packs/day: 0.25     Average packs/day: 0.3 packs/day for 1.5 years (0.4 ttl pk-yrs)     Types: Cigarettes     Start date: 2020     Last attempt to quit: 3/30/2018    Smokeless tobacco: Never    Tobacco comments:     Quit smoking: 10 cigs per day   Substance Use Topics    Alcohol use: Not Currently      Family History   Problem Relation Age of Onset    Breast Cancer Maternal Aunt 70    Heart Disease Neg Hx     Heart Attack Sister     Breast Cancer Paternal Aunt 60    Other Daughter         lupus (SLE) and coagulopathy    Diabetes Daughter     Cancer Maternal Aunt         uterine CA    Diabetes Maternal Grandmother     Cancer Maternal Aunt         breast CA 76yo    Hypertension Mother       Prior to Admission medications    Medication Sig Start Date End Date Taking? Authorizing Provider   ezetimibe (ZETIA) 10 MG tablet Take 1 tablet by mouth daily 6/13/25  Yes Adele Arana MD   atorvastatin (LIPITOR) 40 MG tablet Take 1 tablet by mouth once daily 6/5/25  Yes Luke Everett MD   insulin

## 2025-06-23 NOTE — ANESTHESIA PRE PROCEDURE
Department of Anesthesiology  Preprocedure Note       Name:  Macrina Escalona   Age:  75 y.o.  :  1950                                          MRN:  [id number]         Date:  2025      Surgeon: Surgeon(s):  Porter Stanford MD    Procedure: Procedure(s):  Angiogram of the Aortic Arch including subclavian and Carotid Arteries. Posible Carotid stent of the right or left carotid artery    Medications prior to admission:   Prior to Admission medications    Medication Sig Start Date End Date Taking? Authorizing Provider   ezetimibe (ZETIA) 10 MG tablet Take 1 tablet by mouth daily 25  Yes Adele Arana MD   atorvastatin (LIPITOR) 40 MG tablet Take 1 tablet by mouth once daily 25  Yes Luke Everett MD   insulin glargine (LANTUS) 100 UNIT/ML injection vial Inject 15 Units into the skin nightly 25  Yes Irene Khan MD   loratadine (CLARITIN) 10 MG tablet Take 1 tablet by mouth daily   Yes Irene Khan MD   losartan (COZAAR) 25 MG tablet Take 0.5 tablets by mouth daily   Yes Irene Khan MD   Omega-3 Fatty Acids (KP FISH OIL) 1200 MG CAPS Take 1 capsule by mouth daily   Yes Irene Khan MD   pregabalin (LYRICA) 100 MG capsule Take 1 capsule by mouth 2 times daily.   Yes Irene Khan MD   Vitamin D3 125 MCG (5000 UT) TABS tablet Take 1 tablet by mouth daily   Yes Irene Khan MD   clopidogrel (PLAVIX) 75 MG tablet Take 1 tablet by mouth daily   Yes Irene Khan MD   furosemide (LASIX) 20 MG tablet Take 1 tablet by mouth See Admin Instructions   Yes Irene Khan MD   NONFORMULARY daily [employer] Jay Supplement  Patient not taking: Reported on 2025   Yes Irene Khan MD   carvedilol (COREG) 3.125 MG tablet Take 1 tablet by mouth 2 times daily 9/3/24  Yes Jennie Rodriguez APRN - JUAN   TRELEGY ELLIPTA 200-62.5-25 MCG/ACT AEPB inhaler Inhale 1 puff into the lungs daily 24  Yes Irene Khan MD

## 2025-06-24 VITALS
WEIGHT: 132 LBS | HEART RATE: 61 BPM | RESPIRATION RATE: 23 BRPM | SYSTOLIC BLOOD PRESSURE: 99 MMHG | TEMPERATURE: 98.4 F | OXYGEN SATURATION: 96 % | BODY MASS INDEX: 22.53 KG/M2 | HEIGHT: 64 IN | DIASTOLIC BLOOD PRESSURE: 61 MMHG

## 2025-06-24 LAB
ANION GAP SERPL CALC-SCNC: 9 MMOL/L (ref 3–18)
BUN SERPL-MCNC: 16 MG/DL (ref 6–23)
BUN/CREAT SERPL: 16 (ref 12–20)
CALCIUM SERPL-MCNC: 8.7 MG/DL (ref 8.5–10.1)
CHLORIDE SERPL-SCNC: 102 MMOL/L (ref 98–107)
CO2 SERPL-SCNC: 26 MMOL/L (ref 21–32)
CREAT SERPL-MCNC: 0.94 MG/DL (ref 0.6–1.3)
ERYTHROCYTE [DISTWIDTH] IN BLOOD BY AUTOMATED COUNT: 12.4 % (ref 11.6–14.5)
GLUCOSE BLD STRIP.AUTO-MCNC: 138 MG/DL (ref 70–110)
GLUCOSE BLD STRIP.AUTO-MCNC: 234 MG/DL (ref 70–110)
GLUCOSE SERPL-MCNC: 191 MG/DL (ref 74–108)
HCT VFR BLD AUTO: 37.2 % (ref 35–45)
HGB BLD-MCNC: 12.4 G/DL (ref 12–16)
MCH RBC QN AUTO: 31.3 PG (ref 24–34)
MCHC RBC AUTO-ENTMCNC: 33.3 G/DL (ref 31–37)
MCV RBC AUTO: 93.9 FL (ref 78–100)
NRBC # BLD: 0 K/UL (ref 0–0.01)
NRBC BLD-RTO: 0 PER 100 WBC
PLATELET # BLD AUTO: 214 K/UL (ref 135–420)
PLT FUNCTION P2Y12: 135 PRU (ref 182–335)
PMV BLD AUTO: 10.1 FL (ref 9.2–11.8)
POTASSIUM SERPL-SCNC: 3.5 MMOL/L (ref 3.5–5.5)
RBC # BLD AUTO: 3.96 M/UL (ref 4.2–5.3)
SODIUM SERPL-SCNC: 137 MMOL/L (ref 136–145)
WBC # BLD AUTO: 8.4 K/UL (ref 4.6–13.2)

## 2025-06-24 PROCEDURE — 94640 AIRWAY INHALATION TREATMENT: CPT

## 2025-06-24 PROCEDURE — 80048 BASIC METABOLIC PNL TOTAL CA: CPT

## 2025-06-24 PROCEDURE — 6370000000 HC RX 637 (ALT 250 FOR IP): Performed by: SURGERY

## 2025-06-24 PROCEDURE — 6360000002 HC RX W HCPCS: Performed by: SURGERY

## 2025-06-24 PROCEDURE — 82962 GLUCOSE BLOOD TEST: CPT

## 2025-06-24 PROCEDURE — 94761 N-INVAS EAR/PLS OXIMETRY MLT: CPT

## 2025-06-24 PROCEDURE — 36415 COLL VENOUS BLD VENIPUNCTURE: CPT

## 2025-06-24 PROCEDURE — 2500000003 HC RX 250 WO HCPCS: Performed by: SURGERY

## 2025-06-24 PROCEDURE — 94664 DEMO&/EVAL PT USE INHALER: CPT

## 2025-06-24 PROCEDURE — 85027 COMPLETE CBC AUTOMATED: CPT

## 2025-06-24 RX ORDER — CLOPIDOGREL BISULFATE 75 MG/1
75 TABLET ORAL DAILY
Qty: 90 TABLET | Refills: 1 | Status: SHIPPED | OUTPATIENT
Start: 2025-06-24

## 2025-06-24 RX ORDER — OXYCODONE HYDROCHLORIDE 5 MG/1
10 TABLET ORAL EVERY 4 HOURS PRN
Refills: 0 | Status: DISCONTINUED | OUTPATIENT
Start: 2025-06-24 | End: 2025-06-24 | Stop reason: HOSPADM

## 2025-06-24 RX ORDER — OXYCODONE HYDROCHLORIDE 5 MG/1
5 TABLET ORAL EVERY 4 HOURS PRN
Refills: 0 | Status: DISCONTINUED | OUTPATIENT
Start: 2025-06-24 | End: 2025-06-24 | Stop reason: HOSPADM

## 2025-06-24 RX ADMIN — ARFORMOTEROL TARTRATE 15 MCG: 15 SOLUTION RESPIRATORY (INHALATION) at 08:06

## 2025-06-24 RX ADMIN — OXYCODONE HYDROCHLORIDE 10 MG: 5 TABLET ORAL at 06:46

## 2025-06-24 RX ADMIN — CLOPIDOGREL BISULFATE 75 MG: 75 TABLET, FILM COATED ORAL at 08:23

## 2025-06-24 RX ADMIN — HYDROMORPHONE HYDROCHLORIDE 0.5 MG: 1 INJECTION, SOLUTION INTRAMUSCULAR; INTRAVENOUS; SUBCUTANEOUS at 01:14

## 2025-06-24 RX ADMIN — SODIUM CHLORIDE, PRESERVATIVE FREE 10 ML: 5 INJECTION INTRAVENOUS at 08:24

## 2025-06-24 RX ADMIN — EZETIMIBE 10 MG: 10 TABLET ORAL at 08:23

## 2025-06-24 RX ADMIN — HYDROCHLOROTHIAZIDE 12.5 MG: 12.5 CAPSULE ORAL at 08:23

## 2025-06-24 RX ADMIN — HYDROMORPHONE HYDROCHLORIDE 0.5 MG: 1 INJECTION, SOLUTION INTRAMUSCULAR; INTRAVENOUS; SUBCUTANEOUS at 03:51

## 2025-06-24 RX ADMIN — IPRATROPIUM BROMIDE 0.5 MG: 0.5 SOLUTION RESPIRATORY (INHALATION) at 08:06

## 2025-06-24 RX ADMIN — LEVOTHYROXINE SODIUM 112 MCG: 112 TABLET ORAL at 08:23

## 2025-06-24 RX ADMIN — OXYCODONE HYDROCHLORIDE 10 MG: 5 TABLET ORAL at 10:49

## 2025-06-24 RX ADMIN — ATORVASTATIN CALCIUM 40 MG: 40 TABLET, FILM COATED ORAL at 08:23

## 2025-06-24 RX ADMIN — OXYCODONE HYDROCHLORIDE 10 MG: 5 TABLET ORAL at 02:17

## 2025-06-24 RX ADMIN — BUDESONIDE 1000 MCG: 0.5 INHALANT RESPIRATORY (INHALATION) at 08:05

## 2025-06-24 RX ADMIN — FERROUS SULFATE TAB 325 MG (65 MG ELEMENTAL FE) 325 MG: 325 (65 FE) TAB at 08:23

## 2025-06-24 RX ADMIN — INSULIN LISPRO 1 UNITS: 100 INJECTION, SOLUTION INTRAVENOUS; SUBCUTANEOUS at 10:49

## 2025-06-24 ASSESSMENT — PAIN DESCRIPTION - DESCRIPTORS
DESCRIPTORS: SHOOTING;SHARP
DESCRIPTORS: ACHING
DESCRIPTORS: BURNING;SHOOTING;SHARP

## 2025-06-24 ASSESSMENT — PAIN DESCRIPTION - ORIENTATION
ORIENTATION: LEFT;RIGHT
ORIENTATION: LEFT;RIGHT
ORIENTATION: RIGHT;LEFT

## 2025-06-24 ASSESSMENT — PAIN DESCRIPTION - LOCATION
LOCATION: GENERALIZED;LEG
LOCATION: BACK;LEG
LOCATION: LEG
LOCATION: LEG;GENERALIZED

## 2025-06-24 ASSESSMENT — PAIN SCALES - GENERAL
PAINLEVEL_OUTOF10: 6
PAINLEVEL_OUTOF10: 10
PAINLEVEL_OUTOF10: 8
PAINLEVEL_OUTOF10: 6
PAINLEVEL_OUTOF10: 5
PAINLEVEL_OUTOF10: 10
PAINLEVEL_OUTOF10: 8

## 2025-06-24 NOTE — PROGRESS NOTES
Discharge instructions reviewed with patient/family member and patient transferred to car with x2 family members via wheelchair. IV removed prior.

## 2025-06-24 NOTE — DISCHARGE SUMMARY
Discharge Summary    Date: 6/24/2025  Patient Name: Macrina Escalona    YOB: 1950     Age: 75 y.o.    Admit Date: 6/23/2025  Discharge Date: 6/24/2025  Discharge Condition: Good    Admission Diagnosis  Bilateral carotid artery stenosis [I65.23]      Discharge Diagnosis  Principal Problem:    Bilateral carotid artery stenosis  Resolved Problems:    * No resolved hospital problems. *      Hospital Stay  Narrative of Hospital Course:  Admitted and had left carotid angioplasty and stent. No complications encountered. Hypotensive initially, now off support. No neuro changes.  Stable for DC    Consultants:  None    Surgeries/procedures Performed:  Left carotid stent with distal protection     Treatments:    Surgery    Other    Discharge Plan/Disposition:  Home    Hospital/Incidental Findings Requiring Follow Up:    Patient Instructions:    Diet: Regular Diet    Activity:No Lifting, Driving or Strenuous Excercise, No Sex for and No Heavy Lifting  For number of days (if applicable): 14      Other Instructions:    Provider Follow-Up:   No follow-ups on file.     Significant Diagnostic Studies:    Recent Labs:  Admission on 06/23/2025  POC Glucose                                   Date: 06/23/2025  Value: 139 (H)     Ref range: 70 - 110 mg/dL     Status: Final                Comment: (NOTE)  The FDA has indicated that no capillary point of care blood glucose   monitoring systems are approved for use in \"critically ill\" patients,   however they have not defined this population. The College of   American Pathologists has recommended that these devices should not   be used in cases such as severe hypotension, dehydration, shock, and   hyperglycemic-hyperosmolar state, amongst others.  Venous or arterial   collection is the recommended specimen for testing these patients.    Activated Clotting Time                       Date: 06/23/2025  Value: 297 (H)     Ref range: 79 - 138 SECS      Status: Final  POC Glucose

## 2025-06-24 NOTE — CARE COORDINATION
Pt discharged  Pt left the unit before CM could get to him.  Not d/c needs noted.            Hoa Magallanes, BSN RN  Care Management

## 2025-06-24 NOTE — ACP (ADVANCE CARE PLANNING)
Advance Care Planning   Healthcare Decision Maker:    Primary Decision Maker: Taylor Escalona - Child - [phone]     conducted an initial consultation and Spiritual Assessment for Macrina Escalona, who is a 75 y.o.,female. Patient’s Primary Language is: English.   According to the patient’s EMR Advent Affiliation is: Scientologist.     The  provided the following Interventions:  Initiated a relationship of care and support with patient in bed 2354 where she has had Bilateral Carotid artery stenosis.  Explored issues of jenny, belief, spirituality and Faith/ritual needs while hospitalized.Patient is not involved in a local Lutheran. There is an advance directive on file.  Listened empathically.  Offered prayer and assurance of continued prayers on patients behalf.       The following outcomes were achieved:  Patient shared limited information about her medical narrative and spiritual journey/beliefs.  Patient processed feeling about current hospitalization.  Patient expressed gratitude for pastoral care visit.    Assessment:  Patient does not have any Faith/cultural needs that will affect patient’s preferences in health care.  There are no further spiritual or Faith issues which require Spiritual Care Services interventions at this time.       Spiritual Health History and Assessment/Progress Note  [facility]    Initial Encounter, Crisis ([id number]),  ,  ,      Name: Macrina Escalona MRN: [id number]    Age: 75 y.o.     Sex: female   Language: English   Anabaptist: Scientologist   Bilateral carotid artery stenosis     Date: 6/24/2025                           Spiritual Assessment began in [facility] 2 CV INTNSV CARE        Referral/Consult From: Rounding   Encounter Overview/Reason: Initial Encounter, Crisis ([id number])  Service Provided For: Patient    Jenny, Belief, Meaning:   Patient: is not connected to a local Lutheran setting.  Family/Friends No family/friends present

## 2025-06-24 NOTE — PLAN OF CARE
Problem: Safety - Adult  Goal: Free from fall injury  6/24/2025 0853 by Henry Ghotra RN  Outcome: Progressing  6/24/2025 0302 by Gloria Manjarrez RN  Outcome: Progressing     Problem: Pain  Goal: Verbalizes/displays adequate comfort level or baseline comfort level  6/24/2025 0853 by Henry Ghotra RN  Outcome: Progressing  6/24/2025 0302 by Gloria Manjarrez RN  Outcome: Progressing     Problem: Chronic Conditions and Co-morbidities  Goal: Patient's chronic conditions and co-morbidity symptoms are monitored and maintained or improved  6/24/2025 0853 by Henry Ghotra RN  Outcome: Progressing  Flowsheets (Taken 6/24/2025 0830)  Care Plan - Patient's Chronic Conditions and Co-Morbidity Symptoms are Monitored and Maintained or Improved:   Monitor and assess patient's chronic conditions and comorbid symptoms for stability, deterioration, or improvement   Collaborate with multidisciplinary team to address chronic and comorbid conditions and prevent exacerbation or deterioration  6/24/2025 0302 by Gloria Manjarrez RN  Outcome: Progressing  Flowsheets (Taken 6/23/2025 1553 by Clevinger, Amish, RN)  Care Plan - Patient's Chronic Conditions and Co-Morbidity Symptoms are Monitored and Maintained or Improved:   Monitor and assess patient's chronic conditions and comorbid symptoms for stability, deterioration, or improvement   Collaborate with multidisciplinary team to address chronic and comorbid conditions and prevent exacerbation or deterioration     Problem: Discharge Planning  Goal: Discharge to home or other facility with appropriate resources  6/24/2025 0853 by Henry Ghotra RN  Outcome: Progressing  Flowsheets (Taken 6/24/2025 0830)  Discharge to home or other facility with appropriate resources:   Identify barriers to discharge with patient and caregiver   Arrange for needed discharge resources and transportation as appropriate  6/24/2025 0302 by Gloria Manjarrez RN  Outcome:

## 2025-06-24 NOTE — PLAN OF CARE
Problem: Safety - Adult  Goal: Free from fall injury  6/24/2025 0302 by Gloria Manjarrez RN  Outcome: Progressing  6/23/2025 1522 by Henry Ghotra RN  Outcome: Progressing     Problem: Pain  Goal: Verbalizes/displays adequate comfort level or baseline comfort level  6/24/2025 0302 by Gloria Manjarrez RN  Outcome: Progressing  6/23/2025 1522 by Henry Ghotra RN  Outcome: Progressing     Problem: Chronic Conditions and Co-morbidities  Goal: Patient's chronic conditions and co-morbidity symptoms are monitored and maintained or improved  6/24/2025 0302 by Gloria Manjarrez RN  Outcome: Progressing  Flowsheets (Taken 6/23/2025 1553 by Henry Ghotra, RN)  Care Plan - Patient's Chronic Conditions and Co-Morbidity Symptoms are Monitored and Maintained or Improved:   Monitor and assess patient's chronic conditions and comorbid symptoms for stability, deterioration, or improvement   Collaborate with multidisciplinary team to address chronic and comorbid conditions and prevent exacerbation or deterioration  6/23/2025 1522 by Henry Ghotra RN  Outcome: Progressing     Problem: Discharge Planning  Goal: Discharge to home or other facility with appropriate resources  6/24/2025 0302 by Gloria Manjarrez RN  Outcome: Progressing  Flowsheets (Taken 6/23/2025 1553 by Henry Ghotra, RN)  Discharge to home or other facility with appropriate resources:   Identify barriers to discharge with patient and caregiver   Arrange for needed discharge resources and transportation as appropriate  6/23/2025 1522 by Henry Ghotra RN  Outcome: Progressing     Problem: Skin/Tissue Integrity  Goal: Skin integrity remains intact  Description: 1.  Monitor for areas of redness and/or skin breakdown  2.  Assess vascular access sites hourly  3.  Every 4-6 hours minimum:  Change oxygen saturation probe site  4.  Every 4-6 hours:  If on nasal continuous positive airway pressure, respiratory therapy assess nares and determine

## 2025-08-06 ENCOUNTER — TELEPHONE (OUTPATIENT)
Age: 75
End: 2025-08-06

## 2025-08-06 DIAGNOSIS — I50.42 CHRONIC COMBINED SYSTOLIC AND DIASTOLIC CONGESTIVE HEART FAILURE (HCC): ICD-10-CM

## 2025-08-06 RX ORDER — SACUBITRIL AND VALSARTAN 24; 26 MG/1; MG/1
1 TABLET, FILM COATED ORAL 2 TIMES DAILY
Qty: 60 TABLET | Refills: 6 | Status: SHIPPED | OUTPATIENT
Start: 2025-08-06 | End: 2025-08-07 | Stop reason: SDUPTHER

## 2025-08-07 DIAGNOSIS — I50.42 CHRONIC COMBINED SYSTOLIC AND DIASTOLIC CONGESTIVE HEART FAILURE (HCC): ICD-10-CM

## 2025-08-07 RX ORDER — SACUBITRIL AND VALSARTAN 24; 26 MG/1; MG/1
1 TABLET, FILM COATED ORAL 2 TIMES DAILY
Qty: 180 TABLET | Refills: 1 | Status: SHIPPED | OUTPATIENT
Start: 2025-08-07

## 2025-09-01 DIAGNOSIS — E78.00 PURE HYPERCHOLESTEROLEMIA: ICD-10-CM

## 2025-09-02 RX ORDER — ATORVASTATIN CALCIUM 40 MG/1
40 TABLET, FILM COATED ORAL DAILY
Qty: 90 TABLET | Refills: 3 | Status: SHIPPED | OUTPATIENT
Start: 2025-09-02

## (undated) DEVICE — ANGIO-SEAL VIP VASCULAR CLOSURE DEVICE: Brand: ANGIO-SEAL

## (undated) DEVICE — TRAY MYEL SFTY +

## (undated) DEVICE — CATHETER ANGIO BER2 038 AD 4 FRX100 CM TEMPO AQUA

## (undated) DEVICE — GAUZE,SPONGE,8"X4",12PLY,XRAY,STRL,LF: Brand: MEDLINE

## (undated) DEVICE — CATHETER ANGIO 4FR L100CM COR NYL AR MOD W/O SIDE H RADPQ

## (undated) DEVICE — SYRINGE MED 10ML LUERLOCK TIP W/O SFTY DISP

## (undated) DEVICE — Device

## (undated) DEVICE — RADIFOCUS TORQUE DEVICE MULTI-TORQUE VISE: Brand: RADIFOCUS TORQUE DEVICE

## (undated) DEVICE — CATHETER ART 20 GAX4 CM 22 GA RADIAL FEP

## (undated) DEVICE — GLIDESHEATH SLENDER STAINLESS STEEL KIT: Brand: GLIDESHEATH SLENDER

## (undated) DEVICE — SYRINGE MED 30ML STD CLR PLAS LUERLOCK TIP N CTRL DISP

## (undated) DEVICE — INTRODUCER SHTH 4FR CANN L11CM DIL TIP 25MM RED TUNGSTEN

## (undated) DEVICE — PAD N ADH W3XL4IN POLY COT SFT PERF FLM EASILY CUT ABSRB

## (undated) DEVICE — INTRODUCER SHTH 6FR CANN L11CM DIL TIP 35MM GRN TUNGSTEN

## (undated) DEVICE — GLOVE SURG SZ 7 L11.33IN FNGR THK9.8MIL STRW LTX POLYMER

## (undated) DEVICE — FLEXOR, TUOHY-BORST SIDE-ARM INTRODUCER SHUTTLE, - SL: Brand: FLEXOR

## (undated) DEVICE — TOWEL,OR,DSP,ST,BLUE,DLX,4/PK,20PK/CS: Brand: MEDLINE

## (undated) DEVICE — POLYURETHANE, 6"W X 48"L, STERILE, EXTENDED FIELD ULTRASOUND PROBE & CORD COVER WITH 20ML STERILE GEL & 2 ELASTICS, ROLLED: Brand: SHEATHES

## (undated) DEVICE — PROCEDURE KIT FLUID MGMT 10 FR CUST MAINFOLD

## (undated) DEVICE — GUIDEWIRE VASC L180CM DIA0.035IN L15CM STR TIP PTFE S STL

## (undated) DEVICE — OVERHEAD TABLE COVER: Brand: CONVERTORS

## (undated) DEVICE — SET FLD ADMIN 3 W STPCOCK FIX FEM L BOR 1IN

## (undated) DEVICE — 1LYRTR 16FR10ML100%SILI UMSNAP: Brand: MEDLINE INDUSTRIES, INC.

## (undated) DEVICE — PROBE VASC 8MHZ WTRPRF

## (undated) DEVICE — GLOVE SURG SZ 7.5 L11.73IN FNGR THK9.8MIL STRW LTX POLYMER

## (undated) DEVICE — CATHETER DIAG AD 4FR L100CM STD NYL JUDKINS R 4 TRULUMEN

## (undated) DEVICE — PACK PROCEDURE SURG VASC CATH 161 MMC LF

## (undated) DEVICE — SOLUTION IV 1000ML 0.9% SOD CHL

## (undated) DEVICE — APPLICATOR MEDICATED 10.5 CC SOLUTION HI LT ORNG CHLORAPREP

## (undated) DEVICE — CATHETER ANGIO 4FR L100CM S STL NYL JL4 3 SEG BRAID SFT

## (undated) DEVICE — DEVICE STBL AD TRICOT ANCHR PD FOR 3 W F CATH STATLOK

## (undated) DEVICE — GUIDEWIRE: Brand: AMPLATZ SUPER STIFF™

## (undated) DEVICE — RADIFOCUS GLIDEWIRE: Brand: GLIDEWIRE

## (undated) DEVICE — ELECTRODE PT RET AD L9FT HI MOIST COND ADH HYDRGEL CORDED

## (undated) DEVICE — BAREWIRE DISTAL ACCESS FILTER DELIVERY WIRE 190 CM: Brand: BAREWIRE

## (undated) DEVICE — MEDIA CONTRAST 10ML 200MG/ML 41%

## (undated) DEVICE — FLEXOR TUOHY-BORST SIDE-ARM INTRODUCER SHUTTLE SELECT,: Brand: FLEXOR

## (undated) DEVICE — COVER LT HNDL UNIV PUR FLX DISP 2 PER PK

## (undated) DEVICE — CATHETER ANGIO UNIV FLSH 035 AD 4 FRX90 CM 5 SH TEMPO AQUA

## (undated) DEVICE — 3M™ TEGADERM™ TRANSPARENT FILM DRESSING FRAME STYLE, 1626W, 4 IN X 4-3/4 IN (10 CM X 12 CM), 50/CT 4CT/CASE: Brand: 3M™ TEGADERM™

## (undated) DEVICE — DECANTER BAG 9": Brand: MEDLINE INDUSTRIES, INC.

## (undated) DEVICE — (D)BNDG ADHESIVE FABRIC 3/4X3 -- DISC BY MFR USE ITEM 357960

## (undated) DEVICE — SHEATH 6FR 11CM BRITETIP

## (undated) DEVICE — SHEATH 4FR 11CM BRITETIP

## (undated) DEVICE — SUPPORT WRST AD W3.5XL9IN DIA14.5IN ART SFT ADJ HK AND LOOP

## (undated) DEVICE — PRESSURE MONITORING SET: Brand: TRUWAVE

## (undated) DEVICE — NEEDLE ANGIO 1 WALL ULT SMOOTH 19GAX7CM MERIT AVANCE

## (undated) DEVICE — BAND HEMOSTAT DRY D-STAT RAD --

## (undated) DEVICE — VIATRAC 14 PLUS PERIPHERAL DILATATION CATHETER 4.0 MM X 30 MM X 135 CM: Brand: VIATRAC

## (undated) DEVICE — KIT OR TURNOVER

## (undated) DEVICE — VIATRAC 14 PLUS PERIPHERAL DILATATION CATHETER 5.0 MM X 20 MM X 135 CM: Brand: VIATRAC

## (undated) DEVICE — LIMB HOLDER, WRIST/ANKLE: Brand: DEROYAL

## (undated) DEVICE — (D)SYR 10ML 1/5ML GRAD NSAF -- PKGING CHANGE USE ITEM 338027

## (undated) DEVICE — SYRINGE KIT,PACKAGED,,150FT,MK 7(ANGIO-ARTERION, 150ML SYR KIT W/QFT,MC)(60729385): Brand: MEDRAD® MARK 7 ARTERION DISPOSABLE SYRINGE 150 ML WITH QUICK FILL TUBE

## (undated) DEVICE — SKIN PREP TRAY 4 COMPARTM TRAY: Brand: MEDLINE INDUSTRIES, INC.

## (undated) DEVICE — CATH BLLN ANGIO 3X20MM SC EUPHORA RX